# Patient Record
Sex: FEMALE | Race: WHITE | NOT HISPANIC OR LATINO | Employment: OTHER | URBAN - METROPOLITAN AREA
[De-identification: names, ages, dates, MRNs, and addresses within clinical notes are randomized per-mention and may not be internally consistent; named-entity substitution may affect disease eponyms.]

---

## 2017-03-23 ENCOUNTER — OFFICE VISIT (OUTPATIENT)
Dept: MEDICAL GROUP | Facility: PHYSICIAN GROUP | Age: 82
End: 2017-03-23
Payer: MEDICARE

## 2017-03-23 VITALS
RESPIRATION RATE: 16 BRPM | HEIGHT: 64 IN | BODY MASS INDEX: 23.9 KG/M2 | SYSTOLIC BLOOD PRESSURE: 130 MMHG | OXYGEN SATURATION: 95 % | HEART RATE: 77 BPM | DIASTOLIC BLOOD PRESSURE: 70 MMHG | WEIGHT: 140 LBS | TEMPERATURE: 97.4 F

## 2017-03-23 DIAGNOSIS — Z13.29 SCREENING FOR THYROID DISORDER: ICD-10-CM

## 2017-03-23 DIAGNOSIS — Z13.21 ENCOUNTER FOR VITAMIN DEFICIENCY SCREENING: ICD-10-CM

## 2017-03-23 DIAGNOSIS — R01.1 MURMUR, CARDIAC: ICD-10-CM

## 2017-03-23 DIAGNOSIS — N39.41 URGE INCONTINENCE: ICD-10-CM

## 2017-03-23 DIAGNOSIS — I10 ESSENTIAL HYPERTENSION: ICD-10-CM

## 2017-03-23 DIAGNOSIS — E78.5 DYSLIPIDEMIA: ICD-10-CM

## 2017-03-23 DIAGNOSIS — M81.0 OSTEOPOROSIS, POST-MENOPAUSAL: ICD-10-CM

## 2017-03-23 PROCEDURE — 1101F PT FALLS ASSESS-DOCD LE1/YR: CPT | Performed by: FAMILY MEDICINE

## 2017-03-23 PROCEDURE — 4040F PNEUMOC VAC/ADMIN/RCVD: CPT | Performed by: FAMILY MEDICINE

## 2017-03-23 PROCEDURE — 1036F TOBACCO NON-USER: CPT | Performed by: FAMILY MEDICINE

## 2017-03-23 PROCEDURE — 99214 OFFICE O/P EST MOD 30 MIN: CPT | Performed by: FAMILY MEDICINE

## 2017-03-23 PROCEDURE — G8432 DEP SCR NOT DOC, RNG: HCPCS | Performed by: FAMILY MEDICINE

## 2017-03-23 PROCEDURE — G8484 FLU IMMUNIZE NO ADMIN: HCPCS | Performed by: FAMILY MEDICINE

## 2017-03-23 PROCEDURE — G8420 CALC BMI NORM PARAMETERS: HCPCS | Performed by: FAMILY MEDICINE

## 2017-03-23 RX ORDER — ATORVASTATIN CALCIUM 20 MG/1
20 TABLET, FILM COATED ORAL DAILY
Qty: 30 TAB | Refills: 11 | Status: SHIPPED | OUTPATIENT
Start: 2017-03-23 | End: 2018-07-10 | Stop reason: SDUPTHER

## 2017-03-23 RX ORDER — LISINOPRIL 40 MG/1
40 TABLET ORAL DAILY
Qty: 90 TAB | Refills: 1 | Status: SHIPPED | OUTPATIENT
Start: 2017-03-23 | End: 2017-03-23 | Stop reason: SDUPTHER

## 2017-03-23 RX ORDER — OXYBUTYNIN CHLORIDE 5 MG/1
5 TABLET, EXTENDED RELEASE ORAL DAILY
Qty: 30 TAB | Refills: 11 | Status: SHIPPED | OUTPATIENT
Start: 2017-03-23 | End: 2018-07-10 | Stop reason: SDUPTHER

## 2017-03-23 RX ORDER — LISINOPRIL 40 MG/1
40 TABLET ORAL DAILY
Qty: 30 TAB | Refills: 11 | Status: SHIPPED | OUTPATIENT
Start: 2017-03-23 | End: 2018-07-10 | Stop reason: SDUPTHER

## 2017-03-23 RX ORDER — AMLODIPINE BESYLATE 5 MG/1
5 TABLET ORAL
Qty: 30 TAB | Refills: 11 | Status: SHIPPED | OUTPATIENT
Start: 2017-03-23 | End: 2018-07-10 | Stop reason: SDUPTHER

## 2017-03-23 ASSESSMENT — PATIENT HEALTH QUESTIONNAIRE - PHQ9: CLINICAL INTERPRETATION OF PHQ2 SCORE: 0

## 2017-03-23 NOTE — PROGRESS NOTES
Subjective:   Milena Ang is a 81 y.o. female here today for establishing care, discuss chronic medical conditions including dyslipidemia, hypertension, urinary incontinence.    Her previous PCP was at [facility]. In 2015 she has seen Dr. Garnett in our clinic. She is here for reestablishing care.    Dyslipidemia  Patient has had dyslipidemia for several years and has been taking Lipitor 20 mg daily. She does not have any complaints today. She was also taking niacin at some point but due to side effects of intermittent flushing, she stopped taking it over 2 years back. Her last labs were done with Dr. Shay Baker at [facility] about a year back.    Incontinence  Patient has long-standing history of urinary urge incontinence and bladder prolapse. In 2012 she had a bladder sling surgery. Since then her symptoms have improved a lot, however she still has symptoms for about incontinence. She needs to get up at least once and has sleep to go to the bathroom. Whenever she goes out for any social activities, she makes sure that she wears pads. Denies any urine leakage with coughing, sneezing. She was taking Detrol LA 4 mg daily for her urge incontinence, however that did not seem to make much difference, therefore she stopped taking it almost over a year back.    HTN (hypertension)  She has long-standing history of hypertension. Recently she has been increasing exercise mostly with equipment at home. Her blood pressure has mostly been well controlled in recent times. She is currently taking amlodipine 5 mg daily and lisinopril 40 mg daily. She needs refills on those medications.    Hx of Osteoporosis, post-menopausal  She had a history of osteoporosis that was diagnosed several years back. Apparently she took some medication for it, possibly a bisphosphonate (she cannot remember name) for about a year. I reviewed her last DEXA scan that was done in 2011 and that shows osteopenia with increased fracture risk. She she  "does do regular exercise. She also takes calcium and vitamin D daily. She has not had any recent falls or fractures.    Murmur, cardiac  She does not have any symptoms today. Denies any chest pain, shortness of breath, palpitations. On cardiac exam I do appreciate a grade 3 systolic murmur in the aortic area. She has never had any echocardiogram.         Current medicines (including changes today)  Current Outpatient Prescriptions   Medication Sig Dispense Refill   • oxybutynin SR (DITROPAN-XL) 5 MG TABLET SR 24 HR Take 1 Tab by mouth every day. 30 Tab 11   • amlodipine (NORVASC) 5 MG Tab Take 1 Tab by mouth every bedtime. 30 Tab 11   • atorvastatin (LIPITOR) 20 MG Tab Take 1 Tab by mouth every day. 30 Tab 11   • lisinopril (PRINIVIL, ZESTRIL) 40 MG tablet Take 1 Tab by mouth every day. 30 Tab 11   • vitamin D (CHOLECALCIFEROL) 1000 UNIT Tab Take 3,000 Units by mouth every day.     • ascorbic acid (ASCORBIC ACID) 500 MG Tab Take 500 mg by mouth every day.     • coenzyme Q-10 30 MG capsule Take 60 mg by mouth every day.       No current facility-administered medications for this visit.     She  has a past medical history of Dyslipidemia; Menopausal and postmenopausal disorder; Colon polyp; OSTEOPOROSIS; Cervical high risk human papillomavirus (HPV) DNA test positive; Incontinence; Diverticulosis of colon; Hypertension; Cataract; and Kidney stone. She also has no past medical history of Breast cancer (CMS-[facility]).    ROS   No chest pain, no shortness of breath, no abdominal pain  No nausea, vomiting  No fatigue, heat or cold intolerance.  No rash       Objective:     Blood pressure 130/70, pulse 77, temperature 36.3 °C (97.4 °F), resp. rate 16, height 1.626 m (5' 4\"), weight 63.504 kg (140 lb), last menstrual period 07/01/1986, SpO2 95 %. Body mass index is 24.02 kg/(m^2).     PHYSICAL EXAM     GEN: Alert and oriented,well appearing, no acute distress  SKIN: warm, dry to touch, no rashes or lesions in visible " areas  PSYCH: mood and affect normal, judgement normal  EYES: Conjunctiva clear, lids normal,pupils equal round and reactive  ENMT: Normal external nose and ears,EACs normal appearing, TM pearly gray with normal light reflex bilaterally,nasal mucosa and turbinates normal appearing without erythema or edema, lips without lesions,good dentition,oropharynx clear  Neck : Trachea midline, no masses or swelling, no thyromegaly  LYMPHATIC : No cervical or supraclavicular lymphadenopathy  RESPIRATORY : Unlabored respiratory effort, no distress noted, clear to auscultation bilaterally, no wheeze, rhonchi, crackles  CARDIOVASCULAR: RRR, S1 S2 normal, grade 3 systolic murmur in aortic area, no gallop , no carotid bruit, no edema of the extremities      Assessment and Plan:   The following treatment plan was discussed    1. Dyslipidemia  This is a chronic medical condition.Controlled.Continue current medications.  Refill for Lipitor given  - LIPID PROFILE; Future    2. Essential hypertension  This is a chronic medical condition.Controlled.Continue current medications.  Continue current medications.  - CBC WITH DIFFERENTIAL; Future  - COMP METABOLIC PANEL; Future  - amlodipine (NORVASC) 5 MG Tab; Take 1 Tab by mouth every bedtime.  Dispense: 30 Tab; Refill: 11  - lisinopril (PRINIVIL, ZESTRIL) 40 MG tablet; Take 1 Tab by mouth every day.  Dispense: 30 Tab; Refill: 11    3. Urge incontinence  This is a chronic medical condition.Uncontrolled.  Given prescription for oxybutynin extended release 5 mg daily.    4. Osteoporosis, post-menopausal  Patient has not been on any medications.  Her last DEXA scan in 2011 showed osteopenia with increased fracture risk.  We will reevaluate with a DEXA scan.  - DS-BONE DENSITY STUDY (DEXA); Future    5. Murmur, cardiac  This is a new problem. Patient is asymptomatic.  - ECHOCARDIOGRAM COMP W/O CONT; Future    6. Encounter for vitamin deficiency screening  - VITAMIN D,25 HYDROXY; Future    7.  Screening for thyroid disorder  - TSH WITH REFLEX TO FT4; Future      Followup: Return in about 6 months (around 9/23/2017) for follow up on chronic med conditions.         Please note that this dictation was created using voice recognition software. I have made every reasonable attempt to correct obvious errors, but I expect that there are errors of grammar and possibly content that I did not discover before finalizing the note.

## 2017-03-23 NOTE — ASSESSMENT & PLAN NOTE
She had a history of osteoporosis that was diagnosed several years back. Apparently she took some medication for it, possibly a bisphosphonate (she cannot remember name) for about a year. I reviewed her last DEXA scan that was done in 2011 and that shows osteopenia with increased fracture risk. She she does do regular exercise. She also takes calcium and vitamin D daily. She has not had any recent falls or fractures.

## 2017-03-23 NOTE — ASSESSMENT & PLAN NOTE
Patient has long-standing history of urinary urge incontinence and bladder prolapse. In 2012 she had a bladder sling surgery. Since then her symptoms have improved a lot, however she still has symptoms for about incontinence. She needs to get up at least once and has sleep to go to the bathroom. Whenever she goes out for any social activities, she makes sure that she wears pads. Denies any urine leakage with coughing, sneezing. She was taking Detrol LA 4 mg daily for her urge incontinence, however that did not seem to make much difference, therefore she stopped taking it almost over a year back.

## 2017-03-23 NOTE — ASSESSMENT & PLAN NOTE
She has long-standing history of hypertension. Recently she has been increasing exercise mostly with equipment at home. Her blood pressure has mostly been well controlled in recent times. She is currently taking amlodipine 5 mg daily and lisinopril 40 mg daily. She needs refills on those medications.

## 2017-03-23 NOTE — ASSESSMENT & PLAN NOTE
Patient has had dyslipidemia for several years and has been taking Lipitor 20 mg daily. She does not have any complaints today. She was also taking niacin at some point but due to side effects of intermittent flushing, she stopped taking it over 2 years back. Her last labs were done with Dr. Shay Baker at [facility] about a year back.

## 2017-03-23 NOTE — MR AVS SNAPSHOT
"Milena Ang   3/23/2017 2:00 PM   Office Visit   MRN: [id number]    Department:  [facility]   Dept Phone:  [phone]    Description:  Female : 1935   Provider:  Willem Montana M.D.           Reason for Visit     Establish Care           Allergies as of 3/23/2017     Allergen Noted Reactions    Nitrofurantoin 2014   Rash    rash    Pcn [Penicillins] 2010   Rash      You were diagnosed with     Dyslipidemia   [352899]       Essential hypertension   [1312966]       Urge incontinence   [788.31.ICD-9-CM]       Encounter for Medicare annual wellness exam   [384704]       Osteoporosis, post-menopausal   [779981]       Cervical high risk human papillomavirus (HPV) DNA test positive   [795.05.ICD-9-CM]       Incontinence   [598217]       Diverticulosis of large intestine without hemorrhage   [1262757]       IGT (impaired glucose tolerance)   [512272]       Encounter for vitamin deficiency screening   [048138]       Screening for thyroid disorder   [V77.0.ICD-9-CM]       Murmur, cardiac   [351555]       Osteopenia   [990631]       Osteoporosis   [5499608]         Vital Signs     Blood Pressure Pulse Temperature Respirations Height Weight    130/70 mmHg 77 36.3 °C (97.4 °F) 16 1.626 m (5' 4\") 63.504 kg (140 lb)    Body Mass Index Oxygen Saturation Last Menstrual Period Smoking Status          24.02 kg/m2 95% 1986 Never Smoker         Basic Information     Date Of Birth Sex Race Ethnicity Preferred Language    1935 Female White Non- English      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   Unknown - Present    Cervical high risk human papillomavirus (HPV) DNA test positive R87.810   Unknown - Present    Incontinence R32   Unknown - Present    Diverticulosis of colon K57.30   Unknown - Present    IGT (impaired glucose tolerance) R73.02   3/12/2014 - " Present    HTN (hypertension) I10   1/14/2015 - Present      Health Maintenance        Date Due Completion Dates    PAP SMEAR 9/26/2014 9/26/2011    MAMMOGRAM 4/21/2015 4/21/2014, 11/28/2012, 11/21/2011, 2/20/2009, 2/20/2009, 10/10/2006, 3/4/2004    IMM INFLUENZA (1) 9/1/2016 10/10/2012    COLONOSCOPY 10/15/2018 10/15/2008 (Done)    Override on 10/15/2008: Done    BONE DENSITY 4/21/2019 4/21/2014 (Done), 11/21/2011, 10/10/2006, 3/4/2004    Override on 4/21/2014: Done    IMM DTaP/Tdap/Td Vaccine (2 - Td) 9/20/2026 9/20/2016            Current Immunizations     13-VALENT PCV PREVNAR 9/20/2016    Influenza TIV (IM) 10/10/2012    OPV - Historical Data 7/20/1993, 10/23/1990    Pneumococcal Vaccine (UF)Historical Data 10/25/2012, 8/17/2006    Pneumococcal polysaccharide vaccine (PPSV-23) 10/29/2012    SHINGLES VACCINE 10/30/2012, 10/25/2012    Tdap Vaccine 9/20/2016    Tetanus Vaccine 11/3/2004      Below and/or attached are the medications your provider expects you to take. Review all of your home medications and newly ordered medications with your provider and/or pharmacist. Follow medication instructions as directed by your provider and/or pharmacist. Please keep your medication list with you and share with your provider. Update the information when medications are discontinued, doses are changed, or new medications (including over-the-counter products) are added; and carry medication information at all times in the event of emergency situations     Allergies:  NITROFURANTOIN - Rash     PCN - Rash               Medications  Valid as of: March 23, 2017 -  2:52 PM    Generic Name Brand Name Tablet Size Instructions for use    AmLODIPine Besylate (Tab) NORVASC 5 MG Take 1 Tab by mouth every bedtime.        Ascorbic Acid (Tab) ascorbic acid 500 MG Take 500 mg by mouth every day.        Atorvastatin Calcium (Tab) LIPITOR 20 MG Take 1 Tab by mouth every day.        Cholecalciferol (Tab) cholecalciferol 1000 UNIT Take 3,000  Units by mouth every day.        Coenzyme Q10 (Cap) coenzyme Q-10 30 MG Take 60 mg by mouth every day.        Lisinopril (Tab) PRINIVIL, ZESTRIL 40 MG Take 1 Tab by mouth every day.        Oxybutynin Chloride (TABLET SR 24 HR) DITROPAN-XL 5 MG Take 1 Tab by mouth every day.        .                 Medicines prescribed today were sent to:     [facility] [id number] [place] [address], [address] AT [address] [place] [address]    Phone: [phone] Fax: [phone]    Open 24 Hours?: No      Medication refill instructions:       If your prescription bottle indicates you have medication refills left, it is not necessary to call your provider’s office. Please contact your pharmacy and they will refill your medication.    If your prescription bottle indicates you do not have any refills left, you may request refills at any time through one of the following ways: The online [employer] system (except Urgent Care), by calling your provider’s office, or by asking your pharmacy to contact your provider’s office with a refill request. Medication refills are processed only during regular business hours and may not be available until the next business day. Your provider may request additional information or to have a follow-up visit with you prior to refilling your medication.   *Please Note: Medication refills are assigned a new Rx number when refilled electronically. Your pharmacy may indicate that no refills were authorized even though a new prescription for the same medication is available at the pharmacy. Please request the medicine by name with the pharmacy before contacting your provider for a refill.        Your To Do List     Future Labs/Procedures Complete By Expires    CBC WITH DIFFERENTIAL  As directed 3/24/2018    COMP METABOLIC PANEL  As directed 3/24/2018    DS-BONE DENSITY STUDY (DEXA)  As directed 9/23/2017    ECHOCARDIOGRAM COMP W/O CONT  As directed 3/24/2018       Scheduling Instructions:    Systolic murmur in aortic area    LIPID PROFILE  As directed 3/24/2018    TSH WITH REFLEX TO FT4  As directed 3/23/2018    VITAMIN D,25 HYDROXY  As directed 3/24/2018         Wejo Access Code: [email or web address]  Expires: 4/22/2017  2:52 PM    Wejo  A secure, online tool to manage your health information     [employer]’s Wejo® is a secure, online tool that connects you to your personalized health information from the privacy of your home -- day or night - making it very easy for you to manage your healthcare. Once the activation process is completed, you can even access your medical information using the Wejo alberto, which is available for free in the Apple Alberto store or Google Play store.     Wejo provides the following levels of access (as shown below):   My Chart Features   [facility] Doctor [facility]  Urgent  Care Non-[facility]  Primary Care  Doctor   Email your healthcare team securely and privately 24/7 X X X    Manage appointments: schedule your next appointment; view details of past/upcoming appointments X      Request prescription refills. X      View recent personal medical records, including lab and immunizations X X X X   View health record, including health history, allergies, medications X X X X   Read reports about your outpatient visits, procedures, consult and ER notes X X X X   See your discharge summary, which is a recap of your hospital and/or ER visit that includes your diagnosis, lab results, and care plan. X X       How to register for Wejo:  1. Go to  [email or web address].  2. Click on the Sign Up Now box, which takes you to the New Member Sign Up page. You will need to provide the following information:  a. Enter your Wejo Access Code exactly as it appears at the top of this page. (You will not need to use this code after you’ve completed the sign-up process. If you do not sign up before the expiration date, you  must request a new code.)   b. Enter your date of birth.   c. Enter your home email address.   d. Click Submit, and follow the next screen’s instructions.  3. Create a tsumobit ID. This will be your [employer] login ID and cannot be changed, so think of one that is secure and easy to remember.  4. Create a tsumobit password. You can change your password at any time.  5. Enter your Password Reset Question and Answer. This can be used at a later time if you forget your password.   6. Enter your e-mail address. This allows you to receive e-mail notifications when new information is available in [employer].  7. Click Sign Up. You can now view your health information.    For assistance activating your [employer] account, call [phone]

## 2017-03-23 NOTE — ASSESSMENT & PLAN NOTE
She does not have any symptoms today. Denies any chest pain, shortness of breath, palpitations. On cardiac exam I do appreciate a grade 3 systolic murmur in the aortic area. She has never had any echocardiogram.

## 2017-04-20 ENCOUNTER — HOSPITAL ENCOUNTER (OUTPATIENT)
Dept: CARDIOLOGY | Facility: MEDICAL CENTER | Age: 82
End: 2017-04-20
Attending: FAMILY MEDICINE
Payer: MEDICARE

## 2017-04-20 ENCOUNTER — HOSPITAL ENCOUNTER (OUTPATIENT)
Dept: RADIOLOGY | Facility: MEDICAL CENTER | Age: 82
End: 2017-04-20
Attending: FAMILY MEDICINE
Payer: MEDICARE

## 2017-04-20 DIAGNOSIS — M81.0 OSTEOPOROSIS, POST-MENOPAUSAL: ICD-10-CM

## 2017-04-20 DIAGNOSIS — R01.1 MURMUR, CARDIAC: ICD-10-CM

## 2017-04-20 LAB
LV EJECT FRACT  99904: 65
LV EJECT FRACT MOD 2C 99903: 65.98
LV EJECT FRACT MOD 4C 99902: 62.92
LV EJECT FRACT MOD BP 99901: 64.48

## 2017-04-20 PROCEDURE — 93306 TTE W/DOPPLER COMPLETE: CPT | Mod: 26 | Performed by: INTERNAL MEDICINE

## 2017-04-20 PROCEDURE — 77080 DXA BONE DENSITY AXIAL: CPT

## 2017-04-21 ENCOUNTER — TELEPHONE (OUTPATIENT)
Dept: MEDICAL GROUP | Facility: PHYSICIAN GROUP | Age: 82
End: 2017-04-21

## 2017-04-21 NOTE — TELEPHONE ENCOUNTER
----- Message from Willem Montana M.D. sent at 4/20/2017  7:39 PM PDT -----  Please ask patient to make an appointment to discuss DEXA scan results.It is abnormal and we will need to discuss medication.  Her echocardiogram is generally normal.There are a few not so significant findings which we can discuss at the above appointment.    Willem Montana M.D.

## 2017-04-26 ENCOUNTER — TELEPHONE (OUTPATIENT)
Dept: MEDICAL GROUP | Facility: PHYSICIAN GROUP | Age: 82
End: 2017-04-26

## 2017-04-27 NOTE — TELEPHONE ENCOUNTER
Please inform patient that I have her echocardiogram results.There is some mild to moderate valve abnormality.Please ask her to make an appointment (short visit) to discuss in more detail.    Thank You  Willem Montana M.D.

## 2017-05-02 ENCOUNTER — OFFICE VISIT (OUTPATIENT)
Dept: MEDICAL GROUP | Facility: PHYSICIAN GROUP | Age: 82
End: 2017-05-02
Payer: MEDICARE

## 2017-05-02 VITALS
RESPIRATION RATE: 14 BRPM | WEIGHT: 139 LBS | HEIGHT: 64 IN | TEMPERATURE: 98.2 F | SYSTOLIC BLOOD PRESSURE: 130 MMHG | HEART RATE: 61 BPM | OXYGEN SATURATION: 96 % | DIASTOLIC BLOOD PRESSURE: 56 MMHG | BODY MASS INDEX: 23.73 KG/M2

## 2017-05-02 DIAGNOSIS — M85.88 OSTEOPENIA OF SPINE: ICD-10-CM

## 2017-05-02 DIAGNOSIS — I35.1 NONRHEUMATIC AORTIC VALVE INSUFFICIENCY: ICD-10-CM

## 2017-05-02 PROCEDURE — 4040F PNEUMOC VAC/ADMIN/RCVD: CPT | Performed by: FAMILY MEDICINE

## 2017-05-02 PROCEDURE — G8420 CALC BMI NORM PARAMETERS: HCPCS | Performed by: FAMILY MEDICINE

## 2017-05-02 PROCEDURE — 1101F PT FALLS ASSESS-DOCD LE1/YR: CPT | Performed by: FAMILY MEDICINE

## 2017-05-02 PROCEDURE — 99213 OFFICE O/P EST LOW 20 MIN: CPT | Performed by: FAMILY MEDICINE

## 2017-05-02 PROCEDURE — 1036F TOBACCO NON-USER: CPT | Performed by: FAMILY MEDICINE

## 2017-05-02 PROCEDURE — G8432 DEP SCR NOT DOC, RNG: HCPCS | Performed by: FAMILY MEDICINE

## 2017-05-02 NOTE — ASSESSMENT & PLAN NOTE
Patient is here for review of DEXA scan results.She reports that she was found to have osteopenia in the past in around 2010 when she was treated with a Bisphosphonate once a week for a year.A repeat DEXA had shown some improvement and she was advised to stop the medication.She is currently not taking Vitamin D but she is taking a multivitamin that has calcium.She eats a balanced diet and tries to take milk, yogurt and cheese regularly.

## 2017-05-02 NOTE — PROGRESS NOTES
Subjective:   Milena Ang is a 81 y.o. female here today for to discuss recent DEXA and echocardiogram results.    Osteopenia of spine  Patient is here for review of DEXA scan results.She reports that she was found to have osteopenia in the past in around 2010 when she was treated with a Bisphosphonate once a week for a year.A repeat DEXA had shown some improvement and she was advised to stop the medication.She is currently not taking Vitamin D but she is taking a multivitamin that has calcium.She eats a balanced diet and tries to take milk, yogurt and cheese regularly.    Nonrheumatic aortic valve insufficiency  Patient recently had an echocardiogram done since I found a grade 3 murmur in the aortic area on my last exam.She is here to discuss the results.She denies chest pain, shortness of breath.         Current medicines (including changes today)  Current Outpatient Prescriptions   Medication Sig Dispense Refill   • oxybutynin SR (DITROPAN-XL) 5 MG TABLET SR 24 HR Take 1 Tab by mouth every day. 30 Tab 11   • amlodipine (NORVASC) 5 MG Tab Take 1 Tab by mouth every bedtime. 30 Tab 11   • atorvastatin (LIPITOR) 20 MG Tab Take 1 Tab by mouth every day. 30 Tab 11   • lisinopril (PRINIVIL, ZESTRIL) 40 MG tablet Take 1 Tab by mouth every day. 30 Tab 11   • vitamin D (CHOLECALCIFEROL) 1000 UNIT Tab Take 3,000 Units by mouth every day.     • ascorbic acid (ASCORBIC ACID) 500 MG Tab Take 500 mg by mouth every day.     • coenzyme Q-10 30 MG capsule Take 60 mg by mouth every day.       No current facility-administered medications for this visit.     She  has a past medical history of Dyslipidemia; Menopausal and postmenopausal disorder; Colon polyp; OSTEOPOROSIS; Cervical high risk human papillomavirus (HPV) DNA test positive; Incontinence; Diverticulosis of colon; Hypertension; Cataract; and Kidney stone. She also has no past medical history of Breast cancer (CMS-[facility]).    ROS   No chest pain, no shortness of breath,  "no abdominal pain       Objective:     Blood pressure 130/56, pulse 61, temperature 36.8 °C (98.2 °F), resp. rate 14, height 1.626 m (5' 4\"), weight 63.05 kg (139 lb), last menstrual period 07/01/1986, SpO2 96 %. Body mass index is 23.85 kg/(m^2).     PHYSICAL EXAM     GEN: Alert and oriented,well appearing, no acute distress  SKIN: warm, dry to touch, no rashes or lesions in visible areas  PSYCH: mood and affect normal, judgement normal  RESPIRATORY : Unlabored respiratory effort, no distress noted, clear to auscultation bilaterally, no wheeze, rhonchi, crackles  CARDIOVASCULAR: RRR, S1 S2 normal, grade 3 murmur in aortic area , no edema of the extremities, pedal pulses B/L 2+      Assessment and Plan:   The following treatment plan was discussed    1. Nonrheumatic aortic valve insufficiency  New diagnosis.  Reviewed Echocardiogram results with patient.Moderate Aortic insufficiency, Grade I diastolic dysfunction, Normal EF of 65 %, no wall motion abnormalities.  Monitor for ay symptoms like CP, SOB, dizziness etc.Repeat Echocardiogram in 2years.    2. Osteopenia of spine  Reviewed DEXA scan results with patient.  The mean bone mineral density for the lumbar spine is 1.005 g/cm2, which corresponds to a T score of -1.5 and a Z score of 0.4.  The proximal left femur has a mean bone mineral density of 0.909 g/cm2, with a T score of -0.8 and a Z score of 1.3.  When compared to the previous study dated 11/21/2011, there has been a 3.6% increase in the bone mineral density of the lumbar spine and a  3.4% increase in the bone mineral density of the left femur.  10-year Probability of Fracture:  Major Osteoporotic     13.0%  Hip     3.2%  With improvement in BMD since last scan, will continue conservative management and repeat DEXA in 2 years  Continue balanced diet, more fruits and veggies, daily exercise.Labs ordered.Will reassess Vitamin D level.Advised that she should be taking Calcium (800 - 1000 mg) daily along with " Vitamin D  Followup: Return in about 6 months (around 11/2/2017) for follow up on chronic med conditions.         Please note that this dictation was created using voice recognition software. I have made every reasonable attempt to correct obvious errors, but I expect that there are errors of grammar and possibly content that I did not discover before finalizing the note.

## 2017-05-02 NOTE — ASSESSMENT & PLAN NOTE
Patient recently had an echocardiogram done since I found a grade 3 murmur in the aortic area on my last exam.She is here to discuss the results.She denies chest pain, shortness of breath.

## 2017-05-02 NOTE — MR AVS SNAPSHOT
"        Milena Pang Sav   2017 11:00 AM   Office Visit   MRN: [id number]    Department:  [facility]   Dept Phone:  [phone]    Description:  Female : 1935   Provider:  Willem Montana M.D.           Reason for Visit     Results ECHO AND DEXA SCAN       Allergies as of 2017     Allergen Noted Reactions    Nitrofurantoin 2014   Rash    rash    Pcn [Penicillins] 2010   Rash      Vital Signs     Blood Pressure Pulse Temperature Respirations Height Weight    130/56 mmHg 61 36.8 °C (98.2 °F) 14 1.626 m (5' 4\") 63.05 kg (139 lb)    Body Mass Index Oxygen Saturation Last Menstrual Period Smoking Status          23.85 kg/m2 96% 1986 Never Smoker         Basic Information     Date Of Birth Sex Race Ethnicity Preferred Language    1935 Female White Non- English      Your appointments     Sep 05, 2017 10:00 AM   Established Patient with Willem Montana M.D.   [address] [address]   [phone]           You will be receiving a confirmation call a few days before your appointment from our automated call confirmation system.              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   Unknown - Present    Cervical high risk human papillomavirus (HPV) DNA test positive R87.810   Unknown - Present    Incontinence R32   Unknown - Present    Diverticulosis of colon K57.30   Unknown - Present    IGT (impaired glucose tolerance) R73.02   3/12/2014 - Present    HTN (hypertension) I10   2015 - Present    Murmur, cardiac R01.1   3/23/2017 - Present      Health Maintenance        Date Due Completion Dates    PAP SMEAR 2014    MAMMOGRAM 2015, 2012, 2011, 2009, 2009, 10/10/2006, 3/4/2004    COLONOSCOPY 10/15/2018 10/15/2008 (Done)    Override on 10/15/2008: Done  "    BONE DENSITY 4/20/2022 4/20/2017, 4/21/2014 (Done), 11/21/2011, 10/10/2006, 3/4/2004    Override on 4/21/2014: Done    IMM DTaP/Tdap/Td Vaccine (2 - Td) 9/20/2026 9/20/2016            Current Immunizations     13-VALENT PCV PREVNAR 9/20/2016    Influenza TIV (IM) 10/10/2012    OPV - Historical Data 7/20/1993, 10/23/1990    Pneumococcal Vaccine (UF)Historical Data 10/25/2012, 8/17/2006    Pneumococcal polysaccharide vaccine (PPSV-23) 10/29/2012    SHINGLES VACCINE 10/30/2012, 10/25/2012    Tdap Vaccine 9/20/2016    Tetanus Vaccine 11/3/2004      Below and/or attached are the medications your provider expects you to take. Review all of your home medications and newly ordered medications with your provider and/or pharmacist. Follow medication instructions as directed by your provider and/or pharmacist. Please keep your medication list with you and share with your provider. Update the information when medications are discontinued, doses are changed, or new medications (including over-the-counter products) are added; and carry medication information at all times in the event of emergency situations     Allergies:  NITROFURANTOIN - Rash     PCN - Rash               Medications  Valid as of: May 02, 2017 - 11:16 AM    Generic Name Brand Name Tablet Size Instructions for use    AmLODIPine Besylate (Tab) NORVASC 5 MG Take 1 Tab by mouth every bedtime.        Ascorbic Acid (Tab) ascorbic acid 500 MG Take 500 mg by mouth every day.        Atorvastatin Calcium (Tab) LIPITOR 20 MG Take 1 Tab by mouth every day.        Cholecalciferol (Tab) cholecalciferol 1000 UNIT Take 3,000 Units by mouth every day.        Coenzyme Q10 (Cap) coenzyme Q-10 30 MG Take 60 mg by mouth every day.        Lisinopril (Tab) PRINIVIL, ZESTRIL 40 MG Take 1 Tab by mouth every day.        Oxybutynin Chloride (TABLET SR 24 HR) DITROPAN-XL 5 MG Take 1 Tab by mouth every day.        .                 Medicines prescribed today were sent to:     [id number]  [place], [place] - [address] AT [facility] [address]    [address] [place] [address]    Phone: [phone] Fax: [phone]    Open 24 Hours?: No      Medication refill instructions:       If your prescription bottle indicates you have medication refills left, it is not necessary to call your provider’s office. Please contact your pharmacy and they will refill your medication.    If your prescription bottle indicates you do not have any refills left, you may request refills at any time through one of the following ways: The online Medisync Bioservices system (except Urgent Care), by calling your provider’s office, or by asking your pharmacy to contact your provider’s office with a refill request. Medication refills are processed only during regular business hours and may not be available until the next business day. Your provider may request additional information or to have a follow-up visit with you prior to refilling your medication.   *Please Note: Medication refills are assigned a new Rx number when refilled electronically. Your pharmacy may indicate that no refills were authorized even though a new prescription for the same medication is available at the pharmacy. Please request the medicine by name with the pharmacy before contacting your provider for a refill.           Medisync Bioservices Access Code: [email or web address]  Expires: 6/1/2017 11:16 AM    Medisync Bioservices  A secure, online tool to manage your health information     [employer]’s Medisync Bioservices® is a secure, online tool that connects you to your personalized health information from the privacy of your home -- day or night - making it very easy for you to manage your healthcare. Once the activation process is completed, you can even access your medical information using the Medisync Bioservices annemarie, which is available for free in the Apple Annemarie store or Google Play store.     Medisync Bioservices provides the following levels of access (as shown below):   My Chart  Features   [facility] Doctor [facility]  Specialists [facility]  Urgent  Care Non-[facility]  Primary Care  Doctor   Email your healthcare team securely and privately 24/7 X X X    Manage appointments: schedule your next appointment; view details of past/upcoming appointments X      Request prescription refills. X      View recent personal medical records, including lab and immunizations X X X X   View health record, including health history, allergies, medications X X X X   Read reports about your outpatient visits, procedures, consult and ER notes X X X X   See your discharge summary, which is a recap of your hospital and/or ER visit that includes your diagnosis, lab results, and care plan. X X       How to register for BeVocal:  1. Go to  [email or web address].  2. Click on the Sign Up Now box, which takes you to the New Member Sign Up page. You will need to provide the following information:  a. Enter your BeVocal Access Code exactly as it appears at the top of this page. (You will not need to use this code after you’ve completed the sign-up process. If you do not sign up before the expiration date, you must request a new code.)   b. Enter your date of birth.   c. Enter your home email address.   d. Click Submit, and follow the next screen’s instructions.  3. Create a BeVocal ID. This will be your BeVocal login ID and cannot be changed, so think of one that is secure and easy to remember.  4. Create a BeVocal password. You can change your password at any time.  5. Enter your Password Reset Question and Answer. This can be used at a later time if you forget your password.   6. Enter your e-mail address. This allows you to receive e-mail notifications when new information is available in BeVocal.  7. Click Sign Up. You can now view your health information.    For assistance activating your BeVocal account, call [phone]

## 2017-05-25 ENCOUNTER — OFFICE VISIT (OUTPATIENT)
Dept: URGENT CARE | Facility: PHYSICIAN GROUP | Age: 82
End: 2017-05-25
Payer: MEDICARE

## 2017-05-25 VITALS
OXYGEN SATURATION: 96 % | WEIGHT: 140 LBS | HEIGHT: 63 IN | TEMPERATURE: 98.8 F | DIASTOLIC BLOOD PRESSURE: 70 MMHG | BODY MASS INDEX: 24.8 KG/M2 | SYSTOLIC BLOOD PRESSURE: 132 MMHG | HEART RATE: 68 BPM | RESPIRATION RATE: 16 BRPM

## 2017-05-25 DIAGNOSIS — M79.89 LEFT LEG SWELLING: ICD-10-CM

## 2017-05-25 DIAGNOSIS — M79.605 LEFT LEG PAIN: ICD-10-CM

## 2017-05-25 PROCEDURE — 1036F TOBACCO NON-USER: CPT | Performed by: FAMILY MEDICINE

## 2017-05-25 PROCEDURE — 1101F PT FALLS ASSESS-DOCD LE1/YR: CPT | Performed by: FAMILY MEDICINE

## 2017-05-25 PROCEDURE — 4040F PNEUMOC VAC/ADMIN/RCVD: CPT | Performed by: FAMILY MEDICINE

## 2017-05-25 PROCEDURE — G8432 DEP SCR NOT DOC, RNG: HCPCS | Performed by: FAMILY MEDICINE

## 2017-05-25 PROCEDURE — G8420 CALC BMI NORM PARAMETERS: HCPCS | Performed by: FAMILY MEDICINE

## 2017-05-25 PROCEDURE — 99214 OFFICE O/P EST MOD 30 MIN: CPT | Performed by: FAMILY MEDICINE

## 2017-05-25 NOTE — MR AVS SNAPSHOT
"        Milena Pang Sav   2017 4:00 PM   Office Visit   MRN: [id number]    Department:  [facility]   Dept Phone:  [phone]    Description:  Female : 1935   Provider:  Jordin Fontenot M.D.           Reason for Visit     Leg Pain left lower leg pain, described as \"tightness,\" x3 days      Allergies as of 2017     Allergen Noted Reactions    Nitrofurantoin 2014   Rash    rash    Pcn [Penicillins] 2010   Rash      You were diagnosed with     Left leg pain   [003240]       Left leg swelling   [[id number]]         Vital Signs     Blood Pressure Pulse Temperature Respirations Height Weight    132/70 mmHg 68 37.1 °C (98.8 °F) 16 1.6 m (5' 2.99\") 63.504 kg (140 lb)    Body Mass Index Oxygen Saturation Last Menstrual Period Smoking Status          24.81 kg/m2 96% 1986 Never Smoker         Basic Information     Date Of Birth Sex Race Ethnicity Preferred Language    1935 Female White Non- English      Problem List              ICD-10-CM Priority Class Noted - Resolved    Dyslipidemia E78.5   Unknown - Present    Cervical high risk human papillomavirus (HPV) DNA test positive R87.810   Unknown - Present    Incontinence R32   Unknown - Present    Diverticulosis of colon K57.30   Unknown - Present    IGT (impaired glucose tolerance) R73.02   3/12/2014 - Present    HTN (hypertension) I10   2015 - Present    Murmur, cardiac R01.1   3/23/2017 - Present    Nonrheumatic aortic valve insufficiency I35.1   2017 - Present    Osteopenia of spine M85.88   2017 - Present      Health Maintenance        Date Due Completion Dates    PAP SMEAR 2014    MAMMOGRAM 2015, 2012, 2011, 2009, 2009, 10/10/2006, 3/4/2004    COLONOSCOPY 10/15/2018 10/15/2008 (Done)    Override on 10/15/2008: Done    BONE DENSITY 2022, 2014 (Done), 2011, 10/10/2006, 3/4/2004    Override on 2014: Done    IMM DTaP/Tdap/Td " Vaccine (2 - Td) 9/20/2026 9/20/2016            Current Immunizations     13-VALENT PCV PREVNAR 9/20/2016    Influenza TIV (IM) 10/10/2012    OPV - Historical Data 7/20/1993, 10/23/1990    Pneumococcal Vaccine (UF)Historical Data 10/25/2012, 8/17/2006    Pneumococcal polysaccharide vaccine (PPSV-23) 10/29/2012    SHINGLES VACCINE 10/30/2012, 10/25/2012    Tdap Vaccine 9/20/2016    Tetanus Vaccine 11/3/2004      Below and/or attached are the medications your provider expects you to take. Review all of your home medications and newly ordered medications with your provider and/or pharmacist. Follow medication instructions as directed by your provider and/or pharmacist. Please keep your medication list with you and share with your provider. Update the information when medications are discontinued, doses are changed, or new medications (including over-the-counter products) are added; and carry medication information at all times in the event of emergency situations     Allergies:  NITROFURANTOIN - Rash     PCN - Rash               Medications  Valid as of: May 25, 2017 -  5:10 PM    Generic Name Brand Name Tablet Size Instructions for use    AmLODIPine Besylate (Tab) NORVASC 5 MG Take 1 Tab by mouth every bedtime.        Ascorbic Acid (Tab) ascorbic acid 500 MG Take 500 mg by mouth every day.        Atorvastatin Calcium (Tab) LIPITOR 20 MG Take 1 Tab by mouth every day.        Cholecalciferol (Tab) cholecalciferol 1000 UNIT Take 3,000 Units by mouth every day.        Coenzyme Q10 (Cap) coenzyme Q-10 30 MG Take 60 mg by mouth every day.        Lisinopril (Tab) PRINIVIL, ZESTRIL 40 MG Take 1 Tab by mouth every day.        Oxybutynin Chloride (TABLET SR 24 HR) DITROPAN-XL 5 MG Take 1 Tab by mouth every day.        .                 Medicines prescribed today were sent to:     [id number]  [place], [place] - [address] [place] [address] [place] AT [address] [place] [address]    Phone:  [phone] Fax: [phone]    Open 24 Hours?: No      Medication refill instructions:       If your prescription bottle indicates you have medication refills left, it is not necessary to call your provider’s office. Please contact your pharmacy and they will refill your medication.    If your prescription bottle indicates you do not have any refills left, you may request refills at any time through one of the following ways: The online CoaLogix system (except Urgent Care), by calling your provider’s office, or by asking your pharmacy to contact your provider’s office with a refill request. Medication refills are processed only during regular business hours and may not be available until the next business day. Your provider may request additional information or to have a follow-up visit with you prior to refilling your medication.   *Please Note: Medication refills are assigned a new Rx number when refilled electronically. Your pharmacy may indicate that no refills were authorized even though a new prescription for the same medication is available at the pharmacy. Please request the medicine by name with the pharmacy before contacting your provider for a refill.        Your To Do List     Future Labs/Procedures Complete By Expires    US-EXTREMITY VENOUS UNILATERAL-LOWER LEFT  As directed 6/1/2017    Comments:    [phone]         CoaLogix Access Code: [email or web address]  Expires: 6/1/2017 11:16 AM    CoaLogix  A secure, online tool to manage your health information     [employer]’s CoaLogix® is a secure, online tool that connects you to your personalized health information from the privacy of your home -- day or night - making it very easy for you to manage your healthcare. Once the activation process is completed, you can even access your medical information using the CoaLogix alberto, which is available for free in the Apple Alberto store or Google Play store.     CoaLogix provides the following levels of access (as  shown below):   My Chart Features   [facility] Doctor [facility]  Specialists [facility]  Urgent  Care Non-[facility]  Primary Care  Doctor   Email your healthcare team securely and privately 24/7 X X X    Manage appointments: schedule your next appointment; view details of past/upcoming appointments X      Request prescription refills. X      View recent personal medical records, including lab and immunizations X X X X   View health record, including health history, allergies, medications X X X X   Read reports about your outpatient visits, procedures, consult and ER notes X X X X   See your discharge summary, which is a recap of your hospital and/or ER visit that includes your diagnosis, lab results, and care plan. X X       How to register for Appboy:  1. Go to  [email or web address].  2. Click on the Sign Up Now box, which takes you to the New Member Sign Up page. You will need to provide the following information:  a. Enter your Appboy Access Code exactly as it appears at the top of this page. (You will not need to use this code after you’ve completed the sign-up process. If you do not sign up before the expiration date, you must request a new code.)   b. Enter your date of birth.   c. Enter your home email address.   d. Click Submit, and follow the next screen’s instructions.  3. Create a Appboy ID. This will be your Appboy login ID and cannot be changed, so think of one that is secure and easy to remember.  4. Create a Appboy password. You can change your password at any time.  5. Enter your Password Reset Question and Answer. This can be used at a later time if you forget your password.   6. Enter your e-mail address. This allows you to receive e-mail notifications when new information is available in Appboy.  7. Click Sign Up. You can now view your health information.    For assistance activating your Appboy account, call [phone]

## 2017-05-25 NOTE — PROGRESS NOTES
"Chief Complaint:    Chief Complaint   Patient presents with   • Leg Pain     left lower leg pain, described as \"tightness,\" x3 days       History of Present Illness:    This is a new problem. Since 5/22/17, has pain and swelling in left lower calf region. Can feel tight like pressure and fluctuating squeezing sensation. No injury or trauma. Ibuprofen helps temporarily. No h/o blood clots. No recent long plane or car trip.      Review of Systems:    Constitutional: Negative for fever, chills, and diaphoresis.   Eyes: Negative for change in vision, photophobia, pain, redness, and discharge.  ENT: Negative for ear pain, ear discharge, hearing loss, tinnitus, nasal congestion, nosebleeds, and sore throat.    Respiratory: Negative for cough, hemoptysis, sputum production, shortness of breath, wheezing, and stridor.    Cardiovascular: Negative for chest pain, palpitations, orthopnea, claudication, leg swelling, and PND.   Gastrointestinal: Negative for abdominal pain, nausea, vomiting, diarrhea, constipation, blood in stool, and melena.   Genitourinary: No new symptoms.  Musculoskeletal: See HPI.   Skin: Negative for rash and itching.   Neurological: Negative for dizziness, tingling, tremors, sensory change, speech change, focal weakness, seizures, loss of consciousness, and headaches.   Endo: Negative for polydipsia.   Heme: Does not bruise/bleed easily.   Psychiatric/Behavioral: Negative for depression, suicidal ideas, hallucinations, memory loss and substance abuse. The patient is not nervous/anxious and does not have insomnia.      Past Medical History:    Past Medical History   Diagnosis Date   • Dyslipidemia    • Menopausal and postmenopausal disorder    • Colon polyp      hyperplastic   • OSTEOPOROSIS    • Cervical high risk human papillomavirus (HPV) DNA test positive    • Incontinence      pessary   • Diverticulosis of colon    • Hypertension    • Cataract    • Kidney stone        Past Surgical History:    Past " "Surgical History   Procedure Laterality Date   • Cholecystectomy     • Bladder sling female  7/12     Dr. Quinn       Social History:    Social History     Social History   • Marital Status:      Spouse Name: Rivera   • Number of Children: 2   • Years of Education: college     Occupational History   • Retired Teacher Other     Social History Main Topics   • Smoking status: Never Smoker    • Smokeless tobacco: Never Used   • Alcohol Use: No   • Drug Use: No   • Sexual Activity: No     Other Topics Concern   • Not on file     Social History Narrative       Family History:    Family History   Problem Relation Age of Onset   • Cancer Father      pancreatic   • Heart Disease Father      MI at age 57   • Hypertension Father    • Cancer Brother      throat   • Cancer Brother      melanoma   • Heart Disease Brother    • Psychiatry Daughter    • Cancer Sister      ovaren cancer       Medications:    Current Outpatient Prescriptions on File Prior to Visit   Medication Sig Dispense Refill   • oxybutynin SR (DITROPAN-XL) 5 MG TABLET SR 24 HR Take 1 Tab by mouth every day. 30 Tab 11   • amlodipine (NORVASC) 5 MG Tab Take 1 Tab by mouth every bedtime. 30 Tab 11   • atorvastatin (LIPITOR) 20 MG Tab Take 1 Tab by mouth every day. 30 Tab 11   • lisinopril (PRINIVIL, ZESTRIL) 40 MG tablet Take 1 Tab by mouth every day. 30 Tab 11   • vitamin D (CHOLECALCIFEROL) 1000 UNIT Tab Take 3,000 Units by mouth every day.     • ascorbic acid (ASCORBIC ACID) 500 MG Tab Take 500 mg by mouth every day.     • coenzyme Q-10 30 MG capsule Take 60 mg by mouth every day.       No current facility-administered medications on file prior to visit.       Allergies:    Allergies   Allergen Reactions   • Nitrofurantoin Rash     rash   • Pcn [Penicillins] Rash         Vitals:    Filed Vitals:    05/25/17 1615   BP: 132/70   Pulse: 68   Temp: 37.1 °C (98.8 °F)   Resp: 16   Height: 1.6 m (5' 2.99\")   Weight: 63.504 kg (140 lb)   SpO2: 96% "       Physical Exam:    Constitutional: Vital signs reviewed. Appears well-developed and well-nourished. No acute distress.   Eyes: Sclera white, conjunctivae clear.  ENT: External ears normal. Hearing normal.  Cardiovascular: Peripheral pulses 2+. Moderate varicosities bilaterally.  Pulmonary/Chest: Respirations non-labored.  Musculoskeletal: Left lower calf region: area of soft tissue swelling compared to right without significant tenderness to palpation. Normal gait. Normal range of motion. No muscular atrophy or weakness.  Neurological: Alert and oriented to person, place, and time. Muscle tone normal. Coordination normal. Light touch and sensation normal.   Skin: No rashes or lesions. Warm, dry, normal turgor.  Psychiatric: Normal mood and affect. Behavior is normal. Judgment and thought content normal.       Assessment / Plan:    1. Left leg pain  - US-EXTREMITY VENOUS UNILATERAL-LOWER LEFT; Future    2. Left leg swelling  - US-EXTREMITY VENOUS UNILATERAL-LOWER LEFT; Future      Discussed with her DDX and management options.    Agreeable to Venous Doppler US left leg ordered to r/o DVT - could not be scheduled for today.     Due to protocol, tonight, staff cannot schedule test for tomorrow. Staff will need to schedule test tomorrow AM per protocol.    May continue with Ibuprofen prn as this is helping temporarily.    Says may call [phone] (H) with result and OK to leave message with result.

## 2017-05-26 ENCOUNTER — HOSPITAL ENCOUNTER (OUTPATIENT)
Dept: RADIOLOGY | Facility: MEDICAL CENTER | Age: 82
End: 2017-05-26
Attending: FAMILY MEDICINE
Payer: MEDICARE

## 2017-05-26 DIAGNOSIS — M79.605 LEFT LEG PAIN: ICD-10-CM

## 2017-05-26 DIAGNOSIS — M79.89 LEFT LEG SWELLING: ICD-10-CM

## 2017-05-26 PROCEDURE — 93971 EXTREMITY STUDY: CPT

## 2017-05-26 PROCEDURE — 93971 EXTREMITY STUDY: CPT | Mod: 26 | Performed by: SURGERY

## 2017-05-27 ENCOUNTER — TELEPHONE (OUTPATIENT)
Dept: URGENT CARE | Facility: PHYSICIAN GROUP | Age: 82
End: 2017-05-27

## 2017-05-27 DIAGNOSIS — M79.605 PAIN OF LEFT LOWER EXTREMITY: ICD-10-CM

## 2017-05-27 RX ORDER — PREDNISONE 20 MG/1
TABLET ORAL
Qty: 7 TAB | Refills: 0 | Status: SHIPPED | OUTPATIENT
Start: 2017-05-27 | End: 2018-07-10

## 2017-05-27 NOTE — TELEPHONE ENCOUNTER
Spoke to her. Discussed with her US result. She reports wearing compression stocking during day helps. Advised she may continue. She reports Ibuprofen and Aspirin help temporarily. Advised she may continue. Discussed with her short-term Rx for Prednisone for anti-inflammatory effect. She would like and this was rx'd. Advised to follow-up with PCP or return to urgent care if getting worse or not improving at all in 3-5 days with Prednisone.

## 2017-11-16 ENCOUNTER — HOSPITAL ENCOUNTER (OUTPATIENT)
Dept: LAB | Facility: MEDICAL CENTER | Age: 82
End: 2017-11-16
Attending: FAMILY MEDICINE
Payer: MEDICARE

## 2017-11-16 DIAGNOSIS — E78.5 DYSLIPIDEMIA: ICD-10-CM

## 2017-11-16 DIAGNOSIS — Z13.21 ENCOUNTER FOR VITAMIN DEFICIENCY SCREENING: ICD-10-CM

## 2017-11-16 DIAGNOSIS — I10 ESSENTIAL HYPERTENSION: ICD-10-CM

## 2017-11-16 DIAGNOSIS — Z13.29 SCREENING FOR THYROID DISORDER: ICD-10-CM

## 2017-11-16 LAB
25(OH)D3 SERPL-MCNC: 22 NG/ML (ref 30–100)
ALBUMIN SERPL BCP-MCNC: 4.3 G/DL (ref 3.2–4.9)
ALBUMIN/GLOB SERPL: 1.4 G/DL
ALP SERPL-CCNC: 72 U/L (ref 30–99)
ALT SERPL-CCNC: 22 U/L (ref 2–50)
ANION GAP SERPL CALC-SCNC: 9 MMOL/L (ref 0–11.9)
AST SERPL-CCNC: 21 U/L (ref 12–45)
BASOPHILS # BLD AUTO: 1.3 % (ref 0–1.8)
BASOPHILS # BLD: 0.06 K/UL (ref 0–0.12)
BILIRUB SERPL-MCNC: 1.3 MG/DL (ref 0.1–1.5)
BUN SERPL-MCNC: 22 MG/DL (ref 8–22)
CALCIUM SERPL-MCNC: 9.6 MG/DL (ref 8.5–10.5)
CHLORIDE SERPL-SCNC: 108 MMOL/L (ref 96–112)
CHOLEST SERPL-MCNC: 147 MG/DL (ref 100–199)
CO2 SERPL-SCNC: 24 MMOL/L (ref 20–33)
CREAT SERPL-MCNC: 0.8 MG/DL (ref 0.5–1.4)
EOSINOPHIL # BLD AUTO: 0.03 K/UL (ref 0–0.51)
EOSINOPHIL NFR BLD: 0.7 % (ref 0–6.9)
ERYTHROCYTE [DISTWIDTH] IN BLOOD BY AUTOMATED COUNT: 42.7 FL (ref 35.9–50)
GFR SERPL CREATININE-BSD FRML MDRD: >60 ML/MIN/1.73 M 2
GLOBULIN SER CALC-MCNC: 3.1 G/DL (ref 1.9–3.5)
GLUCOSE SERPL-MCNC: 101 MG/DL (ref 65–99)
HCT VFR BLD AUTO: 48.2 % (ref 37–47)
HDLC SERPL-MCNC: 39 MG/DL
HGB BLD-MCNC: 15.8 G/DL (ref 12–16)
IMM GRANULOCYTES # BLD AUTO: 0.02 K/UL (ref 0–0.11)
IMM GRANULOCYTES NFR BLD AUTO: 0.4 % (ref 0–0.9)
LDLC SERPL CALC-MCNC: 76 MG/DL
LYMPHOCYTES # BLD AUTO: 1.72 K/UL (ref 1–4.8)
LYMPHOCYTES NFR BLD: 37.4 % (ref 22–41)
MCH RBC QN AUTO: 31.2 PG (ref 27–33)
MCHC RBC AUTO-ENTMCNC: 32.8 G/DL (ref 33.6–35)
MCV RBC AUTO: 95.3 FL (ref 81.4–97.8)
MONOCYTES # BLD AUTO: 0.37 K/UL (ref 0–0.85)
MONOCYTES NFR BLD AUTO: 8 % (ref 0–13.4)
NEUTROPHILS # BLD AUTO: 2.4 K/UL (ref 2–7.15)
NEUTROPHILS NFR BLD: 52.2 % (ref 44–72)
NRBC # BLD AUTO: 0 K/UL
NRBC BLD AUTO-RTO: 0 /100 WBC
PLATELET # BLD AUTO: 175 K/UL (ref 164–446)
PMV BLD AUTO: 13.8 FL (ref 9–12.9)
POTASSIUM SERPL-SCNC: 3.5 MMOL/L (ref 3.6–5.5)
PROT SERPL-MCNC: 7.4 G/DL (ref 6–8.2)
RBC # BLD AUTO: 5.06 M/UL (ref 4.2–5.4)
SODIUM SERPL-SCNC: 141 MMOL/L (ref 135–145)
TRIGL SERPL-MCNC: 158 MG/DL (ref 0–149)
TSH SERPL DL<=0.005 MIU/L-ACNC: 0.3 UIU/ML (ref 0.3–3.7)
WBC # BLD AUTO: 4.6 K/UL (ref 4.8–10.8)

## 2017-11-16 PROCEDURE — 80053 COMPREHEN METABOLIC PANEL: CPT

## 2017-11-16 PROCEDURE — 82306 VITAMIN D 25 HYDROXY: CPT | Mod: GA

## 2017-11-16 PROCEDURE — 85025 COMPLETE CBC W/AUTO DIFF WBC: CPT

## 2017-11-16 PROCEDURE — 84443 ASSAY THYROID STIM HORMONE: CPT

## 2017-11-16 PROCEDURE — 80061 LIPID PANEL: CPT

## 2017-11-16 PROCEDURE — 36415 COLL VENOUS BLD VENIPUNCTURE: CPT

## 2017-11-18 DIAGNOSIS — E55.9 VITAMIN D DEFICIENCY: ICD-10-CM

## 2017-11-18 DIAGNOSIS — E78.5 DYSLIPIDEMIA: ICD-10-CM

## 2018-07-02 ENCOUNTER — HOSPITAL ENCOUNTER (OUTPATIENT)
Dept: LAB | Facility: MEDICAL CENTER | Age: 83
End: 2018-07-02
Attending: FAMILY MEDICINE
Payer: MEDICARE

## 2018-07-02 DIAGNOSIS — E55.9 VITAMIN D DEFICIENCY: ICD-10-CM

## 2018-07-02 DIAGNOSIS — E78.5 DYSLIPIDEMIA: ICD-10-CM

## 2018-07-02 LAB
25(OH)D3 SERPL-MCNC: 25 NG/ML (ref 30–100)
ALBUMIN SERPL BCP-MCNC: 4.4 G/DL (ref 3.2–4.9)
ALBUMIN/GLOB SERPL: 1.4 G/DL
ALP SERPL-CCNC: 71 U/L (ref 30–99)
ALT SERPL-CCNC: 23 U/L (ref 2–50)
ANION GAP SERPL CALC-SCNC: 6 MMOL/L (ref 0–11.9)
AST SERPL-CCNC: 20 U/L (ref 12–45)
BILIRUB SERPL-MCNC: 1.5 MG/DL (ref 0.1–1.5)
BUN SERPL-MCNC: 20 MG/DL (ref 8–22)
CALCIUM SERPL-MCNC: 9.6 MG/DL (ref 8.5–10.5)
CHLORIDE SERPL-SCNC: 109 MMOL/L (ref 96–112)
CHOLEST SERPL-MCNC: 143 MG/DL (ref 100–199)
CO2 SERPL-SCNC: 27 MMOL/L (ref 20–33)
CREAT SERPL-MCNC: 0.82 MG/DL (ref 0.5–1.4)
GLOBULIN SER CALC-MCNC: 3.2 G/DL (ref 1.9–3.5)
GLUCOSE SERPL-MCNC: 114 MG/DL (ref 65–99)
HDLC SERPL-MCNC: 36 MG/DL
LDLC SERPL CALC-MCNC: 70 MG/DL
POTASSIUM SERPL-SCNC: 3.8 MMOL/L (ref 3.6–5.5)
PROT SERPL-MCNC: 7.6 G/DL (ref 6–8.2)
SODIUM SERPL-SCNC: 142 MMOL/L (ref 135–145)
TRIGL SERPL-MCNC: 184 MG/DL (ref 0–149)

## 2018-07-02 PROCEDURE — 80061 LIPID PANEL: CPT

## 2018-07-02 PROCEDURE — 82306 VITAMIN D 25 HYDROXY: CPT

## 2018-07-02 PROCEDURE — 80053 COMPREHEN METABOLIC PANEL: CPT

## 2018-07-02 PROCEDURE — 36415 COLL VENOUS BLD VENIPUNCTURE: CPT

## 2018-07-10 ENCOUNTER — OFFICE VISIT (OUTPATIENT)
Dept: MEDICAL GROUP | Facility: PHYSICIAN GROUP | Age: 83
End: 2018-07-10
Payer: MEDICARE

## 2018-07-10 VITALS
BODY MASS INDEX: 24.8 KG/M2 | WEIGHT: 140 LBS | HEIGHT: 63 IN | SYSTOLIC BLOOD PRESSURE: 132 MMHG | TEMPERATURE: 97.8 F | RESPIRATION RATE: 16 BRPM | OXYGEN SATURATION: 98 % | DIASTOLIC BLOOD PRESSURE: 80 MMHG | HEART RATE: 60 BPM

## 2018-07-10 DIAGNOSIS — N32.81 OVERACTIVE BLADDER: ICD-10-CM

## 2018-07-10 DIAGNOSIS — E78.5 DYSLIPIDEMIA: ICD-10-CM

## 2018-07-10 DIAGNOSIS — I10 ESSENTIAL HYPERTENSION: ICD-10-CM

## 2018-07-10 PROCEDURE — 99214 OFFICE O/P EST MOD 30 MIN: CPT | Performed by: NURSE PRACTITIONER

## 2018-07-10 RX ORDER — OXYBUTYNIN CHLORIDE 5 MG/1
5 TABLET, EXTENDED RELEASE ORAL DAILY
Qty: 90 TAB | Refills: 3 | Status: SHIPPED | OUTPATIENT
Start: 2018-07-10 | End: 2019-07-24 | Stop reason: SDUPTHER

## 2018-07-10 RX ORDER — AMLODIPINE BESYLATE 5 MG/1
5 TABLET ORAL
Qty: 90 TAB | Refills: 3 | Status: SHIPPED | OUTPATIENT
Start: 2018-07-10 | End: 2019-08-06 | Stop reason: SDUPTHER

## 2018-07-10 RX ORDER — ATORVASTATIN CALCIUM 20 MG/1
20 TABLET, FILM COATED ORAL DAILY
Qty: 90 TAB | Refills: 3 | Status: SHIPPED | OUTPATIENT
Start: 2018-07-10 | End: 2019-08-06

## 2018-07-10 RX ORDER — LISINOPRIL 40 MG/1
40 TABLET ORAL DAILY
Qty: 90 TAB | Refills: 3 | Status: SHIPPED | OUTPATIENT
Start: 2018-07-10 | End: 2019-08-01 | Stop reason: SDUPTHER

## 2018-07-10 ASSESSMENT — PATIENT HEALTH QUESTIONNAIRE - PHQ9: CLINICAL INTERPRETATION OF PHQ2 SCORE: 0

## 2018-07-10 NOTE — PROGRESS NOTES
Chief Complaint   Patient presents with   • Results     labs       HISTORY OF PRESENT ILLNESS: Patient is a 83 y.o. female established patient of Dr. Montana who presents today to discuss the following issues:    Dyslipidemia  Reviewed lab results.  Will continue on Lipitor.  Would like refills.    Overactive bladder  Would like refills of oxybutinin.    HTN (hypertension)  Chronic in nature.  Stable on meds.  Due for refills.       Patient Active Problem List    Diagnosis Date Noted   • Overactive bladder 2018   • Vitamin D deficiency 2017   • Nonrheumatic aortic valve insufficiency 2017   • Osteopenia of spine 2017   • Murmur, cardiac 2017   • HTN (hypertension) 2015   • Dyslipidemia    • Incontinence    • Diverticulosis of colon        Allergies:Nitrofurantoin and Pcn [penicillins]    Current Outpatient Prescriptions   Medication Sig Dispense Refill   • amLODIPine (NORVASC) 5 MG Tab Take 1 Tab by mouth every bedtime. 90 Tab 3   • atorvastatin (LIPITOR) 20 MG Tab Take 1 Tab by mouth every day. 90 Tab 3   • lisinopril (PRINIVIL, ZESTRIL) 40 MG tablet Take 1 Tab by mouth every day. 90 Tab 3   • oxybutynin SR (DITROPAN-XL) 5 MG TABLET SR 24 HR Take 1 Tab by mouth every day. 90 Tab 3   • vitamin D (CHOLECALCIFEROL) 1000 UNIT Tab Take 3,000 Units by mouth every day.       No current facility-administered medications for this visit.        Social History   Substance Use Topics   • Smoking status: Never Smoker   • Smokeless tobacco: Never Used   • Alcohol use No       Family Status   Relation Status   • Father  at age 70    pancreatic cancer   • Brother    • Brother    • Mother Alive   • Sister Alive   • Brother    • Daughter      Family History   Problem Relation Age of Onset   • Cancer Father         pancreatic   • Heart Disease Father         MI at age 57   • Hypertension Father    • Cancer Brother         melanoma   • Heart Disease Brother    • Cancer  "Sister         ovaren cancer   • Cancer Brother         throat   • Psychiatry Daughter        Review of Systems:   Constitutional: Negative for fever, chills, weight loss and malaise/fatigue.   HENT: Negative for ear pain, nosebleeds, congestion, sore throat and neck pain.    Eyes: Negative for blurred vision.   Respiratory: Negative for cough, sputum production, shortness of breath and wheezing.    Cardiovascular: Negative for chest pain, palpitations, orthopnea and leg swelling.   Gastrointestinal: Negative for heartburn, nausea, vomiting and abdominal pain.   Genitourinary: Negative for dysuria, urgency and frequency.   Musculoskeletal: Negative for myalgias, joint pain, and back pain.  Skin: Negative for rash and itching.   Neurological: Negative for dizziness, tingling, tremors, sensory change, focal weakness and headaches.   Endo/Heme/Allergies: Does not bruise/bleed easily.   Psychiatric/Behavioral: Negative for depression, suicidal ideas and memory loss.  The patient is not nervous/anxious and does not have insomnia.    All other systems reviewed and are negative except as in HPI.    Exam:  Blood pressure 132/80, pulse 60, temperature 36.6 °C (97.8 °F), resp. rate 16, height 1.6 m (5' 3\"), weight 63.5 kg (140 lb), last menstrual period 07/01/1986, SpO2 98 %.  General:  Well nourished, well developed female in NAD  Head: Grossly normal.  Neck: Supple without JVD or bruit. Thyroid is not enlarged.  Pulmonary: Clear to ausculation. Normal effort. No rales, ronchi, or wheezing.  Cardiovascular: Regular rate and rhythm without murmur.   Abdomen:  Soft, nontender, nondistended.  Extremities: No clubbing, cyanosis, or edema.  Skin: Intact with no obvious rashes or lesions.  Neuro: Grossly intact.  Psych: Alert and oriented x 3.  Mood and affect appropriate.    Medical decision-making and discussion: Milena is here today for follow-up.  Her medications were refilled, and she will follow-up here as " needed.          Assessment/Plan:  1. Essential hypertension  amLODIPine (NORVASC) 5 MG Tab    lisinopril (PRINIVIL, ZESTRIL) 40 MG tablet   2. Dyslipidemia  atorvastatin (LIPITOR) 20 MG Tab   3. Overactive bladder  oxybutynin SR (DITROPAN-XL) 5 MG TABLET SR 24 HR       Return if symptoms worsen or fail to improve.    Please note that this dictation was created using voice recognition software. I have made every reasonable attempt to correct obvious errors, but I expect that there are errors of grammar and possibly content that I did not discover before finalizing the note.

## 2018-08-06 PROBLEM — N32.81 OVERACTIVE BLADDER: Status: ACTIVE | Noted: 2018-08-06

## 2018-08-29 ENCOUNTER — OFFICE VISIT (OUTPATIENT)
Dept: MEDICAL GROUP | Facility: PHYSICIAN GROUP | Age: 83
End: 2018-08-29
Payer: MEDICARE

## 2018-08-29 VITALS
HEART RATE: 56 BPM | TEMPERATURE: 98.5 F | OXYGEN SATURATION: 96 % | DIASTOLIC BLOOD PRESSURE: 84 MMHG | HEIGHT: 63 IN | RESPIRATION RATE: 16 BRPM | SYSTOLIC BLOOD PRESSURE: 122 MMHG | WEIGHT: 134 LBS | BODY MASS INDEX: 23.74 KG/M2

## 2018-08-29 DIAGNOSIS — Z76.89 ENCOUNTER TO ESTABLISH CARE WITH NEW DOCTOR: ICD-10-CM

## 2018-08-29 DIAGNOSIS — N32.81 OVERACTIVE BLADDER: ICD-10-CM

## 2018-08-29 DIAGNOSIS — I10 ESSENTIAL HYPERTENSION: ICD-10-CM

## 2018-08-29 PROCEDURE — 99213 OFFICE O/P EST LOW 20 MIN: CPT | Performed by: NURSE PRACTITIONER

## 2018-08-29 NOTE — PROGRESS NOTES
Chief Complaint   Patient presents with   • Hypertension       HISTORY OF PRESENT ILLNESS: Patient is a 83 y.o. female new patient who presents today to discuss the following issues:    Encounter to establish care with new doctor  Is here to establish with a new primary care provider.  Was previously seen by Dr. Montana.    Overactive bladder  Chronic in nature.  Stable on oxybutinin.    HTN (hypertension)  Chronic in nature.  Stable on meds.  Will continue to check blood pressure at home from time to time.      Patient Active Problem List    Diagnosis Date Noted   • Encounter to establish care with new doctor 2018   • Overactive bladder 2018   • Vitamin D deficiency 2017   • Nonrheumatic aortic valve insufficiency 2017   • Osteopenia of spine 2017   • Murmur, cardiac 2017   • HTN (hypertension) 2015   • Dyslipidemia    • Incontinence    • Diverticulosis of colon        Allergies:Nitrofurantoin and Pcn [penicillins]    Current Outpatient Prescriptions   Medication Sig Dispense Refill   • amLODIPine (NORVASC) 5 MG Tab Take 1 Tab by mouth every bedtime. 90 Tab 3   • atorvastatin (LIPITOR) 20 MG Tab Take 1 Tab by mouth every day. 90 Tab 3   • lisinopril (PRINIVIL, ZESTRIL) 40 MG tablet Take 1 Tab by mouth every day. 90 Tab 3   • oxybutynin SR (DITROPAN-XL) 5 MG TABLET SR 24 HR Take 1 Tab by mouth every day. 90 Tab 3   • vitamin D (CHOLECALCIFEROL) 1000 UNIT Tab Take 3,000 Units by mouth every day.       No current facility-administered medications for this visit.        Social History   Substance Use Topics   • Smoking status: Never Smoker   • Smokeless tobacco: Never Used   • Alcohol use No       Family Status   Relation Status   • Fa  at age 70        pancreatic cancer   • Bro    • Bro    • Mo Alive   • Sis Alive   • Bro (Not Specified)   • Sha (Not Specified)     Family History   Problem Relation Age of Onset   • Cancer Father         pancreatic  "  • Heart Disease Father         MI at age 57   • Hypertension Father    • Cancer Brother         melanoma   • Heart Disease Brother    • Cancer Sister         ovaren cancer   • Cancer Brother         throat   • Psychiatry Daughter        Review of Systems:   Constitutional: Negative for fever, chills, weight loss and malaise/fatigue.   HENT: Negative for ear pain, nosebleeds, congestion, sore throat and neck pain.    Eyes: Negative for blurred vision.   Respiratory: Negative for cough, sputum production, shortness of breath and wheezing.    Cardiovascular: Negative for chest pain, palpitations, orthopnea and leg swelling.   Gastrointestinal: Negative for heartburn, nausea, vomiting and abdominal pain.   Genitourinary: Negative for dysuria, urgency and frequency.   Musculoskeletal: Negative for myalgias, joint pain, and back pain.  Skin: Negative for rash and itching.   Neurological: Negative for dizziness, tingling, tremors, sensory change, focal weakness and headaches.   Endo/Heme/Allergies: Does not bruise/bleed easily.   Psychiatric/Behavioral: Negative for depression, suicidal ideas and memory loss.  The patient is not nervous/anxious and does not have insomnia.    All other systems reviewed and are negative except as in HPI.    Exam:  Blood pressure 122/84, pulse (!) 56, temperature 36.9 °C (98.5 °F), resp. rate 16, height 1.6 m (5' 3\"), weight 60.8 kg (134 lb), last menstrual period 07/01/1986, SpO2 96 %.  General:  Well nourished, well developed female in NAD  Head: Grossly normal.  Neck: Supple without JVD or bruit. Thyroid is not enlarged.  Pulmonary: Clear to ausculation. Normal effort. No rales, ronchi, or wheezing.  Cardiovascular: Regular rate and rhythm.   Abdomen:  Bowel sounds + x 4. Soft, non-tender, nondistended.  Extremities: No clubbing, cyanosis, or edema.  Skin: Intact with no obvious rashes or lesions.  Neuro: Grossly intact.  Psych: Alert and oriented x 3.  Mood and affect " appropriate.    Medical decision-making and discussion: Milena is here to establish with a new primary care provider.  We reviewed her past medical history and discussed her current medications. She will sign a records release for her previous provider, she will sign up with [facility], and she will plan to follow-up here as needed.         Assessment/Plan:  1. Encounter to establish care with new doctor     2. Overactive bladder     3. Essential hypertension         Return if symptoms worsen or fail to improve.    Please note that this dictation was created using voice recognition software. I have made every reasonable attempt to correct obvious errors, but I expect that there are errors of grammar and possibly content that I did not discover before finalizing the note.

## 2018-08-29 NOTE — ASSESSMENT & PLAN NOTE
Chronic in nature.  Stable on meds.  Will continue to check blood pressure at home from time to time.

## 2019-01-20 ENCOUNTER — OFFICE VISIT (OUTPATIENT)
Dept: URGENT CARE | Facility: PHYSICIAN GROUP | Age: 84
End: 2019-01-20
Payer: MEDICARE

## 2019-01-20 VITALS
RESPIRATION RATE: 16 BRPM | SYSTOLIC BLOOD PRESSURE: 126 MMHG | WEIGHT: 134 LBS | HEART RATE: 73 BPM | TEMPERATURE: 98 F | BODY MASS INDEX: 23.74 KG/M2 | HEIGHT: 63 IN | OXYGEN SATURATION: 97 % | DIASTOLIC BLOOD PRESSURE: 84 MMHG

## 2019-01-20 DIAGNOSIS — H10.33 ACUTE BACTERIAL CONJUNCTIVITIS OF BOTH EYES: ICD-10-CM

## 2019-01-20 PROCEDURE — 99214 OFFICE O/P EST MOD 30 MIN: CPT | Performed by: NURSE PRACTITIONER

## 2019-01-20 RX ORDER — TOBRAMYCIN 3 MG/ML
1 SOLUTION/ DROPS OPHTHALMIC 4 TIMES DAILY
Qty: 1 BOTTLE | Refills: 0 | Status: SHIPPED | OUTPATIENT
Start: 2019-01-20 | End: 2019-01-25

## 2019-01-20 ASSESSMENT — ENCOUNTER SYMPTOMS
NECK PAIN: 0
CHILLS: 0
WEAKNESS: 0
CONSTIPATION: 0
BLURRED VISION: 0
COUGH: 0
WHEEZING: 0
EYE DISCHARGE: 1
DIZZINESS: 0
HEADACHES: 0
MYALGIAS: 0
VOMITING: 0
ABDOMINAL PAIN: 0
NAUSEA: 0
DIARRHEA: 0
FEVER: 0
DOUBLE VISION: 0
EYE PAIN: 0
PHOTOPHOBIA: 0
SORE THROAT: 0
SHORTNESS OF BREATH: 0
EYE REDNESS: 1

## 2019-01-20 NOTE — PROGRESS NOTES
"Subjective:      Milena Ang is a 83 y.o. female who presents with Eye Problem (both eyes red and drainage)            HPI  Bilat eye redness, white d/c x 3 days. Possible exposure to pink eye. OTC \"pink eye relief\" eye drops. Denies vision change, HA, no eyeball. Using facial wipes to clean eyes. Denies any other URI symptoms.    PMH:  has a past medical history of Cataract; Cervical high risk human papillomavirus (HPV) DNA test positive; Colon polyp; Diverticulosis of colon; Dyslipidemia; Hypertension; Incontinence; Kidney stone; Menopausal and postmenopausal disorder; and OSTEOPOROSIS. She also has no past medical history of Breast cancer (HCC).  MEDS:   Current Outpatient Prescriptions:   •  amLODIPine (NORVASC) 5 MG Tab, Take 1 Tab by mouth every bedtime., Disp: 90 Tab, Rfl: 3  •  atorvastatin (LIPITOR) 20 MG Tab, Take 1 Tab by mouth every day., Disp: 90 Tab, Rfl: 3  •  lisinopril (PRINIVIL, ZESTRIL) 40 MG tablet, Take 1 Tab by mouth every day., Disp: 90 Tab, Rfl: 3  •  oxybutynin SR (DITROPAN-XL) 5 MG TABLET SR 24 HR, Take 1 Tab by mouth every day., Disp: 90 Tab, Rfl: 3  •  amLODIPine (NORVASC) 5 MG Tab, TAKE 1 TABLET BY MOUTH EVERY NIGHT AT BEDTIME, Disp: 30 Tab, Rfl: 0  •  vitamin D (CHOLECALCIFEROL) 1000 UNIT Tab, Take 3,000 Units by mouth every day., Disp: , Rfl:   ALLERGIES:   Allergies   Allergen Reactions   • Nitrofurantoin Rash     rash   • Pcn [Penicillins] Rash     SURGHX:   Past Surgical History:   Procedure Laterality Date   • BLADDER SLING FEMALE  7/12    Dr. Quinn   • CHOLECYSTECTOMY       SOCHX:  reports that she has never smoked. She has never used smokeless tobacco. She reports that she does not drink alcohol or use drugs.  FH: Family history was reviewed, no pertinent findings to report    Review of Systems   Constitutional: Negative for chills, fever and malaise/fatigue.   HENT: Negative for congestion, ear pain and sore throat.    Eyes: Positive for discharge and redness. Negative " "for blurred vision, double vision, photophobia and pain.   Respiratory: Negative for cough, shortness of breath and wheezing.    Gastrointestinal: Negative for abdominal pain, constipation, diarrhea, nausea and vomiting.   Musculoskeletal: Negative for myalgias and neck pain.   Skin: Negative for itching and rash.   Neurological: Negative for dizziness, weakness and headaches.   Endo/Heme/Allergies: Negative for environmental allergies.   All other systems reviewed and are negative.         Objective:     /84 (BP Location: Left arm, Patient Position: Sitting, BP Cuff Size: Adult)   Pulse 73   Temp 36.7 °C (98 °F) (Tympanic)   Resp 16   Ht 1.6 m (5' 3\")   Wt 60.8 kg (134 lb)   LMP 07/01/1986   SpO2 97%   BMI 23.74 kg/m²      Physical Exam   Constitutional: She is oriented to person, place, and time. Vital signs are normal. She appears well-developed and well-nourished. She is active and cooperative.  Non-toxic appearance. She does not have a sickly appearance. She does not appear ill. No distress.   HENT:   Head: Normocephalic.   Nose: Nose normal. No mucosal edema or rhinorrhea.   Eyes: Pupils are equal, round, and reactive to light. EOM and lids are normal. Right eye exhibits no chemosis, no discharge, no exudate and no hordeolum. No foreign body present in the right eye. Left eye exhibits no chemosis, no discharge, no exudate and no hordeolum. No foreign body present in the left eye. Right conjunctiva is injected. Right conjunctiva has no hemorrhage. Left conjunctiva is injected. Left conjunctiva has no hemorrhage.   Bilat conjunctiva redness without periorbital swelling.   Neck: Normal range of motion. Neck supple.   Cardiovascular: Normal rate.    Pulmonary/Chest: Effort normal.   Musculoskeletal: Normal range of motion.   Neurological: She is alert and oriented to person, place, and time.   Skin: Skin is warm and dry. She is not diaphoretic.   Vitals reviewed.              Assessment/Plan:     1. " Acute bacterial conjunctivitis of both eyes    - tobramycin (TOBREX) 0.3 % Solution ophthalmic solution; Place 1 Drop in both eyes 4 times a day for 5 days.  Dispense: 1 Bottle; Refill: 0    May use longer acting antihistamine prn for any itchy eyes  May use cool compresses for any eye swelling  Avoid touching eyes  May clean eyes with mild dilute soap/facial wipe along eyelash line with eyes closed, rinse with plenty of water  Avoid wearing eye makeup until cleared  Monitor for increase in redness or swelling, vision change, eye pain- need re-evaluation

## 2019-07-24 DIAGNOSIS — N32.81 OVERACTIVE BLADDER: ICD-10-CM

## 2019-07-24 RX ORDER — OXYBUTYNIN CHLORIDE 5 MG/1
TABLET, EXTENDED RELEASE ORAL
Qty: 90 TAB | Refills: 1 | Status: SHIPPED | OUTPATIENT
Start: 2019-07-24 | End: 2019-08-06 | Stop reason: SDUPTHER

## 2019-07-24 NOTE — TELEPHONE ENCOUNTER
Was the patient seen in the last year in this department? Yes    Does patient have an active prescription for medications requested? No     Received Request Via: Pharmacy    Pt met protocol?: Yes     Last OV 08/29/2018

## 2019-07-29 ENCOUNTER — TELEPHONE (OUTPATIENT)
Dept: MEDICAL GROUP | Facility: PHYSICIAN GROUP | Age: 84
End: 2019-07-29

## 2019-07-29 NOTE — TELEPHONE ENCOUNTER
ANNUAL WELLNESS VISIT PRE-VISIT PLANNING WITHOUT OUTREACH    1.  Reviewed note from last office visit with PCP: YES    2.  If any orders were placed at last visit, do we have Results/Consult Notes?        •  Labs - Labs were not ordered at last office visit.       •  Imaging - Imaging was not ordered at last office visit.       •  Referrals - No referrals were ordered at last office visit.    3.  Immunizations were updated in [facility] using WebIZ?: Yes       •  WebIZ Recommendations: FLU, TD, SHINGRIX (Shingles) and MMR        •  Is patient due for Tdap? NO       •  Is patient due for Shingrix? YES. Patient was not notified of copay/out of pocket cost.     4.  Patient is due for the following Health Maintenance Topics:   Health Maintenance Due   Topic Date Due   • IMM ZOSTER VACCINES (2 of 3) 12/25/2012   • Annual Wellness Visit  08/19/2016   0  5.  Reviewed/Updated the following with patient:       •   Preferred Pharmacy? YES       •   Preferred Lab? YES       •   Preferred Communication? YES       •   Allergies? YES       •   Medications? YES. Was Abstract Encounter opened and chart updated? YES       •   Social History? YES. Was Abstract Encounter opened and chart updated? YES       •   Family History (document living status of immediate family members and if + hx of  cancer, diabetes, hypertension, hyperlipidemia, heart attack, stroke) YES. Was Abstract Encounter opened and chart updated? YES    6.  Care Team Updated:       •   DME Company (gait device, O2, CPAP, etc.): YES       •   Other Specialists (eye doctor, derm, GYN, cardiology, endo, etc): YES    7. Orders for overdue Health Maintenance topics pended in Pre-Charting? NO    8.  Patient has the following Care Path diagnoses on Problem List:  COPD    9.  Patient was advised: “This is a free wellness visit. The provider will screen for medical conditions to help you stay healthy. If you have other concerns to address you may be asked to discuss these at a  separate visit or there may be an additional fee.”     10.  Patient was informed to arrive 15 min prior to their scheduled appointment and bring in their medication bottles.

## 2019-07-29 NOTE — TELEPHONE ENCOUNTER
Just wanted to make a note that pt does not have COPD. It was an accidentally hit on the last telephone encounter.

## 2019-08-01 DIAGNOSIS — I10 ESSENTIAL HYPERTENSION: ICD-10-CM

## 2019-08-02 RX ORDER — LISINOPRIL 40 MG/1
TABLET ORAL
Qty: 90 TAB | Refills: 0 | Status: SHIPPED | OUTPATIENT
Start: 2019-08-02 | End: 2019-08-06 | Stop reason: SDUPTHER

## 2019-08-02 NOTE — TELEPHONE ENCOUNTER
*PT NEEDS AN APPT*  Was the patient seen in the last year in this department? Yes    Does patient have an active prescription for medications requested? No     Received Request Via: Pharmacy      Pt met protocol?: NO    LAST OV 08/29/2018    BP Readings from Last 1 Encounters:   01/20/19 126/84     Lab Results   Component Value Date/Time    CHOLSTRLTOT 143 07/02/2018 09:15 AM    LDL 70 07/02/2018 09:15 AM    HDL 36 (A) 07/02/2018 09:15 AM    TRIGLYCERIDE 184 (H) 07/02/2018 09:15 AM       Lab Results   Component Value Date/Time    SODIUM 142 07/02/2018 09:15 AM    POTASSIUM 3.8 07/02/2018 09:15 AM    CHLORIDE 109 07/02/2018 09:15 AM    CO2 27 07/02/2018 09:15 AM    GLUCOSE 114 (H) 07/02/2018 09:15 AM    BUN 20 07/02/2018 09:15 AM    CREATININE 0.82 07/02/2018 09:15 AM    CREATININE 0.88 04/06/2010 12:00 AM    BUNCREATRAT 22 04/06/2010 12:00 AM    GLOMRATE >59 04/06/2010 12:00 AM     Lab Results   Component Value Date/Time    ALKPHOSPHAT 71 07/02/2018 09:15 AM    ASTSGOT 20 07/02/2018 09:15 AM    ALTSGPT 23 07/02/2018 09:15 AM    TBILIRUBIN 1.5 07/02/2018 09:15 AM

## 2019-08-06 ENCOUNTER — HOSPITAL ENCOUNTER (OUTPATIENT)
Dept: LAB | Facility: MEDICAL CENTER | Age: 84
End: 2019-08-06
Attending: NURSE PRACTITIONER
Payer: MEDICARE

## 2019-08-06 ENCOUNTER — OFFICE VISIT (OUTPATIENT)
Dept: MEDICAL GROUP | Facility: PHYSICIAN GROUP | Age: 84
End: 2019-08-06
Payer: MEDICARE

## 2019-08-06 VITALS
DIASTOLIC BLOOD PRESSURE: 52 MMHG | RESPIRATION RATE: 16 BRPM | SYSTOLIC BLOOD PRESSURE: 122 MMHG | HEIGHT: 63 IN | OXYGEN SATURATION: 96 % | WEIGHT: 127.8 LBS | TEMPERATURE: 97.2 F | HEART RATE: 58 BPM | BODY MASS INDEX: 22.64 KG/M2

## 2019-08-06 DIAGNOSIS — E55.9 AVITAMINOSIS D: ICD-10-CM

## 2019-08-06 DIAGNOSIS — I10 ESSENTIAL HYPERTENSION: ICD-10-CM

## 2019-08-06 DIAGNOSIS — N32.81 OVERACTIVE BLADDER: ICD-10-CM

## 2019-08-06 DIAGNOSIS — E78.5 DYSLIPIDEMIA: ICD-10-CM

## 2019-08-06 DIAGNOSIS — R73.09 ELEVATED GLUCOSE: ICD-10-CM

## 2019-08-06 DIAGNOSIS — Z76.89 ENCOUNTER TO ESTABLISH CARE WITH NEW DOCTOR: ICD-10-CM

## 2019-08-06 LAB
25(OH)D3 SERPL-MCNC: 20 NG/ML (ref 30–100)
ALBUMIN SERPL BCP-MCNC: 4.3 G/DL (ref 3.2–4.9)
ALBUMIN/GLOB SERPL: 1.5 G/DL
ALP SERPL-CCNC: 68 U/L (ref 30–99)
ALT SERPL-CCNC: 15 U/L (ref 2–50)
ANION GAP SERPL CALC-SCNC: 7 MMOL/L (ref 0–11.9)
AST SERPL-CCNC: 17 U/L (ref 12–45)
BASOPHILS # BLD AUTO: 0.9 % (ref 0–1.8)
BASOPHILS # BLD: 0.04 K/UL (ref 0–0.12)
BILIRUB SERPL-MCNC: 1.2 MG/DL (ref 0.1–1.5)
BUN SERPL-MCNC: 23 MG/DL (ref 8–22)
CALCIUM SERPL-MCNC: 10.1 MG/DL (ref 8.5–10.5)
CHLORIDE SERPL-SCNC: 109 MMOL/L (ref 96–112)
CHOLEST SERPL-MCNC: 205 MG/DL (ref 100–199)
CO2 SERPL-SCNC: 27 MMOL/L (ref 20–33)
CREAT SERPL-MCNC: 0.76 MG/DL (ref 0.5–1.4)
EOSINOPHIL # BLD AUTO: 0.02 K/UL (ref 0–0.51)
EOSINOPHIL NFR BLD: 0.4 % (ref 0–6.9)
ERYTHROCYTE [DISTWIDTH] IN BLOOD BY AUTOMATED COUNT: 45.5 FL (ref 35.9–50)
FASTING STATUS PATIENT QL REPORTED: NORMAL
GLOBULIN SER CALC-MCNC: 2.9 G/DL (ref 1.9–3.5)
GLUCOSE SERPL-MCNC: 104 MG/DL (ref 65–99)
HCT VFR BLD AUTO: 47.3 % (ref 37–47)
HDLC SERPL-MCNC: 36 MG/DL
HGB BLD-MCNC: 15 G/DL (ref 12–16)
IMM GRANULOCYTES # BLD AUTO: 0.02 K/UL (ref 0–0.11)
IMM GRANULOCYTES NFR BLD AUTO: 0.4 % (ref 0–0.9)
LDLC SERPL CALC-MCNC: 135 MG/DL
LYMPHOCYTES # BLD AUTO: 1.68 K/UL (ref 1–4.8)
LYMPHOCYTES NFR BLD: 37.7 % (ref 22–41)
MCH RBC QN AUTO: 30.5 PG (ref 27–33)
MCHC RBC AUTO-ENTMCNC: 31.7 G/DL (ref 33.6–35)
MCV RBC AUTO: 96.3 FL (ref 81.4–97.8)
MONOCYTES # BLD AUTO: 0.44 K/UL (ref 0–0.85)
MONOCYTES NFR BLD AUTO: 9.9 % (ref 0–13.4)
NEUTROPHILS # BLD AUTO: 2.26 K/UL (ref 2–7.15)
NEUTROPHILS NFR BLD: 50.7 % (ref 44–72)
NRBC # BLD AUTO: 0 K/UL
NRBC BLD-RTO: 0 /100 WBC
PLATELET # BLD AUTO: 141 K/UL (ref 164–446)
PMV BLD AUTO: 14.1 FL (ref 9–12.9)
POTASSIUM SERPL-SCNC: 4.3 MMOL/L (ref 3.6–5.5)
PROT SERPL-MCNC: 7.2 G/DL (ref 6–8.2)
RBC # BLD AUTO: 4.91 M/UL (ref 4.2–5.4)
SODIUM SERPL-SCNC: 143 MMOL/L (ref 135–145)
TRIGL SERPL-MCNC: 170 MG/DL (ref 0–149)
WBC # BLD AUTO: 4.5 K/UL (ref 4.8–10.8)

## 2019-08-06 PROCEDURE — 83036 HEMOGLOBIN GLYCOSYLATED A1C: CPT | Mod: GA

## 2019-08-06 PROCEDURE — 80053 COMPREHEN METABOLIC PANEL: CPT

## 2019-08-06 PROCEDURE — 80061 LIPID PANEL: CPT

## 2019-08-06 PROCEDURE — G0439 PPPS, SUBSEQ VISIT: HCPCS | Performed by: NURSE PRACTITIONER

## 2019-08-06 PROCEDURE — 85025 COMPLETE CBC W/AUTO DIFF WBC: CPT

## 2019-08-06 PROCEDURE — 82306 VITAMIN D 25 HYDROXY: CPT

## 2019-08-06 PROCEDURE — 36415 COLL VENOUS BLD VENIPUNCTURE: CPT

## 2019-08-06 RX ORDER — AMLODIPINE BESYLATE 5 MG/1
5 TABLET ORAL
Qty: 90 TAB | Refills: 3 | Status: SHIPPED | OUTPATIENT
Start: 2019-08-06 | End: 2020-08-06 | Stop reason: SDUPTHER

## 2019-08-06 RX ORDER — LISINOPRIL 40 MG/1
40 TABLET ORAL
Qty: 90 TAB | Refills: 3 | Status: SHIPPED | OUTPATIENT
Start: 2019-08-06 | End: 2020-08-06 | Stop reason: SDUPTHER

## 2019-08-06 RX ORDER — OXYBUTYNIN CHLORIDE 5 MG/1
5 TABLET, EXTENDED RELEASE ORAL
Qty: 90 TAB | Refills: 3 | Status: SHIPPED | OUTPATIENT
Start: 2019-08-06 | End: 2020-06-30

## 2019-08-06 ASSESSMENT — ACTIVITIES OF DAILY LIVING (ADL): BATHING_REQUIRES_ASSISTANCE: 0

## 2019-08-06 ASSESSMENT — PATIENT HEALTH QUESTIONNAIRE - PHQ9: CLINICAL INTERPRETATION OF PHQ2 SCORE: 0

## 2019-08-06 ASSESSMENT — ENCOUNTER SYMPTOMS: GENERAL WELL-BEING: GOOD

## 2019-08-06 NOTE — PROGRESS NOTES
Chief Complaint   Patient presents with   • Annual Wellness Visit         HPI:  Milena is a 84 y.o. here for Medicare Annual Wellness Visit        Patient Active Problem List    Diagnosis Date Noted   • Encounter to establish care with new doctor 08/29/2018   • Overactive bladder 08/06/2018   • Vitamin D deficiency 11/18/2017   • Nonrheumatic aortic valve insufficiency 05/02/2017   • Osteopenia of spine 05/02/2017   • Murmur, cardiac 03/23/2017   • HTN (hypertension) 01/14/2015   • Dyslipidemia    • Incontinence    • Diverticulosis of colon        Current Outpatient Medications   Medication Sig Dispense Refill   • oxybutynin SR (DITROPAN-XL) 5 MG TABLET SR 24 HR Take 1 Tab by mouth every day. 90 Tab 3   • lisinopril (PRINIVIL, ZESTRIL) 40 MG tablet Take 1 Tab by mouth every day. 90 Tab 3   • amLODIPine (NORVASC) 5 MG Tab Take 1 Tab by mouth every bedtime. 90 Tab 3   • vitamin D (CHOLECALCIFEROL) 1000 UNIT Tab Take 3,000 Units by mouth every day.       No current facility-administered medications for this visit.         Patient is taking medications as noted in medication list.  Current supplements as per medication list.     Allergies: Pcn [penicillins]    Current social contact/activities: Visiting with friends and family, Bahai, Teacher 3-4 years old, maufait     Is patient current with immunizations? Yes.    She  reports that she has never smoked. She has never used smokeless tobacco. She reports that she does not drink alcohol or use drugs.  Counseling given: Yes        DPA/Advanced directive: Patient has Advanced Directive, but it is not on file. Instructed to bring in a copy to scan into their chart.    ROS:    Gait: Uses no assistive device   Ostomy: No   Other tubes: No   Amputations: No   Chronic oxygen use No   Last eye exam 10/2018   Wears hearing aids: No   : Reports urinary leakage during the last 6 months that has not interfered at all with their daily activities or sleep.    Annual Health  Assessment Questions:    1.  Are you currently engaging in any exercise or physical activity? Yes    2.  How would you describe your mood or emotional well-being today? good    3.  Have you had any falls in the last year? No    4.  Have you noticed any problems with your balance or had difficulty walking? No    5.  In the last six months have you experienced any leakage of urine? Yes    6. DPA/Advanced Directive: Patient has Advanced Directive, but it is not on file. Instructed to bring in a copy to scan into their chart.      Screening:        Depression Screening    Little interest or pleasure in doing things?  0 - not at all  Feeling down, depressed, or hopeless? 0 - not at all  Patient Health Questionnaire Score: 0    If depressive symptoms identified deferred to follow up visit unless specifically addressed in assessment and plan.    Interpretation of PHQ-9 Total Score   Score Severity   1-4 No Depression   5-9 Mild Depression   10-14 Moderate Depression   15-19 Moderately Severe Depression   20-27 Severe Depression    Screening for Cognitive Impairment    Three Minute Recall (village, kitchen, baby)  3/3 Village Kitchen Baby  Ryne clock face with all 12 numbers and set the hands to show 10 past 10.  Yes 10:10  5/5  If cognitive concerns identified, deferred for follow up unless specifically addressed in assessment and plan.    Fall Risk Assessment    Has the patient had two or more falls in the last year or any fall with injury in the last year?  No  If fall risk identified, deferred for follow up unless specifically addressed in assessment and plan.    Safety Assessment    Throw rugs on floor.  Yes  Handrails on all stairs.  Yes  Good lighting in all hallways.  Yes  Difficulty hearing.  No  Patient counseled about all safety risks that were identified.    Functional Assessment ADLs    Are there any barriers preventing you from cooking for yourself or meeting nutritional needs?  No.    Are there any barriers  preventing you from driving safely or obtaining transportation?  No.    Are there any barriers preventing you from using a telephone or calling for help?  No.    Are there any barriers preventing you from shopping?  No.    Are there any barriers preventing you from taking care of your own finances?  No.    Are there any barriers preventing you from managing your medications?  No.    Are there any barriers preventing you from showering, bathing or dressing yourself?  No.    Are you currently engaging in any exercise or physical activity?  Yes.  Walking  What is your perception of your health?  Good.    Health Maintenance Summary                IMM ZOSTER VACCINES Overdue 12/25/2012      Done 10/30/2012 Imm Admin: Zoster Vaccine Live (ZVL) (Zostavax)     Patient has more history with this topic...    Annual Wellness Visit Overdue 3/13/2015      Done 3/12/2014      Patient has more history with this topic...    IMM INFLUENZA Next Due 9/1/2019      Done 10/10/2012 Imm Admin: Influenza TIV (IM)    BONE DENSITY Next Due 4/20/2022      Done 4/20/2017 DS-BONE DENSITY STUDY (DEXA)     Patient has more history with this topic...    IMM DTaP/Tdap/Td Vaccine Next Due 9/20/2026      Done 9/20/2016 Imm Admin: Tdap Vaccine     Patient has more history with this topic...          Patient Care Team:  BEN Scott as PCP - General (Family Medicine)  Dario Sy O.D. as Consulting Physician (Optometry)    Social History     Tobacco Use   • Smoking status: Never Smoker   • Smokeless tobacco: Never Used   Substance Use Topics   • Alcohol use: No   • Drug use: No     Family History   Problem Relation Age of Onset   • Cancer Father         pancreatic   • Heart Disease Father         MI at age 57   • Hypertension Father    • Cancer Brother         melanoma   • Heart Disease Brother    • No Known Problems Mother    • Cancer Sister         ovaren cancer   • Cancer Brother         throat   • Psychiatric Illness Daughter    •  "No Known Problems Daughter      She  has a past medical history of Cataract, Cervical high risk human papillomavirus (HPV) DNA test positive, Colon polyp, Diverticulosis of colon, Dyslipidemia, Hypertension, Incontinence, Kidney stone, Menopausal and postmenopausal disorder, and OSTEOPOROSIS. She also has no past medical history of Breast cancer (HCC).   Past Surgical History:   Procedure Laterality Date   • BLADDER SLING FEMALE  7/12    Dr. Quinn   • CHOLECYSTECTOMY             Exam:     /52 (BP Location: Left arm, Patient Position: Sitting, BP Cuff Size: Adult)   Pulse (!) 58   Temp 36.2 °C (97.2 °F) (Temporal)   Resp 16   Ht 1.6 m (5' 3\")   Wt 58 kg (127 lb 12.8 oz)   SpO2 96%  Body mass index is 22.64 kg/m².    Hearing good.    Dentition good  Alert, oriented in no acute distress.  Eye contact is good, speech goal directed, affect calm      Assessment and Plan. The following treatment and monitoring plan is recommended:    1. Dyslipidemia  Lipid Profile    Subsequent Annual Wellness Visit - Includes PPPS ()   2. Elevated glucose  Comp Metabolic Panel    HEMOGLOBIN A1C    Subsequent Annual Wellness Visit - Includes PPPS ()   3. Encounter to establish care with new doctor     4. Essential hypertension  CBC WITH DIFFERENTIAL    Comp Metabolic Panel    lisinopril (PRINIVIL, ZESTRIL) 40 MG tablet    amLODIPine (NORVASC) 5 MG Tab    Subsequent Annual Wellness Visit - Includes PPPS ()   5. Avitaminosis D  VITAMIN D,25 HYDROXY   6. Overactive bladder  oxybutynin SR (DITROPAN-XL) 5 MG TABLET SR 24 HR    Subsequent Annual Wellness Visit - Includes PPPS ()     Elevated glucose  Due for labs.    Dyslipidemia  Chronic in nature.  Stable.  Due for labs.      HTN (hypertension)  Chronic in nature.  Stable on meds.  Due for labs and refills.      Overactive bladder  Due for refills.        Services suggested: No services needed at this time  Health Care Screening recommendations as per orders " if indicated.  Referrals offered: PT/OT/Nutrition counseling/Behavioral Health/Smoking cessation as per orders if indicated.    Discussion today about general wellness and lifestyle habits:    · Prevent falls and reduce trip hazards; Cautioned about securing or removing rugs.  · Have a working fire alarm and carbon monoxide detector;   · Engage in regular physical activity and social activities       Follow-up: Return if symptoms worsen or fail to improve.

## 2019-08-07 PROBLEM — Z76.89 ENCOUNTER TO ESTABLISH CARE WITH NEW DOCTOR: Status: RESOLVED | Noted: 2018-08-29 | Resolved: 2019-08-07

## 2019-08-07 PROBLEM — R73.09 ELEVATED GLUCOSE: Status: ACTIVE | Noted: 2019-08-07

## 2019-08-07 LAB
EST. AVERAGE GLUCOSE BLD GHB EST-MCNC: 126 MG/DL
HBA1C MFR BLD: 6 % (ref 0–5.6)

## 2019-09-26 ENCOUNTER — APPOINTMENT (RX ONLY)
Dept: URBAN - METROPOLITAN AREA CLINIC 22 | Facility: CLINIC | Age: 84
Setting detail: DERMATOLOGY
End: 2019-09-26

## 2019-09-26 DIAGNOSIS — L81.4 OTHER MELANIN HYPERPIGMENTATION: ICD-10-CM

## 2019-09-26 DIAGNOSIS — L57.0 ACTINIC KERATOSIS: ICD-10-CM

## 2019-09-26 DIAGNOSIS — D22 MELANOCYTIC NEVI: ICD-10-CM

## 2019-09-26 DIAGNOSIS — D18.0 HEMANGIOMA: ICD-10-CM

## 2019-09-26 DIAGNOSIS — Z71.89 OTHER SPECIFIED COUNSELING: ICD-10-CM

## 2019-09-26 DIAGNOSIS — L82.1 OTHER SEBORRHEIC KERATOSIS: ICD-10-CM

## 2019-09-26 PROBLEM — D22.62 MELANOCYTIC NEVI OF LEFT UPPER LIMB, INCLUDING SHOULDER: Status: ACTIVE | Noted: 2019-09-26

## 2019-09-26 PROBLEM — D18.01 HEMANGIOMA OF SKIN AND SUBCUTANEOUS TISSUE: Status: ACTIVE | Noted: 2019-09-26

## 2019-09-26 PROBLEM — D22.5 MELANOCYTIC NEVI OF TRUNK: Status: ACTIVE | Noted: 2019-09-26

## 2019-09-26 PROBLEM — D22.61 MELANOCYTIC NEVI OF RIGHT UPPER LIMB, INCLUDING SHOULDER: Status: ACTIVE | Noted: 2019-09-26

## 2019-09-26 PROCEDURE — ? COUNSELING

## 2019-09-26 PROCEDURE — 17004 DESTROY PREMAL LESIONS 15/>: CPT

## 2019-09-26 PROCEDURE — 99203 OFFICE O/P NEW LOW 30 MIN: CPT | Mod: 25

## 2019-09-26 PROCEDURE — ? LIQUID NITROGEN

## 2019-09-26 ASSESSMENT — LOCATION DETAILED DESCRIPTION DERM
LOCATION DETAILED: LEFT INFERIOR FOREHEAD
LOCATION DETAILED: NASAL SUPRATIP
LOCATION DETAILED: LEFT FOREHEAD
LOCATION DETAILED: RIGHT SUPERIOR MEDIAL MIDBACK
LOCATION DETAILED: RIGHT INFERIOR LATERAL FOREHEAD
LOCATION DETAILED: RIGHT INFERIOR MEDIAL FOREHEAD
LOCATION DETAILED: LEFT NASAL SIDEWALL
LOCATION DETAILED: RIGHT SUPERIOR UPPER BACK
LOCATION DETAILED: LEFT SUPERIOR LATERAL MALAR CHEEK
LOCATION DETAILED: RIGHT PROXIMAL DORSAL FOREARM
LOCATION DETAILED: LEFT LATERAL EYEBROW
LOCATION DETAILED: LEFT DISTAL DORSAL FOREARM
LOCATION DETAILED: RIGHT MEDIAL UPPER BACK
LOCATION DETAILED: LEFT PROXIMAL DORSAL FOREARM
LOCATION DETAILED: NASAL DORSUM
LOCATION DETAILED: LEFT INFERIOR LATERAL FOREHEAD
LOCATION DETAILED: RIGHT CENTRAL TEMPLE
LOCATION DETAILED: LEFT MID TEMPLE
LOCATION DETAILED: LEFT INFERIOR CENTRAL MALAR CHEEK
LOCATION DETAILED: INFERIOR MID FOREHEAD
LOCATION DETAILED: EPIGASTRIC SKIN
LOCATION DETAILED: LEFT CENTRAL MALAR CHEEK
LOCATION DETAILED: RIGHT INFERIOR FOREHEAD

## 2019-09-26 ASSESSMENT — LOCATION SIMPLE DESCRIPTION DERM
LOCATION SIMPLE: INFERIOR FOREHEAD
LOCATION SIMPLE: RIGHT FOREHEAD
LOCATION SIMPLE: NOSE
LOCATION SIMPLE: LEFT FOREARM
LOCATION SIMPLE: RIGHT LOWER BACK
LOCATION SIMPLE: LEFT NOSE
LOCATION SIMPLE: LEFT CHEEK
LOCATION SIMPLE: RIGHT TEMPLE
LOCATION SIMPLE: LEFT FOREHEAD
LOCATION SIMPLE: LEFT TEMPLE
LOCATION SIMPLE: RIGHT UPPER BACK
LOCATION SIMPLE: LEFT EYEBROW
LOCATION SIMPLE: RIGHT FOREARM
LOCATION SIMPLE: ABDOMEN

## 2019-09-26 ASSESSMENT — LOCATION ZONE DERM
LOCATION ZONE: FACE
LOCATION ZONE: NOSE
LOCATION ZONE: ARM
LOCATION ZONE: TRUNK

## 2019-09-26 NOTE — PROCEDURE: LIQUID NITROGEN
Duration Of Freeze Thaw-Cycle (Seconds): 3
Post-Care Instructions: I reviewed with the patient in detail post-care instructions. Patient is to wear sunprotection, and avoid picking at any of the treated lesions. Pt may apply Vaseline to crusted or scabbing areas.
Render Note In Bullet Format When Appropriate: No
Detail Level: Detailed
Consent: The patient's consent was obtained including but not limited to risks of crusting, scabbing, blistering, scarring, darker or lighter pigmentary change, recurrence, incomplete removal and infection.
Number Of Freeze-Thaw Cycles: 2 freeze-thaw cycles

## 2020-03-26 ENCOUNTER — APPOINTMENT (RX ONLY)
Dept: URBAN - METROPOLITAN AREA CLINIC 22 | Facility: CLINIC | Age: 85
Setting detail: DERMATOLOGY
End: 2020-03-26

## 2020-03-26 DIAGNOSIS — L81.4 OTHER MELANIN HYPERPIGMENTATION: ICD-10-CM

## 2020-03-26 DIAGNOSIS — L82.1 OTHER SEBORRHEIC KERATOSIS: ICD-10-CM

## 2020-03-26 DIAGNOSIS — L57.0 ACTINIC KERATOSIS: ICD-10-CM

## 2020-03-26 DIAGNOSIS — Z71.89 OTHER SPECIFIED COUNSELING: ICD-10-CM

## 2020-03-26 PROCEDURE — 99213 OFFICE O/P EST LOW 20 MIN: CPT | Mod: 25

## 2020-03-26 PROCEDURE — ? LIQUID NITROGEN

## 2020-03-26 PROCEDURE — 17000 DESTRUCT PREMALG LESION: CPT

## 2020-03-26 PROCEDURE — ? COUNSELING

## 2020-03-26 PROCEDURE — 17003 DESTRUCT PREMALG LES 2-14: CPT

## 2020-03-26 ASSESSMENT — LOCATION DETAILED DESCRIPTION DERM
LOCATION DETAILED: RIGHT RADIAL DORSAL HAND
LOCATION DETAILED: RIGHT CENTRAL MALAR CHEEK
LOCATION DETAILED: RIGHT DORSAL MIDDLE METACARPOPHALANGEAL JOINT
LOCATION DETAILED: LEFT INFERIOR CENTRAL MALAR CHEEK
LOCATION DETAILED: RIGHT FOREHEAD
LOCATION DETAILED: LEFT RADIAL DORSAL HAND
LOCATION DETAILED: RIGHT DISTAL DORSAL FOREARM
LOCATION DETAILED: LEFT CENTRAL MALAR CHEEK
LOCATION DETAILED: LEFT PROXIMAL DORSAL FOREARM
LOCATION DETAILED: RIGHT PROXIMAL DORSAL FOREARM
LOCATION DETAILED: LEFT SUPERIOR FOREHEAD
LOCATION DETAILED: RIGHT INFERIOR CENTRAL MALAR CHEEK
LOCATION DETAILED: INFERIOR MID FOREHEAD
LOCATION DETAILED: LEFT SUPERIOR MEDIAL FOREHEAD
LOCATION DETAILED: LEFT DISTAL DORSAL FOREARM
LOCATION DETAILED: LEFT SUPERIOR NASAL CHEEK
LOCATION DETAILED: LEFT ULNAR DORSAL HAND
LOCATION DETAILED: LEFT INFERIOR FOREHEAD

## 2020-03-26 ASSESSMENT — LOCATION SIMPLE DESCRIPTION DERM
LOCATION SIMPLE: RIGHT HAND
LOCATION SIMPLE: RIGHT FOREHEAD
LOCATION SIMPLE: RIGHT CHEEK
LOCATION SIMPLE: INFERIOR FOREHEAD
LOCATION SIMPLE: LEFT HAND
LOCATION SIMPLE: RIGHT FOREARM
LOCATION SIMPLE: LEFT CHEEK
LOCATION SIMPLE: LEFT FOREHEAD
LOCATION SIMPLE: LEFT FOREARM

## 2020-03-26 ASSESSMENT — LOCATION ZONE DERM
LOCATION ZONE: ARM
LOCATION ZONE: FACE
LOCATION ZONE: HAND

## 2020-05-02 ENCOUNTER — OFFICE VISIT (OUTPATIENT)
Dept: URGENT CARE | Facility: PHYSICIAN GROUP | Age: 85
End: 2020-05-02
Payer: MEDICARE

## 2020-05-02 VITALS
BODY MASS INDEX: 21.9 KG/M2 | HEIGHT: 63 IN | OXYGEN SATURATION: 94 % | TEMPERATURE: 97.6 F | WEIGHT: 123.6 LBS | DIASTOLIC BLOOD PRESSURE: 70 MMHG | SYSTOLIC BLOOD PRESSURE: 132 MMHG | HEART RATE: 70 BPM | RESPIRATION RATE: 18 BRPM

## 2020-05-02 DIAGNOSIS — M25.472 LEFT ANKLE SWELLING: ICD-10-CM

## 2020-05-02 PROCEDURE — 99212 OFFICE O/P EST SF 10 MIN: CPT | Performed by: FAMILY MEDICINE

## 2020-05-02 ASSESSMENT — ENCOUNTER SYMPTOMS: LEG SWELLING: 1

## 2020-05-02 ASSESSMENT — FIBROSIS 4 INDEX: FIB4 SCORE: 2.58

## 2020-05-02 NOTE — PROGRESS NOTES
"Subjective:      Milena Ang is a 84 y.o. female who presents with Leg Swelling (x3 months, states swelling is worse at night )            This is a healthy 84-year-old female with past medical history of hypertension, and incontinence is here complaining of left ankle swelling for the past 3 to 4 months, patient stated the swelling only stays around the ankle area and only noticeable at nighttime, during the day patient does not have the swelling.  Patient is active, does all her ADLs and IADLs herself, moves around the house constantly and takes care of her disabled .  Denies any fever, sore throat, chest pain, no shortness of breath.  No recent traveling,    Leg Swelling         Review of Systems   Musculoskeletal:        Left ankle swelling   All other systems reviewed and are negative.         Objective:     /70 (BP Location: Left arm, Patient Position: Sitting, BP Cuff Size: Adult)   Pulse 70   Temp 36.4 °C (97.6 °F) (Temporal)   Resp 18   Ht 1.6 m (5' 3\")   Wt 56.1 kg (123 lb 9.6 oz)   LMP 07/01/1986   SpO2 94%   BMI 21.89 kg/m²      Physical Exam  Vitals signs reviewed.   Constitutional:       General: She is not in acute distress.     Appearance: Normal appearance. She is normal weight. She is not ill-appearing, toxic-appearing or diaphoretic.   HENT:      Head: Normocephalic and atraumatic.      Nose: Nose normal. No congestion or rhinorrhea.      Mouth/Throat:      Mouth: Mucous membranes are moist.      Pharynx: No oropharyngeal exudate or posterior oropharyngeal erythema.   Eyes:      Extraocular Movements: Extraocular movements intact.   Neck:      Musculoskeletal: Normal range of motion.   Cardiovascular:      Rate and Rhythm: Normal rate.      Pulses: Normal pulses.      Heart sounds: No murmur. No friction rub. No gallop.    Pulmonary:      Effort: Pulmonary effort is normal. No respiratory distress.      Breath sounds: Normal breath sounds. No stridor. No wheezing, " rhonchi or rales.   Chest:      Chest wall: No tenderness.   Musculoskeletal: Normal range of motion.         General: No swelling, tenderness, deformity or signs of injury.      Right lower leg: No edema.      Left lower leg: No edema.      Comments: No ankle swelling at this time, no edema, no calf swelling  No pitting edema   Skin:     General: Skin is warm.      Capillary Refill: Capillary refill takes less than 2 seconds.   Neurological:      General: No focal deficit present.      Mental Status: She is alert.   Psychiatric:         Mood and Affect: Mood normal.                 Assessment/Plan:       1. Left ankle swelling         -Patient currently does not have any swelling, patient is Wells criteria for PE and DVT is extremely low, patient is not complaining of any shortness of breath, no chest pain, patient physical exam within normal limits, vital signs within normal limits,  -Patient takes calcium channel blocker for blood pressure and it is possible that 1 of the reason that she is having ankle swelling is because of that especially because ankle swelling only noticeable at nighttime, patient does not have calf swelling  -I explained to the patient that it is best if she uses her compression socks during the morning and take the compression socks out at nighttime,  -Explained to the patient to make an appoint with the primary care physician in the next 7 to 10 days for possible blood work  -All the red flag signs were reviewed with the patient, please come back to classes or if your sinus symptoms worsen

## 2020-05-02 NOTE — PATIENT INSTRUCTIONS
Edema  Edema is an abnormal buildup of fluids in your body tissues. Edema is somewhat dependent on gravity to pull the fluid to the lowest place in your body. That makes the condition more common in the legs and thighs (lower extremities). Painless swelling of the feet and ankles is common and becomes more likely as you get older. It is also common in looser tissues, like around your eyes.  When the affected area is squeezed, the fluid may move out of that spot and leave a dent for a few moments. This dent is called pitting.  What are the causes?  There are many possible causes of edema. Eating too much salt and being on your feet or sitting for a long time can cause edema in your legs and ankles. Hot weather may make edema worse. Common medical causes of edema include:  · Heart failure.  · Liver disease.  · Kidney disease.  · Weak blood vessels in your legs.  · Cancer.  · An injury.  · Pregnancy.  · Some medications.  · Obesity.  What are the signs or symptoms?  Edema is usually painless. Your skin may look swollen or shiny.  How is this diagnosed?  Your health care provider may be able to diagnose edema by asking about your medical history and doing a physical exam. You may need to have tests such as X-rays, an electrocardiogram, or blood tests to check for medical conditions that may cause edema.  How is this treated?  Edema treatment depends on the cause. If you have heart, liver, or kidney disease, you need the treatment appropriate for these conditions. General treatment may include:  · Elevation of the affected body part above the level of your heart.  · Compression of the affected body part. Pressure from elastic bandages or support stockings squeezes the tissues and forces fluid back into the blood vessels. This keeps fluid from entering the tissues.  · Restriction of fluid and salt intake.  · Use of a water pill (diuretic). These medications are appropriate only for some types of edema. They pull fluid out  of your body and make you urinate more often. This gets rid of fluid and reduces swelling, but diuretics can have side effects. Only use diuretics as directed by your health care provider.  Follow these instructions at home:  · Keep the affected body part above the level of your heart when you are lying down.  · Do not sit still or stand for prolonged periods.  · Do not put anything directly under your knees when lying down.  · Do not wear constricting clothing or garters on your upper legs.  · Exercise your legs to work the fluid back into your blood vessels. This may help the swelling go down.  · Wear elastic bandages or support stockings to reduce ankle swelling as directed by your health care provider.  · Eat a low-salt diet to reduce fluid if your health care provider recommends it.  · Only take medicines as directed by your health care provider.  Contact a health care provider if:  · Your edema is not responding to treatment.  · You have heart, liver, or kidney disease and notice symptoms of edema.  · You have edema in your legs that does not improve after elevating them.  · You have sudden and unexplained weight gain.  Get help right away if:  · You develop shortness of breath or chest pain.  · You cannot breathe when you lie down.  · You develop pain, redness, or warmth in the swollen areas.  · You have heart, liver, or kidney disease and suddenly get edema.  · You have a fever and your symptoms suddenly get worse.  This information is not intended to replace advice given to you by your health care provider. Make sure you discuss any questions you have with your health care provider.  Document Released: 12/18/2006 Document Revised: 05/25/2017 Document Reviewed: 10/10/2014  [employer] Interactive Patient Education © 2017 [employer]

## 2020-06-18 ENCOUNTER — HOSPITAL ENCOUNTER (OUTPATIENT)
Dept: LAB | Facility: MEDICAL CENTER | Age: 85
End: 2020-06-18
Attending: PHYSICIAN ASSISTANT
Payer: MEDICARE

## 2020-06-18 ENCOUNTER — OFFICE VISIT (OUTPATIENT)
Dept: URGENT CARE | Facility: PHYSICIAN GROUP | Age: 85
End: 2020-06-18
Payer: MEDICARE

## 2020-06-18 ENCOUNTER — HOSPITAL ENCOUNTER (OUTPATIENT)
Facility: MEDICAL CENTER | Age: 85
End: 2020-06-18
Attending: SPECIALIST
Payer: MEDICARE

## 2020-06-18 VITALS
DIASTOLIC BLOOD PRESSURE: 76 MMHG | HEIGHT: 63 IN | TEMPERATURE: 98.1 F | BODY MASS INDEX: 21.97 KG/M2 | WEIGHT: 124 LBS | SYSTOLIC BLOOD PRESSURE: 134 MMHG | RESPIRATION RATE: 18 BRPM | HEART RATE: 66 BPM | OXYGEN SATURATION: 96 %

## 2020-06-18 DIAGNOSIS — R20.2 TINGLING OF BOTH FEET: ICD-10-CM

## 2020-06-18 DIAGNOSIS — R73.09 OTHER ABNORMAL GLUCOSE: ICD-10-CM

## 2020-06-18 DIAGNOSIS — N30.00 ACUTE CYSTITIS WITHOUT HEMATURIA: ICD-10-CM

## 2020-06-18 DIAGNOSIS — R73.03 PRE-DIABETES: ICD-10-CM

## 2020-06-18 LAB
ALBUMIN SERPL BCP-MCNC: 4.6 G/DL (ref 3.2–4.9)
ALBUMIN/GLOB SERPL: 1.5 G/DL
ALP SERPL-CCNC: 70 U/L (ref 30–99)
ALT SERPL-CCNC: 17 U/L (ref 2–50)
ANION GAP SERPL CALC-SCNC: 12 MMOL/L (ref 7–16)
APPEARANCE UR: CLEAR
AST SERPL-CCNC: 10 U/L (ref 12–45)
BASOPHILS # BLD AUTO: 1.1 % (ref 0–1.8)
BASOPHILS # BLD: 0.07 K/UL (ref 0–0.12)
BILIRUB SERPL-MCNC: 0.5 MG/DL (ref 0.1–1.5)
BILIRUB UR STRIP-MCNC: NEGATIVE MG/DL
BUN SERPL-MCNC: 18 MG/DL (ref 8–22)
CALCIUM SERPL-MCNC: 9.4 MG/DL (ref 8.5–10.5)
CHLORIDE SERPL-SCNC: 107 MMOL/L (ref 96–112)
CO2 SERPL-SCNC: 25 MMOL/L (ref 20–33)
COLOR UR AUTO: YELLOW
CREAT SERPL-MCNC: 0.79 MG/DL (ref 0.5–1.4)
EOSINOPHIL # BLD AUTO: 0.03 K/UL (ref 0–0.51)
EOSINOPHIL NFR BLD: 0.5 % (ref 0–6.9)
ERYTHROCYTE [DISTWIDTH] IN BLOOD BY AUTOMATED COUNT: 46 FL (ref 35.9–50)
GLOBULIN SER CALC-MCNC: 3.1 G/DL (ref 1.9–3.5)
GLUCOSE BLD-MCNC: 128 MG/DL (ref 70–100)
GLUCOSE SERPL-MCNC: 114 MG/DL (ref 65–99)
GLUCOSE UR STRIP.AUTO-MCNC: NEGATIVE MG/DL
HBA1C MFR BLD: 5.9 % (ref 0–5.6)
HCT VFR BLD AUTO: 49.4 % (ref 37–47)
HGB BLD-MCNC: 15.8 G/DL (ref 12–16)
IMM GRANULOCYTES # BLD AUTO: 0.02 K/UL (ref 0–0.11)
IMM GRANULOCYTES NFR BLD AUTO: 0.3 % (ref 0–0.9)
INT CON NEG: ABNORMAL
INT CON POS: ABNORMAL
KETONES UR STRIP.AUTO-MCNC: NEGATIVE MG/DL
LEUKOCYTE ESTERASE UR QL STRIP.AUTO: NEGATIVE
LYMPHOCYTES # BLD AUTO: 2.37 K/UL (ref 1–4.8)
LYMPHOCYTES NFR BLD: 36.7 % (ref 22–41)
MCH RBC QN AUTO: 31.5 PG (ref 27–33)
MCHC RBC AUTO-ENTMCNC: 32 G/DL (ref 33.6–35)
MCV RBC AUTO: 98.4 FL (ref 81.4–97.8)
MONOCYTES # BLD AUTO: 0.52 K/UL (ref 0–0.85)
MONOCYTES NFR BLD AUTO: 8.1 % (ref 0–13.4)
NEUTROPHILS # BLD AUTO: 3.44 K/UL (ref 2–7.15)
NEUTROPHILS NFR BLD: 53.3 % (ref 44–72)
NITRITE UR QL STRIP.AUTO: POSITIVE
NRBC # BLD AUTO: 0 K/UL
NRBC BLD-RTO: 0 /100 WBC
PH UR STRIP.AUTO: 5.5 [PH] (ref 5–8)
PLATELET # BLD AUTO: 185 K/UL (ref 164–446)
PMV BLD AUTO: 11.5 FL (ref 9–12.9)
POTASSIUM SERPL-SCNC: 3.8 MMOL/L (ref 3.6–5.5)
PROT SERPL-MCNC: 7.7 G/DL (ref 6–8.2)
PROT UR QL STRIP: NEGATIVE MG/DL
RBC # BLD AUTO: 5.02 M/UL (ref 4.2–5.4)
RBC UR QL AUTO: NORMAL
SODIUM SERPL-SCNC: 144 MMOL/L (ref 135–145)
SP GR UR STRIP.AUTO: 1.03
T4 FREE SERPL-MCNC: 1.06 NG/DL (ref 0.93–1.7)
TSH SERPL DL<=0.005 MIU/L-ACNC: 0.21 UIU/ML (ref 0.38–5.33)
UROBILINOGEN UR STRIP-MCNC: NEGATIVE MG/DL
WBC # BLD AUTO: 6.5 K/UL (ref 4.8–10.8)

## 2020-06-18 PROCEDURE — 84443 ASSAY THYROID STIM HORMONE: CPT

## 2020-06-18 PROCEDURE — 87186 SC STD MICRODIL/AGAR DIL: CPT

## 2020-06-18 PROCEDURE — 80053 COMPREHEN METABOLIC PANEL: CPT

## 2020-06-18 PROCEDURE — 83036 HEMOGLOBIN GLYCOSYLATED A1C: CPT | Performed by: PHYSICIAN ASSISTANT

## 2020-06-18 PROCEDURE — 81002 URINALYSIS NONAUTO W/O SCOPE: CPT | Performed by: PHYSICIAN ASSISTANT

## 2020-06-18 PROCEDURE — 87077 CULTURE AEROBIC IDENTIFY: CPT

## 2020-06-18 PROCEDURE — 82962 GLUCOSE BLOOD TEST: CPT | Performed by: PHYSICIAN ASSISTANT

## 2020-06-18 PROCEDURE — 36415 COLL VENOUS BLD VENIPUNCTURE: CPT

## 2020-06-18 PROCEDURE — 85025 COMPLETE CBC W/AUTO DIFF WBC: CPT

## 2020-06-18 PROCEDURE — 84439 ASSAY OF FREE THYROXINE: CPT

## 2020-06-18 PROCEDURE — 99214 OFFICE O/P EST MOD 30 MIN: CPT | Performed by: PHYSICIAN ASSISTANT

## 2020-06-18 PROCEDURE — 87086 URINE CULTURE/COLONY COUNT: CPT

## 2020-06-18 RX ORDER — SULFAMETHOXAZOLE AND TRIMETHOPRIM 800; 160 MG/1; MG/1
1 TABLET ORAL EVERY 12 HOURS
Qty: 6 TAB | Refills: 0 | Status: SHIPPED | OUTPATIENT
Start: 2020-06-18 | End: 2020-06-21

## 2020-06-18 ASSESSMENT — ENCOUNTER SYMPTOMS
CHILLS: 0
FEVER: 0
NUMBNESS: 0
VISUAL CHANGE: 0
VERTIGO: 0
FATIGUE: 1
ABDOMINAL PAIN: 0
NAUSEA: 0
WEAKNESS: 0
JOINT SWELLING: 0
LEG PAIN: 1
HEADACHES: 0
COUGH: 0
TINGLING: 1
VOMITING: 0

## 2020-06-18 ASSESSMENT — FIBROSIS 4 INDEX: FIB4 SCORE: 2.58

## 2020-06-18 NOTE — PROGRESS NOTES
Subjective:      Milena Ang is a 84 y.o. female who presents with Leg Pain (and tingling in (R) leg)            Approximately 6 weeks ago she had swelling of her left ankle which was treated conservatively.  Shortly after the episode began to have tingling in her right foot which she is shooting upward intermittently.  Upon further questioning patient states she has had symptoms like this for the last 2 years off and on.  She has been increasingly fatigued and did have smelly urine.  History of prediabetes.  No injuries.  No leg swelling or pain.  Denies any hip, back, knee pain.  History of DVT several years ago but states this feels nothing like that.  Denies any chest pain, shortness of breath.      Tingling   This is a new problem. The current episode started more than 1 month ago. The problem occurs intermittently. The problem has been waxing and waning. Associated symptoms include fatigue and urinary symptoms. Pertinent negatives include no abdominal pain, chest pain, chills, congestion, coughing, fever, headaches, joint swelling, nausea, numbness, rash, vertigo, visual change, vomiting or weakness. Nothing aggravates the symptoms. She has tried nothing for the symptoms. The treatment provided no relief.         PMH:  has a past medical history of Cataract, Cervical high risk human papillomavirus (HPV) DNA test positive, Colon polyp, Diverticulosis of colon, Dyslipidemia, Hypertension, Incontinence, Kidney stone, Menopausal and postmenopausal disorder, and OSTEOPOROSIS. She also has no past medical history of Breast cancer (HCC).  MEDS:   Current Outpatient Medications:   •  sulfamethoxazole-trimethoprim (BACTRIM DS) 800-160 MG tablet, Take 1 Tab by mouth every 12 hours for 3 days., Disp: 6 Tab, Rfl: 0  •  oxybutynin SR (DITROPAN-XL) 5 MG TABLET SR 24 HR, Take 1 Tab by mouth every day., Disp: 90 Tab, Rfl: 3  •  lisinopril (PRINIVIL, ZESTRIL) 40 MG tablet, Take 1 Tab by mouth every day., Disp: 90 Tab,  "Rfl: 3  •  amLODIPine (NORVASC) 5 MG Tab, Take 1 Tab by mouth every bedtime., Disp: 90 Tab, Rfl: 3  •  vitamin D (CHOLECALCIFEROL) 1000 UNIT Tab, Take 3,000 Units by mouth every day., Disp: , Rfl:   ALLERGIES:   Allergies   Allergen Reactions   • Pcn [Penicillins] Rash     \"tongue got thick\"     SURGHX:   Past Surgical History:   Procedure Laterality Date   • BLADDER SLING FEMALE  7/12    Dr. Quinn   • CHOLECYSTECTOMY       SOCHX:  reports that she has never smoked. She has never used smokeless tobacco. She reports that she does not drink alcohol or use drugs.  FH: family history includes Cancer in her brother, brother, father, and sister; Heart Disease in her brother and father; Hypertension in her father; No Known Problems in her daughter and mother; Psychiatric Illness in her daughter.    Review of Systems   Constitutional: Positive for fatigue. Negative for chills and fever.   HENT: Negative for congestion.    Respiratory: Negative for cough.    Cardiovascular: Negative for chest pain.   Gastrointestinal: Negative for abdominal pain, nausea and vomiting.   Musculoskeletal: Negative for joint swelling.   Skin: Negative for rash.   Neurological: Negative for vertigo, weakness, numbness and headaches.       Medications, Allergies, and current problem list reviewed today in Epic     Objective:     /76 (BP Location: Left arm, Patient Position: Sitting, BP Cuff Size: Adult)   Pulse 66   Temp 36.7 °C (98.1 °F) (Temporal)   Resp 18   Ht 1.6 m (5' 3\")   Wt 56.2 kg (124 lb)   LMP 07/01/1986   SpO2 96%   BMI 21.97 kg/m²      Physical Exam  Vitals signs and nursing note reviewed.   Constitutional:       General: She is not in acute distress.     Appearance: She is well-developed. She is not diaphoretic.   HENT:      Head: Normocephalic and atraumatic.      Right Ear: Tympanic membrane and external ear normal.      Left Ear: Tympanic membrane and external ear normal.      Nose: Nose normal.      " Mouth/Throat:      Pharynx: No oropharyngeal exudate.   Eyes:      General:         Right eye: No discharge.         Left eye: No discharge.      Conjunctiva/sclera: Conjunctivae normal.      Pupils: Pupils are equal, round, and reactive to light.   Neck:      Musculoskeletal: Normal range of motion and neck supple.   Cardiovascular:      Rate and Rhythm: Normal rate and regular rhythm.      Heart sounds: Normal heart sounds.   Pulmonary:      Effort: Pulmonary effort is normal. No respiratory distress.      Breath sounds: Normal breath sounds. No wheezing.   Abdominal:      Tenderness: There is no right CVA tenderness or left CVA tenderness.   Musculoskeletal: Normal range of motion.         General: No swelling or tenderness.      Right lower leg: No edema.      Left lower leg: No edema.      Comments: No obvious sensory deficits of her distal extremities.   Lymphadenopathy:      Cervical: No cervical adenopathy.   Skin:     General: Skin is warm and dry.   Neurological:      Mental Status: She is alert and oriented to person, place, and time.   Psychiatric:         Behavior: Behavior normal.         Thought Content: Thought content normal.         Judgment: Judgment normal.                 Assessment/Plan:     1. Tingling of both feet  POCT Glucose    POCT  A1C    POCT Urinalysis    URINE CULTURE(NEW)    CBC WITH DIFFERENTIAL    Comp Metabolic Panel    TSH WITH REFLEX TO FT4   2. Other abnormal glucose   POCT  A1C    CBC WITH DIFFERENTIAL    Comp Metabolic Panel   3. Acute cystitis without hematuria  URINE CULTURE(NEW)    sulfamethoxazole-trimethoprim (BACTRIM DS) 800-160 MG tablet   4. Pre-diabetes       Urinalysis: Positive nitrites  Glucose: 128  A1c: 5.9    Couple year history of tingling in her right foot off and on.  Patient is prediabetic as evidenced by in clinic testing.  Exam is completely unremarkable, her vital signs are normal.  She has no leg swelling or pain that would indicate vascular compromise.   I will complete CBC, CMP, TSH as she has not had any recent lab work completed.  Incidental finding of urinary tract infection.  She does admit to increased fatigue and smelly urine recently.  We will treat for UTI and see if this has any bearing on her other symptoms.  I did advise an immediate follow-up with PCP for further work-up if indicated.    Take full course of antibiotic  Urine culture, I will only call patient if I need to change antibiotic pending results  Significantly increase fluids  OTC Motrin or Azo as needed  Cranberry as tolerated    Return to clinic or go to ED if symptoms worsen or persist. Indications for ED discussed at length. Patient voices understanding. Follow-up with your primary care provider in 3-5 days. Red flags discussed. All side effects of medication discussed including allergic response, GI upset, tendon injury, etc.    Please note that this dictation was created using voice recognition software. I have made every reasonable attempt to correct obvious errors, but I expect that there are errors of grammar and possibly content that I did not discover before finalizing the note.

## 2020-06-21 LAB
BACTERIA UR CULT: ABNORMAL
BACTERIA UR CULT: ABNORMAL
SIGNIFICANT IND 70042: ABNORMAL
SITE SITE: ABNORMAL
SOURCE SOURCE: ABNORMAL

## 2020-06-23 ENCOUNTER — OFFICE VISIT (OUTPATIENT)
Dept: URGENT CARE | Facility: PHYSICIAN GROUP | Age: 85
End: 2020-06-23
Payer: MEDICARE

## 2020-06-23 ENCOUNTER — HOSPITAL ENCOUNTER (OUTPATIENT)
Dept: RADIOLOGY | Facility: MEDICAL CENTER | Age: 85
End: 2020-06-23
Attending: NURSE PRACTITIONER
Payer: MEDICARE

## 2020-06-23 VITALS
RESPIRATION RATE: 16 BRPM | SYSTOLIC BLOOD PRESSURE: 140 MMHG | TEMPERATURE: 97.6 F | OXYGEN SATURATION: 97 % | DIASTOLIC BLOOD PRESSURE: 78 MMHG | HEART RATE: 63 BPM

## 2020-06-23 DIAGNOSIS — Z86.79 HISTORY OF HYPERTENSION: ICD-10-CM

## 2020-06-23 DIAGNOSIS — R63.0 LACK OF APPETITE: ICD-10-CM

## 2020-06-23 DIAGNOSIS — R53.83 OTHER FATIGUE: ICD-10-CM

## 2020-06-23 LAB
APPEARANCE UR: CLEAR
BILIRUB UR STRIP-MCNC: NORMAL MG/DL
COLOR UR AUTO: YELLOW
EKG 4674: NORMAL
GLUCOSE UR STRIP.AUTO-MCNC: NORMAL MG/DL
KETONES UR STRIP.AUTO-MCNC: NORMAL MG/DL
LEUKOCYTE ESTERASE UR QL STRIP.AUTO: NORMAL
NITRITE UR QL STRIP.AUTO: NORMAL
PH UR STRIP.AUTO: 5 [PH] (ref 5–8)
PROT UR QL STRIP: NORMAL MG/DL
RBC UR QL AUTO: NORMAL
SP GR UR STRIP.AUTO: 1.03
UROBILINOGEN UR STRIP-MCNC: 0.2 MG/DL

## 2020-06-23 PROCEDURE — 81002 URINALYSIS NONAUTO W/O SCOPE: CPT | Performed by: NURSE PRACTITIONER

## 2020-06-23 PROCEDURE — 93000 ELECTROCARDIOGRAM COMPLETE: CPT | Performed by: NURSE PRACTITIONER

## 2020-06-23 PROCEDURE — 71046 X-RAY EXAM CHEST 2 VIEWS: CPT

## 2020-06-23 PROCEDURE — 99214 OFFICE O/P EST MOD 30 MIN: CPT | Performed by: NURSE PRACTITIONER

## 2020-06-23 ASSESSMENT — ENCOUNTER SYMPTOMS
TINGLING: 0
WHEEZING: 0
PALPITATIONS: 0
CHILLS: 0
ABDOMINAL PAIN: 0
EYE DISCHARGE: 0
FEVER: 0
SORE THROAT: 0
EYE REDNESS: 0
VOMITING: 0
FALLS: 0
COUGH: 1
DIARRHEA: 0
HEADACHES: 0
NAUSEA: 0
SHORTNESS OF BREATH: 0
STRIDOR: 0
CONSTIPATION: 0
BLOOD IN STOOL: 0
WEAKNESS: 0
ROS GI COMMENTS: LACK OF APPETITE
DIZZINESS: 0

## 2020-06-23 NOTE — PROGRESS NOTES
"Subjective:   Milena Ang is a 84 y.o. female who presents for Fatigue (ging on for a while)        HPI   Patient with recurrent fatigue that has been ongoing for the past several weeks. States symptoms are intermittent. Often associated with lack of appetite. She was seen in UC on 6/18/2020 and was treated for UTI, which showed sensitivity tot Bactrim. States her urinary symptoms have resolved, but she is still feeling fatigued. Patient states her blood \"returned normal.\" She has not followed up with PCP. States often times when she goes to sleep, she \"has a mild cough and feels like she has to clear her throat.\" Denies alleviating or aggravating factors.    Patient has not had any recent medicine changes and does not take any sedating medications. Denies numbness or tingling or weakness.    Review of Systems   Constitutional: Positive for malaise/fatigue. Negative for chills and fever.   HENT: Negative for congestion, ear discharge, ear pain and sore throat.    Eyes: Negative for discharge and redness.   Respiratory: Positive for cough. Negative for shortness of breath, wheezing and stridor.    Cardiovascular: Negative for chest pain, palpitations and leg swelling.   Gastrointestinal: Negative for abdominal pain, blood in stool, constipation, diarrhea, nausea and vomiting.        Lack of appetite   Musculoskeletal: Negative for falls and joint pain.   Skin: Negative for itching and rash.   Neurological: Negative for dizziness, tingling, weakness and headaches.   All other systems reviewed and are negative.    PMH:  has a past medical history of Cataract, Cervical high risk human papillomavirus (HPV) DNA test positive, Colon polyp, Diverticulosis of colon, Dyslipidemia, Hypertension, Incontinence, Kidney stone, Menopausal and postmenopausal disorder, and OSTEOPOROSIS. She also has no past medical history of Breast cancer (HCC).  ALLERGIES:   Allergies   Allergen Reactions   • Pcn [Penicillins] Rash     " "\"tongue got thick\"       Patient's PMH, SocHx, SurgHx, FamHx, Drug allergies and medications reviewed.     Objective:   /78 (BP Location: Left arm, Patient Position: Sitting, BP Cuff Size: Adult)   Pulse 63   Temp 36.4 °C (97.6 °F) (Temporal)   Resp 16   LMP 07/01/1986   SpO2 97%   Physical Exam  Vitals signs reviewed.   Constitutional:       Appearance: She is well-developed.   HENT:      Head: Normocephalic.      Right Ear: Tympanic membrane and ear canal normal. No middle ear effusion. Tympanic membrane is not perforated or erythematous.      Left Ear: Tympanic membrane and ear canal normal.  No middle ear effusion. Tympanic membrane is not perforated or erythematous.      Nose: Nose normal. No rhinorrhea.      Right Sinus: No maxillary sinus tenderness or frontal sinus tenderness.      Left Sinus: No maxillary sinus tenderness or frontal sinus tenderness.      Mouth/Throat:      Lips: Pink.      Mouth: Mucous membranes are moist.      Pharynx: Oropharynx is clear. Uvula midline. No oropharyngeal exudate, posterior oropharyngeal erythema or uvula swelling.      Tonsils: No tonsillar exudate.   Eyes:      General: Lids are normal.      Extraocular Movements: Extraocular movements intact.      Conjunctiva/sclera: Conjunctivae normal.      Pupils: Pupils are equal, round, and reactive to light.   Neck:      Musculoskeletal: Normal range of motion.      Thyroid: No thyromegaly.   Cardiovascular:      Rate and Rhythm: Normal rate and regular rhythm.      Heart sounds: Murmur present.   Pulmonary:      Effort: Pulmonary effort is normal. No respiratory distress.      Breath sounds: Examination of the right-lower field reveals decreased breath sounds. Decreased breath sounds present. No wheezing.   Lymphadenopathy:      Head:      Right side of head: No submandibular or tonsillar adenopathy.      Left side of head: No submandibular or tonsillar adenopathy.   Skin:     General: Skin is warm and dry.      " "Findings: No rash.   Neurological:      Mental Status: She is alert and oriented to person, place, and time.   Psychiatric:         Mood and Affect: Mood normal.         Speech: Speech normal.         Behavior: Behavior normal. Behavior is cooperative.         Thought Content: Thought content normal.         Judgment: Judgment normal.           Assessment/Plan:   Assessment    1. Other fatigue  EKG    DX-CHEST-2 VIEWS   2. Lack of appetite  EKG    DX-CHEST-2 VIEWS    POCT Urinalysis   3. History of hypertension  EKG    POCT Urinalysis     EKG normal sinus rhythm without any acute changes at ST segment. No prior EKG for comparison.  UA negative - just completed course of Bactrim  Xray:\"FINDINGS:   The cardiac silhouette  and mediastinal contours are normal.   No discrete opacity, pleural fluid or pneumothorax.   Surgical clips project over the upper abdomen.      IMPRESSION:   No acute cardiopulmonary findings.\"    I reviewed prior labs from last visit and TSH was slightly below normal range. However, the T4 was WNL. Due to patient's ongoing symptoms, I would like her to follow up with PCP regarding lab work and further evaluation since she has continued fatigue.  Patient states she will schedule appointment with PCP and agrees to treatment plan.  Strict ER precautions discussed    Differential diagnosis, natural history, supportive care, and indications for immediate follow-up discussed.     **Please note that all invasive procedures during this visit were performed by myself and/or the Medical Assistant under the supervision of the PA or MD in office**      "

## 2020-06-30 ENCOUNTER — OFFICE VISIT (OUTPATIENT)
Dept: MEDICAL GROUP | Facility: PHYSICIAN GROUP | Age: 85
End: 2020-06-30
Payer: MEDICARE

## 2020-06-30 VITALS
DIASTOLIC BLOOD PRESSURE: 70 MMHG | HEART RATE: 63 BPM | SYSTOLIC BLOOD PRESSURE: 102 MMHG | BODY MASS INDEX: 22.15 KG/M2 | HEIGHT: 63 IN | TEMPERATURE: 98.4 F | RESPIRATION RATE: 18 BRPM | OXYGEN SATURATION: 97 % | WEIGHT: 125 LBS

## 2020-06-30 DIAGNOSIS — N32.81 OVERACTIVE BLADDER: ICD-10-CM

## 2020-06-30 DIAGNOSIS — R53.83 FATIGUE, UNSPECIFIED TYPE: ICD-10-CM

## 2020-06-30 PROCEDURE — 99213 OFFICE O/P EST LOW 20 MIN: CPT | Performed by: NURSE PRACTITIONER

## 2020-06-30 RX ORDER — LISINOPRIL 40 MG/1
TABLET ORAL
COMMUNITY
Start: 2020-05-08 | End: 2020-06-29

## 2020-06-30 RX ORDER — SULFAMETHOXAZOLE AND TRIMETHOPRIM 800; 160 MG/1; MG/1
TABLET ORAL
COMMUNITY
Start: 2020-06-18 | End: 2020-06-29

## 2020-06-30 RX ORDER — OXYBUTYNIN CHLORIDE 5 MG/1
TABLET, EXTENDED RELEASE ORAL
COMMUNITY
Start: 2020-05-07 | End: 2020-06-29

## 2020-06-30 RX ORDER — OXYBUTYNIN CHLORIDE 10 MG/1
10 TABLET, EXTENDED RELEASE ORAL
Qty: 90 TAB | Refills: 3 | Status: SHIPPED | OUTPATIENT
Start: 2020-06-30 | End: 2022-06-28

## 2020-06-30 RX ORDER — AMLODIPINE BESYLATE 5 MG/1
TABLET ORAL
COMMUNITY
Start: 2020-05-07 | End: 2020-06-29

## 2020-06-30 ASSESSMENT — FIBROSIS 4 INDEX: FIB4 SCORE: 1.1

## 2020-07-03 PROBLEM — R53.83 FATIGUE: Status: ACTIVE | Noted: 2020-07-03

## 2020-07-03 NOTE — ASSESSMENT & PLAN NOTE
Reports that she has been feeling very run down and tired lately.  She has been seen in urgent care 3 times recently, and has had an extensive workup.  All test results were normal, with the exception of being treated for a UTI.  She states that she is starting to feel a little more like herself, and her UTI symptoms have resolved.

## 2020-07-03 NOTE — PROGRESS NOTES
Chief Complaint   Patient presents with   • Follow-Up     urgent care   • Medication Refill     Vit D       HISTORY OF PRESENT ILLNESS: Patient is a 85 y.o. female established patient who presents today to discuss the following issues:    Fatigue  Reports that she has been feeling very run down and tired lately.  She has been seen in urgent care 3 times recently, and has had an extensive workup.  All test results were normal, with the exception of being treated for a UTI.  She states that she is starting to feel a little more like herself, and her UTI symptoms have resolved.    Overactive bladder  She would like refills of her Ditropan.      Patient Active Problem List    Diagnosis Date Noted   • Fatigue 2020   • Elevated glucose 2019   • Overactive bladder 2018   • Vitamin D deficiency 2017   • Nonrheumatic aortic valve insufficiency 2017   • Osteopenia of spine 2017   • Murmur, cardiac 2017   • HTN (hypertension) 2015   • Dyslipidemia    • Incontinence    • Diverticulosis of colon        Allergies:Pcn [penicillins]    Current Outpatient Medications   Medication Sig Dispense Refill   • oxybutynin SR (DITROPAN-XL) 10 MG CR tablet Take 1 Tab by mouth every day. 90 Tab 3   • lisinopril (PRINIVIL, ZESTRIL) 40 MG tablet Take 1 Tab by mouth every day. 90 Tab 3   • amLODIPine (NORVASC) 5 MG Tab Take 1 Tab by mouth every bedtime. 90 Tab 3   • vitamin D (CHOLECALCIFEROL) 1000 UNIT Tab Take 3,000 Units by mouth every day.       No current facility-administered medications for this visit.        Social History     Tobacco Use   • Smoking status: Never Smoker   • Smokeless tobacco: Never Used   Substance Use Topics   • Alcohol use: No   • Drug use: No       Family Status   Relation Name Status   • Fa 70  at age 70        pancreatic cancer   • Bro  Alive   • Bro     • Mo  Alive   • Sis 78    • Bro     • Sha  Alive   • Bro  Alive   • Sha  Alive  "    Family History   Problem Relation Age of Onset   • Cancer Father         pancreatic   • Heart Disease Father         MI at age 57   • Hypertension Father    • Cancer Brother         melanoma   • Heart Disease Brother    • No Known Problems Mother    • Cancer Sister         ovaren cancer   • Cancer Brother         throat   • Psychiatric Illness Daughter    • No Known Problems Daughter        Review of Systems:   Constitutional: Negative for fever, chills, weight loss and malaise.  Positive for fatigue.   HENT: Negative for ear pain, nosebleeds, congestion, sore throat and neck pain.    Eyes: Negative for blurred vision.   Respiratory: Negative for cough, sputum production, shortness of breath and wheezing.    Cardiovascular: Negative for chest pain, palpitations, orthopnea.  Leg swelling has resolved.   Gastrointestinal: Negative for heartburn, nausea, vomiting and abdominal pain.   Genitourinary: Negative for dysuria, urgency and frequency.   Musculoskeletal: Negative for myalgias, joint pain, and back pain.  Skin: Negative for rash and itching.   Neurological: Negative for dizziness, tingling, tremors, sensory change, focal weakness and headaches.   Endo/Heme/Allergies: Does not bruise/bleed easily.   Psychiatric/Behavioral: Negative for depression, suicidal ideas and memory loss.  The patient is not nervous/anxious and does not have insomnia.    All other systems reviewed and are negative except as in HPI.    Exam:  Blood Pressure 102/70 (BP Location: Left arm, Patient Position: Sitting, BP Cuff Size: Adult)   Pulse 63   Temperature 36.9 °C (98.4 °F) (Temporal)   Respiration 18   Height 1.6 m (5' 3\")   Weight 56.7 kg (125 lb)   Oxygen Saturation 97%   General:  Well nourished, well developed female in NAD  Head: Grossly normal.  Neck: Supple without JVD or bruit. Thyroid is not enlarged.  Pulmonary: Clear to ausculation. Normal effort. No rales, ronchi, or wheezing.  Cardiovascular: Regular rate and " rhythm without murmur.   Abdomen:  Soft, nontender, nondistended.  Extremities: No clubbing, cyanosis, or edema.  Skin: Intact with no obvious rashes or lesions.  Neuro: Grossly intact.  Psych: Alert and oriented x 3.  Mood and affect appropriate.    Medical decision-making and discussion: Milena is here today for follow-up.  Her chart was reviewed and her urgent care visits were discussed, her medication was refilled, and she will follow-up here as needed.          Assessment/Plan:  1. Overactive bladder  oxybutynin SR (DITROPAN-XL) 10 MG CR tablet   2. Fatigue, unspecified type         Return if symptoms worsen or fail to improve.    Please note that this dictation was created using voice recognition software. I have made every reasonable attempt to correct obvious errors, but I expect that there are errors of grammar and possibly content that I did not discover before finalizing the note.

## 2020-07-27 ENCOUNTER — APPOINTMENT (OUTPATIENT)
Dept: RADIOLOGY | Facility: MEDICAL CENTER | Age: 85
End: 2020-07-27
Attending: EMERGENCY MEDICINE
Payer: MEDICARE

## 2020-07-27 ENCOUNTER — HOSPITAL ENCOUNTER (EMERGENCY)
Facility: MEDICAL CENTER | Age: 85
End: 2020-07-27
Attending: EMERGENCY MEDICINE
Payer: MEDICARE

## 2020-07-27 VITALS
DIASTOLIC BLOOD PRESSURE: 79 MMHG | RESPIRATION RATE: 18 BRPM | BODY MASS INDEX: 22.27 KG/M2 | HEART RATE: 58 BPM | OXYGEN SATURATION: 93 % | SYSTOLIC BLOOD PRESSURE: 178 MMHG | TEMPERATURE: 97 F | HEIGHT: 63 IN | WEIGHT: 125.66 LBS

## 2020-07-27 DIAGNOSIS — R53.83 OTHER FATIGUE: ICD-10-CM

## 2020-07-27 DIAGNOSIS — N39.0 ACUTE UTI: ICD-10-CM

## 2020-07-27 LAB
ALBUMIN SERPL BCP-MCNC: 4.6 G/DL (ref 3.2–4.9)
ALBUMIN/GLOB SERPL: 1.8 G/DL
ALP SERPL-CCNC: 67 U/L (ref 30–99)
ALT SERPL-CCNC: 23 U/L (ref 2–50)
ANION GAP SERPL CALC-SCNC: 15 MMOL/L (ref 7–16)
APPEARANCE UR: CLEAR
AST SERPL-CCNC: 19 U/L (ref 12–45)
BACTERIA #/AREA URNS HPF: ABNORMAL /HPF
BASOPHILS # BLD AUTO: 0.7 % (ref 0–1.8)
BASOPHILS # BLD: 0.04 K/UL (ref 0–0.12)
BILIRUB SERPL-MCNC: 0.9 MG/DL (ref 0.1–1.5)
BILIRUB UR QL STRIP.AUTO: NEGATIVE
BUN SERPL-MCNC: 17 MG/DL (ref 8–22)
CALCIUM SERPL-MCNC: 9.2 MG/DL (ref 8.5–10.5)
CHLORIDE SERPL-SCNC: 106 MMOL/L (ref 96–112)
CO2 SERPL-SCNC: 22 MMOL/L (ref 20–33)
COLOR UR: YELLOW
COVID ORDER STATUS COVID19: NORMAL
CREAT SERPL-MCNC: 0.82 MG/DL (ref 0.5–1.4)
EKG IMPRESSION: NORMAL
EOSINOPHIL # BLD AUTO: 0.01 K/UL (ref 0–0.51)
EOSINOPHIL NFR BLD: 0.2 % (ref 0–6.9)
EPI CELLS #/AREA URNS HPF: ABNORMAL /HPF
ERYTHROCYTE [DISTWIDTH] IN BLOOD BY AUTOMATED COUNT: 41 FL (ref 35.9–50)
GLOBULIN SER CALC-MCNC: 2.6 G/DL (ref 1.9–3.5)
GLUCOSE SERPL-MCNC: 106 MG/DL (ref 65–99)
GLUCOSE UR STRIP.AUTO-MCNC: NEGATIVE MG/DL
HCT VFR BLD AUTO: 47.3 % (ref 37–47)
HGB BLD-MCNC: 16.2 G/DL (ref 12–16)
HYALINE CASTS #/AREA URNS LPF: ABNORMAL /LPF
IMM GRANULOCYTES # BLD AUTO: 0.02 K/UL (ref 0–0.11)
IMM GRANULOCYTES NFR BLD AUTO: 0.3 % (ref 0–0.9)
KETONES UR STRIP.AUTO-MCNC: ABNORMAL MG/DL
LEUKOCYTE ESTERASE UR QL STRIP.AUTO: NEGATIVE
LIPASE SERPL-CCNC: 35 U/L (ref 11–82)
LYMPHOCYTES # BLD AUTO: 1.6 K/UL (ref 1–4.8)
LYMPHOCYTES NFR BLD: 26.5 % (ref 22–41)
MCH RBC QN AUTO: 31.3 PG (ref 27–33)
MCHC RBC AUTO-ENTMCNC: 34.2 G/DL (ref 33.6–35)
MCV RBC AUTO: 91.3 FL (ref 81.4–97.8)
MICRO URNS: ABNORMAL
MONOCYTES # BLD AUTO: 0.45 K/UL (ref 0–0.85)
MONOCYTES NFR BLD AUTO: 7.5 % (ref 0–13.4)
NEUTROPHILS # BLD AUTO: 3.91 K/UL (ref 2–7.15)
NEUTROPHILS NFR BLD: 64.8 % (ref 44–72)
NITRITE UR QL STRIP.AUTO: POSITIVE
NRBC # BLD AUTO: 0 K/UL
NRBC BLD-RTO: 0 /100 WBC
PH UR STRIP.AUTO: 5 [PH] (ref 5–8)
PLATELET # BLD AUTO: 172 K/UL (ref 164–446)
PMV BLD AUTO: 11.6 FL (ref 9–12.9)
POTASSIUM SERPL-SCNC: 3.9 MMOL/L (ref 3.6–5.5)
PROT SERPL-MCNC: 7.2 G/DL (ref 6–8.2)
PROT UR QL STRIP: NEGATIVE MG/DL
RBC # BLD AUTO: 5.18 M/UL (ref 4.2–5.4)
RBC # URNS HPF: ABNORMAL /HPF
RBC UR QL AUTO: NEGATIVE
SARS-COV-2 RNA RESP QL NAA+PROBE: NOTDETECTED
SODIUM SERPL-SCNC: 143 MMOL/L (ref 135–145)
SP GR UR STRIP.AUTO: 1.02
SPECIMEN SOURCE: NORMAL
TROPONIN T SERPL-MCNC: 17 NG/L (ref 6–19)
UROBILINOGEN UR STRIP.AUTO-MCNC: 0.2 MG/DL
WBC # BLD AUTO: 6 K/UL (ref 4.8–10.8)
WBC #/AREA URNS HPF: ABNORMAL /HPF

## 2020-07-27 PROCEDURE — 700111 HCHG RX REV CODE 636 W/ 250 OVERRIDE (IP): Performed by: EMERGENCY MEDICINE

## 2020-07-27 PROCEDURE — 93005 ELECTROCARDIOGRAM TRACING: CPT | Performed by: EMERGENCY MEDICINE

## 2020-07-27 PROCEDURE — 96365 THER/PROPH/DIAG IV INF INIT: CPT

## 2020-07-27 PROCEDURE — U0003 INFECTIOUS AGENT DETECTION BY NUCLEIC ACID (DNA OR RNA); SEVERE ACUTE RESPIRATORY SYNDROME CORONAVIRUS 2 (SARS-COV-2) (CORONAVIRUS DISEASE [COVID-19]), AMPLIFIED PROBE TECHNIQUE, MAKING USE OF HIGH THROUGHPUT TECHNOLOGIES AS DESCRIBED BY CMS-2020-01-R: HCPCS

## 2020-07-27 PROCEDURE — 83690 ASSAY OF LIPASE: CPT

## 2020-07-27 PROCEDURE — 85025 COMPLETE CBC W/AUTO DIFF WBC: CPT

## 2020-07-27 PROCEDURE — C9803 HOPD COVID-19 SPEC COLLECT: HCPCS | Performed by: EMERGENCY MEDICINE

## 2020-07-27 PROCEDURE — 700105 HCHG RX REV CODE 258: Performed by: EMERGENCY MEDICINE

## 2020-07-27 PROCEDURE — 71046 X-RAY EXAM CHEST 2 VIEWS: CPT

## 2020-07-27 PROCEDURE — 99284 EMERGENCY DEPT VISIT MOD MDM: CPT

## 2020-07-27 PROCEDURE — 80053 COMPREHEN METABOLIC PANEL: CPT

## 2020-07-27 PROCEDURE — 81001 URINALYSIS AUTO W/SCOPE: CPT

## 2020-07-27 PROCEDURE — 84484 ASSAY OF TROPONIN QUANT: CPT

## 2020-07-27 RX ORDER — CEFDINIR 300 MG/1
300 CAPSULE ORAL 2 TIMES DAILY
Qty: 14 CAP | Refills: 0 | Status: SHIPPED | OUTPATIENT
Start: 2020-07-27 | End: 2020-08-03

## 2020-07-27 RX ADMIN — CEFTRIAXONE SODIUM 1 G: 1 INJECTION, POWDER, FOR SOLUTION INTRAMUSCULAR; INTRAVENOUS at 16:53

## 2020-07-27 ASSESSMENT — FIBROSIS 4 INDEX: FIB4 SCORE: 1.1

## 2020-07-27 NOTE — ED PROVIDER NOTES
ED Provider Note    CHIEF COMPLAINT  Chief Complaint   Patient presents with   • Fatigue     Generalized. Intermittent bouts of exhaustion w/o exertion. x3 mo.        HPI  Milena Ang is a 85 y.o. female who presents to the emergency department for fatigue and lack of energy.  The patient states the symptoms have been present for about 3 months and they come and go.  She has been to the urgent care a couple of times.  She has been worked up with labs urinalysis and x-ray and no diagnosis has been made.  She did have a UTI about a month ago.  She reports taking antibiotics so she does not know what they were.  She states she came in today because she just does not have any energy.  She has a bit of a cough.  She states her cough is associated with some mucus production.  No sore throat or runny nose.  She denies any shortness of breath.  She denies any pain in her chest.  No abdominal pain nausea or vomiting.  She has had some soft stools for a while.  And she does have chronic incontinence of stool and occasionally has stool around her vagina when she goes to the bathroom.  No belly pain.  No focal numbness tingling weakness.  She denies any other aggravating alleviating factors or associated complaints.    REVIEW OF SYSTEMS  See HPI for further details. All other systems are negative.    PAST MEDICAL HISTORY  Past Medical History:   Diagnosis Date   • Cataract    • Cervical high risk human papillomavirus (HPV) DNA test positive    • Colon polyp     hyperplastic   • Diverticulosis of colon    • Dyslipidemia    • Hypertension    • Incontinence     pessary   • Kidney stone    • Menopausal and postmenopausal disorder    • OSTEOPOROSIS        FAMILY HISTORY  Family History   Problem Relation Age of Onset   • Cancer Father         pancreatic   • Heart Disease Father         MI at age 57   • Hypertension Father    • Cancer Brother         melanoma   • Heart Disease Brother    • No Known Problems Mother    • Cancer  Sister         ovaren cancer   • Cancer Brother         throat   • Psychiatric Illness Daughter    • No Known Problems Daughter        SOCIAL HISTORY  Social History     Socioeconomic History   • Marital status:      Spouse name: Rivera   • Number of children: 2   • Years of education: college   • Highest education level: Not on file   Occupational History   • Occupation: Retired Teacher     Employer: OTHER   Social Needs   • Financial resource strain: Not on file   • Food insecurity     Worry: Not on file     Inability: Not on file   • Transportation needs     Medical: Not on file     Non-medical: Not on file   Tobacco Use   • Smoking status: Never Smoker   • Smokeless tobacco: Never Used   Substance and Sexual Activity   • Alcohol use: No   • Drug use: No   • Sexual activity: Never   Lifestyle   • Physical activity     Days per week: Not on file     Minutes per session: Not on file   • Stress: Not on file   Relationships   • Social connections     Talks on phone: Not on file     Gets together: Not on file     Attends Bahai service: Not on file     Active member of club or organization: Not on file     Attends meetings of clubs or organizations: Not on file     Relationship status: Not on file   • Intimate partner violence     Fear of current or ex partner: Not on file     Emotionally abused: Not on file     Physically abused: Not on file     Forced sexual activity: Not on file   Other Topics Concern   • Not on file   Social History Narrative   • Not on file       SURGICAL HISTORY  Past Surgical History:   Procedure Laterality Date   • BLADDER SLING FEMALE  7/12    Dr. Quinn   • CHOLECYSTECTOMY         CURRENT MEDICATIONS  Home Medications     Reviewed by Schuyler B Collett, R.N. (Registered Nurse) on 07/27/20 at 1336  Med List Status: <None>   Medication Last Dose Status   amLODIPine (NORVASC) 5 MG Tab  Active   lisinopril (PRINIVIL, ZESTRIL) 40 MG tablet  Active   oxybutynin SR (DITROPAN-XL) 10 MG  "CR tablet  Active   vitamin D (CHOLECALCIFEROL) 1000 UNIT Tab  Active                ALLERGIES  Allergies   Allergen Reactions   • Pcn [Penicillins] Rash     \"tongue got thick\"       PHYSICAL EXAM  VITAL SIGNS: /72   Pulse (!) 54   Temp 36.1 °C (97 °F) (Temporal)   Resp 16   Ht 1.6 m (5' 3\")   Wt 57 kg (125 lb 10.6 oz)   LMP 07/01/1986   SpO2 95%   BMI 22.26 kg/m²    Constitutional: Well developed, Well nourished, No acute distress, Non-toxic appearance.   HENT: Normocephalic, Atraumatic, Bilateral external ears normal, Oropharynx moist, No oral exudates, Nose normal.   Eyes: PERRL, EOMI, Conjunctiva normal, No discharge.   Neck: Normal range of motion, No tenderness, Supple, No stridor.   Cardiovascular: Normal heart rate, Normal rhythm, No murmurs,   Thorax & Lungs: Normal breath sounds, No respiratory distress, No wheezing, No chest tenderness.   Abdomen: Bowel sounds normal, Soft, No tenderness  Skin: Warm, Dry, No erythema, No rash.   Back: No tenderness, No CVA tenderness.   Musculoskeletal: Good range of motion in all major joints.  Neurologic: Alert & oriented x 3, No focal deficits noted.   Psychiatric: Affect normal    Results for orders placed or performed during the hospital encounter of 07/27/20   CBC WITH DIFFERENTIAL   Result Value Ref Range    WBC 6.0 4.8 - 10.8 K/uL    RBC 5.18 4.20 - 5.40 M/uL    Hemoglobin 16.2 (H) 12.0 - 16.0 g/dL    Hematocrit 47.3 (H) 37.0 - 47.0 %    MCV 91.3 81.4 - 97.8 fL    MCH 31.3 27.0 - 33.0 pg    MCHC 34.2 33.6 - 35.0 g/dL    RDW 41.0 35.9 - 50.0 fL    Platelet Count 172 164 - 446 K/uL    MPV 11.6 9.0 - 12.9 fL    Neutrophils-Polys 64.80 44.00 - 72.00 %    Lymphocytes 26.50 22.00 - 41.00 %    Monocytes 7.50 0.00 - 13.40 %    Eosinophils 0.20 0.00 - 6.90 %    Basophils 0.70 0.00 - 1.80 %    Immature Granulocytes 0.30 0.00 - 0.90 %    Nucleated RBC 0.00 /100 WBC    Neutrophils (Absolute) 3.91 2.00 - 7.15 K/uL    Lymphs (Absolute) 1.60 1.00 - 4.80 K/uL    " Monos (Absolute) 0.45 0.00 - 0.85 K/uL    Eos (Absolute) 0.01 0.00 - 0.51 K/uL    Baso (Absolute) 0.04 0.00 - 0.12 K/uL    Immature Granulocytes (abs) 0.02 0.00 - 0.11 K/uL    NRBC (Absolute) 0.00 K/uL   COMP METABOLIC PANEL   Result Value Ref Range    Sodium 143 135 - 145 mmol/L    Potassium 3.9 3.6 - 5.5 mmol/L    Chloride 106 96 - 112 mmol/L    Co2 22 20 - 33 mmol/L    Anion Gap 15.0 7.0 - 16.0    Glucose 106 (H) 65 - 99 mg/dL    Bun 17 8 - 22 mg/dL    Creatinine 0.82 0.50 - 1.40 mg/dL    Calcium 9.2 8.5 - 10.5 mg/dL    AST(SGOT) 19 12 - 45 U/L    ALT(SGPT) 23 2 - 50 U/L    Alkaline Phosphatase 67 30 - 99 U/L    Total Bilirubin 0.9 0.1 - 1.5 mg/dL    Albumin 4.6 3.2 - 4.9 g/dL    Total Protein 7.2 6.0 - 8.2 g/dL    Globulin 2.6 1.9 - 3.5 g/dL    A-G Ratio 1.8 g/dL   LIPASE   Result Value Ref Range    Lipase 35 11 - 82 U/L   URINALYSIS,CULTURE IF INDICATED    Specimen: Blood   Result Value Ref Range    Color Yellow     Character Clear     Specific Gravity 1.021 <1.035    Ph 5.0 5.0 - 8.0    Glucose Negative Negative mg/dL    Ketones Trace (A) Negative mg/dL    Protein Negative Negative mg/dL    Bilirubin Negative Negative    Urobilinogen, Urine 0.2 Negative    Nitrite Positive (A) Negative    Leukocyte Esterase Negative Negative    Occult Blood Negative Negative    Micro Urine Req Microscopic    ESTIMATED GFR   Result Value Ref Range    GFR If African American >60 >60 mL/min/1.73 m 2    GFR If Non African American >60 >60 mL/min/1.73 m 2   URINE MICROSCOPIC (W/UA)   Result Value Ref Range    WBC 5-10 (A) /hpf    RBC 0-2 /hpf    Bacteria Many (A) None /hpf    Epithelial Cells Few /hpf    Hyaline Cast 0-2 /lpf   TROPONIN   Result Value Ref Range    Troponin T 17 6 - 19 ng/L   COVID/SARS CoV-2 PCR    Specimen: Nasopharyngeal; Respirate   Result Value Ref Range    COVID Order Status Received    SARS-CoV-2, PCR (In-House)   Result Value Ref Range    SARS-CoV-2 Source NP Swab     SARS-CoV-2 by PCR NotDetected    EKG  (NOW)   Result Value Ref Range    Report       [facility] Emergency Dept.    Test Date:  2020  Pt Name:    JOHNATHAN GUY                  Department: ER  MRN:        [id number]                      Room:        56  Gender:     Female                       Technician: [id number]  :        [id number]                   Requested By:NOBLE MCLAUGHLIN  Order #:    [id number]                    Reading MD: NOBLE MCLAUGHLIN. AMD    Measurements  Intervals                                Axis  Rate:       49                           P:          -76  IN:         196                          QRS:        -14  QRSD:       80                           T:          65  QT:         432  QTc:        390    Interpretive Statements  SINUS OR ECTOPIC ATRIAL BRADYCARDIA  LVH BY VOLTAGE  No previous ECG available for comparison  Electronically Signed On 2020 18:31:52 PDT by NOBLE MCLAUGHLIN. AMD          RADIOLOGY/PROCEDURES  DX-CHEST-2 VIEWS   Final Result      Mild left basilar atelectasis/scarring. No focal consolidation to suggest pneumonia.            COURSE & MEDICAL DECISION MAKING  Pertinent Labs & Imaging studies reviewed. (See chart for details)    The patient presents emergency department complaining of fatigue.  She mostly complains of just unusual fatigue and not having any energy.    Patient has been to the urgent care and has had a fairly extensive work-up as an outpatient.  A broad differential diagnosis was considered including but not limited to COVID-19 infection acute UTI pneumonia, dehydration, electrolyte abnormality, ACS and hypothyroidism.    The patient's chart is reviewed.  She had fairly recent thyroid studies which are reassuring.  X-ray was unremarkable.     The patient CBC and CMP are unremarkable.  I considered ACS but she has vague fatigue and no chest pain or shortness of breath and unremarkable EKG and negative troponin.  This is ACS.  She is having chest pain or dyspnea on exertion  of this is a PE.  She is not in renal failure.  COVID swab is negative has no signs of sepsis.    Henry exam is benign.  At this point she does have a UTI.  She had a culture proven UTI recently took Bactrim.  I suspect this is not completely treated.  She is given a dose of IV Rocephin will be sent home on Omnicef.    The patient is given return precautions for UTI discharge instructions for fatigue.  I suggested she goes back to her primary care doctor for continued work-up and treatment as an outpatient.    This point her questions are answered, she is agreeable with plan she is discharged in good condition.    The patient was noted to have elevated blood pressure while in the ER and was counseled to see their doctor within one wee to have this rechecked.    Chacorta Welch, A.P.N.  [address] [place] [id number]  [phone]    Schedule an appointment as soon as possible for a visit in 2 days          FINAL IMPRESSION  1. Other fatigue     2. Acute UTI         3.         Electronically signed by: Ferny Hernandez M.D., 7/27/2020 4:04 PM

## 2020-07-27 NOTE — ED NOTES
Pt walked back to room, steady gait. Placed in gown and on cardiac monitor. Respirations even and unlabored. Pt states feeling light headed with ambulation.

## 2020-07-27 NOTE — ED TRIAGE NOTES
"Milena Ang presents to triage, wearing a mask, reporting:  Chief Complaint   Patient presents with   • Fatigue     Generalized. Intermittent bouts of exhaustion w/o exertion. x3 mo.      Pt also reports intermittent \"heavyness in my chest every once in a while\". Pt denies dizziness. Pt denies SOB/CP. Denies dark stools.     Pt denies any respiratory or flu like symptoms at this time.    Pt speaking in full sentences, NAD noted. Pt educated of triage process, placed back in waiting area pending room assignment.    "

## 2020-07-28 NOTE — DISCHARGE INSTRUCTIONS
Take antibiotics as prescribed.  Follow-up with your doctor.  Return for any new medical problems or complaints.

## 2020-07-31 ENCOUNTER — HOSPITAL ENCOUNTER (EMERGENCY)
Facility: MEDICAL CENTER | Age: 85
End: 2020-07-31
Attending: EMERGENCY MEDICINE
Payer: MEDICARE

## 2020-07-31 VITALS
RESPIRATION RATE: 15 BRPM | HEART RATE: 54 BPM | WEIGHT: 123.68 LBS | HEIGHT: 63 IN | BODY MASS INDEX: 21.91 KG/M2 | SYSTOLIC BLOOD PRESSURE: 176 MMHG | TEMPERATURE: 97.6 F | OXYGEN SATURATION: 94 % | DIASTOLIC BLOOD PRESSURE: 77 MMHG

## 2020-07-31 DIAGNOSIS — G60.9 IDIOPATHIC PERIPHERAL NEUROPATHY: ICD-10-CM

## 2020-07-31 LAB
ALBUMIN SERPL BCP-MCNC: 4.3 G/DL (ref 3.2–4.9)
ALBUMIN/GLOB SERPL: 1.7 G/DL
ALP SERPL-CCNC: 61 U/L (ref 30–99)
ALT SERPL-CCNC: 20 U/L (ref 2–50)
ANION GAP SERPL CALC-SCNC: 13 MMOL/L (ref 7–16)
APPEARANCE UR: CLEAR
AST SERPL-CCNC: 19 U/L (ref 12–45)
BACTERIA #/AREA URNS HPF: NEGATIVE /HPF
BILIRUB SERPL-MCNC: 0.9 MG/DL (ref 0.1–1.5)
BILIRUB UR QL STRIP.AUTO: NEGATIVE
BUN SERPL-MCNC: 17 MG/DL (ref 8–22)
CALCIUM SERPL-MCNC: 9.3 MG/DL (ref 8.5–10.5)
CHLORIDE SERPL-SCNC: 104 MMOL/L (ref 96–112)
CO2 SERPL-SCNC: 24 MMOL/L (ref 20–33)
COLOR UR: YELLOW
CREAT SERPL-MCNC: 0.81 MG/DL (ref 0.5–1.4)
EPI CELLS #/AREA URNS HPF: ABNORMAL /HPF
GLOBULIN SER CALC-MCNC: 2.6 G/DL (ref 1.9–3.5)
GLUCOSE SERPL-MCNC: 98 MG/DL (ref 65–99)
GLUCOSE UR STRIP.AUTO-MCNC: NEGATIVE MG/DL
HYALINE CASTS #/AREA URNS LPF: ABNORMAL /LPF
KETONES UR STRIP.AUTO-MCNC: NEGATIVE MG/DL
LEUKOCYTE ESTERASE UR QL STRIP.AUTO: ABNORMAL
MICRO URNS: ABNORMAL
NITRITE UR QL STRIP.AUTO: NEGATIVE
PH UR STRIP.AUTO: 6.5 [PH] (ref 5–8)
POTASSIUM SERPL-SCNC: 4.4 MMOL/L (ref 3.6–5.5)
PROT SERPL-MCNC: 6.9 G/DL (ref 6–8.2)
PROT UR QL STRIP: NEGATIVE MG/DL
RBC # URNS HPF: ABNORMAL /HPF
RBC UR QL AUTO: NEGATIVE
SODIUM SERPL-SCNC: 141 MMOL/L (ref 135–145)
SP GR UR STRIP.AUTO: 1.01
TSH SERPL DL<=0.005 MIU/L-ACNC: 0.23 UIU/ML (ref 0.38–5.33)
UROBILINOGEN UR STRIP.AUTO-MCNC: 0.2 MG/DL
WBC #/AREA URNS HPF: ABNORMAL /HPF

## 2020-07-31 PROCEDURE — 80053 COMPREHEN METABOLIC PANEL: CPT

## 2020-07-31 PROCEDURE — 81001 URINALYSIS AUTO W/SCOPE: CPT

## 2020-07-31 PROCEDURE — 84443 ASSAY THYROID STIM HORMONE: CPT

## 2020-07-31 PROCEDURE — 99283 EMERGENCY DEPT VISIT LOW MDM: CPT

## 2020-07-31 RX ORDER — GABAPENTIN 100 MG/1
100 CAPSULE ORAL
Qty: 60 CAP | Refills: 0 | Status: SHIPPED | OUTPATIENT
Start: 2020-07-31 | End: 2020-08-30

## 2020-07-31 ASSESSMENT — FIBROSIS 4 INDEX: FIB4 SCORE: 1.93

## 2020-07-31 ASSESSMENT — ENCOUNTER SYMPTOMS
BACK PAIN: 0
CHILLS: 0
COUGH: 0
TINGLING: 1
SHORTNESS OF BREATH: 0
ABDOMINAL PAIN: 0
FEVER: 0

## 2020-07-31 NOTE — ED TRIAGE NOTES
Pt to triage, c/o being diagnosed with a bladder infection on Monday, pt c/o lower ext bilateral weakness, tingling for weeks. Pt states she really hasn't felt well since February. Pt was tested for Covid last Monday and it was negative. Pt provided urine cup in triage

## 2020-08-01 NOTE — ED NOTES
D/C home with written and verbal instructions re: Rx, activity, f/u.  Verbalizes understanding.  Ambulated out with steady gait

## 2020-08-01 NOTE — ED NOTES
D/C home with written and verbal instructions re: Rx, activity, f/u.  Verbalizes understanding.  Ambulated out with steady gait. Patient will call  for a ride home.

## 2020-08-01 NOTE — ED PROVIDER NOTES
ED Provider Note    Scribed for Helio Elam M.D. by Sb Otero. 7/31/2020, 5:05 PM.    Primary care provider: BEN Scott  Means of arrival: Walk in  History obtained from: Patient  History limited by: None    CHIEF COMPLAINT  Chief Complaint   Patient presents with   • Extremity Weakness   • Tingling       HPI  Milena Ang is a 85 y.o. female who presents to the Emergency Department for evaluation of constant, squeezing, moderate bilateral shin pain onset this morning. She notes history of chronic bilateral ankle pain, but never as high up her leg as it is currently. She notes she has been evaluated by her Primary care provider four times since February, 2020 for her ankle pain, who believed her symptoms may be secondary to Vitamin D deficiency and anemia, which she was put on vitamin D and iron supplement for. She says her ankle pain has not changed since starting these supplements. She admits to additional symptoms of bilateral lower extremity tingling, but denies abdominal pain, back pain, chest pain, shortness of breath, acute cough (had cough for several months), fever, or chills. She denies alleviating factors. She admits to history of intermittent lower extremity weakness. She notes she was tested for COVID 5 days ago, which was negative. She says she was diagnosed with a bladder infection 5 days ago. She denies history of diabetes. She denies alcohol or drug abuse.     PPE Note: I personally donned full PPE for all patient encounters during this visit, including being clean-shaven with an N95 respirator mask, gloves, and goggles.     Scribe remained outside the patient's room and did not have any contact with the patient for the duration of patient encounter.      REVIEW OF SYSTEMS  Review of Systems   Constitutional: Negative for chills and fever.   Respiratory: Negative for cough and shortness of breath.    Cardiovascular: Negative for chest pain.   Gastrointestinal: Negative  "for abdominal pain.   Musculoskeletal: Negative for back pain.        Positive for bilateral shin pain.    Neurological: Positive for tingling.   All other systems reviewed and are negative.    PAST MEDICAL HISTORY   has a past medical history of Cataract, Cervical high risk human papillomavirus (HPV) DNA test positive, Colon polyp, Diverticulosis of colon, Dyslipidemia, Hypertension, Incontinence, Kidney stone, Menopausal and postmenopausal disorder, and OSTEOPOROSIS.    SURGICAL HISTORY   has a past surgical history that includes cholecystectomy and bladder sling female (7/12).    SOCIAL HISTORY  Social History     Tobacco Use   • Smoking status: Never Smoker   • Smokeless tobacco: Never Used   Substance Use Topics   • Alcohol use: No   • Drug use: No      Social History     Substance and Sexual Activity   Drug Use No       FAMILY HISTORY  Family History   Problem Relation Age of Onset   • Cancer Father         pancreatic   • Heart Disease Father         MI at age 57   • Hypertension Father    • Cancer Brother         melanoma   • Heart Disease Brother    • No Known Problems Mother    • Cancer Sister         ovaren cancer   • Cancer Brother         throat   • Psychiatric Illness Daughter    • No Known Problems Daughter      CURRENT MEDICATIONS  Current Outpatient Medications   Medication Instructions   • amLODIPine (NORVASC) 5 mg, Oral, EVERY BEDTIME   • cefdinir (OMNICEF) 300 mg, Oral, 2 TIMES DAILY   • lisinopril (PRINIVIL) 40 mg, Oral, EVERY DAY   • oxybutynin SR (DITROPAN-XL) 10 mg, Oral, EVERY DAY   • vitamin D (CHOLECALCIFEROL) 3,000 Units, Oral, DAILY     ALLERGIES  Allergies   Allergen Reactions   • Pcn [Penicillins] Rash     \"tongue got thick\"       PHYSICAL EXAM  VITAL SIGNS: BP (!) 170/71   Pulse 67   Temp 36.7 °C (98 °F) (Oral)   Resp 18   Ht 1.6 m (5' 3\")   Wt 56.1 kg (123 lb 10.9 oz)   LMP 07/01/1986   SpO2 95%   BMI 21.91 kg/m²     Constitutional:  Mild distress  HENT:  Moist mucous " membranes  Eyes:  No conjunctivitis or icterus  Neck:  trachea is midline, no palpable thyroid  Lymphatic:  No cervical lymphadenopathy  Cardiovascular: Regular rate and rhythm, no murmurs  Thorax & Lungs: Normal breath sounds, no rhonchi  Skin:. no rash  Extremities:  no edema  Vascular: Symmetric radial pulse  Neurologic: Alert & oriented x 3, Answers questions appropriately, No asymmetry to motor or sensation of face, EOMI, No pronator drift, Normal visual fields confrontation, Normal finger to nose, Normal leg raise bilaterally, Normal sensation in all 4 extremities.       LABS  Labs Reviewed   URINALYSIS - Abnormal; Notable for the following components:       Result Value    Leukocyte Esterase Trace (*)     All other components within normal limits   URINE MICROSCOPIC (W/UA) - Abnormal; Notable for the following components:    RBC 2-5 (*)     All other components within normal limits   TSH - Abnormal; Notable for the following components:    TSH 0.233 (*)     All other components within normal limits   COMP METABOLIC PANEL   ESTIMATED GFR     All labs reviewed by me.    COURSE & MEDICAL DECISION MAKING  Pertinent Labs & Imaging studies reviewed. (See chart for details)    5:05 PM - Patient seen and examined at bedside. Discussed her symptoms may be relayed to peripheral neuropathy. Ordered Urine Microscopic, UA to evaluate her symptoms. The differential diagnoses include but are not limited to: Peripheral Neuropathy.     5:57 PM - Ordered TSH, CMP.     7:24 PM - I reevaluated the patient at bedside. I discussed the patient's diagnostic study results which show a low TSH of .233. I discussed plan for discharge and follow up as outlined below, including follow up with her primary care provider for further thyroid evaluation. She will be started on low dose Neurontin today. The patient verbalizes they feel comfortable going home. The patient is stable for discharge at this time and will return for any new or  worsening symptoms. Patient verbalizes understanding and support with my plan for discharge.      Medical Decision Making:   The patient has pain that she calls a squeezing type in the lower legs symmetrically below the knees.  She has had it at the ankle level but now it seems to have increased.  She also has been having fatigue.  She comes in for evaluation.  I feel it is likely to be peripheral neuropathy.  I did do evaluation of her electrolytes.  She has a normal complete metabolic panel and UA.  Her TSH is low 0.233.  I think she needs further evaluation of her thyroid function and I am going to have her follow-up with her physician for that.  I am going to try her on low-dose Neurontin for pain she is given return precautions and follow-up    The patient will return for new or worsening symptoms and is stable at the time of discharge.    DISPOSITION:  Patient will be discharged home in stable condition.    FOLLOW UP:  BEN Scott  [address] [id number] [phone]            OUTPATIENT MEDICATIONS:  Discharge Medication List as of 7/31/2020  7:34 PM      START taking these medications    Details   gabapentin (NEURONTIN) 100 MG Cap Take 1 Cap by mouth 2 Times a Day for 30 days., Disp-60 Cap,R-0, Print Rx Paper              FINAL IMPRESSION  1. Idiopathic peripheral neuropathy          Sb GAMA (Adam), am scribing for, and in the presence of, Helio Elam M.D..    Electronically signed by: Sb Foster), 7/31/2020    Helio GAMA M.D. personally performed the services described in this documentation, as scribed by Sb Otero in my presence, and it is both accurate and complete.    The note accurately reflects work and decisions made by me.  Helio Elam M.D.  7/31/2020  9:29 PM

## 2020-08-06 DIAGNOSIS — I10 ESSENTIAL HYPERTENSION: ICD-10-CM

## 2020-08-06 RX ORDER — LISINOPRIL 40 MG/1
40 TABLET ORAL
Qty: 90 TAB | Refills: 0 | Status: SHIPPED | OUTPATIENT
Start: 2020-08-06 | End: 2020-11-09 | Stop reason: SDUPTHER

## 2020-08-06 RX ORDER — AMLODIPINE BESYLATE 5 MG/1
5 TABLET ORAL
Qty: 90 TAB | Refills: 0 | Status: SHIPPED | OUTPATIENT
Start: 2020-08-06 | End: 2020-11-09 | Stop reason: SDUPTHER

## 2020-09-17 ENCOUNTER — APPOINTMENT (RX ONLY)
Dept: URBAN - METROPOLITAN AREA CLINIC 22 | Facility: CLINIC | Age: 85
Setting detail: DERMATOLOGY
End: 2020-09-17

## 2020-09-17 DIAGNOSIS — Z71.89 OTHER SPECIFIED COUNSELING: ICD-10-CM

## 2020-09-17 DIAGNOSIS — D18.0 HEMANGIOMA: ICD-10-CM

## 2020-09-17 DIAGNOSIS — D22 MELANOCYTIC NEVI: ICD-10-CM

## 2020-09-17 DIAGNOSIS — L81.4 OTHER MELANIN HYPERPIGMENTATION: ICD-10-CM

## 2020-09-17 DIAGNOSIS — L82.1 OTHER SEBORRHEIC KERATOSIS: ICD-10-CM

## 2020-09-17 DIAGNOSIS — L57.0 ACTINIC KERATOSIS: ICD-10-CM

## 2020-09-17 DIAGNOSIS — Z85.828 PERSONAL HISTORY OF OTHER MALIGNANT NEOPLASM OF SKIN: ICD-10-CM

## 2020-09-17 PROBLEM — D22.5 MELANOCYTIC NEVI OF TRUNK: Status: ACTIVE | Noted: 2020-09-17

## 2020-09-17 PROBLEM — D18.01 HEMANGIOMA OF SKIN AND SUBCUTANEOUS TISSUE: Status: ACTIVE | Noted: 2020-09-17

## 2020-09-17 PROCEDURE — 99214 OFFICE O/P EST MOD 30 MIN: CPT | Mod: 25

## 2020-09-17 PROCEDURE — ? LIQUID NITROGEN

## 2020-09-17 PROCEDURE — 17004 DESTROY PREMAL LESIONS 15/>: CPT

## 2020-09-17 PROCEDURE — ? COUNSELING

## 2020-09-17 ASSESSMENT — LOCATION SIMPLE DESCRIPTION DERM
LOCATION SIMPLE: RIGHT HAND
LOCATION SIMPLE: LEFT TEMPLE
LOCATION SIMPLE: NOSE
LOCATION SIMPLE: LEFT UPPER BACK
LOCATION SIMPLE: LEFT FOREHEAD
LOCATION SIMPLE: RIGHT FOREHEAD
LOCATION SIMPLE: CHEST
LOCATION SIMPLE: ABDOMEN
LOCATION SIMPLE: RIGHT LOWER BACK
LOCATION SIMPLE: RIGHT FOREARM

## 2020-09-17 ASSESSMENT — LOCATION DETAILED DESCRIPTION DERM
LOCATION DETAILED: RIGHT MEDIAL SUPERIOR CHEST
LOCATION DETAILED: LEFT SUPERIOR FOREHEAD
LOCATION DETAILED: RIGHT PROXIMAL DORSAL FOREARM
LOCATION DETAILED: NASAL DORSUM
LOCATION DETAILED: RIGHT DORSAL MIDDLE METACARPOPHALANGEAL JOINT
LOCATION DETAILED: LEFT SUPERIOR MEDIAL UPPER BACK
LOCATION DETAILED: RIGHT SUPERIOR MEDIAL MIDBACK
LOCATION DETAILED: EPIGASTRIC SKIN
LOCATION DETAILED: LEFT INFERIOR FOREHEAD
LOCATION DETAILED: MIDDLE STERNUM
LOCATION DETAILED: RIGHT INFERIOR MEDIAL FOREHEAD
LOCATION DETAILED: LEFT MEDIAL SUPERIOR CHEST
LOCATION DETAILED: LEFT MEDIAL TEMPLE

## 2020-09-17 ASSESSMENT — LOCATION ZONE DERM
LOCATION ZONE: ARM
LOCATION ZONE: FACE
LOCATION ZONE: NOSE
LOCATION ZONE: HAND
LOCATION ZONE: TRUNK

## 2020-10-11 ENCOUNTER — HOSPITAL ENCOUNTER (OUTPATIENT)
Facility: MEDICAL CENTER | Age: 85
End: 2020-10-12
Attending: EMERGENCY MEDICINE | Admitting: INTERNAL MEDICINE
Payer: MEDICARE

## 2020-10-11 ENCOUNTER — APPOINTMENT (OUTPATIENT)
Dept: RADIOLOGY | Facility: MEDICAL CENTER | Age: 85
End: 2020-10-11
Attending: EMERGENCY MEDICINE
Payer: MEDICARE

## 2020-10-11 DIAGNOSIS — R10.11 RIGHT UPPER QUADRANT ABDOMINAL PAIN: ICD-10-CM

## 2020-10-11 PROBLEM — N13.9 OBSTRUCTIVE UROPATHY: Status: ACTIVE | Noted: 2020-10-11

## 2020-10-11 PROBLEM — N30.00 ACUTE CYSTITIS WITHOUT HEMATURIA: Status: ACTIVE | Noted: 2020-10-11

## 2020-10-11 LAB
ALBUMIN SERPL BCP-MCNC: 4.3 G/DL (ref 3.2–4.9)
ALBUMIN/GLOB SERPL: 1.6 G/DL
ALP SERPL-CCNC: 67 U/L (ref 30–99)
ALT SERPL-CCNC: 18 U/L (ref 2–50)
ANION GAP SERPL CALC-SCNC: 13 MMOL/L (ref 7–16)
APPEARANCE UR: CLEAR
AST SERPL-CCNC: 19 U/L (ref 12–45)
BACTERIA #/AREA URNS HPF: ABNORMAL /HPF
BASOPHILS # BLD AUTO: 0.6 % (ref 0–1.8)
BASOPHILS # BLD: 0.06 K/UL (ref 0–0.12)
BILIRUB SERPL-MCNC: 0.9 MG/DL (ref 0.1–1.5)
BILIRUB UR QL STRIP.AUTO: NEGATIVE
BUN SERPL-MCNC: 25 MG/DL (ref 8–22)
CALCIUM SERPL-MCNC: 9 MG/DL (ref 8.5–10.5)
CHLORIDE SERPL-SCNC: 108 MMOL/L (ref 96–112)
CO2 SERPL-SCNC: 23 MMOL/L (ref 20–33)
COLOR UR: YELLOW
COVID ORDER STATUS COVID19: NORMAL
CREAT SERPL-MCNC: 0.66 MG/DL (ref 0.5–1.4)
EOSINOPHIL # BLD AUTO: 0.02 K/UL (ref 0–0.51)
EOSINOPHIL NFR BLD: 0.2 % (ref 0–6.9)
EPI CELLS #/AREA URNS HPF: NEGATIVE /HPF
ERYTHROCYTE [DISTWIDTH] IN BLOOD BY AUTOMATED COUNT: 43.6 FL (ref 35.9–50)
GLOBULIN SER CALC-MCNC: 2.7 G/DL (ref 1.9–3.5)
GLUCOSE SERPL-MCNC: 128 MG/DL (ref 65–99)
GLUCOSE UR STRIP.AUTO-MCNC: NEGATIVE MG/DL
HCT VFR BLD AUTO: 46.2 % (ref 37–47)
HGB BLD-MCNC: 15.5 G/DL (ref 12–16)
HYALINE CASTS #/AREA URNS LPF: ABNORMAL /LPF
IMM GRANULOCYTES # BLD AUTO: 0.03 K/UL (ref 0–0.11)
IMM GRANULOCYTES NFR BLD AUTO: 0.3 % (ref 0–0.9)
INR PPP: 1 (ref 0.87–1.13)
KETONES UR STRIP.AUTO-MCNC: NEGATIVE MG/DL
LACTATE BLD-SCNC: 2.8 MMOL/L (ref 0.5–2)
LEUKOCYTE ESTERASE UR QL STRIP.AUTO: ABNORMAL
LYMPHOCYTES # BLD AUTO: 0.85 K/UL (ref 1–4.8)
LYMPHOCYTES NFR BLD: 7.8 % (ref 22–41)
MCH RBC QN AUTO: 32.2 PG (ref 27–33)
MCHC RBC AUTO-ENTMCNC: 33.5 G/DL (ref 33.6–35)
MCV RBC AUTO: 96 FL (ref 81.4–97.8)
MICRO URNS: ABNORMAL
MONOCYTES # BLD AUTO: 1.22 K/UL (ref 0–0.85)
MONOCYTES NFR BLD AUTO: 11.2 % (ref 0–13.4)
NEUTROPHILS # BLD AUTO: 8.68 K/UL (ref 2–7.15)
NEUTROPHILS NFR BLD: 79.9 % (ref 44–72)
NITRITE UR QL STRIP.AUTO: NEGATIVE
NRBC # BLD AUTO: 0 K/UL
NRBC BLD-RTO: 0 /100 WBC
PH UR STRIP.AUTO: 5 [PH] (ref 5–8)
PLATELET # BLD AUTO: 160 K/UL (ref 164–446)
PMV BLD AUTO: 12.2 FL (ref 9–12.9)
POTASSIUM SERPL-SCNC: 3.7 MMOL/L (ref 3.6–5.5)
PROT SERPL-MCNC: 7 G/DL (ref 6–8.2)
PROT UR QL STRIP: NEGATIVE MG/DL
PROTHROMBIN TIME: 13.5 SEC (ref 12–14.6)
RBC # BLD AUTO: 4.81 M/UL (ref 4.2–5.4)
RBC # URNS HPF: ABNORMAL /HPF
RBC UR QL AUTO: NEGATIVE
SODIUM SERPL-SCNC: 144 MMOL/L (ref 135–145)
SP GR UR STRIP.AUTO: 1.01
TROPONIN T SERPL-MCNC: 15 NG/L (ref 6–19)
UROBILINOGEN UR STRIP.AUTO-MCNC: 0.2 MG/DL
WBC # BLD AUTO: 10.9 K/UL (ref 4.8–10.8)
WBC #/AREA URNS HPF: ABNORMAL /HPF

## 2020-10-11 PROCEDURE — 36415 COLL VENOUS BLD VENIPUNCTURE: CPT

## 2020-10-11 PROCEDURE — 700117 HCHG RX CONTRAST REV CODE 255: Performed by: EMERGENCY MEDICINE

## 2020-10-11 PROCEDURE — 87077 CULTURE AEROBIC IDENTIFY: CPT

## 2020-10-11 PROCEDURE — 84484 ASSAY OF TROPONIN QUANT: CPT

## 2020-10-11 PROCEDURE — 85025 COMPLETE CBC W/AUTO DIFF WBC: CPT

## 2020-10-11 PROCEDURE — 87086 URINE CULTURE/COLONY COUNT: CPT

## 2020-10-11 PROCEDURE — G0378 HOSPITAL OBSERVATION PER HR: HCPCS

## 2020-10-11 PROCEDURE — C9803 HOPD COVID-19 SPEC COLLECT: HCPCS | Performed by: INTERNAL MEDICINE

## 2020-10-11 PROCEDURE — U0003 INFECTIOUS AGENT DETECTION BY NUCLEIC ACID (DNA OR RNA); SEVERE ACUTE RESPIRATORY SYNDROME CORONAVIRUS 2 (SARS-COV-2) (CORONAVIRUS DISEASE [COVID-19]), AMPLIFIED PROBE TECHNIQUE, MAKING USE OF HIGH THROUGHPUT TECHNOLOGIES AS DESCRIBED BY CMS-2020-01-R: HCPCS

## 2020-10-11 PROCEDURE — 80053 COMPREHEN METABOLIC PANEL: CPT

## 2020-10-11 PROCEDURE — 81001 URINALYSIS AUTO W/SCOPE: CPT

## 2020-10-11 PROCEDURE — 74177 CT ABD & PELVIS W/CONTRAST: CPT

## 2020-10-11 PROCEDURE — 85610 PROTHROMBIN TIME: CPT

## 2020-10-11 PROCEDURE — 700111 HCHG RX REV CODE 636 W/ 250 OVERRIDE (IP): Performed by: INTERNAL MEDICINE

## 2020-10-11 PROCEDURE — 99220 PR INITIAL OBSERVATION CARE,LEVL III: CPT | Performed by: INTERNAL MEDICINE

## 2020-10-11 PROCEDURE — 700102 HCHG RX REV CODE 250 W/ 637 OVERRIDE(OP): Performed by: INTERNAL MEDICINE

## 2020-10-11 PROCEDURE — 87040 BLOOD CULTURE FOR BACTERIA: CPT

## 2020-10-11 PROCEDURE — 83605 ASSAY OF LACTIC ACID: CPT

## 2020-10-11 PROCEDURE — 96365 THER/PROPH/DIAG IV INF INIT: CPT

## 2020-10-11 PROCEDURE — 99285 EMERGENCY DEPT VISIT HI MDM: CPT

## 2020-10-11 PROCEDURE — 700105 HCHG RX REV CODE 258: Performed by: INTERNAL MEDICINE

## 2020-10-11 PROCEDURE — 87186 SC STD MICRODIL/AGAR DIL: CPT

## 2020-10-11 PROCEDURE — A9270 NON-COVERED ITEM OR SERVICE: HCPCS | Performed by: INTERNAL MEDICINE

## 2020-10-11 RX ORDER — BISACODYL 10 MG
10 SUPPOSITORY, RECTAL RECTAL
Status: DISCONTINUED | OUTPATIENT
Start: 2020-10-11 | End: 2020-10-12 | Stop reason: HOSPADM

## 2020-10-11 RX ORDER — SODIUM CHLORIDE, SODIUM LACTATE, POTASSIUM CHLORIDE, AND CALCIUM CHLORIDE .6; .31; .03; .02 G/100ML; G/100ML; G/100ML; G/100ML
500 INJECTION, SOLUTION INTRAVENOUS
Status: DISCONTINUED | OUTPATIENT
Start: 2020-10-11 | End: 2020-10-12 | Stop reason: HOSPADM

## 2020-10-11 RX ORDER — ONDANSETRON 4 MG/1
4 TABLET, ORALLY DISINTEGRATING ORAL EVERY 4 HOURS PRN
Status: DISCONTINUED | OUTPATIENT
Start: 2020-10-11 | End: 2020-10-12 | Stop reason: HOSPADM

## 2020-10-11 RX ORDER — HYDROMORPHONE HYDROCHLORIDE 1 MG/ML
0.25 INJECTION, SOLUTION INTRAMUSCULAR; INTRAVENOUS; SUBCUTANEOUS
Status: DISCONTINUED | OUTPATIENT
Start: 2020-10-11 | End: 2020-10-12 | Stop reason: HOSPADM

## 2020-10-11 RX ORDER — AMLODIPINE BESYLATE 5 MG/1
5 TABLET ORAL
Status: DISCONTINUED | OUTPATIENT
Start: 2020-10-11 | End: 2020-10-12 | Stop reason: HOSPADM

## 2020-10-11 RX ORDER — SODIUM CHLORIDE 9 MG/ML
INJECTION, SOLUTION INTRAVENOUS CONTINUOUS
Status: DISCONTINUED | OUTPATIENT
Start: 2020-10-11 | End: 2020-10-12 | Stop reason: HOSPADM

## 2020-10-11 RX ORDER — OXYBUTYNIN CHLORIDE 10 MG/1
10 TABLET, EXTENDED RELEASE ORAL DAILY
Status: DISCONTINUED | OUTPATIENT
Start: 2020-10-11 | End: 2020-10-12 | Stop reason: HOSPADM

## 2020-10-11 RX ORDER — POLYETHYLENE GLYCOL 3350 17 G/17G
1 POWDER, FOR SOLUTION ORAL
Status: DISCONTINUED | OUTPATIENT
Start: 2020-10-11 | End: 2020-10-12 | Stop reason: HOSPADM

## 2020-10-11 RX ORDER — OXYCODONE HYDROCHLORIDE 5 MG/1
5 TABLET ORAL
Status: DISCONTINUED | OUTPATIENT
Start: 2020-10-11 | End: 2020-10-12 | Stop reason: HOSPADM

## 2020-10-11 RX ORDER — ACETAMINOPHEN 500 MG
500 TABLET ORAL ONCE
COMMUNITY
End: 2021-03-08

## 2020-10-11 RX ORDER — LISINOPRIL 20 MG/1
40 TABLET ORAL DAILY
Status: DISCONTINUED | OUTPATIENT
Start: 2020-10-11 | End: 2020-10-12 | Stop reason: HOSPADM

## 2020-10-11 RX ORDER — OXYCODONE HYDROCHLORIDE 5 MG/1
2.5 TABLET ORAL
Status: DISCONTINUED | OUTPATIENT
Start: 2020-10-11 | End: 2020-10-12 | Stop reason: HOSPADM

## 2020-10-11 RX ORDER — AMOXICILLIN 250 MG
2 CAPSULE ORAL 2 TIMES DAILY
Status: DISCONTINUED | OUTPATIENT
Start: 2020-10-12 | End: 2020-10-12 | Stop reason: HOSPADM

## 2020-10-11 RX ORDER — ACETAMINOPHEN 325 MG/1
650 TABLET ORAL EVERY 6 HOURS PRN
Status: DISCONTINUED | OUTPATIENT
Start: 2020-10-11 | End: 2020-10-12 | Stop reason: HOSPADM

## 2020-10-11 RX ORDER — ONDANSETRON 2 MG/ML
4 INJECTION INTRAMUSCULAR; INTRAVENOUS EVERY 4 HOURS PRN
Status: DISCONTINUED | OUTPATIENT
Start: 2020-10-11 | End: 2020-10-12 | Stop reason: HOSPADM

## 2020-10-11 RX ORDER — VITAMIN B COMPLEX
1500 TABLET ORAL DAILY
Status: DISCONTINUED | OUTPATIENT
Start: 2020-10-11 | End: 2020-10-12 | Stop reason: HOSPADM

## 2020-10-11 RX ORDER — LACTOBACILLUS RHAMNOSUS GG 10B CELL
1 CAPSULE ORAL
Status: DISCONTINUED | OUTPATIENT
Start: 2020-10-11 | End: 2020-10-12 | Stop reason: HOSPADM

## 2020-10-11 RX ADMIN — CEFTRIAXONE SODIUM 2 G: 2 INJECTION, POWDER, FOR SOLUTION INTRAMUSCULAR; INTRAVENOUS at 09:34

## 2020-10-11 RX ADMIN — Medication 1500 UNITS: at 11:28

## 2020-10-11 RX ADMIN — SODIUM CHLORIDE: 9 INJECTION, SOLUTION INTRAVENOUS at 12:31

## 2020-10-11 RX ADMIN — OXYCODONE 5 MG: 5 TABLET ORAL at 15:49

## 2020-10-11 RX ADMIN — AMLODIPINE BESYLATE 5 MG: 5 TABLET ORAL at 20:49

## 2020-10-11 RX ADMIN — Medication 1 CAPSULE: at 11:32

## 2020-10-11 RX ADMIN — LISINOPRIL 40 MG: 20 TABLET ORAL at 11:27

## 2020-10-11 RX ADMIN — ACETAMINOPHEN 650 MG: 325 TABLET, FILM COATED ORAL at 11:29

## 2020-10-11 RX ADMIN — IOHEXOL 80 ML: 350 INJECTION, SOLUTION INTRAVENOUS at 07:59

## 2020-10-11 ASSESSMENT — COGNITIVE AND FUNCTIONAL STATUS - GENERAL
SUGGESTED CMS G CODE MODIFIER DAILY ACTIVITY: CH
SUGGESTED CMS G CODE MODIFIER MOBILITY: CH
DAILY ACTIVITIY SCORE: 24
MOBILITY SCORE: 24

## 2020-10-11 ASSESSMENT — ENCOUNTER SYMPTOMS
SEIZURES: 0
FALLS: 0
FLANK PAIN: 1
PALPITATIONS: 0
INSOMNIA: 0
EYE REDNESS: 0
DIZZINESS: 0
BLOOD IN STOOL: 0
EYE PAIN: 0
ABDOMINAL PAIN: 0
LOSS OF CONSCIOUSNESS: 0
WHEEZING: 0
ABDOMINAL PAIN: 1
TREMORS: 0
CONSTIPATION: 0
HEMOPTYSIS: 0
FOCAL WEAKNESS: 0
MYALGIAS: 0
VOMITING: 0
SHORTNESS OF BREATH: 0
NAUSEA: 0
FLANK PAIN: 0
COUGH: 0
NERVOUS/ANXIOUS: 0
WEAKNESS: 0
DIARRHEA: 0
FEVER: 0
HEADACHES: 0
CHILLS: 0

## 2020-10-11 ASSESSMENT — LIFESTYLE VARIABLES
HAVE PEOPLE ANNOYED YOU BY CRITICIZING YOUR DRINKING: NO
TOTAL SCORE: 0
TOTAL SCORE: 0
DOES PATIENT WANT TO STOP DRINKING: NO
DO YOU DRINK ALCOHOL: NO
HAVE YOU EVER FELT YOU SHOULD CUT DOWN ON YOUR DRINKING: NO
CONSUMPTION TOTAL: NEGATIVE
ALCOHOL_USE: NO
HOW MANY TIMES IN THE PAST YEAR HAVE YOU HAD 5 OR MORE DRINKS IN A DAY: 0
TOTAL SCORE: 0
EVER HAD A DRINK FIRST THING IN THE MORNING TO STEADY YOUR NERVES TO GET RID OF A HANGOVER: NO
EVER FELT BAD OR GUILTY ABOUT YOUR DRINKING: NO
AVERAGE NUMBER OF DAYS PER WEEK YOU HAVE A DRINK CONTAINING ALCOHOL: 0
ON A TYPICAL DAY WHEN YOU DRINK ALCOHOL HOW MANY DRINKS DO YOU HAVE: 0

## 2020-10-11 ASSESSMENT — PAIN DESCRIPTION - PAIN TYPE
TYPE: ACUTE PAIN

## 2020-10-11 ASSESSMENT — PATIENT HEALTH QUESTIONNAIRE - PHQ9
1. LITTLE INTEREST OR PLEASURE IN DOING THINGS: NOT AT ALL
1. LITTLE INTEREST OR PLEASURE IN DOING THINGS: NOT AT ALL
2. FEELING DOWN, DEPRESSED, IRRITABLE, OR HOPELESS: NOT AT ALL
2. FEELING DOWN, DEPRESSED, IRRITABLE, OR HOPELESS: NOT AT ALL
SUM OF ALL RESPONSES TO PHQ9 QUESTIONS 1 AND 2: 0
SUM OF ALL RESPONSES TO PHQ9 QUESTIONS 1 AND 2: 0

## 2020-10-11 ASSESSMENT — FIBROSIS 4 INDEX: FIB4 SCORE: 2.07

## 2020-10-11 NOTE — ED TRIAGE NOTES
Patient reports that she started having right sided abdominal pain and flank pain.  Describes it as a pinching pain.  Patient reports some blood in her urine earlier.  Voiding about every hour.  Hx of kidney stones.

## 2020-10-11 NOTE — H&P
Hospital Medicine History & Physical Note    Date of Service  10/11/2020    Primary Care Physician  Chacorta Welch, KANWAL.    Consultants  Dr. Gamboa, Urology    Code Status  Full Code    Chief Complaint  Chief Complaint   Patient presents with   • Abdominal Pain       History of Presenting Illness  85 y.o. female who presented 10/11/2020 with Abdominal Pain  History of UTIs treated antibiotics last episode few months ago.  Family history of ovarian cancer, colon cancer and renal mass. No personal history of cancer.  Presents with Abdominal Pain  Mainly RLQ thern radiating to the pelvis. Occurred last night. She also has a bit of urinary frequency and stres sincontinence. She is on oxybutin. Had an episode of gross hematuria early this morning. No dysuria.   No sudden weight loss. NO exposure to chemicals. Worked as a  before. Denies ever smoking. No personal history of cancer.  No fevers, chills, cough, myalgias, no exposure to CoVID positive people or travel to hotspots.  At the ED, she is afebrile and initially hypertensive, improved with pain control.  CT:  1.  Right posterior bladder wall asymmetric thickening in the vicinity of the UVJ with mild hydroureteronephrosis. 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 Cystogram is recommended. No evidence of metastasis  2.  Punctate urolith at the right UVJ, contributing to the hydronephrosis  3.  Cholecystectomy with mild intrahepatic biliary dilatation  4.  Above average stool volume  5.  Renal hypodensities most likely represent cysts but one on the right is mildly dense. Consider ultrasound or MRI to further evaluate. Alternatively could follow at time of follow-up for the bladder mass. This most likely is a Bosniak 2 cyst  6.  Above average stool volume  Mild leukocytosis.  Urinalysis showed WBCs and bacteruria but no blood.  Dr. Gamboa, Urology consulted.    Review of Systems  Review of Systems   Constitutional: Negative  "for chills and fever.   HENT: Negative for congestion, hearing loss and nosebleeds.    Eyes: Negative for pain and redness.   Respiratory: Negative for cough, hemoptysis, shortness of breath and wheezing.    Cardiovascular: Negative for chest pain and palpitations.   Gastrointestinal: Negative for abdominal pain, blood in stool, constipation, diarrhea, nausea and vomiting.   Genitourinary: Positive for flank pain, frequency and urgency. Negative for dysuria and hematuria.   Musculoskeletal: Negative for falls, joint pain and myalgias.   Skin: Negative for rash.   Neurological: Negative for dizziness, tremors, focal weakness, seizures, loss of consciousness, weakness and headaches.   Psychiatric/Behavioral: The patient is not nervous/anxious and does not have insomnia.    All other systems reviewed and are negative.      Past Medical History   has a past medical history of Cataract, Cervical high risk human papillomavirus (HPV) DNA test positive, Colon polyp, Diverticulosis of colon, Dyslipidemia, Hypertension, Incontinence, Kidney stone, Menopausal and postmenopausal disorder, and OSTEOPOROSIS.    Surgical History   has a past surgical history that includes cholecystectomy and bladder sling female (7/12).     Family History  family history includes Cancer in her brother, brother, father, and sister; Heart Disease in her brother and father; Hypertension in her father; No Known Problems in her daughter and mother; Psychiatric Illness in her daughter.   Father had stomach cancer  Mother had a mass on kidney and had resection  Sister had ovarian cancer  Brother had colon cancer    Social History   reports that she has never smoked. She has never used smokeless tobacco. She reports that she does not drink alcohol or use drugs.    Allergies  Allergies   Allergen Reactions   • Pcn [Penicillins] Rash     \"tongue got thick\"       Medications  Prior to Admission Medications   Prescriptions Last Dose Informant Patient " Reported? Taking?   Ferrous Sulfate (IRON PO) 10/10/2020 at am  Yes Yes   Sig: Take 1 Tab by mouth every day.   acetaminophen (TYLENOL) 500 MG Tab 10/10/2020 at 1300  Yes Yes   Sig: Take 500 mg by mouth Once.   amLODIPine (NORVASC) 5 MG Tab 10/10/2020 at hs  No No   Sig: Take 1 Tab by mouth every bedtime.   lisinopril (PRINIVIL) 40 MG tablet 10/10/2020 at am  No No   Sig: Take 1 Tab by mouth every day.   oxybutynin SR (DITROPAN-XL) 10 MG CR tablet 10/10/2020 at am  No No   Sig: Take 1 Tab by mouth every day.   vitamin D (CHOLECALCIFEROL) 1000 UNIT Tab 10/10/2020 at am  Yes No   Sig: Take 5,000 Units by mouth every day.      Facility-Administered Medications: None       Physical Exam  Temp:  [37.1 °C (98.8 °F)] 37.1 °C (98.8 °F)  Pulse:  [66-80] 74  Resp:  [16] 16  BP: (132-172)/(63-84) 172/83  SpO2:  [92 %-96 %] 96 %    Physical Exam  Vitals signs and nursing note reviewed.   Constitutional:       General: She is not in acute distress.  HENT:      Head: Normocephalic and atraumatic.      Right Ear: External ear normal.      Left Ear: External ear normal.      Nose: Nose normal.      Mouth/Throat:      Mouth: Mucous membranes are moist.   Eyes:      General: No scleral icterus.     Conjunctiva/sclera: Conjunctivae normal.   Neck:      Musculoskeletal: Normal range of motion and neck supple. No neck rigidity.   Cardiovascular:      Rate and Rhythm: Normal rate and regular rhythm.      Pulses: Normal pulses.      Heart sounds: No murmur. No friction rub. No gallop.    Pulmonary:      Effort: Pulmonary effort is normal. No respiratory distress.      Breath sounds: Normal breath sounds. No stridor. No wheezing, rhonchi or rales.   Chest:      Chest wall: No tenderness.   Abdominal:      General: Abdomen is flat. Bowel sounds are normal. There is no distension.      Palpations: Abdomen is soft.      Tenderness: There is no abdominal tenderness. There is no guarding or rebound.   Musculoskeletal: Normal range of motion.          General: No swelling, tenderness or deformity.   Skin:     General: Skin is warm.      Coloration: Skin is not jaundiced.   Neurological:      General: No focal deficit present.      Mental Status: She is alert and oriented to person, place, and time. Mental status is at baseline.   Psychiatric:         Mood and Affect: Mood normal.         Behavior: Behavior normal.         Thought Content: Thought content normal.         Judgment: Judgment normal.         Laboratory:  Recent Labs     10/11/20  0629   WBC 10.9*   RBC 4.81   HEMOGLOBIN 15.5   HEMATOCRIT 46.2   MCV 96.0   MCH 32.2   MCHC 33.5*   RDW 43.6   PLATELETCT 160*   MPV 12.2     Recent Labs     10/11/20  0629   SODIUM 144   POTASSIUM 3.7   CHLORIDE 108   CO2 23   GLUCOSE 128*   BUN 25*   CREATININE 0.66   CALCIUM 9.0     Recent Labs     10/11/20  0629   ALTSGPT 18   ASTSGOT 19   ALKPHOSPHAT 67   TBILIRUBIN 0.9   GLUCOSE 128*     Recent Labs     10/11/20  0629   INR 1.00     No results for input(s): NTPROBNP in the last 72 hours.      Recent Labs     10/11/20  0629   TROPONINT 15       Imaging:  CT-ABDOMEN-PELVIS WITH   Final Result      1.  Right posterior bladder wall asymmetric thickening in the vicinity of the UVJ with mild hydroureteronephrosis. 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 Cystogram is recommended. No evidence of metastasis      2.  Punctate urolith at the right UVJ, contributing to the hydronephrosis      3.  Cholecystectomy with mild intrahepatic biliary dilatation      4.  Above average stool volume      5.  Renal hypodensities most likely represent cysts but one on the right is mildly dense. Consider ultrasound or MRI to further evaluate. Alternatively could follow at time of follow-up for the bladder mass. 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  Above average stool volume            Assessment/Plan:  I anticipate this patient is appropriate for observation status at this time. PENDING  Urology    * 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  Assessment & Plan  Mild hydronephrosis on CT  Pain control, bowel regimen  Urinalysis show leuk est, WBCs and some bacteria so will cover with antibiotics.  Urology consulted.    Acute cystitis without hematuria  Assessment & Plan  With hydronephrosis and obstructing mass  Treat with antibiotics    Vitamin D deficiency- (present on admission)  Assessment & Plan  Continue vitamin D    HTN (hypertension)- (present on admission)  Assessment & Plan  Stable. Continue home blood pressure medications.    SCDs, will hold pharmacologic DVT prophylaxis for now unless no urologic procedures

## 2020-10-11 NOTE — CONSULTS
[facility] Consult/H&P Note    Primary Service: Hospitalist  Attending: No att. providers found  Patient's Name/MRN: Milena Ang, [id number]    Admit Date:10/11/2020  Today's Date: 10/11/2020   Length of stay:  LOS: 0 days   Room #: S635/00      Reason for consult/chief complaint: abnormal CT finding  ID/HPI: Milena Ang is a 85 y.o. female patient being worked up for abdominal pain was found to have abnormal thickening around UVJ with hydro on CT scan. Hx of one UTI prior and one episode of kidney stones. No rf for bladder or kidney cancer. Denies any hematuria or other  symptoms. Cr 0.66. Urine with leuk and 10-20 WBC. No fevers or chills.        Past Medical History:   Past Medical History:   Diagnosis Date   • Cataract    • Cervical high risk human papillomavirus (HPV) DNA test positive    • Colon polyp     hyperplastic   • Diverticulosis of colon    • Dyslipidemia    • Hypertension    • Incontinence     pessary   • Kidney stone    • Menopausal and postmenopausal disorder    • OSTEOPOROSIS         Past Surgical History:   Past Surgical History:   Procedure Laterality Date   • BLADDER SLING FEMALE  7/12    Dr. Quinn   • CHOLECYSTECTOMY          Family History:   Family History   Problem Relation Age of Onset   • Cancer Father         pancreatic   • Heart Disease Father         MI at age 57   • Hypertension Father    • Cancer Brother         melanoma   • Heart Disease Brother    • No Known Problems Mother    • Cancer Sister         ovaren cancer   • Cancer Brother         throat   • Psychiatric Illness Daughter    • No Known Problems Daughter          Social History:   Social History     Tobacco Use   • Smoking status: Never Smoker   • Smokeless tobacco: Never Used   Substance Use Topics   • Alcohol use: No   • Drug use: No      Social History     Social History Narrative   • Not on file        Allergies: she Pcn [penicillins]    Medications:   Medications Prior to Admission   Medication Sig  "Dispense Refill Last Dose   • Ferrous Sulfate (IRON PO) Take 1 Tab by mouth every day.   10/10/2020 at am   • acetaminophen (TYLENOL) 500 MG Tab Take 500 mg by mouth Once.   10/10/2020 at 1300   • lisinopril (PRINIVIL) 40 MG tablet Take 1 Tab by mouth every day. 90 Tab 0 10/10/2020 at am   • amLODIPine (NORVASC) 5 MG Tab Take 1 Tab by mouth every bedtime. 90 Tab 0 10/10/2020 at hs   • oxybutynin SR (DITROPAN-XL) 10 MG CR tablet Take 1 Tab by mouth every day. 90 Tab 3 10/10/2020 at am   • vitamin D (CHOLECALCIFEROL) 1000 UNIT Tab Take 5,000 Units by mouth every day.   10/10/2020 at am         Review of Systems  Review of Systems   Constitutional: Negative for chills and fever.   Gastrointestinal: Positive for abdominal pain. Negative for diarrhea, nausea and vomiting.   Genitourinary: Negative for dysuria, flank pain, frequency and urgency.   All other systems reviewed and are negative.       Physical Exam  VITAL SIGNS: /73   Pulse 86   Temp 36.7 °C (98 °F) (Temporal)   Resp 18   Ht 1.6 m (5' 3\")   Wt 56.2 kg (124 lb)   LMP 07/01/1986   SpO2 93%   BMI 21.97 kg/m²   Physical Exam   Constitutional: She is oriented to person, place, and time and well-developed, well-nourished, and in no distress.   HENT:   Head: Normocephalic and atraumatic.   Eyes: Pupils are equal, round, and reactive to light.   Cardiovascular: Normal rate.   Pulmonary/Chest: Effort normal and breath sounds normal.   Abdominal: Soft.   Neurological: She is alert and oriented to person, place, and time.   Skin: Skin is warm.   Psychiatric: Affect and judgment normal.   Nursing note and vitals reviewed.        Labs:  Recent Labs     10/11/20  0629   WBC 10.9*   RBC 4.81   HEMOGLOBIN 15.5   HEMATOCRIT 46.2   MCV 96.0   MCH 32.2   MCHC 33.5*   RDW 43.6   PLATELETCT 160*   MPV 12.2     Recent Labs     10/11/20  0629   SODIUM 144   POTASSIUM 3.7   CHLORIDE 108   CO2 23   GLUCOSE 128*   BUN 25*   CREATININE 0.66   CALCIUM 9.0     Recent Labs "     10/11/20  0629   INR 1.00     Glucose:  Recent Labs     10/11/20  0629   GLUCOSE 128*     Coags:  Recent Labs     10/11/20  0629   INR 1.00         Urinalysis:   Recent Labs     10/11/20  0558   COLORURINE Yellow   CLARITY Clear   SPECGRAVITY 1.015   PHURINE 5.0   GLUCOSEUR Negative   KETONES Negative   NITRITE Negative   OCCULTBLOOD Negative   RBCURINE 0-2   BACTERIA Few*   EPITHELCELL Negative       Imaging:  CT-ABDOMEN-PELVIS WITH   Final Result      1.  Right posterior bladder wall asymmetric thickening in the vicinity of the UVJ with mild hydroureteronephrosis. 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 Cystogram is recommended. No evidence of metastasis      2.  Punctate urolith at the right UVJ, contributing to the hydronephrosis      3.  Cholecystectomy with mild intrahepatic biliary dilatation      4.  Above average stool volume      5.  Renal hypodensities most likely represent cysts but one on the right is mildly dense. Consider ultrasound or MRI to further evaluate. Alternatively could follow at time of follow-up for the bladder mass. 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  Above average stool volume          @lastct@     Assessment/Recommendation   85 y.o.female with asymmetric thickening around UVJ with mild hydro.     · PLAN:  · Outpatient cystoscopy in our office. I will arrange.  · No acute surgical interventions indicated while inpatient.   · Dr. Gamboa is aware of this consult and dictated the plan of care.   · Urology signing off.        MAXINE LaraCDave   [address] [place] [address]   [phone]

## 2020-10-11 NOTE — ASSESSMENT & PLAN NOTE
Mild hydronephrosis on CT  Pain control, bowel regimen  Urinalysis show leuk est, WBCs and some bacteria so will cover with antibiotics.  Urology consulted.

## 2020-10-11 NOTE — ED PROVIDER NOTES
"ED Provider Note    CHIEF COMPLAINT  Chief Complaint   Patient presents with   • Abdominal Pain       HPI  Milena Ang is a 85 y.o. female here for evaluation of abdominal pain.  Patient states that she woke up early this morning with some right-sided abdominal pain that radiated around and through to her lower back.  Patient states that she has a history of the same regarding previous kidney stones that she has had in the past.  She states that she did take some Tylenol when the incident occurred but this did not help her symptoms.  She denies any chest pain or upper back pain, and has no shortness of breath headache or vomiting.  She denies any fever chills.  Patient describes it as an aching and intermittently sharp pain, and nothing seems alleviate or exacerbate her symptoms.      ROS  See HPI for further details, o/w negative.     PAST MEDICAL HISTORY   has a past medical history of Cataract, Cervical high risk human papillomavirus (HPV) DNA test positive, Colon polyp, Diverticulosis of colon, Dyslipidemia, Hypertension, Incontinence, Kidney stone, Menopausal and postmenopausal disorder, and OSTEOPOROSIS.    SOCIAL HISTORY  Social History     Tobacco Use   • Smoking status: Never Smoker   • Smokeless tobacco: Never Used   Substance and Sexual Activity   • Alcohol use: No   • Drug use: No   • Sexual activity: Never       Family History  No bleeding disorders    SURGICAL HISTORY   has a past surgical history that includes cholecystectomy and bladder sling female (7/12).    CURRENT MEDICATIONS  Home Medications    **Home medications have not yet been reviewed for this encounter**         ALLERGIES  Allergies   Allergen Reactions   • Pcn [Penicillins] Rash     \"tongue got thick\"       REVIEW OF SYSTEMS  See HPI for further details. Review of systems as above, otherwise all other systems are negative.     PHYSICAL EXAM  Constitutional: Well developed, well nourished.  Mild acute distress.  HEENT: " Normocephalic, atraumatic. Posterior pharynx clear and moist.  Eyes:  EOMI. Normal sclera.  Neck: Supple, Full range of motion, nontender.  Chest/Pulmonary: clear to ausculation. Symmetrical expansion.   Cardio: Regular rate and rhythm with no murmur.   Abdomen: Soft, right upper quadrant tenderness palpation, no peritoneal signs. No guarding. No palpable masses.  Musculoskeletal: No deformity, no edema, neurovascular intact.   Neuro: Clear speech, appropriate, cooperative, cranial nerves II-XII grossly intact.  Psych: Normal mood and affect      Results for orders placed or performed during the hospital encounter of 10/11/20   URINALYSIS CULTURE, IF INDICATED    Specimen: Urine   Result Value Ref Range    Color Yellow     Character Clear     Specific Gravity 1.015 <1.035    Ph 5.0 5.0 - 8.0    Glucose Negative Negative mg/dL    Ketones Negative Negative mg/dL    Protein Negative Negative mg/dL    Bilirubin Negative Negative    Urobilinogen, Urine 0.2 Negative    Nitrite Negative Negative    Leukocyte Esterase Small (A) Negative    Occult Blood Negative Negative    Micro Urine Req Microscopic    URINE MICROSCOPIC (W/UA)   Result Value Ref Range    WBC 10-20 (A) /hpf    RBC 0-2 /hpf    Bacteria Few (A) None /hpf    Epithelial Cells Negative /hpf    Hyaline Cast 0-2 /lpf   CBC WITH DIFFERENTIAL   Result Value Ref Range    WBC 10.9 (H) 4.8 - 10.8 K/uL    RBC 4.81 4.20 - 5.40 M/uL    Hemoglobin 15.5 12.0 - 16.0 g/dL    Hematocrit 46.2 37.0 - 47.0 %    MCV 96.0 81.4 - 97.8 fL    MCH 32.2 27.0 - 33.0 pg    MCHC 33.5 (L) 33.6 - 35.0 g/dL    RDW 43.6 35.9 - 50.0 fL    Platelet Count 160 (L) 164 - 446 K/uL    MPV 12.2 9.0 - 12.9 fL    Neutrophils-Polys 79.90 (H) 44.00 - 72.00 %    Lymphocytes 7.80 (L) 22.00 - 41.00 %    Monocytes 11.20 0.00 - 13.40 %    Eosinophils 0.20 0.00 - 6.90 %    Basophils 0.60 0.00 - 1.80 %    Immature Granulocytes 0.30 0.00 - 0.90 %    Nucleated RBC 0.00 /100 WBC    Neutrophils (Absolute) 8.68 (H)  2.00 - 7.15 K/uL    Lymphs (Absolute) 0.85 (L) 1.00 - 4.80 K/uL    Monos (Absolute) 1.22 (H) 0.00 - 0.85 K/uL    Eos (Absolute) 0.02 0.00 - 0.51 K/uL    Baso (Absolute) 0.06 0.00 - 0.12 K/uL    Immature Granulocytes (abs) 0.03 0.00 - 0.11 K/uL    NRBC (Absolute) 0.00 K/uL   COMP METABOLIC PANEL   Result Value Ref Range    Sodium 144 135 - 145 mmol/L    Potassium 3.7 3.6 - 5.5 mmol/L    Chloride 108 96 - 112 mmol/L    Co2 23 20 - 33 mmol/L    Anion Gap 13.0 7.0 - 16.0    Glucose 128 (H) 65 - 99 mg/dL    Bun 25 (H) 8 - 22 mg/dL    Creatinine 0.66 0.50 - 1.40 mg/dL    Calcium 9.0 8.5 - 10.5 mg/dL    AST(SGOT) 19 12 - 45 U/L    ALT(SGPT) 18 2 - 50 U/L    Alkaline Phosphatase 67 30 - 99 U/L    Total Bilirubin 0.9 0.1 - 1.5 mg/dL    Albumin 4.3 3.2 - 4.9 g/dL    Total Protein 7.0 6.0 - 8.2 g/dL    Globulin 2.7 1.9 - 3.5 g/dL    A-G Ratio 1.6 g/dL   TROPONIN   Result Value Ref Range    Troponin T 15 6 - 19 ng/L   PROTHROMBIN TIME (INR)   Result Value Ref Range    PT 13.5 12.0 - 14.6 sec    INR 1.00 0.87 - 1.13   ESTIMATED GFR   Result Value Ref Range    GFR If African American >60 >60 mL/min/1.73 m 2    GFR If Non African American >60 >60 mL/min/1.73 m 2     CT-ABDOMEN-PELVIS WITH   Final Result      1.  Right posterior bladder wall asymmetric thickening in the vicinity of the UVJ with mild hydroureteronephrosis. 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 Cystogram is recommended. No evidence of metastasis      2.  Punctate urolith at the right UVJ, contributing to the hydronephrosis      3.  Cholecystectomy with mild intrahepatic biliary dilatation      4.  Above average stool volume      5.  Renal hypodensities most likely represent cysts but one on the right is mildly dense. Consider ultrasound or MRI to further evaluate. Alternatively could follow at time of follow-up for the bladder mass. 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  Above average stool volume            PROCEDURES      MEDICAL RECORD  I have reviewed patient's medical record and pertinent results are listed.    COURSE & MEDICAL DECISION MAKING  I have reviewed any medical record information, laboratory studies and radiographic results as noted above.    8:30 AM  Dr. SHANKAR will admit the pt.  He requests urology consult.     10:00 AM  Dr. Gamboa is aware, via tiger text.     FINAL IMPRESSION  Abdominal pain      Electronically signed by: Drew Baez D.O., 10/11/2020 7:20 AM

## 2020-10-11 NOTE — ED NOTES
Break RN:  Blood drawn for new lab orders, two set of cultures and sent to lab.    Admitting MD at bedside.

## 2020-10-12 ENCOUNTER — PATIENT OUTREACH (OUTPATIENT)
Dept: HEALTH INFORMATION MANAGEMENT | Facility: OTHER | Age: 85
End: 2020-10-12

## 2020-10-12 VITALS
RESPIRATION RATE: 17 BRPM | SYSTOLIC BLOOD PRESSURE: 117 MMHG | HEIGHT: 63 IN | WEIGHT: 124 LBS | BODY MASS INDEX: 21.97 KG/M2 | DIASTOLIC BLOOD PRESSURE: 62 MMHG | OXYGEN SATURATION: 94 % | HEART RATE: 62 BPM | TEMPERATURE: 99.1 F

## 2020-10-12 PROBLEM — N30.00 ACUTE CYSTITIS WITHOUT HEMATURIA: Status: RESOLVED | Noted: 2020-10-11 | Resolved: 2020-10-12

## 2020-10-12 LAB
ANION GAP SERPL CALC-SCNC: 12 MMOL/L (ref 7–16)
BUN SERPL-MCNC: 19 MG/DL (ref 8–22)
CALCIUM SERPL-MCNC: 8.2 MG/DL (ref 8.5–10.5)
CHLORIDE SERPL-SCNC: 107 MMOL/L (ref 96–112)
CO2 SERPL-SCNC: 25 MMOL/L (ref 20–33)
CREAT SERPL-MCNC: 0.7 MG/DL (ref 0.5–1.4)
ERYTHROCYTE [DISTWIDTH] IN BLOOD BY AUTOMATED COUNT: 44.3 FL (ref 35.9–50)
GLUCOSE SERPL-MCNC: 137 MG/DL (ref 65–99)
HCT VFR BLD AUTO: 41.5 % (ref 37–47)
HGB BLD-MCNC: 13.6 G/DL (ref 12–16)
MCH RBC QN AUTO: 31.7 PG (ref 27–33)
MCHC RBC AUTO-ENTMCNC: 32.8 G/DL (ref 33.6–35)
MCV RBC AUTO: 96.7 FL (ref 81.4–97.8)
PLATELET # BLD AUTO: 149 K/UL (ref 164–446)
PMV BLD AUTO: 12.5 FL (ref 9–12.9)
POTASSIUM SERPL-SCNC: 3.5 MMOL/L (ref 3.6–5.5)
RBC # BLD AUTO: 4.29 M/UL (ref 4.2–5.4)
SARS-COV-2 RNA RESP QL NAA+PROBE: NOTDETECTED
SODIUM SERPL-SCNC: 144 MMOL/L (ref 135–145)
SPECIMEN SOURCE: NORMAL
WBC # BLD AUTO: 5.7 K/UL (ref 4.8–10.8)

## 2020-10-12 PROCEDURE — 99217 PR OBSERVATION CARE DISCHARGE: CPT | Performed by: INTERNAL MEDICINE

## 2020-10-12 PROCEDURE — 700105 HCHG RX REV CODE 258: Performed by: INTERNAL MEDICINE

## 2020-10-12 PROCEDURE — 97161 PT EVAL LOW COMPLEX 20 MIN: CPT

## 2020-10-12 PROCEDURE — G0378 HOSPITAL OBSERVATION PER HR: HCPCS

## 2020-10-12 PROCEDURE — 97165 OT EVAL LOW COMPLEX 30 MIN: CPT

## 2020-10-12 PROCEDURE — 700111 HCHG RX REV CODE 636 W/ 250 OVERRIDE (IP): Performed by: INTERNAL MEDICINE

## 2020-10-12 PROCEDURE — 85027 COMPLETE CBC AUTOMATED: CPT

## 2020-10-12 PROCEDURE — 80048 BASIC METABOLIC PNL TOTAL CA: CPT

## 2020-10-12 PROCEDURE — 700102 HCHG RX REV CODE 250 W/ 637 OVERRIDE(OP): Performed by: INTERNAL MEDICINE

## 2020-10-12 PROCEDURE — A9270 NON-COVERED ITEM OR SERVICE: HCPCS | Performed by: INTERNAL MEDICINE

## 2020-10-12 RX ORDER — CEFDINIR 300 MG/1
300 CAPSULE ORAL 2 TIMES DAILY
Qty: 10 CAP | Refills: 0 | Status: SHIPPED | OUTPATIENT
Start: 2020-10-12 | End: 2020-10-17

## 2020-10-12 RX ADMIN — DOCUSATE SODIUM 50 MG AND SENNOSIDES 8.6 MG 2 TABLET: 8.6; 5 TABLET, FILM COATED ORAL at 04:51

## 2020-10-12 RX ADMIN — SODIUM CHLORIDE: 9 INJECTION, SOLUTION INTRAVENOUS at 01:26

## 2020-10-12 RX ADMIN — Medication 1 CAPSULE: at 08:35

## 2020-10-12 RX ADMIN — Medication 1500 UNITS: at 04:51

## 2020-10-12 RX ADMIN — LISINOPRIL 40 MG: 20 TABLET ORAL at 04:52

## 2020-10-12 RX ADMIN — CEFTRIAXONE SODIUM 2 G: 2 INJECTION, POWDER, FOR SOLUTION INTRAMUSCULAR; INTRAVENOUS at 04:55

## 2020-10-12 RX ADMIN — OXYBUTYNIN CHLORIDE 10 MG: 5 TABLET, EXTENDED RELEASE ORAL at 04:51

## 2020-10-12 ASSESSMENT — PAIN DESCRIPTION - PAIN TYPE: TYPE: ACUTE PAIN

## 2020-10-12 ASSESSMENT — COGNITIVE AND FUNCTIONAL STATUS - GENERAL
MOBILITY SCORE: 23
CLIMB 3 TO 5 STEPS WITH RAILING: A LITTLE
DAILY ACTIVITIY SCORE: 24
SUGGESTED CMS G CODE MODIFIER MOBILITY: CI
SUGGESTED CMS G CODE MODIFIER DAILY ACTIVITY: CH

## 2020-10-12 ASSESSMENT — GAIT ASSESSMENTS
GAIT LEVEL OF ASSIST: SUPERVISED
DISTANCE (FEET): 75
DEVIATION: NO DEVIATION

## 2020-10-12 ASSESSMENT — ACTIVITIES OF DAILY LIVING (ADL): TOILETING: INDEPENDENT

## 2020-10-12 NOTE — DISCHARGE SUMMARY
Discharge Summary    CHIEF COMPLAINT ON ADMISSION  Chief Complaint   Patient presents with   • Abdominal Pain       Reason for Admission  Right Flank Pain     Admission Date  10/11/2020    CODE STATUS  Full Code    HPI & HOSPITAL COURSE  This is a 85 y.o. female here with above medical issues. She was found to have a possible bladder mass at the UVJ on the right side with resulting hydronephrosis which is mild. Urology was consulted and patient was placed on empiric IV CTX for possible mild UTI. Urology recommended outpatient cystoscopy. Patient was also noted to have possible renal cysts likely Bosniak but this can be followed up with urology. Urology will schedule this appointment but I also called hospital  to expedite this process.     Patient's pain resolved and she remained afebrile with normal labs. She will be discharged on empiric oral omnicef to complete a 7 day course and I will follow up on her urine culture. Prior urine cultures have grown pan sensitive E coli.         Therefore, she is discharged in fair and stable condition to home with close outpatient follow-up.      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    MEDICATIONS ON DISCHARGE     Medication List      START taking these medications      Instructions   cefdinir 300 MG Caps  Commonly known as: OMNICEF   Take 1 Cap by mouth 2 times a day for 5 days.  Dose: 300 mg        CONTINUE taking these  "medications      Instructions   acetaminophen 500 MG Tabs  Commonly known as: TYLENOL   Take 500 mg by mouth Once.  Dose: 500 mg     amLODIPine 5 MG Tabs  Commonly known as: NORVASC   Take 1 Tab by mouth every bedtime.  Dose: 5 mg     IRON PO   Take 1 Tab by mouth every day.  Dose: 1 Tab     lisinopril 40 MG tablet  Commonly known as: PRINIVIL   Take 1 Tab by mouth every day.  Dose: 40 mg     oxybutynin SR 10 MG CR tablet  Commonly known as: DITROPAN-XL   Take 1 Tab by mouth every day.  Dose: 10 mg     vitamin D 1000 Unit (25 mcg) Tabs  Commonly known as: cholecalciferol   Take 5,000 Units by mouth every day.  Dose: 5,000 Units            Allergies  Allergies   Allergen Reactions   • Pcn [Penicillins] Rash     \"tongue got thick\"       DIET  Orders Placed This Encounter   Procedures   • Diet Order Regular     Standing Status:   Standing     Number of Occurrences:   1     Order Specific Question:   Diet:     Answer:   Regular [1]       ACTIVITY  As tolerated.  Weight bearing as tolerated    CONSULTATIONS  Urology    PROCEDURES  CT-ABDOMEN-PELVIS WITH   Final Result      1.  Right posterior bladder wall asymmetric thickening in the vicinity of the UVJ with mild hydroureteronephrosis. 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 Cystogram is recommended. No evidence of metastasis      2.  Punctate urolith at the right UVJ, contributing to the hydronephrosis      3.  Cholecystectomy with mild intrahepatic biliary dilatation      4.  Above average stool volume      5.  Renal hypodensities most likely represent cysts but one on the right is mildly dense. Consider ultrasound or MRI to further evaluate. Alternatively could follow at time of follow-up for the bladder mass. 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  Above average stool volume            LABORATORY  Lab Results   Component Value Date    SODIUM 144 10/12/2020    POTASSIUM 3.5 (L) 10/12/2020    CHLORIDE 107 10/12/2020    CO2 25 " 10/12/2020    GLUCOSE 137 (H) 10/12/2020    BUN 19 10/12/2020    CREATININE 0.70 10/12/2020    CREATININE 0.88 04/06/2010    GLOMRATE >59 04/06/2010        Lab Results   Component Value Date    WBC 5.7 10/12/2020    HEMOGLOBIN 13.6 10/12/2020    HEMATOCRIT 41.5 10/12/2020    PLATELETCT 149 (L) 10/12/2020        Total time of the discharge process exceeds 32 minutes.

## 2020-10-12 NOTE — THERAPY
Physical Therapy   Initial Evaluation     Patient Name: Milena Ang  Age:  85 y.o., Sex:  female  Medical Record #: [id number]  Today's Date: 10/12/2020          Assessment  Patient is 85 y.o. female with a diagnosis of UTI and hydronephrosis. Pt will be followed in outpatient by urology. Pt lives with her  and was independent prior. Pt appears to be at her functional baseline ambulating with no AD and no LOB. Pt completed all transfers with modified independent. No further acute PT needs.     Plan    Recommend Physical Therapy for Evaluation only for the following treatments:  none    DC Equipment Recommendations: None  Discharge Recommendations: Anticipate that the patient will have no further physical therapy needs after discharge from the hospital          10/12/20 0856   Prior Living Situation   Prior Services None   Housing / Facility 1 Story House   Steps Into Home 1   Steps In Home 0   Equipment Owned None   Lives with - Patient's Self Care Capacity Spouse   Comments pt lives with her  and is independent prior cooking, cleaning, driving   Prior Level of Functional Mobility   Bed Mobility Independent   Transfer Status Independent   Ambulation Independent   Distance Ambulation (Feet)   (community)   Assistive Devices Used None   Stairs Independent   Comments independent prior   Gait Analysis   Gait Level Of Assist Supervised   Assistive Device None   Distance (Feet) 75   # of Times Distance was Traveled 1   Deviation No deviation   # of Stairs Climbed 0   Weight Bearing Status fwb   Comments no LOB   Bed Mobility    Supine to Sit   (NT EOB)   Sit to Supine   (NT EOB)   Scooting Modified Independent   Functional Mobility   Sit to Stand Modified Independent   Bed, Chair, Wheelchair Transfer Modified Independent   Toilet Transfers Modified Independent   Mobility household distances   Anticipated Discharge Equipment and Recommendations   DC Equipment Recommendations None   Discharge  Recommendations Anticipate that the patient will have no further physical therapy needs after discharge from the hospital

## 2020-10-12 NOTE — DISCHARGE INSTRUCTIONS
Discharge Instructions    Discharged to home by car with relative. Discharged via wheelchair, hospital escort: Yes.  Special equipment needed: Not Applicable    Be sure to schedule a follow-up appointment with your primary care doctor or any specialists as instructed.     Discharge Plan:   Diet Plan: Discussed  Activity Level: Discussed  Confirmed Follow up Appointment: Patient to Call and Schedule Appointment  Confirmed Symptoms Management: Discussed  Medication Reconciliation Updated: Yes  Influenza Vaccine Indication: Patient Refuses    I understand that a diet low in cholesterol, fat, and sodium is recommended for good health. Unless I have been given specific instructions below for another diet, I accept this instruction as my diet prescription.   Other diet: Regular diet    Special Instructions: None    · Is patient discharged on Warfarin / Coumadin?   No     Depression / Suicide Risk    As you are discharged from this [facility] facility, it is important to learn how to keep safe from harming yourself.    Recognize the warning signs:  · Abrupt changes in personality, positive or negative- including increase in energy   · Giving away possessions  · Change in eating patterns- significant weight changes-  positive or negative  · Change in sleeping patterns- unable to sleep or sleeping all the time   · Unwillingness or inability to communicate  · Depression  · Unusual sadness, discouragement and loneliness  · Talk of wanting to die  · Neglect of personal appearance   · Rebelliousness- reckless behavior  · Withdrawal from people/activities they love  · Confusion- inability to concentrate     If you or a loved one observes any of these behaviors or has concerns about self-harm, here's what you can do:  · Talk about it- your feelings and reasons for harming yourself  · Remove any means that you might use to hurt yourself (examples: pills, rope, extension cords, firearm)  · Get professional help from the community  (Mental Health, Substance Abuse, psychological counseling)  · Do not be alone:Call your Safe Contact- someone whom you trust who will be there for you.  · Call your local CRISIS HOTLINE 398-4385 or 487-038-4346  · Call your local Children's Mobile Crisis Response Team [place] (335) 781-7978 or www.WakingApp  · Call the toll free National Suicide Prevention Hotlines   · National Suicide Prevention Lifeline 267-250-ATJV (7548)  · National Hope Line Network 800-SUICIDE (036-0862)

## 2020-10-12 NOTE — THERAPY
"Occupational Therapy   Initial Evaluation     Patient Name: Milena Ang  Age:  85 y.o., Sex:  female  Medical Record #: [id number]  Today's Date: 10/12/2020          Assessment  Patient is 85 y.o. female with a diagnosis of hydronephrosis and UTI, small mass found near kidney to be address with outpatient urology.  Pt presents at her baseline of functional independence, Mod I for ADLs, ADL transfers and IADLs. Pt has no concerns for DC, reports grab bars installed in her home mostly for her , otherwise no daily DME use. No additional acute OT needs.     Plan    Recommend Occupational Therapy for Evaluation only  DC Equipment Recommendations: None  Discharge Recommendations: Anticipate that the patient will have no further occupational therapy needs after discharge from the hospital     Subjective    \"It's been a rotten year.\"        10/12/20 0858   Prior Living Situation   Housing / Facility 1 Story House   Steps Into Home 1   Equipment Owned Grab Bar(s) In Tub / Shower;Grab Bar(s) By Toilet;Tub / Shower Seat   Lives with - Patient's Self Care Capacity Spouse   Comments pt lives with  who is unable to provide physical assist, pt doesnt need any   Prior Level of ADL Function   Comments independent   Prior Level of IADL Function   Shopping Independent   Prior Level Of Mobility Independent With Device in Community;Independent With Device in Home   Driving / Transportation Driving Independent   Occupation (Pre-Hospital Vocational) Retired Due To Age   Cognition    Cognition / Consciousness WDL   Comments verbose, very pleasant   Balance Assessment   Weight Shift Sitting Good   Weight Shift Standing Fair   Comments no LOB, no AD   Bed Mobility    Scooting Modified Independent   Comments not observed, no concerns for independence    ADL Assessment   Eating Modified Independent   Grooming Modified Independent;Standing   Upper Body Dressing Modified Independent   Lower Body Dressing Modified Independent "   Toileting Modified Independent   Functional Mobility   Sit to Stand Modified Independent   Bed, Chair, Wheelchair Transfer Modified Independent   Toilet Transfers Modified Independent   Mobility no AD   Activity Tolerance   Sitting in Chair functional   Sitting Edge of Bed functional   Standing functional   Anticipated Discharge Equipment and Recommendations   DC Equipment Recommendations None   Discharge Recommendations Anticipate that the patient will have no further occupational therapy needs after discharge from the hospital

## 2020-10-12 NOTE — CARE PLAN
Problem: Infection  Goal: Will remain free from infection  Outcome: PROGRESSING AS EXPECTED   IV abx in use    Problem: Pain Management  Goal: Pain level will decrease to patient's comfort goal  Outcome: PROGRESSING AS EXPECTED   Prn pain meds in use

## 2020-10-16 LAB
BACTERIA BLD CULT: NORMAL
BACTERIA BLD CULT: NORMAL
SIGNIFICANT IND 70042: NORMAL
SIGNIFICANT IND 70042: NORMAL
SITE SITE: NORMAL
SITE SITE: NORMAL
SOURCE SOURCE: NORMAL
SOURCE SOURCE: NORMAL

## 2020-10-21 ENCOUNTER — TELEPHONE (OUTPATIENT)
Dept: MEDICAL GROUP | Facility: PHYSICIAN GROUP | Age: 85
End: 2020-10-21

## 2020-10-21 NOTE — TELEPHONE ENCOUNTER
----- Message from Jayde Whitman sent at 10/21/2020  3:34 PM PDT -----  Regarding: FW: shingles vaccine  Contact: [phone]    ----- Message -----  From: Milena Ang  Sent: 10/12/2020   9:53 PM PDT  To: Roger Jim  Subject: shingles vaccine                                 Topic: Bill/Statement    I would like the first  shingle shot.  I am a patient at the [place] office.  I was at [facility] on Sunday and they suggested I contact your office.   [phone]

## 2020-11-04 ENCOUNTER — PRE-ADMISSION TESTING (OUTPATIENT)
Dept: ADMISSIONS | Facility: MEDICAL CENTER | Age: 85
End: 2020-11-04
Attending: UROLOGY
Payer: MEDICARE

## 2020-11-04 DIAGNOSIS — Z01.812 PRE-PROCEDURAL LABORATORY EXAMINATION: ICD-10-CM

## 2020-11-04 LAB
COVID ORDER STATUS COVID19: NORMAL
SARS-COV-2 RNA RESP QL NAA+PROBE: NOTDETECTED
SPECIMEN SOURCE: NORMAL

## 2020-11-04 PROCEDURE — U0003 INFECTIOUS AGENT DETECTION BY NUCLEIC ACID (DNA OR RNA); SEVERE ACUTE RESPIRATORY SYNDROME CORONAVIRUS 2 (SARS-COV-2) (CORONAVIRUS DISEASE [COVID-19]), AMPLIFIED PROBE TECHNIQUE, MAKING USE OF HIGH THROUGHPUT TECHNOLOGIES AS DESCRIBED BY CMS-2020-01-R: HCPCS

## 2020-11-04 ASSESSMENT — FIBROSIS 4 INDEX: FIB4 SCORE: 2.52

## 2020-11-06 ENCOUNTER — HOSPITAL ENCOUNTER (OUTPATIENT)
Facility: MEDICAL CENTER | Age: 85
End: 2020-11-06
Attending: UROLOGY | Admitting: UROLOGY
Payer: MEDICARE

## 2020-11-06 ENCOUNTER — ANESTHESIA EVENT (OUTPATIENT)
Dept: SURGERY | Facility: MEDICAL CENTER | Age: 85
End: 2020-11-06
Payer: MEDICARE

## 2020-11-06 ENCOUNTER — ANESTHESIA (OUTPATIENT)
Dept: SURGERY | Facility: MEDICAL CENTER | Age: 85
End: 2020-11-06
Payer: MEDICARE

## 2020-11-06 ENCOUNTER — APPOINTMENT (OUTPATIENT)
Dept: RADIOLOGY | Facility: MEDICAL CENTER | Age: 85
End: 2020-11-06
Attending: UROLOGY
Payer: MEDICARE

## 2020-11-06 VITALS
WEIGHT: 124.34 LBS | OXYGEN SATURATION: 91 % | SYSTOLIC BLOOD PRESSURE: 145 MMHG | HEART RATE: 63 BPM | DIASTOLIC BLOOD PRESSURE: 52 MMHG | TEMPERATURE: 97.4 F | RESPIRATION RATE: 16 BRPM | BODY MASS INDEX: 22.03 KG/M2 | HEIGHT: 63 IN

## 2020-11-06 PROCEDURE — 501329 HCHG SET, CYSTO IRRIG Y TUR: Performed by: UROLOGY

## 2020-11-06 PROCEDURE — 700111 HCHG RX REV CODE 636 W/ 250 OVERRIDE (IP): Performed by: ANESTHESIOLOGY

## 2020-11-06 PROCEDURE — 700101 HCHG RX REV CODE 250: Performed by: ANESTHESIOLOGY

## 2020-11-06 PROCEDURE — 700101 HCHG RX REV CODE 250: Performed by: UROLOGY

## 2020-11-06 PROCEDURE — 700105 HCHG RX REV CODE 258: Performed by: UROLOGY

## 2020-11-06 PROCEDURE — 160048 HCHG OR STATISTICAL LEVEL 1-5: Performed by: UROLOGY

## 2020-11-06 PROCEDURE — 500879 HCHG PACK, CYSTO: Performed by: UROLOGY

## 2020-11-06 PROCEDURE — 700102 HCHG RX REV CODE 250 W/ 637 OVERRIDE(OP): Performed by: ANESTHESIOLOGY

## 2020-11-06 PROCEDURE — 160039 HCHG SURGERY MINUTES - EA ADDL 1 MIN LEVEL 3: Performed by: UROLOGY

## 2020-11-06 PROCEDURE — 160002 HCHG RECOVERY MINUTES (STAT): Performed by: UROLOGY

## 2020-11-06 PROCEDURE — C2617 STENT, NON-COR, TEM W/O DEL: HCPCS | Performed by: UROLOGY

## 2020-11-06 PROCEDURE — A9270 NON-COVERED ITEM OR SERVICE: HCPCS | Performed by: ANESTHESIOLOGY

## 2020-11-06 PROCEDURE — C1769 GUIDE WIRE: HCPCS | Performed by: UROLOGY

## 2020-11-06 PROCEDURE — 160025 RECOVERY II MINUTES (STATS): Performed by: UROLOGY

## 2020-11-06 PROCEDURE — 160046 HCHG PACU - 1ST 60 MINS PHASE II: Performed by: UROLOGY

## 2020-11-06 PROCEDURE — 160035 HCHG PACU - 1ST 60 MINS PHASE I: Performed by: UROLOGY

## 2020-11-06 PROCEDURE — A9270 NON-COVERED ITEM OR SERVICE: HCPCS | Performed by: UROLOGY

## 2020-11-06 PROCEDURE — 160009 HCHG ANES TIME/MIN: Performed by: UROLOGY

## 2020-11-06 PROCEDURE — 700102 HCHG RX REV CODE 250 W/ 637 OVERRIDE(OP): Performed by: UROLOGY

## 2020-11-06 PROCEDURE — 160028 HCHG SURGERY MINUTES - 1ST 30 MINS LEVEL 3: Performed by: UROLOGY

## 2020-11-06 DEVICE — STENT UROLOGICAL POLARIS 6X22  ULTRA: Type: IMPLANTABLE DEVICE | Site: URETER | Status: FUNCTIONAL

## 2020-11-06 RX ORDER — HYDROMORPHONE HYDROCHLORIDE 1 MG/ML
0.5 INJECTION, SOLUTION INTRAMUSCULAR; INTRAVENOUS; SUBCUTANEOUS
Status: DISCONTINUED | OUTPATIENT
Start: 2020-11-06 | End: 2020-11-06 | Stop reason: HOSPADM

## 2020-11-06 RX ORDER — ONDANSETRON 2 MG/ML
4 INJECTION INTRAMUSCULAR; INTRAVENOUS
Status: DISCONTINUED | OUTPATIENT
Start: 2020-11-06 | End: 2020-11-06 | Stop reason: HOSPADM

## 2020-11-06 RX ORDER — CELECOXIB 200 MG/1
200 CAPSULE ORAL ONCE
Status: COMPLETED | OUTPATIENT
Start: 2020-11-06 | End: 2020-11-06

## 2020-11-06 RX ORDER — ACETAMINOPHEN 500 MG
1000 TABLET ORAL ONCE
Status: COMPLETED | OUTPATIENT
Start: 2020-11-06 | End: 2020-11-06

## 2020-11-06 RX ORDER — HYDRALAZINE HYDROCHLORIDE 20 MG/ML
5 INJECTION INTRAMUSCULAR; INTRAVENOUS
Status: DISCONTINUED | OUTPATIENT
Start: 2020-11-06 | End: 2020-11-06 | Stop reason: HOSPADM

## 2020-11-06 RX ORDER — SODIUM CHLORIDE, SODIUM LACTATE, POTASSIUM CHLORIDE, CALCIUM CHLORIDE 600; 310; 30; 20 MG/100ML; MG/100ML; MG/100ML; MG/100ML
INJECTION, SOLUTION INTRAVENOUS CONTINUOUS
Status: DISCONTINUED | OUTPATIENT
Start: 2020-11-06 | End: 2020-11-06 | Stop reason: HOSPADM

## 2020-11-06 RX ORDER — HYDROMORPHONE HYDROCHLORIDE 1 MG/ML
0.2 INJECTION, SOLUTION INTRAMUSCULAR; INTRAVENOUS; SUBCUTANEOUS
Status: DISCONTINUED | OUTPATIENT
Start: 2020-11-06 | End: 2020-11-06 | Stop reason: HOSPADM

## 2020-11-06 RX ORDER — DEXAMETHASONE SODIUM PHOSPHATE 4 MG/ML
INJECTION, SOLUTION INTRA-ARTICULAR; INTRALESIONAL; INTRAMUSCULAR; INTRAVENOUS; SOFT TISSUE PRN
Status: DISCONTINUED | OUTPATIENT
Start: 2020-11-06 | End: 2020-11-06 | Stop reason: SURG

## 2020-11-06 RX ORDER — LIDOCAINE HYDROCHLORIDE 20 MG/ML
INJECTION, SOLUTION EPIDURAL; INFILTRATION; INTRACAUDAL; PERINEURAL PRN
Status: DISCONTINUED | OUTPATIENT
Start: 2020-11-06 | End: 2020-11-06 | Stop reason: SURG

## 2020-11-06 RX ORDER — ONDANSETRON 2 MG/ML
INJECTION INTRAMUSCULAR; INTRAVENOUS PRN
Status: DISCONTINUED | OUTPATIENT
Start: 2020-11-06 | End: 2020-11-06 | Stop reason: SURG

## 2020-11-06 RX ORDER — OXYCODONE HCL 5 MG/5 ML
10 SOLUTION, ORAL ORAL
Status: DISCONTINUED | OUTPATIENT
Start: 2020-11-06 | End: 2020-11-06 | Stop reason: HOSPADM

## 2020-11-06 RX ORDER — OXYCODONE HCL 5 MG/5 ML
5 SOLUTION, ORAL ORAL
Status: DISCONTINUED | OUTPATIENT
Start: 2020-11-06 | End: 2020-11-06 | Stop reason: HOSPADM

## 2020-11-06 RX ORDER — HYDROMORPHONE HYDROCHLORIDE 1 MG/ML
0.4 INJECTION, SOLUTION INTRAMUSCULAR; INTRAVENOUS; SUBCUTANEOUS
Status: DISCONTINUED | OUTPATIENT
Start: 2020-11-06 | End: 2020-11-06 | Stop reason: HOSPADM

## 2020-11-06 RX ORDER — HALOPERIDOL 5 MG/ML
1 INJECTION INTRAMUSCULAR
Status: DISCONTINUED | OUTPATIENT
Start: 2020-11-06 | End: 2020-11-06 | Stop reason: HOSPADM

## 2020-11-06 RX ORDER — CEFAZOLIN SODIUM 1 G/3ML
INJECTION, POWDER, FOR SOLUTION INTRAMUSCULAR; INTRAVENOUS PRN
Status: DISCONTINUED | OUTPATIENT
Start: 2020-11-06 | End: 2020-11-06 | Stop reason: SURG

## 2020-11-06 RX ORDER — ROCURONIUM BROMIDE 10 MG/ML
INJECTION, SOLUTION INTRAVENOUS PRN
Status: DISCONTINUED | OUTPATIENT
Start: 2020-11-06 | End: 2020-11-06 | Stop reason: SURG

## 2020-11-06 RX ADMIN — CEFAZOLIN 2 G: 330 INJECTION, POWDER, FOR SOLUTION INTRAMUSCULAR; INTRAVENOUS at 15:48

## 2020-11-06 RX ADMIN — SODIUM CHLORIDE, POTASSIUM CHLORIDE, SODIUM LACTATE AND CALCIUM CHLORIDE: 600; 310; 30; 20 INJECTION, SOLUTION INTRAVENOUS at 14:10

## 2020-11-06 RX ADMIN — LIDOCAINE HYDROCHLORIDE 60 MG: 20 INJECTION, SOLUTION EPIDURAL; INFILTRATION; INTRACAUDAL at 15:51

## 2020-11-06 RX ADMIN — EPHEDRINE SULFATE 10 MG: 50 INJECTION, SOLUTION INTRAVENOUS at 16:04

## 2020-11-06 RX ADMIN — CELECOXIB 200 MG: 200 CAPSULE ORAL at 14:09

## 2020-11-06 RX ADMIN — ACETAMINOPHEN 1000 MG: 500 TABLET ORAL at 14:09

## 2020-11-06 RX ADMIN — SODIUM CHLORIDE, POTASSIUM CHLORIDE, SODIUM LACTATE AND CALCIUM CHLORIDE: 600; 310; 30; 20 INJECTION, SOLUTION INTRAVENOUS at 16:24

## 2020-11-06 RX ADMIN — ROCURONIUM BROMIDE 50 MG: 10 INJECTION, SOLUTION INTRAVENOUS at 15:51

## 2020-11-06 RX ADMIN — SUGAMMADEX 200 MG: 100 INJECTION, SOLUTION INTRAVENOUS at 16:36

## 2020-11-06 RX ADMIN — FENTANYL CITRATE 50 MCG: 50 INJECTION, SOLUTION INTRAMUSCULAR; INTRAVENOUS at 16:24

## 2020-11-06 RX ADMIN — POVIDONE IODINE 15 ML: 100 SOLUTION TOPICAL at 14:10

## 2020-11-06 RX ADMIN — EPHEDRINE SULFATE 10 MG: 50 INJECTION, SOLUTION INTRAVENOUS at 16:13

## 2020-11-06 RX ADMIN — DEXAMETHASONE SODIUM PHOSPHATE 4 MG: 4 INJECTION, SOLUTION INTRA-ARTICULAR; INTRALESIONAL; INTRAMUSCULAR; INTRAVENOUS; SOFT TISSUE at 15:57

## 2020-11-06 RX ADMIN — ONDANSETRON 4 MG: 2 INJECTION INTRAMUSCULAR; INTRAVENOUS at 16:36

## 2020-11-06 RX ADMIN — FENTANYL CITRATE 75 MCG: 50 INJECTION, SOLUTION INTRAMUSCULAR; INTRAVENOUS at 15:51

## 2020-11-06 RX ADMIN — PROPOFOL 100 MG: 10 INJECTION, EMULSION INTRAVENOUS at 15:51

## 2020-11-06 RX ADMIN — LIDOCAINE HYDROCHLORIDE 0.5 ML: 10 INJECTION, SOLUTION EPIDURAL; INFILTRATION; INTRACAUDAL; PERINEURAL at 14:10

## 2020-11-06 ASSESSMENT — FIBROSIS 4 INDEX: FIB4 SCORE: 2.52

## 2020-11-06 ASSESSMENT — PAIN DESCRIPTION - PAIN TYPE
TYPE: SURGICAL PAIN

## 2020-11-06 ASSESSMENT — PAIN SCALES - GENERAL: PAIN_LEVEL: 0

## 2020-11-06 NOTE — ANESTHESIA PREPROCEDURE EVALUATION
HTN. Denies: MI/CHF/smoking/CVA/DM/CKD      Relevant Problems   CARDIAC   (+) HTN (hypertension)   (+) Murmur, cardiac   (+) Nonrheumatic aortic valve insufficiency       Physical Exam    Airway   Mallampati: II  TM distance: >3 FB  Neck ROM: full       Cardiovascular - normal exam  Rhythm: regular  Rate: normal  (-) murmur     Dental - normal exam           Pulmonary - normal exam  Breath sounds clear to auscultation     Abdominal    Neurological - normal exam                 Anesthesia Plan    ASA 2       Plan - general       Airway plan will be ETT        Induction: intravenous    Postoperative Plan: Postoperative administration of opioids is intended.    Pertinent diagnostic labs and testing reviewed    Informed Consent:    Anesthetic plan and risks discussed with patient.    Use of blood products discussed with: patient whom consented to blood products.

## 2020-11-07 NOTE — OP REPORT
DATE OF SERVICE:  11/06/2020    PREOPERATIVE DIAGNOSES:  1.  Right hydroureteronephrosis.  2.  1 mm distal ureteral stone.  3.  Bladder wall thickening suggestive of possible malignancy versus   inflammation.    PROCEDURES PERFORMED:  1.  Rigid cystourethroscopy.  2.  Right semi-rigid ureteroscopy with dilation of distal ureteral stricture.  3.  Right 6-Nigerian x 22 cm double-J stent placement.    SURGEON:  Ernesto Gamboa MD    ANESTHESIA:  Dr. Ryan Agustin, general endotracheal.    POSTOPERATIVE DIAGNOSES:  1.  Passed right distal ureteral stone.  2.  Right distal ureteral stricture.  3.  Resolution of the bladder wall inflammation.    SPECIMENS:  None.    INDICATIONS:  The patient is a pleasant woman who presented with hematuria and   flank pain.  She was evaluated and found to have right hydroureteronephrosis   with a small distal stone, but a significantly inflamed bladder wall   suggestive of potential malignancy.  She was seen in consultation and I   performed office cystoscopy, which showed inflammation involving the right   hemitrigone.  At the time of cystoscopy, I could not identify the ureter,   ureteral orifice and the rest of the bladder was normal.  I explained to the   patient and her  and I felt this was inflammatory, but we needed to   further evaluate her under anesthesia with cystoscopy, possible retrograde   ureteral pyelogram, possible transurethral resection of the right ureteral   orifice with a stent.  I also discussed that the stone would be removed at the   time of treatment probably with the basket as it was very small.  In   addition, we discussed the risk of the procedure including, but not limited to   risk of perioperative urinary tract infection, urosepsis, risk of bladder   perforation requiring prolonged catheterization.  With the stent, there is a   risk of migration, associated stent pain, urgency, frequency, and hematuria.    I have also discussed that if there is a  malignancy, this is for diagnosis and   additional treatment may be indicated.  In addition, we discussed the fact   that if there is tumor involving the ureteral orifice and the stent was left   in place, there is a 3-7% incidence of stricture requiring ureteral   reimplantation.  I have also discussed the perioperative risk of deep vein   thrombosis, pulmonary embolism, aspiration pneumonia, heart attack, stroke and   death.  Informed consent was given to me by the patient to proceed and she is   marked on her right thigh with my initials and letter Y for safe site   surgery.    DESCRIPTION IN DETAIL:  After informed consent was obtained, the patient was   brought to the operating room and placed supine.  Bilateral sequential   compression devices are in place and operational.  A general endotracheal   anesthetic administered in balanced fashion by Matt Agustin.  The patient   subsequently was positioned in modified lithotomy, received weight-based   antibiotics and the operative area was Betadine prepped and draped in usual   sterile fashion.  A surgical time-out was called and all members of the   operative team agree as the patient's name, procedure to be performed without   objections, attention was directed towards the procedure.    The procedure was begun by passing a generously lubricated 25-Taiwanese rigid   cystoscope with a 30-degree lens per urethra.  The urethra was normal.  Upon   entering the bladder, serial evaluation shows efflux of clear urine from the   left ureteral orifice.  I did not see strong efflux in the right ureteral   orifice, but there was some inflammation previously noted in the office   cystoscopy had completely resolved.  The hemitrigone as much now normal in   appearance.  The bladder itself is normal and subsequently I cannulated the   right ureteral orifice with a 0.38 Sensor wire.  I pushed this up into the   kidney as documented on fluoroscopy and over the wire, passed the  semi-rigid   ureteroscope.  There is a distal ureteral stricture and associated   hydronephrosis, but no stone is identified.  I believe the patient has passed   the stone.  I was able to pass the scope all the way into the kidney   evaluating the entire ureter and the upper pole of the kidney.  Upper pole of   the kidney had no evidence of stones or tumors.  The proximal ureter, mid   ureter and distal ureter was normal with the exception of the narrowing.  So   at this point in time, the assumption is that there was a distal ureteral   stone with significant bladder wall thickening from inflammation and mucosal   inflammation previously noted and that she has passed the stone, but because   of the stricture noted I dilated this with a semi-rigid ureteroscope and at   this point in time, I elected to position a stent.    I withdrew my semi-rigid ureteroscope and upon doing so, the guidewire came   out of the right ureteral orifice, so I advanced a 0.38 angle tipped ZIPwire   up into the kidney as documented on fluoroscopy and with the cystoscope   backloaded this wire, I pushed up a 6-Amharic x 22 cm stent into the right   system.  When I withdrew the guidewire there was a good coil in the kidney,   good coil in the bladder and it was seen draining under high pressure.  The   bladder was drained.  She tolerated the procedure well without complication,   was awakened in the operating room and transferred to the recovery room where   she arrived in stable condition.       ____________________________________     MD JULIUS Solis / SHARIF    DD:  11/07/2020 07:01:01  DT:  11/07/2020 08:42:24    D#:  [id number]  Job#:  [id number]

## 2020-11-07 NOTE — ANESTHESIA PROCEDURE NOTES
Airway    Date/Time: 11/6/2020 3:53 PM  Performed by: Matt Agustin M.D.  Authorized by: Matt Agustin M.D.     Location:  OR  Urgency:  Elective  Difficult Airway: No    Indications for Airway Management:  Anesthesia      Spontaneous Ventilation: absent    Sedation Level:  Deep  Preoxygenated: Yes    Patient Position:  Sniffing  Mask Difficulty Assessment:  1 - vent by mask  Final Airway Type:  Endotracheal airway  Final Endotracheal Airway:  ETT  Cuffed: Yes    Technique Used for Successful ETT Placement:  Direct laryngoscopy    Insertion Site:  Oral  Blade Type:  Natarajan  Laryngoscope Blade/Videolaryngoscope Blade Size:  2  ETT Size (mm):  7.0  Measured from:  Lips  ETT to Lips (cm):  20  Placement Verified by: auscultation and capnometry    Cormack-Lehane Classification:  Grade IIa - partial view of glottis  Number of Attempts at Approach:  1

## 2020-11-07 NOTE — ANESTHESIA POSTPROCEDURE EVALUATION
Patient: Milena Ang    Procedure Summary     Date: 11/06/20 Room / Location: [place] [id number] / SURGERY [facility]    Anesthesia Start: 1548 Anesthesia Stop: 1645    Procedures:       CYSTOSCOPY, WITH BILATERAL RETROGRADE PYELOGRAM- URETERO (Right Bladder)      URETEROSCOPY (Bilateral Ureter)      CYSTOSCOPY, WITH URETERAL STENT INSERTION (Right Ureter) Diagnosis: (RIGHT DISTAL URETERAL STRICTURE)    Surgeons: Ernesto Gamboa M.D. Responsible Provider: Matt Agustin M.D.    Anesthesia Type: general ASA Status: 2          Final Anesthesia Type: general  Last vitals  BP   Blood Pressure : 123/49    Temp   36.3 °C (97.3 °F)    Pulse   Pulse: 71   Resp   16    SpO2   92 %      Anesthesia Post Evaluation    Patient location during evaluation: PACU  Patient participation: complete - patient participated  Level of consciousness: awake and alert  Pain score: 0    Airway patency: patent  Anesthetic complications: no  Cardiovascular status: hemodynamically stable  Respiratory status: acceptable  Hydration status: euvolemic    PONV: none           Nurse Pain Score: 0 (NPRS)

## 2020-11-07 NOTE — OR NURSING
Patient to PACU in stable condition. No complaints of pain or nausea. VSS. Will continue to monitor and medicate appropriately.

## 2020-11-07 NOTE — OR NURSING
1735 - Arrived to Phase II, ambulated to restroom for void x1, VSS, denies pain and nausea.     1743 - Pt dressing,  contacted for pickup    1750 - Continues to deny pain, ready for home, IV removed, discharge instructions reviewed with pt.     1758 - Discharged to  via wheelchair

## 2020-11-07 NOTE — DISCHARGE INSTRUCTIONS
ACTIVITY: Rest and take it easy for the first 24 hours.  A responsible adult is recommended to remain with you during that time.  It is normal to feel sleepy.  We encourage you to not do anything that requires balance, judgment or coordination.    MILD FLU-LIKE SYMPTOMS ARE NORMAL. YOU MAY EXPERIENCE GENERALIZED MUSCLE ACHES, THROAT IRRITATION, HEADACHE AND/OR SOME NAUSEA.    FOR 24 HOURS DO NOT:  Drive, operate machinery or run household appliances.  Drink beer or alcoholic beverages.   Make important decisions or sign legal documents.    SPECIAL INSTRUCTIONS:    · Diet: Clears, advance as tolerated to regular   · Activity: No restrictions   · Follow-up with Dr. Gamboa for right stent removal in 2 weeks.      Cystoscopy    What can I expect after the procedure?  After the procedure, it is common to have:  · Some soreness or pain in your abdomen and urethra.  · Urinary symptoms. These include:  ? Mild pain or burning when you urinate. Pain should stop within a few minutes after you urinate. This may last for up to 1 week.  ? A small amount of blood in your urine for several days.  ? Feeling like you need to urinate but producing only a small amount of urine.  Follow these instructions at home:  Medicines  · Take over-the-counter and prescription medicines only as told by your health care provider.  · If you were prescribed an antibiotic medicine, take it as told by your health care provider. Do not stop taking the antibiotic even if you start to feel better.  General instructions  · Return to your normal activities as told by your health care provider. Ask your health care provider what activities are safe for you.  · Do not drive for 24 hours if you were given a sedative during your procedure.  · Watch for any blood in your urine. If the amount of blood in your urine increases, call your health care provider.  · Follow instructions from your health care provider about eating or drinking restrictions.  · If a tissue  sample was removed for testing (biopsy) during your procedure, it is up to you to get your test results. Ask your health care provider, or the department that is doing the test, when your results will be ready.  · Drink enough fluid to keep your urine pale yellow.  · Keep all follow-up visits as told by your health care provider. This is important.  Contact a health care provider if you:  · Have pain that gets worse or does not get better with medicine, especially pain when you urinate.  · Have trouble urinating.  · Have more blood in your urine.  Get help right away if you:  · Have blood clots in your urine.  · Have abdominal pain.  · Have a fever or chills.  · Are unable to urinate.  Summary  · Cystoscopy is a procedure that is used to help diagnose and sometimes treat conditions that affect the lower urinary tract.  · Cystoscopy is done using a thin, tube-shaped instrument with a light and camera at the end.  · After the procedure, it is common to have some soreness or pain in your abdomen and urethra.  · Watch for any blood in your urine. If the amount of blood in your urine increases, call your health care provider.  · If you were prescribed an antibiotic medicine, take it as told by your health care provider. Do not stop taking the antibiotic even if you start to feel better.  This information is not intended to replace advice given to you by your health care provider. Make sure you discuss any questions you have with your health care provider.  Document Released: 12/15/2001 Document Revised: 12/10/2019 Document Reviewed: 12/10/2019  [employer] Patient Education © 2020 [employer]      DIET: To avoid nausea, slowly advance diet as tolerated, avoiding spicy or greasy foods for the first day.  Add more substantial food to your diet according to your physician's instructions.  Babies can be fed formula or breast milk as soon as they are hungry.  INCREASE FLUIDS AND FIBER TO AVOID CONSTIPATION.    SURGICAL  DRESSING/BATHING: N/A    FOLLOW-UP APPOINTMENT:  A follow-up appointment should be arranged with your doctor in 2 weeks; call to schedule.    You should CALL YOUR PHYSICIAN if you develop:  Fever greater than 101 degrees F.  Pain not relieved by medication, or persistent nausea or vomiting.  Excessive bleeding (blood soaking through dressing) or unexpected drainage from the wound.  Extreme redness or swelling around the incision site, drainage of pus or foul smelling drainage.  Inability to urinate or empty your bladder within 8 hours.  Problems with breathing or chest pain.    You should call 911 if you develop problems with breathing or chest pain.  If you are unable to contact your doctor or surgical center, you should go to the nearest emergency room or urgent care center.  Physician's telephone #: Dr. Gamboa [phone]    If any questions arise, call your doctor.  If your doctor is not available, please feel free to call the Surgical Center at [phone]. The Contact Center is open Monday through Friday 7AM to 5PM and may speak to a nurse at [phone], or toll free at [phone].     A registered nurse may call you a few days after your surgery to see how you are doing after your procedure.    MEDICATIONS: Resume taking daily medication.  Take prescribed pain medication with food.  If no medication is prescribed, you may take non-aspirin pain medication if needed.  PAIN MEDICATION CAN BE VERY CONSTIPATING.  Take a stool softener or laxative such as senokot, pericolace, or milk of magnesia if needed.    .    If your physician has prescribed pain medication that includes Acetaminophen (Tylenol), do not take additional Acetaminophen (Tylenol) while taking the prescribed medication.    Depression / Suicide Risk    As you are discharged from this [facility] facility, it is important to learn how to keep safe from harming yourself.    Recognize the warning signs:  · Abrupt changes in personality,  positive or negative- including increase in energy   · Giving away possessions  · Change in eating patterns- significant weight changes-  positive or negative  · Change in sleeping patterns- unable to sleep or sleeping all the time   · Unwillingness or inability to communicate  · Depression  · Unusual sadness, discouragement and loneliness  · Talk of wanting to die  · Neglect of personal appearance   · Rebelliousness- reckless behavior  · Withdrawal from people/activities they love  · Confusion- inability to concentrate     If you or a loved one observes any of these behaviors or has concerns about self-harm, here's what you can do:  · Talk about it- your feelings and reasons for harming yourself  · Remove any means that you might use to hurt yourself (examples: pills, rope, extension cords, firearm)  · Get professional help from the community (Mental Health, Substance Abuse, psychological counseling)  · Do not be alone:Call your Safe Contact- someone whom you trust who will be there for you.  · Call your local CRISIS HOTLINE 453-4927 or 591-113-6244  · Call your local Children's Mobile Crisis Response Team [place] (045) 377-1018 or www.DearLocal  · Call the toll free National Suicide Prevention Hotlines   · National Suicide Prevention Lifeline 564-786-XBIF (1722)  · National Hope Line Network 800-SUICIDE (531-8298)

## 2020-11-07 NOTE — ANESTHESIA TIME REPORT
Anesthesia Start and Stop Event Times     Date Time Event    11/6/2020 1531 Ready for Procedure     1548 Anesthesia Start     1645 Anesthesia Stop        Responsible Staff  11/06/20    Name Role Begin End    Matt Agustin M.D. Anesth 1548 1645        Preop Diagnosis (Free Text):  Pre-op Diagnosis     LESION OF BLADDER        Preop Diagnosis (Codes):    Post op Diagnosis  Ureteral stricture, right  right distal ureteral stricture    Premium Reason  A. 3PM - 7AM    Comments:

## 2020-11-07 NOTE — OR SURGEON
Immediate Post OP Note    PreOp Diagnosis: Right ureteral obstruction                                Right bladder inflammation                                Distal ureteral stone    PostOp Diagnosis: Right distal ureteral obstruction                                 Passed right distal ureteral stone                                 Resolved bladder inflammation    Procedure(s):  CYSTOSCOPY  RIGHT SEMIRIGID URETEROSCOPY - Wound Class: Clean Contaminated  RIGHT URETERAL STENT INSERTION - Wound Class: Clean Contaminated    Surgeon(s):  Ernesto Gamboa M.D.    Anesthesiologist/Type of Anesthesia:  Anesthesiologist: Matt Agustin M.D./General ETT    Surgical Staff:  Circulator: Megan Gallegos R.N.  Scrub Person: Nii Hampton R.N.    Specimens removed if any:  None    Estimated Blood Loss:N/A    Findings:No bladder inflammation                 Right ureteral obstruction                  Passed ureteral stone    Complications: None  Drains: 6 Libyan x 22 cm stent in the right ureter        11/6/2020 4:37 PM Ernesto Gamboa M.D.

## 2020-11-09 DIAGNOSIS — I10 ESSENTIAL HYPERTENSION: ICD-10-CM

## 2020-11-09 RX ORDER — AMLODIPINE BESYLATE 5 MG/1
5 TABLET ORAL
Qty: 90 TAB | Refills: 0 | Status: SHIPPED | OUTPATIENT
Start: 2020-11-09 | End: 2021-02-02

## 2020-11-09 RX ORDER — LISINOPRIL 40 MG/1
40 TABLET ORAL
Qty: 90 TAB | Refills: 0 | Status: SHIPPED | OUTPATIENT
Start: 2020-11-09 | End: 2021-02-02

## 2020-12-28 ENCOUNTER — OFFICE VISIT (OUTPATIENT)
Dept: MEDICAL GROUP | Facility: PHYSICIAN GROUP | Age: 85
End: 2020-12-28
Payer: MEDICARE

## 2020-12-28 VITALS
HEART RATE: 60 BPM | OXYGEN SATURATION: 96 % | DIASTOLIC BLOOD PRESSURE: 60 MMHG | SYSTOLIC BLOOD PRESSURE: 102 MMHG | RESPIRATION RATE: 12 BRPM | TEMPERATURE: 98.4 F | HEIGHT: 63 IN | BODY MASS INDEX: 21.76 KG/M2 | WEIGHT: 122.8 LBS

## 2020-12-28 DIAGNOSIS — R79.89 ABNORMAL CBC: ICD-10-CM

## 2020-12-28 DIAGNOSIS — R01.1 MURMUR: ICD-10-CM

## 2020-12-28 DIAGNOSIS — Z79.899 HIGH RISK MEDICATION USE: ICD-10-CM

## 2020-12-28 DIAGNOSIS — R32 URINARY INCONTINENCE, UNSPECIFIED TYPE: ICD-10-CM

## 2020-12-28 DIAGNOSIS — R73.03 PREDIABETES: ICD-10-CM

## 2020-12-28 DIAGNOSIS — R53.83 OTHER FATIGUE: ICD-10-CM

## 2020-12-28 DIAGNOSIS — N13.9 OBSTRUCTIVE UROPATHY: ICD-10-CM

## 2020-12-28 DIAGNOSIS — I10 ESSENTIAL HYPERTENSION: ICD-10-CM

## 2020-12-28 DIAGNOSIS — Z23 NEED FOR VACCINATION: ICD-10-CM

## 2020-12-28 DIAGNOSIS — E55.9 VITAMIN D DEFICIENCY: ICD-10-CM

## 2020-12-28 LAB
HBA1C MFR BLD: 5.7 % (ref 0–5.6)
INT CON NEG: ABNORMAL
INT CON POS: ABNORMAL

## 2020-12-28 PROCEDURE — 99214 OFFICE O/P EST MOD 30 MIN: CPT | Performed by: PHYSICIAN ASSISTANT

## 2020-12-28 PROCEDURE — 83036 HEMOGLOBIN GLYCOSYLATED A1C: CPT | Performed by: PHYSICIAN ASSISTANT

## 2020-12-28 ASSESSMENT — PATIENT HEALTH QUESTIONNAIRE - PHQ9: CLINICAL INTERPRETATION OF PHQ2 SCORE: 0

## 2020-12-28 ASSESSMENT — FIBROSIS 4 INDEX: FIB4 SCORE: 2.52

## 2020-12-28 NOTE — ASSESSMENT & PLAN NOTE
October 11, 2020 patient was seen in the ED for possible kidney stone or bladder infection.  CT abdomen pelvis showed an asymmetric bladder wall thickening concerning for bladder carcinoma.  Cystoscopy was recommended.  Urology was consulted to follow-up with the patient.  Renal hypodensities likely represent cysts one is mildly dense.  They discussed an MRI of the kidneys for further evaluation.    I see that the patient had a cystoscopy performed on November 6, 2020.

## 2020-12-28 NOTE — ASSESSMENT & PLAN NOTE
Complains of continued fatigue- started back in February. Has been to ED a few times for this. Had work up. She had pretty big work up, found to have bladder mass- had this removed- Dr. Gamboa. Benign per patient.  Patient states that she will get up to do an activity but then afterwards has to lie down and take a nap.  She has good days and bad days.  Denies any fever, chills, unintentional weight loss.  Denies any night sweats or bone pain.  Denies any nausea.  No belly pain, no chest pain.

## 2020-12-28 NOTE — ASSESSMENT & PLAN NOTE
This is a  chronic condition.   Supplementation: supplementing 1000 IU daily  Last Vitamin D level:   VIT D:   Lab Results   Component Value Date/Time    25HYDROXY 20 (L) 08/06/2019 1118     Patient denies any muscle aches or fatigue.

## 2020-12-28 NOTE — PROGRESS NOTES
CC:   Chief Complaint   Patient presents with   • Establish Care   • Fatigue          HISTORY OF PRESENT ILLNESS: Patient is a 85 y.o. female established patient who presents today to establish care with me and discuss the following issues:      Health Maintenance: Completed  Print shingrix.  Doesn't usually get flu shot.     HTN (hypertension)  This is a chronic condition.  Onset: years ago  Current Meds: Amlodipine 5 mg daily, lisinopril 40 mg daily  Side effects? none    Associated symptoms:  denies any chest pain, sob, headaches, dizziness/lightheadedness      Incontinence  Chronic condition.  Stable.  Patient is on oxybutynin 10 mg daily.  Tolerates medication without side effect.    Vitamin D deficiency  This is a  chronic condition.   Supplementation: supplementing 1000 IU daily  Last Vitamin D level:   VIT D:   Lab Results   Component Value Date/Time    25HYDROXY 20 (L) 08/06/2019 1118     Patient denies any muscle aches or fatigue.       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  CT abdomen pelvis showed an asymmetric bladder wall thickening concerning for bladder carcinoma.  Cystoscopy was recommended.  Urology was consulted to follow-up with the patient.  Renal hypodensities likely represent cysts one is mildly dense.  They discussed an MRI of the kidneys for further evaluation.    I see that the patient had a cystoscopy performed on November 6, 2020.    Fatigue  Complains of continued fatigue- started back in February. Has been to ED a few times for this. Had work up. She had pretty big work up, found to have bladder mass- had this removed- Dr. Gamboa. Benign per patient.  Patient states that she will get up to do an activity but then afterwards has to lie down and take a nap.  She has good days and bad days.  Denies any fever, chills, unintentional weight loss.  Denies any night sweats or bone  pain.  Denies any nausea.  No belly pain, no chest pain.    Murmur  Echo 2017 reviewed-   CONCLUSIONS  1. Moderate aortic insufficiency.     2.  Left ventricular ejection fraction is visually estimated to be 65%.   Normal regional wall motion. Grade I diastolic dysfunction.     3. Estimated right ventricular systolic pressure  is 30 mmHg.    Having chronic fatigue, sometimes feels that she has to take a deep breath from her belly, does not describe it as shortness of breath necessarily.  No chest pain., has had leg swelling in past, none currently. Wears compression stocking as needed.       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 10/11/2020     Priority: High   • Fatigue 07/03/2020   • Elevated glucose 08/07/2019   • Overactive bladder 08/06/2018   • Vitamin D deficiency 11/18/2017   • Nonrheumatic aortic valve insufficiency 05/02/2017   • Osteopenia of spine 05/02/2017   • Murmur 03/23/2017   • HTN (hypertension) 01/14/2015   • Dyslipidemia    • Incontinence    • Diverticulosis of colon       Allergies:Pcn [penicillins]    Current Outpatient Medications   Medication Sig Dispense Refill   • Zoster Vac Recomb Adjuvanted (SHINGRIX) 50 MCG/0.5ML Recon Susp Inject 0.5 mL into the shoulder, thigh, or buttocks one time for 1 dose. 0.5 mL 0   • lisinopril (PRINIVIL) 40 MG tablet Take 1 Tab by mouth every day. 90 Tab 0   • amLODIPine (NORVASC) 5 MG Tab Take 1 Tab by mouth every bedtime. 90 Tab 0   • Ferrous Sulfate (IRON PO) Take 1 Tab by mouth every day.     • acetaminophen (TYLENOL) 500 MG Tab Take 500 mg by mouth Once.     • oxybutynin SR (DITROPAN-XL) 10 MG CR tablet Take 1 Tab by mouth every day. 90 Tab 3   • vitamin D (CHOLECALCIFEROL) 1000 UNIT Tab Take 1,000 Units by mouth every day.       No current facility-administered medications for this visit.        Social History     Tobacco Use   • Smoking status: Never Smoker   • Smokeless tobacco:  "Never Used   Substance Use Topics   • Alcohol use: No   • Drug use: No     Social History     Social History Narrative   • Not on file       Family History   Problem Relation Age of Onset   • Cancer Father         pancreatic   • Heart Disease Father         MI at age 57   • Hypertension Father    • Cancer Brother         melanoma   • Heart Disease Brother    • No Known Problems Mother    • Cancer Sister         ovaren cancer   • Cancer Brother         throat   • Psychiatric Illness Daughter    • No Known Problems Daughter        Review of Systems:    Constitutional: No Fevers, Chills  Eyes: No vision changes  ENT: No hearing changes  Resp: No Shortness of breath  CV: No Chest pain  GI: No Nausea/Vomiting  MSK: No weakness  Skin: No rashes  Neuro: No Headaches  Psych: No Suicidal ideations    All remaining systems reviewed and found to be negative, except as stated above.    Exam:    /60   Pulse 60   Temp 36.9 °C (98.4 °F)   Resp 12   Ht 1.6 m (5' 3\")   Wt 55.7 kg (122 lb 12.8 oz)   SpO2 96%  Body mass index is 21.75 kg/m².    General:  Well nourished, well developed female in NAD  HENT: Atraumatic, normocephalic  EYES: Extraocular movements intact  NECK: Supple, FROM  CHEST: No deformities, Equal chest expansion  RESP: Unlabored, no stridor or audible wheeze  HEART: Regular Rate and rhythm. Murmur  ABD: Soft, Nontender, Non-Distended  Extremities: No Clubbing, Cyanosis, or Edema  Skin: Warm/dry, without rashes  Neuro: A/O x 4, due to COVID-19- did not have patient remove face mask to test cranial nerves.  Motor/sensory grossly intact  Psych: Normal behavior, normal affect      Lab review:  Labs are reviewed and discussed with a patient  Lab Results   Component Value Date/Time    CHOLSTRLTOT 205 (H) 08/06/2019 11:18 AM     (H) 08/06/2019 11:18 AM    HDL 36 (A) 08/06/2019 11:18 AM    TRIGLYCERIDE 170 (H) 08/06/2019 11:18 AM       Lab Results   Component Value Date/Time    SODIUM 144 10/12/2020 06:03 " AM    POTASSIUM 3.5 (L) 10/12/2020 06:03 AM    CHLORIDE 107 10/12/2020 06:03 AM    CO2 25 10/12/2020 06:03 AM    GLUCOSE 137 (H) 10/12/2020 06:03 AM    BUN 19 10/12/2020 06:03 AM    CREATININE 0.70 10/12/2020 06:03 AM    CREATININE 0.88 04/06/2010 12:00 AM    BUNCREATRAT 22 04/06/2010 12:00 AM    GLOMRATE >59 04/06/2010 12:00 AM     Lab Results   Component Value Date/Time    ALKPHOSPHAT 67 10/11/2020 06:29 AM    ASTSGOT 19 10/11/2020 06:29 AM    ALTSGPT 18 10/11/2020 06:29 AM    TBILIRUBIN 0.9 10/11/2020 06:29 AM      Lab Results   Component Value Date/Time    HBA1C 5.7 (A) 12/28/2020 02:25 PM    HBA1C 5.9 (A) 06/18/2020 10:55 AM    HBA1C 6.0 (H) 08/06/2019 11:18 AM     No results found for: TSH  Lab Results   Component Value Date/Time    FREET4 1.06 06/18/2020 11:24 AM    FREET4 0.92 01/19/2016 11:13 AM       Lab Results   Component Value Date/Time    WBC 5.7 10/12/2020 06:03 AM    RBC 4.29 10/12/2020 06:03 AM    HEMOGLOBIN 13.6 10/12/2020 06:03 AM    HEMATOCRIT 41.5 10/12/2020 06:03 AM    MCV 96.7 10/12/2020 06:03 AM    MCH 31.7 10/12/2020 06:03 AM    MCHC 32.8 (L) 10/12/2020 06:03 AM    MPV 12.5 10/12/2020 06:03 AM    NEUTSPOLYS 79.90 (H) 10/11/2020 06:29 AM    LYMPHOCYTES 7.80 (L) 10/11/2020 06:29 AM    MONOCYTES 11.20 10/11/2020 06:29 AM    EOSINOPHILS 0.20 10/11/2020 06:29 AM    BASOPHILS 0.60 10/11/2020 06:29 AM          Assessment/Plan:  1. Essential hypertension  Chronic condition.  Stable.  Blood pressure excellent in office today.  Continue amlodipine 5 mg nightly and lisinopril 40 mg daily.  2. Urinary incontinence, unspecified type  Chronic condition.  Stable.  Seen urology.  Continue oxybutynin 10 mg daily.  3. Vitamin D deficiency  - VITAMIN D,25 HYDROXY; Future  Continue vitamin D supplementation at 1000 IUs a day.  4. 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 Gamboa, recently had a bladder mass removed, benign per patient.  5. Other fatigue  - TSH;  Future  - T3 FREE; Future  - FREE THYROXINE; Future  - THYROID PEROXIDASE  (TPO) AB; Future  Unsure of etiology of her chronic fatigue.  Has been going on for almost a year now.  Has been to the ED a few times and reviewed their notes as well.  I do see back in July her TSH was actually slightly low indicative of hyperthyroidism.  She is on no medications for this.  We will repeat with a full thyroid panel.  We will update her vitamin D level, this could contribute to fatigue.  In addition I do see that she has a history of a moderate aortic insufficiency, last echo was 3 years ago would like to update this as well to make sure that there is no cardiac involvement.  Denies any chest pain.  No dyspnea on exertion.  No chronic edema.  No edema on exam today.  I also see that on her previous labs her glucose level has been elevated, patient states she has been fasting on a few of those labs.      Hemoglobin A1c in office showed: 5.7. mild elevation, but no diagnosis of Diabetes.   6. Need for vaccination  - Zoster Vac Recomb Adjuvanted (SHINGRIX) 50 MCG/0.5ML Recon Susp; Inject 0.5 mL into the shoulder, thigh, or buttocks one time for 1 dose.  Dispense: 0.5 mL; Refill: 0    7. Abnormal CBC  - CBC WITH DIFFERENTIAL; Future  Due for updated labs.  8. High risk medication use  - Comp Metabolic Panel; Future  - TSH; Future    9. Prediabetes  - POCT Hemoglobin A1C       Follow-up: Return in about 4 weeks (around 1/25/2021) for Follow up on labs.    Please note that this dictation was created using voice recognition software. I have made every reasonable attempt to correct obvious errors, but I expect that there are errors of grammar and possibly content that I did not discover before finalizing the note.

## 2020-12-28 NOTE — ASSESSMENT & PLAN NOTE
Chronic condition.  Stable.  Patient is on oxybutynin 10 mg daily.  Tolerates medication without side effect.

## 2020-12-28 NOTE — ASSESSMENT & PLAN NOTE
This is a chronic condition.  Onset: years ago  Current Meds: Amlodipine 5 mg daily, lisinopril 40 mg daily  Side effects? none    Associated symptoms:  denies any chest pain, sob, headaches, dizziness/lightheadedness

## 2020-12-28 NOTE — LETTER
[facility]  Shwetha Meyers P.A.-C.  [address]  [place] [address]  Fax: [phone]   Authorization for Release/Disclosure of   Protected Health Information   Name: JOHNATHAN ANG : 1935 SSN: [id number]   Address: [facility] [place] [address] Phone:    [phone] (home)    I authorize the entity listed below to release/disclose the PHI below to:   [facility]/Shwetha Meyers P.A.-C. and Shwetha Meyers P.A.-C.   Provider or Entity Name:  Dr. Gamboa - Urology   Address   City, [facility]   Phone:      Fax:     Reason for request: continuity of care   Information to be released:    [  ] LAST COLONOSCOPY,  including any PATH REPORT and follow-up  [  ] LAST FIT/COLOGUARD RESULT [  ] LAST DEXA  [  ] LAST MAMMOGRAM  [  ] LAST PAP  [  ] LAST LABS [  ] RETINA EXAM REPORT  [  ] IMMUNIZATION RECORDS  [ X ] Release all info      [  ] Check here and initial the line next to each item to release ALL health information INCLUDING  _____ Care and treatment for drug and / or alcohol abuse  _____ HIV testing, infection status, or AIDS  _____ Genetic Testing    DATES OF SERVICE OR TIME PERIOD TO BE DISCLOSED: _____________  I understand and acknowledge that:  * This Authorization may be revoked at any time by you in writing, except if your health information has already been used or disclosed.  * Your health information that will be used or disclosed as a result of you signing this authorization could be re-disclosed by the recipient. If this occurs, your re-disclosed health information may no longer be protected by State or Federal laws.  * You may refuse to sign this Authorization. Your refusal will not affect your ability to obtain treatment.  * This Authorization becomes effective upon signing and will  on (date) __________.      If no date is indicated, this Authorization will  one (1) year from the signature date.    Name: Johnathan Ang    Signature:   Date:          12/28/2020       PLEASE FAX REQUESTED RECORDS BACK TO: [phone]

## 2020-12-28 NOTE — ASSESSMENT & PLAN NOTE
Echo 2017 reviewed-   CONCLUSIONS  1. Moderate aortic insufficiency.     2.  Left ventricular ejection fraction is visually estimated to be 65%.   Normal regional wall motion. Grade I diastolic dysfunction.     3. Estimated right ventricular systolic pressure  is 30 mmHg.    Having chronic fatigue, sometimes feels that she has to take a deep breath from her belly, does not describe it as shortness of breath necessarily.  No chest pain., has had leg swelling in past, none currently. Wears compression stocking as needed.

## 2021-01-05 ENCOUNTER — HOSPITAL ENCOUNTER (OUTPATIENT)
Dept: LAB | Facility: MEDICAL CENTER | Age: 86
End: 2021-01-05
Attending: PHYSICIAN ASSISTANT
Payer: MEDICARE

## 2021-01-05 DIAGNOSIS — Z79.899 HIGH RISK MEDICATION USE: ICD-10-CM

## 2021-01-05 DIAGNOSIS — R79.89 ABNORMAL CBC: ICD-10-CM

## 2021-01-05 DIAGNOSIS — R53.83 OTHER FATIGUE: ICD-10-CM

## 2021-01-05 DIAGNOSIS — E55.9 VITAMIN D DEFICIENCY: ICD-10-CM

## 2021-01-05 LAB
25(OH)D3 SERPL-MCNC: 58 NG/ML (ref 30–100)
ALBUMIN SERPL BCP-MCNC: 4.2 G/DL (ref 3.2–4.9)
ALBUMIN/GLOB SERPL: 1.4 G/DL
ALP SERPL-CCNC: 68 U/L (ref 30–99)
ALT SERPL-CCNC: 17 U/L (ref 2–50)
ANION GAP SERPL CALC-SCNC: 9 MMOL/L (ref 7–16)
AST SERPL-CCNC: 21 U/L (ref 12–45)
BASOPHILS # BLD AUTO: 0.5 % (ref 0–1.8)
BASOPHILS # BLD: 0.03 K/UL (ref 0–0.12)
BILIRUB SERPL-MCNC: 0.5 MG/DL (ref 0.1–1.5)
BUN SERPL-MCNC: 21 MG/DL (ref 8–22)
CALCIUM SERPL-MCNC: 9.1 MG/DL (ref 8.5–10.5)
CHLORIDE SERPL-SCNC: 107 MMOL/L (ref 96–112)
CO2 SERPL-SCNC: 26 MMOL/L (ref 20–33)
CREAT SERPL-MCNC: 0.68 MG/DL (ref 0.5–1.4)
EOSINOPHIL # BLD AUTO: 0.08 K/UL (ref 0–0.51)
EOSINOPHIL NFR BLD: 1.4 % (ref 0–6.9)
ERYTHROCYTE [DISTWIDTH] IN BLOOD BY AUTOMATED COUNT: 43.7 FL (ref 35.9–50)
FERRITIN SERPL-MCNC: 99.9 NG/ML (ref 10–291)
FOLATE SERPL-MCNC: 13.4 NG/ML
GLOBULIN SER CALC-MCNC: 2.9 G/DL (ref 1.9–3.5)
GLUCOSE SERPL-MCNC: 127 MG/DL (ref 65–99)
HCT VFR BLD AUTO: 48.1 % (ref 37–47)
HGB BLD-MCNC: 15.6 G/DL (ref 12–16)
IMM GRANULOCYTES # BLD AUTO: 0.02 K/UL (ref 0–0.11)
IMM GRANULOCYTES NFR BLD AUTO: 0.3 % (ref 0–0.9)
IRON SATN MFR SERPL: 16 % (ref 15–55)
IRON SERPL-MCNC: 51 UG/DL (ref 40–170)
LYMPHOCYTES # BLD AUTO: 1.33 K/UL (ref 1–4.8)
LYMPHOCYTES NFR BLD: 22.6 % (ref 22–41)
MCH RBC QN AUTO: 31.6 PG (ref 27–33)
MCHC RBC AUTO-ENTMCNC: 32.4 G/DL (ref 33.6–35)
MCV RBC AUTO: 97.6 FL (ref 81.4–97.8)
MONOCYTES # BLD AUTO: 0.43 K/UL (ref 0–0.85)
MONOCYTES NFR BLD AUTO: 7.3 % (ref 0–13.4)
NEUTROPHILS # BLD AUTO: 4 K/UL (ref 2–7.15)
NEUTROPHILS NFR BLD: 67.9 % (ref 44–72)
NRBC # BLD AUTO: 0 K/UL
NRBC BLD-RTO: 0 /100 WBC
PLATELET # BLD AUTO: 155 K/UL (ref 164–446)
PMV BLD AUTO: 13.9 FL (ref 9–12.9)
POTASSIUM SERPL-SCNC: 3.9 MMOL/L (ref 3.6–5.5)
PROT SERPL-MCNC: 7.1 G/DL (ref 6–8.2)
RBC # BLD AUTO: 4.93 M/UL (ref 4.2–5.4)
SODIUM SERPL-SCNC: 142 MMOL/L (ref 135–145)
T3FREE SERPL-MCNC: 2.93 PG/ML (ref 2–4.4)
T4 FREE SERPL-MCNC: 1 NG/DL (ref 0.93–1.7)
THYROPEROXIDASE AB SERPL-ACNC: <9 IU/ML (ref 0–9)
TIBC SERPL-MCNC: 311 UG/DL (ref 250–450)
TSH SERPL DL<=0.005 MIU/L-ACNC: 0.15 UIU/ML (ref 0.38–5.33)
UIBC SERPL-MCNC: 260 UG/DL (ref 110–370)
VIT B12 SERPL-MCNC: 390 PG/ML (ref 211–911)
WBC # BLD AUTO: 5.9 K/UL (ref 4.8–10.8)

## 2021-01-05 PROCEDURE — 82746 ASSAY OF FOLIC ACID SERUM: CPT

## 2021-01-05 PROCEDURE — 84443 ASSAY THYROID STIM HORMONE: CPT

## 2021-01-05 PROCEDURE — 36415 COLL VENOUS BLD VENIPUNCTURE: CPT

## 2021-01-05 PROCEDURE — 83550 IRON BINDING TEST: CPT

## 2021-01-05 PROCEDURE — 84481 FREE ASSAY (FT-3): CPT

## 2021-01-05 PROCEDURE — 86376 MICROSOMAL ANTIBODY EACH: CPT

## 2021-01-05 PROCEDURE — 83540 ASSAY OF IRON: CPT

## 2021-01-05 PROCEDURE — 85025 COMPLETE CBC W/AUTO DIFF WBC: CPT

## 2021-01-05 PROCEDURE — 82306 VITAMIN D 25 HYDROXY: CPT

## 2021-01-05 PROCEDURE — 82607 VITAMIN B-12: CPT

## 2021-01-05 PROCEDURE — 84439 ASSAY OF FREE THYROXINE: CPT

## 2021-01-05 PROCEDURE — 82728 ASSAY OF FERRITIN: CPT

## 2021-01-05 PROCEDURE — 80053 COMPREHEN METABOLIC PANEL: CPT

## 2021-01-06 ENCOUNTER — TELEPHONE (OUTPATIENT)
Dept: MEDICAL GROUP | Facility: PHYSICIAN GROUP | Age: 86
End: 2021-01-06

## 2021-01-06 DIAGNOSIS — E05.90 HYPERTHYROIDISM: ICD-10-CM

## 2021-01-07 NOTE — PROGRESS NOTES
Lab work-up on the patient from January 5, 2021 showed a low TSH at 0.146.  Would like to do a further work-up on the patient on this matter.  Her free T3 and free T4 within normal limits.  TPO normal.    I like to proceed with a thyroid ultrasound.  Thyrotropin antibody test.  Would be consideration to get a radioiodine uptake scan, would like to get an ultrasound first being that her free T3 and free T4 within normal limits.

## 2021-01-07 NOTE — TELEPHONE ENCOUNTER
----- Message from Shwetha Meyers P.A.-C. sent at 1/6/2021  4:07 PM PST -----  Please call patient about their results.     Results showed: I like to keep her next visit on January 28 discussed your labs in detail, but your TSH came back low again indicating a possible hyperthyroid state.  I would like to proceed with a further work-up on this now before our next visit if you are agreeable.  This could be a contributing factor to some of your symptoms.  I would like to proceed with some further lab work and thyroid ultrasound.  I will order these for you, and hopefully we have the results before next visit.  We will give us further information on why her TSH might be low.    If you have any questions or concerns, do not hesitate to contact me or my Medical Assistant. Thank you for your time today.     Respectfully,     Shwetha Meyers PA-C

## 2021-01-11 DIAGNOSIS — Z23 NEED FOR VACCINATION: ICD-10-CM

## 2021-01-12 ENCOUNTER — HOSPITAL ENCOUNTER (OUTPATIENT)
Dept: RADIOLOGY | Facility: MEDICAL CENTER | Age: 86
End: 2021-01-12
Attending: PHYSICIAN ASSISTANT
Payer: MEDICARE

## 2021-01-12 DIAGNOSIS — E05.90 HYPERTHYROIDISM: ICD-10-CM

## 2021-01-12 PROCEDURE — 76536 US EXAM OF HEAD AND NECK: CPT

## 2021-01-14 ENCOUNTER — TELEPHONE (OUTPATIENT)
Dept: MEDICAL GROUP | Facility: PHYSICIAN GROUP | Age: 86
End: 2021-01-14

## 2021-01-14 DIAGNOSIS — E04.2 MULTIPLE THYROID NODULES: ICD-10-CM

## 2021-01-14 NOTE — TELEPHONE ENCOUNTER
----- Message from Shwetha Meyers P.A.-C. sent at 1/14/2021  9:26 AM PST -----  Please call patient about their results.     Results showed: Ultrasound of the thyroid gland did show there are 3 nodules on the right side and one small nodule on the left side.  2 were quite large on the right side and is recommended that we biopsy these at this time.  I will schedule a fine-needle aspiration for you.  We will follow-up on the results at your appointment on January 28, 2021.    If you have any questions or concerns, do not hesitate to contact me or my Medical Assistant. Thank you for your time today.     Respectfully,     Shwetha Meyers PA-C

## 2021-01-14 NOTE — PROGRESS NOTES
IMPRESSION:     1.  There are 3 solid nodules in the right lobe and one nodule measuring less than 1 cm and the left lobe.     2.   Ultrasound-guided biopsy of the mass located superiorly in the right lobe as well as the mass located inferiorly in the right lobe is recommended.     3.  Follow-up ultrasound is recommended in one year for the mass in the mid right lobe as well as the small left lobe nodule.    Will order FNA for patient.

## 2021-02-01 DIAGNOSIS — I10 ESSENTIAL HYPERTENSION: ICD-10-CM

## 2021-02-02 RX ORDER — AMLODIPINE BESYLATE 5 MG/1
TABLET ORAL
Qty: 90 TAB | Refills: 0 | Status: ON HOLD | OUTPATIENT
Start: 2021-02-02 | End: 2021-03-10 | Stop reason: SDUPTHER

## 2021-02-02 RX ORDER — LISINOPRIL 40 MG/1
TABLET ORAL
Qty: 90 TAB | Refills: 0 | Status: ON HOLD | OUTPATIENT
Start: 2021-02-02 | End: 2021-03-10 | Stop reason: SDUPTHER

## 2021-02-04 ENCOUNTER — HOSPITAL ENCOUNTER (OUTPATIENT)
Dept: RADIOLOGY | Facility: MEDICAL CENTER | Age: 86
End: 2021-02-04
Attending: PHYSICIAN ASSISTANT
Payer: MEDICARE

## 2021-02-04 DIAGNOSIS — E04.2 MULTIPLE THYROID NODULES: ICD-10-CM

## 2021-02-04 LAB — CYTOLOGY REG CYTOL: NORMAL

## 2021-02-04 PROCEDURE — 10006 FNA BX W/US GDN EA ADDL: CPT

## 2021-02-04 PROCEDURE — 88112 CYTOPATH CELL ENHANCE TECH: CPT | Mod: 91

## 2021-02-04 NOTE — PROGRESS NOTES
OPIR       Right Thyroid Fine Needle Aspiration x2 done by Dr. Foss, Right anterior aspect of neck access site; x2 jars of cytolyt obtained and sent to pathology.  Patient tolerated the procedure well.    All questions and concerns answered prior to being dc'd; pt provided with appropriate education for procedure, pt discharge dot home.

## 2021-02-08 ENCOUNTER — OFFICE VISIT (OUTPATIENT)
Dept: MEDICAL GROUP | Facility: PHYSICIAN GROUP | Age: 86
End: 2021-02-08
Payer: MEDICARE

## 2021-02-08 ENCOUNTER — E-CONSULT (OUTPATIENT)
Dept: ENDOCRINOLOGY | Facility: MEDICAL CENTER | Age: 86
End: 2021-02-08

## 2021-02-08 VITALS
RESPIRATION RATE: 12 BRPM | OXYGEN SATURATION: 95 % | DIASTOLIC BLOOD PRESSURE: 70 MMHG | WEIGHT: 121 LBS | TEMPERATURE: 97.5 F | BODY MASS INDEX: 21.44 KG/M2 | HEART RATE: 67 BPM | SYSTOLIC BLOOD PRESSURE: 130 MMHG | HEIGHT: 63 IN

## 2021-02-08 DIAGNOSIS — Z71.9 ENCOUNTER FOR CONSULTATION: ICD-10-CM

## 2021-02-08 DIAGNOSIS — R53.83 OTHER FATIGUE: ICD-10-CM

## 2021-02-08 DIAGNOSIS — T78.40XA ALLERGY, INITIAL ENCOUNTER: ICD-10-CM

## 2021-02-08 DIAGNOSIS — R79.89 LOW TSH LEVEL: ICD-10-CM

## 2021-02-08 PROCEDURE — 99213 OFFICE O/P EST LOW 20 MIN: CPT | Performed by: PHYSICIAN ASSISTANT

## 2021-02-08 ASSESSMENT — PATIENT HEALTH QUESTIONNAIRE - PHQ9: CLINICAL INTERPRETATION OF PHQ2 SCORE: 0

## 2021-02-08 ASSESSMENT — FIBROSIS 4 INDEX: FIB4 SCORE: 2.76

## 2021-02-08 NOTE — PATIENT INSTRUCTIONS
Continue taking iron daily. Iron saturation at lower end of normal.   Start taking B12 1000 mcg daily. B12 level at lower end of normal.

## 2021-02-08 NOTE — PROGRESS NOTES
CC:   Chief Complaint   Patient presents with   • Lab Results   • Dyslipidemia          HISTORY OF PRESENT ILLNESS: Patient is a 85 y.o. female established patient who presents today to follow up on the following conditions:       Health Maintenance: Completed    Low TSH level  To recap from her last visit, patient was complaining of extreme fatigue for well over a year.  She was seen in the ER for multiple times on this matter.  No emergent issues were discovered.  Reviewing labs previously, showed a low TSH level.  Repeat labs showed TSH is 0.146 with a free T3 within normal limits at 2.93 and a free T4 at 1.00.  TPO was normal.  Because of the fatigue we also did a folic acid, B12 iron panel, vitamin D all within normal limits.  CMP relatively unremarkable except an elevated glucose at 127.  CMP showed a slightly reduced platelet count 155, hematocrit slightly elevated at 48.1, again otherwise unremarkable.  Thyroid ultrasound was performed on the patient because of the low TSH and did find multiple thyroid nodules greater than a centimeter.  FNA was ordered on the thyroid nodules.  Final result on February 4, 2021 showed benign findings in the [place] 2 category.      Allergies  Having some phlegm symptoms. Discussed possible allergies. No sinus pressure, no fever or chills. Occasional coughing up phlegm. She took Benedryl and left eyelid swelled. Didn't take it again.     Fatigue  Continues to be fatigued, but taking less naps, feeling overall dramatically improved since her gallstone surgery.  Reviewed all of her results and imaging results in detail today.      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 10/11/2020     Priority: High   • Low TSH level 02/08/2021   • Allergies 02/08/2021   • Fatigue 07/03/2020   • Elevated glucose 08/07/2019   • Overactive bladder 08/06/2018   • Vitamin D deficiency 11/18/2017   • Nonrheumatic aortic valve  "insufficiency 05/02/2017   • Osteopenia of spine 05/02/2017   • Murmur 03/23/2017   • HTN (hypertension) 01/14/2015   • Dyslipidemia    • Incontinence    • Diverticulosis of colon       Allergies:Pcn [penicillins]    Current Outpatient Medications   Medication Sig Dispense Refill   • lisinopril (PRINIVIL) 40 MG tablet TAKE 1 TABLET BY MOUTH EVERY DAY 90 Tab 0   • amLODIPine (NORVASC) 5 MG Tab TAKE 1 TABLET BY MOUTH EVERY NIGHT AT BEDTIME 90 Tab 0   • Ferrous Sulfate (IRON PO) Take 1 Tab by mouth every day.     • acetaminophen (TYLENOL) 500 MG Tab Take 500 mg by mouth Once.     • oxybutynin SR (DITROPAN-XL) 10 MG CR tablet Take 1 Tab by mouth every day. 90 Tab 3   • vitamin D (CHOLECALCIFEROL) 1000 UNIT Tab Take 1,000 Units by mouth every day.       No current facility-administered medications for this visit.        Social History     Tobacco Use   • Smoking status: Never Smoker   • Smokeless tobacco: Never Used   Substance Use Topics   • Alcohol use: No   • Drug use: No     Social History     Social History Narrative   • Not on file       Family History   Problem Relation Age of Onset   • Cancer Father         pancreatic   • Heart Disease Father         MI at age 57   • Hypertension Father    • Cancer Brother         melanoma   • Heart Disease Brother    • No Known Problems Mother    • Cancer Sister         ovaren cancer   • Cancer Brother         throat   • Psychiatric Illness Daughter    • No Known Problems Daughter        Review of Systems:    Constitutional: No Fevers, Chills  Eyes: No vision changes  ENT: No hearing changes  Resp: No Shortness of breath  CV: No Chest pain  GI: No Nausea/Vomiting  MSK: No weakness  Skin: No rashes  Neuro: No Headaches  Psych: No Suicidal ideations    All remaining systems reviewed and found to be negative, except as stated above.    Exam:    /70   Pulse 67   Temp 36.4 °C (97.5 °F) (Temporal)   Resp 12   Ht 1.6 m (5' 3\")   Wt 54.9 kg (121 lb)   SpO2 95%  Body mass " index is 21.43 kg/m².    General:  Well nourished, well developed female in NAD  HENT: Atraumatic, normocephalic  EYES: Extraocular movements intact  NECK: Supple, FROM  CHEST: No deformities, Equal chest expansion  RESP: Unlabored, no stridor or audible wheeze  HEART: Regular Rate and rhythm.   Extremities: No Clubbing, Cyanosis, or Edema  Skin: Warm/dry, without rashes  Neuro: A/O x 4, due to COVID-19- did not have patient remove face mask to test cranial nerves.  Motor/sensory grossly intact  Psych: Normal behavior, normal affect      Lab review:  Labs are reviewed and discussed with a patient  Lab Results   Component Value Date/Time    CHOLSTRLTOT 205 (H) 08/06/2019 11:18 AM     (H) 08/06/2019 11:18 AM    HDL 36 (A) 08/06/2019 11:18 AM    TRIGLYCERIDE 170 (H) 08/06/2019 11:18 AM       Lab Results   Component Value Date/Time    SODIUM 142 01/05/2021 10:54 AM    POTASSIUM 3.9 01/05/2021 10:54 AM    CHLORIDE 107 01/05/2021 10:54 AM    CO2 26 01/05/2021 10:54 AM    GLUCOSE 127 (H) 01/05/2021 10:54 AM    BUN 21 01/05/2021 10:54 AM    CREATININE 0.68 01/05/2021 10:54 AM    CREATININE 0.88 04/06/2010 12:00 AM    BUNCREATRAT 22 04/06/2010 12:00 AM    GLOMRATE >59 04/06/2010 12:00 AM     Lab Results   Component Value Date/Time    ALKPHOSPHAT 68 01/05/2021 10:54 AM    ASTSGOT 21 01/05/2021 10:54 AM    ALTSGPT 17 01/05/2021 10:54 AM    TBILIRUBIN 0.5 01/05/2021 10:54 AM      Lab Results   Component Value Date/Time    HBA1C 5.7 (A) 12/28/2020 02:25 PM    HBA1C 5.9 (A) 06/18/2020 10:55 AM    HBA1C 6.0 (H) 08/06/2019 11:18 AM     No results found for: TSH  Lab Results   Component Value Date/Time    FREET4 1.00 01/05/2021 10:54 AM    FREET4 1.06 06/18/2020 11:24 AM       Lab Results   Component Value Date/Time    WBC 5.9 01/05/2021 10:54 AM    RBC 4.93 01/05/2021 10:54 AM    HEMOGLOBIN 15.6 01/05/2021 10:54 AM    HEMATOCRIT 48.1 (H) 01/05/2021 10:54 AM    MCV 97.6 01/05/2021 10:54 AM    MCH 31.6 01/05/2021 10:54 AM     MCHC 32.4 (L) 01/05/2021 10:54 AM    MPV 13.9 (H) 01/05/2021 10:54 AM    NEUTSPOLYS 67.90 01/05/2021 10:54 AM    LYMPHOCYTES 22.60 01/05/2021 10:54 AM    MONOCYTES 7.30 01/05/2021 10:54 AM    EOSINOPHILS 1.40 01/05/2021 10:54 AM    BASOPHILS 0.50 01/05/2021 10:54 AM          Assessment/Plan:  1. Low TSH level  Reviewed her work-up in full detail with the patient today.  Went over all her lab results.  Repeat labs showed TSH is 0.146 with a free T3 within normal limits at 2.93 and a free T4 at 1.00.  TPO was normal.  Thyroid ultrasound showed multiple thyroid nodules greater than 1 cm.  FNA was benign follicular nodule.  [place] II category.  Going to consult with endocrinology on this matter.  Did not start methimazole or any medication management being that she is subacute hyperthyroidism.  Consider radioiodine uptake scan?      2. Fatigue   For her continued fatigue, discussed her low iron levels and relatively low B12 levels.  Did have a long recovery following her surgery for gallstones.  Recommend she continue iron supplementation and start B12 1000 mcg daily see if this improves her symptoms.  Would recheck these levels in 3 months.    3. Allergy, initial encounter  Trial over-the-counter allergy medication, suggest Claritin or Zyrtec for postnasal drip and phlegm.    Follow-up: Return in about 3 months (around 5/8/2021).    Please note that this dictation was created using voice recognition software. I have made every reasonable attempt to correct obvious errors, but I expect that there are errors of grammar and possibly content that I did not discover before finalizing the note.

## 2021-02-08 NOTE — ASSESSMENT & PLAN NOTE
Continues to be fatigued, but taking less naps, feeling overall dramatically improved since her gallstone surgery.  Reviewed all of her results and imaging results in detail today.

## 2021-02-08 NOTE — ASSESSMENT & PLAN NOTE
Having some phlegm symptoms. Discussed possible allergies. No sinus pressure, no fever or chills. Occasional coughing up phlegm. She took Benedryl and left eyelid swelled. Didn't take it again.

## 2021-02-08 NOTE — ASSESSMENT & PLAN NOTE
To recap from her last visit, patient was complaining of extreme fatigue for well over a year.  She was seen in the ER for multiple times on this matter.  No emergent issues were discovered.  Reviewing labs previously, showed a low TSH level.  Repeat labs showed TSH is 0.146 with a free T3 within normal limits at 2.93 and a free T4 at 1.00.  TPO was normal.  Because of the fatigue we also did a folic acid, B12 iron panel, vitamin D all within normal limits.  CMP relatively unremarkable except an elevated glucose at 127.  CMP showed a slightly reduced platelet count 155, hematocrit slightly elevated at 48.1, again otherwise unremarkable.  Thyroid ultrasound was performed on the patient because of the low TSH and did find multiple thyroid nodules greater than a centimeter.  FNA was ordered on the thyroid nodules.  Final result on February 4, 2021 showed benign findings in the [place] 2 category.

## 2021-02-09 ENCOUNTER — APPOINTMENT (OUTPATIENT)
Dept: RADIOLOGY | Facility: MEDICAL CENTER | Age: 86
End: 2021-02-09
Attending: EMERGENCY MEDICINE
Payer: MEDICARE

## 2021-02-09 ENCOUNTER — HOSPITAL ENCOUNTER (EMERGENCY)
Facility: MEDICAL CENTER | Age: 86
End: 2021-02-09
Attending: EMERGENCY MEDICINE
Payer: MEDICARE

## 2021-02-09 ENCOUNTER — TELEPHONE (OUTPATIENT)
Dept: MEDICAL GROUP | Facility: PHYSICIAN GROUP | Age: 86
End: 2021-02-09

## 2021-02-09 VITALS
BODY MASS INDEX: 21.41 KG/M2 | RESPIRATION RATE: 16 BRPM | SYSTOLIC BLOOD PRESSURE: 165 MMHG | TEMPERATURE: 98.4 F | WEIGHT: 120.81 LBS | DIASTOLIC BLOOD PRESSURE: 78 MMHG | HEART RATE: 58 BPM | HEIGHT: 63 IN | OXYGEN SATURATION: 92 %

## 2021-02-09 DIAGNOSIS — E05.90 SUBCLINICAL HYPERTHYROIDISM: ICD-10-CM

## 2021-02-09 DIAGNOSIS — R19.7 DIARRHEA, UNSPECIFIED TYPE: ICD-10-CM

## 2021-02-09 DIAGNOSIS — N39.0 URINARY TRACT INFECTION WITHOUT HEMATURIA, SITE UNSPECIFIED: ICD-10-CM

## 2021-02-09 LAB
ALBUMIN SERPL BCP-MCNC: 4 G/DL (ref 3.2–4.9)
ALBUMIN/GLOB SERPL: 1.2 G/DL
ALP SERPL-CCNC: 68 U/L (ref 30–99)
ALT SERPL-CCNC: 16 U/L (ref 2–50)
ANION GAP SERPL CALC-SCNC: 13 MMOL/L (ref 7–16)
APPEARANCE UR: CLEAR
AST SERPL-CCNC: 20 U/L (ref 12–45)
BACTERIA #/AREA URNS HPF: ABNORMAL /HPF
BASOPHILS # BLD AUTO: 0.8 % (ref 0–1.8)
BASOPHILS # BLD: 0.04 K/UL (ref 0–0.12)
BILIRUB SERPL-MCNC: 0.8 MG/DL (ref 0.1–1.5)
BILIRUB UR QL STRIP.AUTO: NEGATIVE
BUN SERPL-MCNC: 17 MG/DL (ref 8–22)
CALCIUM SERPL-MCNC: 9.5 MG/DL (ref 8.5–10.5)
CHLORIDE SERPL-SCNC: 104 MMOL/L (ref 96–112)
CO2 SERPL-SCNC: 25 MMOL/L (ref 20–33)
COLOR UR: YELLOW
CREAT SERPL-MCNC: 0.8 MG/DL (ref 0.5–1.4)
EKG IMPRESSION: NORMAL
EOSINOPHIL # BLD AUTO: 0.01 K/UL (ref 0–0.51)
EOSINOPHIL NFR BLD: 0.2 % (ref 0–6.9)
EPI CELLS #/AREA URNS HPF: ABNORMAL /HPF
ERYTHROCYTE [DISTWIDTH] IN BLOOD BY AUTOMATED COUNT: 41.9 FL (ref 35.9–50)
GLOBULIN SER CALC-MCNC: 3.3 G/DL (ref 1.9–3.5)
GLUCOSE SERPL-MCNC: 110 MG/DL (ref 65–99)
GLUCOSE UR STRIP.AUTO-MCNC: NEGATIVE MG/DL
HCT VFR BLD AUTO: 49 % (ref 37–47)
HGB BLD-MCNC: 16.5 G/DL (ref 12–16)
HYALINE CASTS #/AREA URNS LPF: ABNORMAL /LPF
IMM GRANULOCYTES # BLD AUTO: 0.01 K/UL (ref 0–0.11)
IMM GRANULOCYTES NFR BLD AUTO: 0.2 % (ref 0–0.9)
KETONES UR STRIP.AUTO-MCNC: NEGATIVE MG/DL
LEUKOCYTE ESTERASE UR QL STRIP.AUTO: NEGATIVE
LIPASE SERPL-CCNC: 41 U/L (ref 11–82)
LYMPHOCYTES # BLD AUTO: 1.36 K/UL (ref 1–4.8)
LYMPHOCYTES NFR BLD: 26.2 % (ref 22–41)
MCH RBC QN AUTO: 31.6 PG (ref 27–33)
MCHC RBC AUTO-ENTMCNC: 33.7 G/DL (ref 33.6–35)
MCV RBC AUTO: 93.9 FL (ref 81.4–97.8)
MICRO URNS: ABNORMAL
MONOCYTES # BLD AUTO: 0.38 K/UL (ref 0–0.85)
MONOCYTES NFR BLD AUTO: 7.3 % (ref 0–13.4)
NEUTROPHILS # BLD AUTO: 3.4 K/UL (ref 2–7.15)
NEUTROPHILS NFR BLD: 65.3 % (ref 44–72)
NITRITE UR QL STRIP.AUTO: POSITIVE
NRBC # BLD AUTO: 0 K/UL
NRBC BLD-RTO: 0 /100 WBC
PH UR STRIP.AUTO: 8 [PH] (ref 5–8)
PLATELET # BLD AUTO: 183 K/UL (ref 164–446)
PMV BLD AUTO: 12.3 FL (ref 9–12.9)
POTASSIUM SERPL-SCNC: 4 MMOL/L (ref 3.6–5.5)
PROT SERPL-MCNC: 7.3 G/DL (ref 6–8.2)
PROT UR QL STRIP: NEGATIVE MG/DL
RBC # BLD AUTO: 5.22 M/UL (ref 4.2–5.4)
RBC # URNS HPF: ABNORMAL /HPF
RBC UR QL AUTO: NEGATIVE
SODIUM SERPL-SCNC: 142 MMOL/L (ref 135–145)
SP GR UR STRIP.AUTO: 1.01
UROBILINOGEN UR STRIP.AUTO-MCNC: 0.2 MG/DL
WBC # BLD AUTO: 5.2 K/UL (ref 4.8–10.8)
WBC #/AREA URNS HPF: ABNORMAL /HPF

## 2021-02-09 PROCEDURE — 700117 HCHG RX CONTRAST REV CODE 255: Performed by: EMERGENCY MEDICINE

## 2021-02-09 PROCEDURE — 81001 URINALYSIS AUTO W/SCOPE: CPT

## 2021-02-09 PROCEDURE — 74177 CT ABD & PELVIS W/CONTRAST: CPT

## 2021-02-09 PROCEDURE — 700105 HCHG RX REV CODE 258: Performed by: EMERGENCY MEDICINE

## 2021-02-09 PROCEDURE — 80053 COMPREHEN METABOLIC PANEL: CPT

## 2021-02-09 PROCEDURE — 36415 COLL VENOUS BLD VENIPUNCTURE: CPT

## 2021-02-09 PROCEDURE — 83690 ASSAY OF LIPASE: CPT

## 2021-02-09 PROCEDURE — A9270 NON-COVERED ITEM OR SERVICE: HCPCS | Performed by: EMERGENCY MEDICINE

## 2021-02-09 PROCEDURE — 96365 THER/PROPH/DIAG IV INF INIT: CPT

## 2021-02-09 PROCEDURE — 93005 ELECTROCARDIOGRAM TRACING: CPT | Performed by: EMERGENCY MEDICINE

## 2021-02-09 PROCEDURE — 700111 HCHG RX REV CODE 636 W/ 250 OVERRIDE (IP): Performed by: EMERGENCY MEDICINE

## 2021-02-09 PROCEDURE — 85025 COMPLETE CBC W/AUTO DIFF WBC: CPT

## 2021-02-09 PROCEDURE — 93005 ELECTROCARDIOGRAM TRACING: CPT

## 2021-02-09 PROCEDURE — 700102 HCHG RX REV CODE 250 W/ 637 OVERRIDE(OP): Performed by: EMERGENCY MEDICINE

## 2021-02-09 PROCEDURE — 99285 EMERGENCY DEPT VISIT HI MDM: CPT

## 2021-02-09 RX ORDER — LISINOPRIL 20 MG/1
40 TABLET ORAL ONCE
Status: COMPLETED | OUTPATIENT
Start: 2021-02-09 | End: 2021-02-09

## 2021-02-09 RX ORDER — SULFAMETHOXAZOLE AND TRIMETHOPRIM 800; 160 MG/1; MG/1
1 TABLET ORAL 2 TIMES DAILY
Qty: 20 TAB | Refills: 0 | Status: SHIPPED | OUTPATIENT
Start: 2021-02-09 | End: 2021-03-03

## 2021-02-09 RX ADMIN — LISINOPRIL 40 MG: 20 TABLET ORAL at 18:25

## 2021-02-09 RX ADMIN — CEFTRIAXONE SODIUM 1 G: 1 INJECTION, POWDER, FOR SOLUTION INTRAMUSCULAR; INTRAVENOUS at 18:15

## 2021-02-09 RX ADMIN — IOHEXOL 80 ML: 350 INJECTION, SOLUTION INTRAVENOUS at 16:38

## 2021-02-09 ASSESSMENT — FIBROSIS 4 INDEX: FIB4 SCORE: 2.76

## 2021-02-09 NOTE — ED PROVIDER NOTES
"ED Provider  Scribed for Omar Desouza D.O. by João Jerez. 2/9/2021  2:40 PM    Means of arrival:Walk In  History obtained from: Patient  History limited by: None    CHIEF COMPLAINT  Chief Complaint   Patient presents with   • Diarrhea     intermittent incontinence of stool, last this am- issue started last year,    • Epigastric Pain     \"discomfort\", hx kidney stones and bladder mass in November   • Fatigue   • Anxiety     HPI  Milena Ang is a 85 y.o. female who presents to the ED for evaluation of intermittent diarrhea episodes onset one year. Patient states that she has one episode of uncontrolled diarrhea every week with the last episode being this morning. Patient denies medicating with any laxatives or stool softeners. She says she does have normal bowel movement in between diarrhea episodes. Patient reports having associated occasional nausea and vomiting, stool incontinence (with diarrhea episodes) and fatigue, but denies any abdominal pain, dysuria, polyuria, fever, or chills. She states she was evaluated with CT in Fall 2020, which was only positive for a kidney mass. Patient reports surgical removal of the kidney mass in November 2020. She adds that she has seen several primary care physicians but has not followed with GI.     REVIEW OF SYSTEMS  See HPI for further details. All other systems are negative.     PAST MEDICAL HISTORY   has a past medical history of Cataract, Cervical high risk human papillomavirus (HPV) DNA test positive, Colon polyp, Diverticulosis of colon, Dyslipidemia, Female bladder prolapse (11/04/2020), High cholesterol, Hypertension, Incontinence, Incontinence (11/04/2020), Kidney stone, Menopausal and postmenopausal disorder, OSTEOPOROSIS, and Urinary bladder disorder (11/04/2020).    SOCIAL HISTORY  Social History     Tobacco Use   • Smoking status: Never Smoker   • Smokeless tobacco: Never Used   Substance and Sexual Activity   • Alcohol use: No   • Drug use: No   • " "Sexual activity: Never       SURGICAL HISTORY   has a past surgical history that includes bladder sling female (7/12); cataract extraction with iol (Bilateral); cholecystectomy; cystourethroscopy,ureter catheter (Right, 11/6/2020); cysto/uretero/pyeloscopy, dx (Bilateral, 11/6/2020); and cystoscopy,insert ureteral stent (Right, 11/6/2020).    CURRENT MEDICATIONS  Home Medications     Reviewed by Maya Mccloud R.N. (Registered Nurse) on 02/09/21 at 1303  Med List Status: Partial   Medication Last Dose Status   acetaminophen (TYLENOL) 500 MG Tab  Active   amLODIPine (NORVASC) 5 MG Tab  Active   Ferrous Sulfate (IRON PO)  Active   lisinopril (PRINIVIL) 40 MG tablet  Active   oxybutynin SR (DITROPAN-XL) 10 MG CR tablet  Active   vitamin D (CHOLECALCIFEROL) 1000 UNIT Tab  Active                ALLERGIES  Allergies   Allergen Reactions   • Pcn [Penicillins] Rash     \"tongue got thick\"       PHYSICAL EXAM  VITAL SIGNS: BP (!) 173/85   Pulse 66   Temp 36.2 °C (97.2 °F) (Oral)   Resp 18   Ht 1.6 m (5' 3\")   Wt 54.8 kg (120 lb 13 oz)   LMP 07/01/1986   SpO2 98%   BMI 21.40 kg/m²   Constitutional: Alert in no apparent distress.  HENT: No signs of trauma, mucous membranes are moist  Eyes: Conjunctiva normal, Non-icteric.   Neck: Normal range of motion, No tenderness, Supple.  Lymphatic: No lymphadenopathy noted.   Cardiovascular: Regular rate and rhythm, no murmurs.   Thorax & Lungs: Normal breath sounds, No respiratory distress, No wheezing, No chest tenderness.   Abdomen: Bowel sounds normal, Soft, No tenderness, No masses, No pulsatile masses. No peritoneal signs.  Skin: Warm, Dry, normal color.   Back: No bony tenderness, No CVA tenderness.   Extremities: No edema, No tenderness, No cyanosis  Musculoskeletal: Good range of motion in all major joints. No tenderness to palpation or major deformities noted.   Neurologic: Alert and oriented x4, Normal motor function, Normal sensory function, No focal deficits " noted.   Psychiatric: Affect normal, Judgment normal, Mood normal.     DIAGNOSTIC STUDIES / PROCEDURES    EKG  12 Lead EKG interpreted by me shown below.      LABS  Results for orders placed or performed during the hospital encounter of 02/09/21   CBC WITH DIFFERENTIAL   Result Value Ref Range    WBC 5.2 4.8 - 10.8 K/uL    RBC 5.22 4.20 - 5.40 M/uL    Hemoglobin 16.5 (H) 12.0 - 16.0 g/dL    Hematocrit 49.0 (H) 37.0 - 47.0 %    MCV 93.9 81.4 - 97.8 fL    MCH 31.6 27.0 - 33.0 pg    MCHC 33.7 33.6 - 35.0 g/dL    RDW 41.9 35.9 - 50.0 fL    Platelet Count 183 164 - 446 K/uL    MPV 12.3 9.0 - 12.9 fL    Neutrophils-Polys 65.30 44.00 - 72.00 %    Lymphocytes 26.20 22.00 - 41.00 %    Monocytes 7.30 0.00 - 13.40 %    Eosinophils 0.20 0.00 - 6.90 %    Basophils 0.80 0.00 - 1.80 %    Immature Granulocytes 0.20 0.00 - 0.90 %    Nucleated RBC 0.00 /100 WBC    Neutrophils (Absolute) 3.40 2.00 - 7.15 K/uL    Lymphs (Absolute) 1.36 1.00 - 4.80 K/uL    Monos (Absolute) 0.38 0.00 - 0.85 K/uL    Eos (Absolute) 0.01 0.00 - 0.51 K/uL    Baso (Absolute) 0.04 0.00 - 0.12 K/uL    Immature Granulocytes (abs) 0.01 0.00 - 0.11 K/uL    NRBC (Absolute) 0.00 K/uL   COMP METABOLIC PANEL   Result Value Ref Range    Sodium 142 135 - 145 mmol/L    Potassium 4.0 3.6 - 5.5 mmol/L    Chloride 104 96 - 112 mmol/L    Co2 25 20 - 33 mmol/L    Anion Gap 13.0 7.0 - 16.0    Glucose 110 (H) 65 - 99 mg/dL    Bun 17 8 - 22 mg/dL    Creatinine 0.80 0.50 - 1.40 mg/dL    Calcium 9.5 8.5 - 10.5 mg/dL    AST(SGOT) 20 12 - 45 U/L    ALT(SGPT) 16 2 - 50 U/L    Alkaline Phosphatase 68 30 - 99 U/L    Total Bilirubin 0.8 0.1 - 1.5 mg/dL    Albumin 4.0 3.2 - 4.9 g/dL    Total Protein 7.3 6.0 - 8.2 g/dL    Globulin 3.3 1.9 - 3.5 g/dL    A-G Ratio 1.2 g/dL   LIPASE   Result Value Ref Range    Lipase 41 11 - 82 U/L   URINALYSIS    Specimen: Urine   Result Value Ref Range    Color Yellow     Character Clear     Specific Gravity 1.010 <1.035    Ph 8.0 5.0 - 8.0    Glucose  Negative Negative mg/dL    Ketones Negative Negative mg/dL    Protein Negative Negative mg/dL    Bilirubin Negative Negative    Urobilinogen, Urine 0.2 Negative    Nitrite Positive (A) Negative    Leukocyte Esterase Negative Negative    Occult Blood Negative Negative    Micro Urine Req Microscopic    ESTIMATED GFR   Result Value Ref Range    GFR If African American >60 >60 mL/min/1.73 m 2    GFR If Non African American >60 >60 mL/min/1.73 m 2   URINE MICROSCOPIC (W/UA)   Result Value Ref Range    WBC 0-2 /hpf    RBC 0-2 /hpf    Bacteria Many (A) None /hpf    Epithelial Cells Few /hpf    Hyaline Cast 0-2 /lpf   EKG   Result Value Ref Range    Report       [facility] Emergency Dept.    Test Date:  2021  Pt Name:    JOHNATHAN GUY                  Department: ER  MRN:        [id number]                      Room:  Gender:     Female                       Technician: [id number]  :        [id number]                   Requested By:ER TRIAGE PROTOCOL  Order #:    [id number]                    Reading MD: SREE MCFADDEN D.O.    Measurements  Intervals                                Axis  Rate:       55                           P:          19  OR:         188                          QRS:        7  QRSD:       90                           T:          75  QT:         424  QTc:        406    Interpretive Statements  SINUS BRADYCARDIA  Compared to ECG 2020 16:28:23  Bradycardia, nonsinus no longer present  Left ventricular hypertrophy no longer present  Electronically Signed On 2021 17:38:30 PST by SREE MCFADDEN D.O.       All labs reviewed by me.    RADIOLOGY  CT-ABDOMEN-PELVIS WITH   Final Result      1.  Negative contrast-enhanced CT of the abdomen and pelvis.      2.  No evidence of bowel obstruction or inflammation. Colonic diverticulosis without evidence of diverticulitis.      3.  Minor intrahepatic and extra hepatic biliary dilatation status post cholecystectomy.      4.  No free  fluid      5.  Small hiatal hernia.        The radiologist's interpretations of all radiological studies have been reviewed by me.    Films have been independently by me      COURSE  Pertinent Labs & Imaging studies reviewed. (See chart for details)    2:40 PM - Patient seen and examined at bedside. Discussed plan of care which includes imaging and lab work. Patient verbalizes understanding and agreement to this plan of care. Ordered for CT-Abdomen-Pelvis, EKG, CBC w/ diff, CMP, Lipase, and UA to evaluate her symptoms.     5:41 PM - Patient was treated with Rocephin 1 g in  mL IVPB.    5:39 PM - I reevaluated the patient at bedside. I discussed the patient's diagnostic study results as noted above. I discussed plan for discharge and follow up as outlined below. The patient verbalizes they feel comfortable going home. The patient is stable for discharge at this time and will return for any new or worsening symptoms. Patient verbalizes understanding and support with my plan for discharge.      MEDICAL DECISION MAKING  This is a 85 y.o. female who presents with generalized weakness, and she does have intermittent episodes of diarrhea she did have that today and she considering episodes of diarrhea where she is has no control over this.  She is not been seen by GI.  She has had other medical problems had been taking her time and she is not been able to quit went to see the GI specialist.  On evaluation there is no abdominal pain.  No distention.  Her white blood cell count is normal no signs of dehydration.  She does have a urinary tract infection which may be a contributing factor to the diarrhea also.  She is given IV antibiotics and discharged home with antibiotics.    There is no signs of sepsis.     The patient will return for new or worsening symptoms and is stable at the time of discharge.    The patient is referred to a primary physician for blood pressure management, diabetic screening, and for all other  preventative health concerns.      DISPOSITION:  Patient will be discharged home in stable condition.    FOLLOW UP:  [facility]  [address] [place] [id number] [phone]          OUTPATIENT MEDICATIONS:  Discharge Medication List as of 2/9/2021  6:28 PM      START taking these medications    Details   sulfamethoxazole-trimethoprim (BACTRIM DS) 800-160 MG tablet Take 1 Tab by mouth 2 times a day., Disp-20 Tab, R-0, Normal               FINAL IMPRESSION  1. Urinary tract infection without hematuria, site unspecified    2. Diarrhea, unspecified type         I, João Jerez (Scribe), am scribing for, and in the presence of, Omar Desouza D.O..    Electronically signed by: João Jerez (Adam), 2/9/2021    IOmar D.O. personally performed the services described in this documentation, as scribed by João Jerez in my presence, and it is both accurate and complete.    The note accurately reflects work and decisions made by me.  Omar Desouza D.O.  2/9/2021  7:41 PM

## 2021-02-09 NOTE — ED NOTES
Pt unable to provide urine sample at this time. Pt advised of the need for a urine sample and provided a specimen cup. Pt acknowledged

## 2021-02-09 NOTE — TELEPHONE ENCOUNTER
Milena called into clinic.    Milena stated that she woke up this morning very tired and she has been having diarrhea that is very dark and smelly.    Milena advised to go to ED for further evaluation.

## 2021-02-09 NOTE — PROGRESS NOTES
E-Consult Response     After careful review of the patient's information available in the medical record, the following are my findings and recommendations:    Reason for consult: Management guidance for subclinical hyperthyroidism    Summary of data reviewed: In summary this is an 85-year-old female born on June 26, 1935 who has a history of endogenous subclinical hypothyroidism present since 2016 with TSH levels as low as 0.270    She was seen in the emergency room according to her primary care physician and was found to have a low TSH level of 0.146 with normal free T4 levels of 1.0 and free T3 levels of 2.93 on January 5, 2021 compatible with Subclinical hyperthyroidism    TPO antibodies were drawn which were negative  Thyroid-stimulating immunoglobulin and thyrotropin receptor antibodies are not available    She has not had a formal thyroid uptake and scan    Thyroid ultrasound on January 12, 2021 showed 3 dominant greater than 1 cm hypoechoic solid nodules on the right upper lobe, right mid lobe and right lower lobe.  The right upper lobe nodules largest measuring 2.3 cm in the largest dimension.    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 it will help confirm the diagnosis and confirm the underlying etiology for subclinical hyperthyroidism     and     2. will help also guide in determining which nodules need to be biopsied or which nodules are high  risk for cancer when correlated with her US      More than likely the right upper lobe nodule which is the largest and most dominant hypervascular nodule is going to be the hot or hyperfunctioning nodule on the thyroid uptake and scan    If it is hot or hyperfunctioning on the thyroid uptake and scan it does not need to be biopsied.  The risk of malignancy is very low for hot nodules      Assuming the thyroid uptake and scan is done any cold nodules should be evaluated as those nodules have a higher risk of malignancy.    I strongly suspect the right mid and right lower lobe nodules will be cold or hypofunctioning on her thyroid uptake and scan (if done)  and will need biopsy      Further management of this patient subclinical hyperthyroidism depends whether or not she has symptoms of thyrotoxicosis and whether or not she has A. fib or osteoporosis.    If her TSH levels remain stable or NOT less than 0.10 - THEN she can be monitored and may not need antithyroid therapy or radioactive iodine therapy        E-Consult Time: 31 minutes were spent with >50% of the total time spent discussing plan of care with requesting physician (Use code [id number])    Lucien Trejo M.D.

## 2021-02-09 NOTE — PROGRESS NOTES
Shwetha Meyers P.A.-C.  You 29 minutes ago (12:37 PM)     Can we call patient and let her know I consulted with endo, and they do recommend further evaluation with a thyroid uptake scan.

## 2021-02-09 NOTE — ED TRIAGE NOTES
"Milena Ang  Chief Complaint   Patient presents with   • Diarrhea     intermittent incontinence of stool, last this am- issue started last year,    • Epigastric Pain     \"discomfort\", hx kidney stones and bladder mass in November   • Fatigue   • Anxiety     Pt to triage in w/c, with above complaint.  VSS, no acute distress.  EKG done.   Pt states after incontinent of stool this morning had feeling that she was short of breath and now is felling anxious.  Pt has had multiple medical issues over last few months, and PCP unsure why pt having \"explosive diarrhea\", and work up was \"put on back burning\" d/t other issues.  Pt/staff masked and in appropriate PPE during encounter.   Pt returned to lobby, educated on triage process, and to inform staff of any changes or concerns.       "

## 2021-02-10 NOTE — ED NOTES
DC home with written and verbal instructions regarding f/u, activity and RX.  Verbalized understanding, ambulated out.

## 2021-02-10 NOTE — ED NOTES
Assumed care, report received from RN, POC discussed.   Introduced self as RN, call light within reach, no s/s of distress noted.

## 2021-02-10 NOTE — DISCHARGE INSTRUCTIONS
You have a urine infection.  Please take the antibiotics as prescribed.  Return if your symptoms worsen or if anything changes.

## 2021-02-10 NOTE — ED NOTES
Pt continues to be Hypertensive, unsure if she took her morning BP pill as louie was so uncomfortable and hasn't slept all night.

## 2021-02-17 ENCOUNTER — IMMUNIZATION (OUTPATIENT)
Dept: FAMILY PLANNING/WOMEN'S HEALTH CLINIC | Facility: IMMUNIZATION CENTER | Age: 86
End: 2021-02-17
Payer: MEDICARE

## 2021-02-17 DIAGNOSIS — Z23 ENCOUNTER FOR VACCINATION: Primary | ICD-10-CM

## 2021-02-17 PROCEDURE — 91300 PFIZER SARS-COV-2 VACCINE: CPT

## 2021-02-17 PROCEDURE — 0001A PFIZER SARS-COV-2 VACCINE: CPT

## 2021-03-03 ENCOUNTER — TELEMEDICINE (OUTPATIENT)
Dept: MEDICAL GROUP | Facility: PHYSICIAN GROUP | Age: 86
End: 2021-03-03
Payer: MEDICARE

## 2021-03-03 VITALS — BODY MASS INDEX: 21.44 KG/M2 | HEIGHT: 63 IN | WEIGHT: 121 LBS | RESPIRATION RATE: 12 BRPM

## 2021-03-03 DIAGNOSIS — R79.89 LOW TSH LEVEL: ICD-10-CM

## 2021-03-03 DIAGNOSIS — K59.1 FUNCTIONAL DIARRHEA: ICD-10-CM

## 2021-03-03 DIAGNOSIS — Z09 HOSPITAL DISCHARGE FOLLOW-UP: ICD-10-CM

## 2021-03-03 DIAGNOSIS — E03.8 SUBCLINICAL HYPOTHYROIDISM: ICD-10-CM

## 2021-03-03 DIAGNOSIS — E04.2 MULTIPLE THYROID NODULES: ICD-10-CM

## 2021-03-03 PROCEDURE — 99214 OFFICE O/P EST MOD 30 MIN: CPT | Mod: 95,CR | Performed by: PHYSICIAN ASSISTANT

## 2021-03-03 ASSESSMENT — FIBROSIS 4 INDEX: FIB4 SCORE: 2.3

## 2021-03-03 NOTE — ASSESSMENT & PLAN NOTE
To recap from her last visit the patient was complaining of chronic fatigue.  We found that her TSH level was too low, thyroid ultrasound was performed on the patient because of the fatigue and low TSH and found multiple thyroid nodules greater than a centimeter.  FNA was ordered and had benign findings.  Because of the hyperthyroidism though consult with endocrinology and they did recommend radioiodine uptake scan.  Ordered this for the patient at her last visit.  Pending results.  Endocrinology suspected subclinical hyperthyroidism most likely secondary to autonomous thyroid nodules or toxic multinodular goiter or toxic adenoma.  Further comments by endocrinology stated that the right upper lobe is the largest and most likely the dominant hypervascular nodule that may be hot or hyperfunctioning.  If it is it does not need to be biopsied.  Any cold nodules will need to be evaluated and biopsied.  Endocrinology suspects that the right mid and right lower nodules could be cold nodules.

## 2021-03-03 NOTE — ASSESSMENT & PLAN NOTE
Since her last visit patient was seen in the ED on February 9, 2021 for diarrhea and abdominal pain.  CT the abdomen essentially negative for acute findings small hiatal hernia.  She was found to have a urinary tract infection which they treated.  She was given IV antibiotics and discharged home with antibiotics.  Patient was put on Bactrim DS twice daily for 10 days.

## 2021-03-03 NOTE — PROGRESS NOTES
Virtual Visit: Established Patient   This visit was conducted via Zoom using secure and encrypted videoconferencing technology. The patient was in a private location in the state of [place].    The patient's identity was confirmed and verbal consent was obtained for this virtual visit.    Subjective:   CC:   Chief Complaint   Patient presents with   • Lab Results   • Thyroid Problem       Milena Ang is a 85 y.o. female presenting for evaluation and management of:     Hospital discharge follow-up  Since her last visit patient was seen in the ED on February 9, 2021 for diarrhea and abdominal pain.  CT the abdomen essentially negative for acute findings small hiatal hernia.  She was found to have a urinary tract infection which they treated.  She was given IV antibiotics and discharged home with antibiotics.  Patient was put on Bactrim DS twice daily for 10 days.    Low TSH level  To recap from her last visit the patient was complaining of chronic fatigue.  We found that her TSH level was too low, thyroid ultrasound was performed on the patient because of the fatigue and low TSH and found multiple thyroid nodules greater than a centimeter.  FNA was ordered and had benign findings.  Because of the hyperthyroidism though consult with endocrinology and they did recommend radioiodine uptake scan.  Ordered this for the patient at her last visit.  Pending results.  Endocrinology suspected subclinical hyperthyroidism most likely secondary to autonomous thyroid nodules or toxic multinodular goiter or toxic adenoma.  Further comments by endocrinology stated that the right upper lobe is the largest and most likely the dominant hypervascular nodule that may be hot or hyperfunctioning.  If it is it does not need to be biopsied.  Any cold nodules will need to be evaluated and biopsied.  Endocrinology suspects that the right mid and right lower nodules could be cold nodules.    Functional diarrhea  Chronic ongoing diarrhea.  "Intermittent. No blood in her stools. Will see Gastro tomorrow for this. Watery diarrhea most days. No medications taken for it. Imodium causes more cramping. This has been going on for years. She try eliminating certain foods and helped some but not resolved. Stopping caffeine and chocolate and that helped. Will see [facility] tomorrow.       ROS   Denies any recent fevers or chills. No nausea or vomiting. No chest pains or shortness of breath.     Allergies   Allergen Reactions   • Pcn [Penicillins] Rash     \"tongue got thick\"       Current medicines (including changes today)  Current Outpatient Medications   Medication Sig Dispense Refill   • lisinopril (PRINIVIL) 40 MG tablet TAKE 1 TABLET BY MOUTH EVERY DAY 90 Tab 0   • amLODIPine (NORVASC) 5 MG Tab TAKE 1 TABLET BY MOUTH EVERY NIGHT AT BEDTIME 90 Tab 0   • Ferrous Sulfate (IRON PO) Take 1 Tab by mouth every day.     • acetaminophen (TYLENOL) 500 MG Tab Take 500 mg by mouth Once.     • oxybutynin SR (DITROPAN-XL) 10 MG CR tablet Take 1 Tab by mouth every day. 90 Tab 3   • vitamin D (CHOLECALCIFEROL) 1000 UNIT Tab Take 1,000 Units by mouth every day.       No current facility-administered medications for this visit.       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 10/11/2020     Priority: High   • Hospital discharge follow-up 03/03/2021   • Functional diarrhea 03/03/2021   • Low TSH level 02/08/2021   • Allergies 02/08/2021   • Fatigue 07/03/2020   • Elevated glucose 08/07/2019   • Overactive bladder 08/06/2018   • Vitamin D deficiency 11/18/2017   • Nonrheumatic aortic valve insufficiency 05/02/2017   • Osteopenia of spine 05/02/2017   • Murmur 03/23/2017   • HTN (hypertension) 01/14/2015   • Dyslipidemia    • Incontinence    • Diverticulosis of colon        Family History   Problem Relation Age of Onset   • Cancer Father         pancreatic   • Heart Disease Father         MI at age 57   • " "Hypertension Father    • Cancer Brother         melanoma   • Heart Disease Brother    • No Known Problems Mother    • Cancer Sister         ovaren cancer   • Cancer Brother         throat   • Psychiatric Illness Daughter    • No Known Problems Daughter        She  has a past medical history of Cataract, Cervical high risk human papillomavirus (HPV) DNA test positive, Colon polyp, Diverticulosis of colon, Dyslipidemia, Female bladder prolapse (11/04/2020), High cholesterol, Hypertension, Incontinence, Incontinence (11/04/2020), Kidney stone, Menopausal and postmenopausal disorder, OSTEOPOROSIS, and Urinary bladder disorder (11/04/2020).  She  has a past surgical history that includes bladder sling female (7/12); cataract extraction with iol (Bilateral); cholecystectomy; pr cystourethroscopy,ureter catheter (Right, 11/6/2020); pr cysto/uretero/pyeloscopy, dx (Bilateral, 11/6/2020); and pr cystoscopy,insert ureteral stent (Right, 11/6/2020).       Objective:   Resp 12   Ht 1.6 m (5' 3\")   Wt 54.9 kg (121 lb)   LMP 07/01/1986   BMI 21.43 kg/m²     Physical Exam:  Constitutional: Alert, no distress, well-groomed.  Skin: No rashes in visible areas.  Eye: Round. Conjunctiva clear, lids normal. No icterus.   ENMT: Lips pink without lesions, good dentition, moist mucous membranes. Phonation normal.  Neck: No masses, no thyromegaly. Moves freely without pain.  Respiratory: Unlabored respiratory effort, no cough or audible wheeze  Psych: Alert and oriented x3, normal affect and mood.       Assessment and Plan:   The following treatment plan was discussed:     1. Hospital discharge follow-up  Patient was seen and evaluated for abdominal pain and diarrhea.  Found to have an asymptomatic UTI that she was treated for IV antibiotics and Bactrim for 10 days.   2. Low TSH level  Reviewing patient's records I see that her TSH levels have been low for the last few years going back to 2016.    To recap from her last visit the " patient was complaining of chronic fatigue.  We found that her TSH level was too low, thyroid ultrasound was performed on the patient because of the fatigue and low TSH and found multiple thyroid nodules greater than a centimeter.  FNA was ordered and had benign findings.  Because of the hyperthyroidism though consult with endocrinology and they did recommend radioiodine uptake scan.  Ordered this for the patient at her last visit.  Pending results.  Endocrinology suspected subclinical hyperthyroidism most likely secondary to autonomous thyroid nodules or toxic multinodular goiter or toxic adenoma.  Further comments by endocrinology stated that the right upper lobe is the largest and most likely the dominant hypervascular nodule that may be hot or hyperfunctioning.  If it is it does not need to be biopsied.  Any cold nodules will need to be evaluated and biopsied.  Endocrinology suspects that the right mid and right lower nodules could be cold nodules.    I would like to update her thyroid labs to ensure that it still subclinical hyperthyroid and her free T3 and free T4 are within normal limits.  Patient discussed with me today that she has had chronic diarrhea in which she is going to see gastro tomorrow.  And she is also had weight loss issues for the last few years.  She denies any palpitations, excessive sweating, heat or cold intolerance.    I did order a thyrotropin receptor antibody at her last visit but it was not performed.  Will order again.    I have ordered a radioiodine uptake scan, patient has not received a call on this, will find out why that she has not been scheduled for this.  Would like to get this as soon as we can.    Sending referral to endocrinology for consult as well.  3. Functional diarrhea  Follow-up with gastroenterology tomorrow.  4. Subclinical hypothyroidism  - URINALYSIS,CULTURE IF INDICATED; Future  - REFERRAL TO ENDOCRINOLOGY  - TSH; Future  - T3 FREE; Future  - FREE THYROXINE;  Future  - THYROTROPIN RECEP AB; Future    5. Multiple thyroid nodules  - URINALYSIS,CULTURE IF INDICATED; Future  - REFERRAL TO ENDOCRINOLOGY  - TSH; Future  - T3 FREE; Future  - FREE THYROXINE; Future  - THYROTROPIN RECEP AB; Future        Follow-up: Return in about 4 weeks (around 3/31/2021) for Follow up on labs.        The patient verbalized agreement and understanding of current plan. All questions and concerns were addressed at time of visit.    Please note that this dictation was created using voice recognition software. I have made every reasonable attempt to correct obvious errors, but I expect that there are errors of grammar and possibly content that I did not discover before finalizing the note.

## 2021-03-03 NOTE — ASSESSMENT & PLAN NOTE
Chronic ongoing diarrhea. Intermittent. No blood in her stools. Will see Gastro tomorrow for this. Watery diarrhea most days. No medications taken for it. Imodium causes more cramping. This has been going on for years. She try eliminating certain foods and helped some but not resolved. Stopping caffeine and chocolate and that helped. Will see [facility] tomorrow.

## 2021-03-06 ENCOUNTER — IMMUNIZATION (OUTPATIENT)
Dept: FAMILY PLANNING/WOMEN'S HEALTH CLINIC | Facility: IMMUNIZATION CENTER | Age: 86
End: 2021-03-06
Attending: INTERNAL MEDICINE
Payer: MEDICARE

## 2021-03-06 DIAGNOSIS — Z23 ENCOUNTER FOR VACCINATION: Primary | ICD-10-CM

## 2021-03-06 PROCEDURE — 0002A PFIZER SARS-COV-2 VACCINE: CPT

## 2021-03-06 PROCEDURE — 91300 PFIZER SARS-COV-2 VACCINE: CPT

## 2021-03-08 ENCOUNTER — APPOINTMENT (OUTPATIENT)
Dept: RADIOLOGY | Facility: MEDICAL CENTER | Age: 86
End: 2021-03-08
Attending: FAMILY MEDICINE
Payer: MEDICARE

## 2021-03-08 ENCOUNTER — HOSPITAL ENCOUNTER (OUTPATIENT)
Facility: MEDICAL CENTER | Age: 86
End: 2021-03-10
Attending: EMERGENCY MEDICINE | Admitting: FAMILY MEDICINE
Payer: MEDICARE

## 2021-03-08 ENCOUNTER — APPOINTMENT (OUTPATIENT)
Dept: RADIOLOGY | Facility: MEDICAL CENTER | Age: 86
End: 2021-03-08
Attending: EMERGENCY MEDICINE
Payer: MEDICARE

## 2021-03-08 DIAGNOSIS — R20.0 LEFT FACIAL NUMBNESS: ICD-10-CM

## 2021-03-08 DIAGNOSIS — I10 ESSENTIAL HYPERTENSION: ICD-10-CM

## 2021-03-08 PROBLEM — E05.90 SUBCLINICAL HYPERTHYROIDISM: Status: ACTIVE | Noted: 2021-03-08

## 2021-03-08 PROBLEM — R00.1 BRADYCARDIA: Status: ACTIVE | Noted: 2021-03-08

## 2021-03-08 LAB
ALBUMIN SERPL BCP-MCNC: 4 G/DL (ref 3.2–4.9)
ALBUMIN/GLOB SERPL: 1.5 G/DL
ALP SERPL-CCNC: 54 U/L (ref 30–99)
ALT SERPL-CCNC: 16 U/L (ref 2–50)
ANION GAP SERPL CALC-SCNC: 10 MMOL/L (ref 7–16)
AST SERPL-CCNC: 18 U/L (ref 12–45)
BASOPHILS # BLD AUTO: 0.6 % (ref 0–1.8)
BASOPHILS # BLD: 0.03 K/UL (ref 0–0.12)
BILIRUB SERPL-MCNC: 0.6 MG/DL (ref 0.1–1.5)
BUN SERPL-MCNC: 18 MG/DL (ref 8–22)
CALCIUM SERPL-MCNC: 9 MG/DL (ref 8.4–10.2)
CHLORIDE SERPL-SCNC: 107 MMOL/L (ref 96–112)
CO2 SERPL-SCNC: 26 MMOL/L (ref 20–33)
CREAT SERPL-MCNC: 0.77 MG/DL (ref 0.5–1.4)
EKG IMPRESSION: NORMAL
EOSINOPHIL # BLD AUTO: 0.03 K/UL (ref 0–0.51)
EOSINOPHIL NFR BLD: 0.6 % (ref 0–6.9)
ERYTHROCYTE [DISTWIDTH] IN BLOOD BY AUTOMATED COUNT: 42.9 FL (ref 35.9–50)
GLOBULIN SER CALC-MCNC: 2.6 G/DL (ref 1.9–3.5)
GLUCOSE SERPL-MCNC: 117 MG/DL (ref 65–99)
HCT VFR BLD AUTO: 44.8 % (ref 37–47)
HGB BLD-MCNC: 14.8 G/DL (ref 12–16)
IMM GRANULOCYTES # BLD AUTO: 0.03 K/UL (ref 0–0.11)
IMM GRANULOCYTES NFR BLD AUTO: 0.6 % (ref 0–0.9)
LYMPHOCYTES # BLD AUTO: 1.44 K/UL (ref 1–4.8)
LYMPHOCYTES NFR BLD: 30 % (ref 22–41)
MAGNESIUM SERPL-MCNC: 2.2 MG/DL (ref 1.5–2.5)
MCH RBC QN AUTO: 31.4 PG (ref 27–33)
MCHC RBC AUTO-ENTMCNC: 33 G/DL (ref 33.6–35)
MCV RBC AUTO: 94.9 FL (ref 81.4–97.8)
MONOCYTES # BLD AUTO: 0.52 K/UL (ref 0–0.85)
MONOCYTES NFR BLD AUTO: 10.8 % (ref 0–13.4)
NEUTROPHILS # BLD AUTO: 2.75 K/UL (ref 2–7.15)
NEUTROPHILS NFR BLD: 57.4 % (ref 44–72)
NRBC # BLD AUTO: 0 K/UL
NRBC BLD-RTO: 0 /100 WBC
PHOSPHATE SERPL-MCNC: 2.6 MG/DL (ref 2.5–4.5)
PLATELET # BLD AUTO: 177 K/UL (ref 164–446)
PMV BLD AUTO: 11.1 FL (ref 9–12.9)
POTASSIUM SERPL-SCNC: 4.4 MMOL/L (ref 3.6–5.5)
PROT SERPL-MCNC: 6.6 G/DL (ref 6–8.2)
RBC # BLD AUTO: 4.72 M/UL (ref 4.2–5.4)
SARS-COV-2 RNA RESP QL NAA+PROBE: NOTDETECTED
SODIUM SERPL-SCNC: 143 MMOL/L (ref 135–145)
SPECIMEN SOURCE: NORMAL
TSH SERPL DL<=0.005 MIU/L-ACNC: 0.32 UIU/ML (ref 0.38–5.33)
WBC # BLD AUTO: 4.8 K/UL (ref 4.8–10.8)

## 2021-03-08 PROCEDURE — 85025 COMPLETE CBC W/AUTO DIFF WBC: CPT

## 2021-03-08 PROCEDURE — A9270 NON-COVERED ITEM OR SERVICE: HCPCS | Performed by: FAMILY MEDICINE

## 2021-03-08 PROCEDURE — 99219 PR INITIAL OBSERVATION CARE,LEVL II: CPT | Performed by: FAMILY MEDICINE

## 2021-03-08 PROCEDURE — 80053 COMPREHEN METABOLIC PANEL: CPT

## 2021-03-08 PROCEDURE — 93005 ELECTROCARDIOGRAM TRACING: CPT

## 2021-03-08 PROCEDURE — 70450 CT HEAD/BRAIN W/O DYE: CPT

## 2021-03-08 PROCEDURE — G0378 HOSPITAL OBSERVATION PER HR: HCPCS

## 2021-03-08 PROCEDURE — 99285 EMERGENCY DEPT VISIT HI MDM: CPT

## 2021-03-08 PROCEDURE — 700102 HCHG RX REV CODE 250 W/ 637 OVERRIDE(OP): Performed by: EMERGENCY MEDICINE

## 2021-03-08 PROCEDURE — 93880 EXTRACRANIAL BILAT STUDY: CPT

## 2021-03-08 PROCEDURE — U0005 INFEC AGEN DETEC AMPLI PROBE: HCPCS

## 2021-03-08 PROCEDURE — 700102 HCHG RX REV CODE 250 W/ 637 OVERRIDE(OP): Performed by: FAMILY MEDICINE

## 2021-03-08 PROCEDURE — U0003 INFECTIOUS AGENT DETECTION BY NUCLEIC ACID (DNA OR RNA); SEVERE ACUTE RESPIRATORY SYNDROME CORONAVIRUS 2 (SARS-COV-2) (CORONAVIRUS DISEASE [COVID-19]), AMPLIFIED PROBE TECHNIQUE, MAKING USE OF HIGH THROUGHPUT TECHNOLOGIES AS DESCRIBED BY CMS-2020-01-R: HCPCS

## 2021-03-08 PROCEDURE — 83735 ASSAY OF MAGNESIUM: CPT

## 2021-03-08 PROCEDURE — A9270 NON-COVERED ITEM OR SERVICE: HCPCS | Performed by: EMERGENCY MEDICINE

## 2021-03-08 PROCEDURE — 84100 ASSAY OF PHOSPHORUS: CPT

## 2021-03-08 PROCEDURE — 36415 COLL VENOUS BLD VENIPUNCTURE: CPT

## 2021-03-08 PROCEDURE — 84443 ASSAY THYROID STIM HORMONE: CPT

## 2021-03-08 PROCEDURE — 82607 VITAMIN B-12: CPT

## 2021-03-08 RX ORDER — HYDRALAZINE HYDROCHLORIDE 20 MG/ML
10 INJECTION INTRAMUSCULAR; INTRAVENOUS
Status: DISCONTINUED | OUTPATIENT
Start: 2021-03-08 | End: 2021-03-08

## 2021-03-08 RX ORDER — LISINOPRIL 20 MG/1
40 TABLET ORAL ONCE
Status: COMPLETED | OUTPATIENT
Start: 2021-03-08 | End: 2021-03-08

## 2021-03-08 RX ORDER — BISACODYL 10 MG
10 SUPPOSITORY, RECTAL RECTAL
Status: DISCONTINUED | OUTPATIENT
Start: 2021-03-08 | End: 2021-03-10 | Stop reason: HOSPADM

## 2021-03-08 RX ORDER — AMOXICILLIN 250 MG
2 CAPSULE ORAL 2 TIMES DAILY
Status: DISCONTINUED | OUTPATIENT
Start: 2021-03-08 | End: 2021-03-10 | Stop reason: HOSPADM

## 2021-03-08 RX ORDER — ATORVASTATIN CALCIUM 40 MG/1
40 TABLET, FILM COATED ORAL EVERY EVENING
Status: DISCONTINUED | OUTPATIENT
Start: 2021-03-08 | End: 2021-03-10

## 2021-03-08 RX ORDER — ASPIRIN 325 MG
325 TABLET ORAL DAILY
Status: DISCONTINUED | OUTPATIENT
Start: 2021-03-08 | End: 2021-03-10 | Stop reason: HOSPADM

## 2021-03-08 RX ORDER — POLYETHYLENE GLYCOL 3350 17 G/17G
1 POWDER, FOR SOLUTION ORAL
Status: DISCONTINUED | OUTPATIENT
Start: 2021-03-08 | End: 2021-03-10 | Stop reason: HOSPADM

## 2021-03-08 RX ORDER — LABETALOL HYDROCHLORIDE 5 MG/ML
10 INJECTION, SOLUTION INTRAVENOUS EVERY 4 HOURS PRN
Status: DISCONTINUED | OUTPATIENT
Start: 2021-03-08 | End: 2021-03-08

## 2021-03-08 RX ORDER — ACETAMINOPHEN 325 MG/1
650 TABLET ORAL EVERY 6 HOURS PRN
Status: DISCONTINUED | OUTPATIENT
Start: 2021-03-08 | End: 2021-03-10 | Stop reason: HOSPADM

## 2021-03-08 RX ORDER — ASPIRIN 81 MG/1
324 TABLET, CHEWABLE ORAL DAILY
Status: DISCONTINUED | OUTPATIENT
Start: 2021-03-08 | End: 2021-03-10 | Stop reason: HOSPADM

## 2021-03-08 RX ORDER — FERROUS SULFATE 325(65) MG
325 TABLET ORAL
Status: DISCONTINUED | OUTPATIENT
Start: 2021-03-09 | End: 2021-03-10 | Stop reason: HOSPADM

## 2021-03-08 RX ORDER — ASPIRIN 600 MG/1
300 SUPPOSITORY RECTAL DAILY
Status: DISCONTINUED | OUTPATIENT
Start: 2021-03-08 | End: 2021-03-10 | Stop reason: HOSPADM

## 2021-03-08 RX ORDER — HYDRALAZINE HYDROCHLORIDE 20 MG/ML
10 INJECTION INTRAMUSCULAR; INTRAVENOUS
Status: DISCONTINUED | OUTPATIENT
Start: 2021-03-08 | End: 2021-03-10 | Stop reason: HOSPADM

## 2021-03-08 RX ADMIN — ASPIRIN 325 MG ORAL TABLET 325 MG: 325 PILL ORAL at 15:49

## 2021-03-08 RX ADMIN — LISINOPRIL 40 MG: 20 TABLET ORAL at 12:04

## 2021-03-08 RX ADMIN — ATORVASTATIN CALCIUM 40 MG: 40 TABLET, FILM COATED ORAL at 17:43

## 2021-03-08 ASSESSMENT — FIBROSIS 4 INDEX: FIB4 SCORE: 2.3

## 2021-03-08 ASSESSMENT — COGNITIVE AND FUNCTIONAL STATUS - GENERAL
MOBILITY SCORE: 24
SUGGESTED CMS G CODE MODIFIER DAILY ACTIVITY: CH
DAILY ACTIVITIY SCORE: 24
SUGGESTED CMS G CODE MODIFIER MOBILITY: CH

## 2021-03-08 ASSESSMENT — PATIENT HEALTH QUESTIONNAIRE - PHQ9
1. LITTLE INTEREST OR PLEASURE IN DOING THINGS: NOT AT ALL
2. FEELING DOWN, DEPRESSED, IRRITABLE, OR HOPELESS: NOT AT ALL
SUM OF ALL RESPONSES TO PHQ9 QUESTIONS 1 AND 2: 0

## 2021-03-08 ASSESSMENT — ENCOUNTER SYMPTOMS
WEAKNESS: 0
TINGLING: 1
FOCAL WEAKNESS: 0
LOSS OF CONSCIOUSNESS: 0
COUGH: 0
FEVER: 0
PALPITATIONS: 0
MYALGIAS: 0
HEADACHES: 0
EYE PAIN: 0
NAUSEA: 0
DIZZINESS: 0
SHORTNESS OF BREATH: 0
SPEECH CHANGE: 0
CHILLS: 0
SENSORY CHANGE: 1
ABDOMINAL PAIN: 0
VOMITING: 0

## 2021-03-08 ASSESSMENT — LIFESTYLE VARIABLES
AVERAGE NUMBER OF DAYS PER WEEK YOU HAVE A DRINK CONTAINING ALCOHOL: 0
TOTAL SCORE: 0
ALCOHOL_USE: NO
TOTAL SCORE: 0
HAVE PEOPLE ANNOYED YOU BY CRITICIZING YOUR DRINKING: NO
ON A TYPICAL DAY WHEN YOU DRINK ALCOHOL HOW MANY DRINKS DO YOU HAVE: 0
HOW MANY TIMES IN THE PAST YEAR HAVE YOU HAD 5 OR MORE DRINKS IN A DAY: 0
CONSUMPTION TOTAL: NEGATIVE
TOTAL SCORE: 0
EVER HAD A DRINK FIRST THING IN THE MORNING TO STEADY YOUR NERVES TO GET RID OF A HANGOVER: NO
EVER FELT BAD OR GUILTY ABOUT YOUR DRINKING: NO
HAVE YOU EVER FELT YOU SHOULD CUT DOWN ON YOUR DRINKING: NO

## 2021-03-08 NOTE — ED NOTES
ERP at bedside. Pt agrees with plan of care discussed by ERP. AIDET acknowledged with patient. IV established. Blood sent to lab.Peewee in low position, side rail up for pt safety. Call light within reach. Will continue to monitor.

## 2021-03-08 NOTE — ASSESSMENT & PLAN NOTE
- Largely asymptomatic, not on beta blockade  - TSH mildly suppressed  - Free T4 in the am   - No chest pain or palpitations

## 2021-03-08 NOTE — ED PROVIDER NOTES
"ED Provider Note    CHIEF COMPLAINT  Chief Complaint   Patient presents with   • Numbness     L side ear and head, pt states it started last night. Denies any stroke-like symptoms or fever       HPI  Milena Ang is a 85 y.o. female with a history of high cholesterol and hypertension who presents complaining of left external ear and side of head numbness since yesterday.  She reports \"a strange feeling.\"  She denies rash or pain.  She states her left eye \"feels heavy.\"    She denies headache, visual changes, trauma, jaw pain, extremity weakness or numbness, slurred speech, taste changes, hearing changes, tinnitus, history of similar symptoms.    Patient has had 1 dose of her shingles vaccine.  She has had both doses Covid vaccines.    Patient did not take her blood pressure or any of her medications yesterday evening or this morning.      ALLERGIES  Allergies   Allergen Reactions   • Pcn [Penicillins] Rash     \"tongue got thick\"       CURRENT MEDICATIONS  Home Medications     Reviewed by Meredith Bui (Pharmacy Tech) on 03/08/21 at 1111  Med List Status: Complete   Medication Last Dose Status   amLODIPine (NORVASC) 5 MG Tab 3/6/2021 Active   diphenhydrAMINE HCl (ALLERGY MEDICINE PO) 3/5/2021 Active   Ferrous Sulfate (IRON PO) 3/7/2021 Active   lisinopril (PRINIVIL) 40 MG tablet 3/7/2021 Active   oxybutynin SR (DITROPAN-XL) 10 MG CR tablet 3/7/2021 Active   VITAMIN D PO 3/7/2021 Active                PAST MEDICAL HISTORY   has a past medical history of Cataract, Cervical high risk human papillomavirus (HPV) DNA test positive, Colon polyp, Diverticulosis of colon, Dyslipidemia, Female bladder prolapse (11/04/2020), High cholesterol, Hypertension, Incontinence, Incontinence (11/04/2020), Kidney stone, Menopausal and postmenopausal disorder, OSTEOPOROSIS, and Urinary bladder disorder (11/04/2020).    SURGICAL HISTORY   has a past surgical history that includes bladder sling female (7/12); cataract " "extraction with iol (Bilateral); cholecystectomy; cystourethroscopy,ureter catheter (Right, 11/6/2020); cysto/uretero/pyeloscopy, dx (Bilateral, 11/6/2020); and cystoscopy,insert ureteral stent (Right, 11/6/2020).    SOCIAL HISTORY  Social History     Tobacco Use   • Smoking status: Never Smoker   • Smokeless tobacco: Never Used   Substance and Sexual Activity   • Alcohol use: No   • Drug use: No   • Sexual activity: Never       Family Hx:  No CVA      REVIEW OF SYSTEMS  See HPI for further details.  All other systems are negative except as above in HPI.      PHYSICAL EXAM  VITAL SIGNS: BP (!) 188/73   Pulse (!) 56   Temp 36.4 °C (97.6 °F) (Temporal)   Resp 20   Ht 1.6 m (5' 3\")   Wt 54.1 kg (119 lb 4.3 oz)   LMP 07/01/1986   SpO2 97%   BMI 21.13 kg/m²     General:  WDWN elderly female, nontoxic appearing in NAD; A+Ox3; V/S as above; elevated blood pressure  Skin: warm and dry; good color; no rash  HEENT: NCAT; EOMs intact; PERRL; no scleral icterus   Neck: FROM; soft  Cardiovascular: Regular heart rate and rhythm.  No murmurs, rubs, or gallops; pulses 2+ bilaterally radially and DP areas  Lungs: Clear to auscultation with good air movement bilaterally.  No wheezes, rhonchi, or rales.   Abdomen: BS present; soft; NTND; no rebound, guarding, or rigidity.  No organomegaly or pulsatile mass  Extremities: NUNEZ x 4; no e/o trauma; no pedal edema; neg [place]'s  Neurologic: CNs III-XII formally intact; no facial droop, speech clear; distal sensation intact; strength 5/5 UE/LEs; DTRs 2+ bilaterally in patellar/BR areas; subjectively decreased sensation in the left preauricular/scalp region  Psychiatric: Appropriate affect, normal mood    LABS  Results for orders placed or performed during the hospital encounter of 03/08/21   CBC WITH DIFFERENTIAL   Result Value Ref Range    WBC 4.8 4.8 - 10.8 K/uL    RBC 4.72 4.20 - 5.40 M/uL    Hemoglobin 14.8 12.0 - 16.0 g/dL    Hematocrit 44.8 37.0 - 47.0 %    MCV 94.9 81.4 - 97.8 " fL    MCH 31.4 27.0 - 33.0 pg    MCHC 33.0 (L) 33.6 - 35.0 g/dL    RDW 42.9 35.9 - 50.0 fL    Platelet Count 177 164 - 446 K/uL    MPV 11.1 9.0 - 12.9 fL    Neutrophils-Polys 57.40 44.00 - 72.00 %    Lymphocytes 30.00 22.00 - 41.00 %    Monocytes 10.80 0.00 - 13.40 %    Eosinophils 0.60 0.00 - 6.90 %    Basophils 0.60 0.00 - 1.80 %    Immature Granulocytes 0.60 0.00 - 0.90 %    Nucleated RBC 0.00 /100 WBC    Neutrophils (Absolute) 2.75 2.00 - 7.15 K/uL    Lymphs (Absolute) 1.44 1.00 - 4.80 K/uL    Monos (Absolute) 0.52 0.00 - 0.85 K/uL    Eos (Absolute) 0.03 0.00 - 0.51 K/uL    Baso (Absolute) 0.03 0.00 - 0.12 K/uL    Immature Granulocytes (abs) 0.03 0.00 - 0.11 K/uL    NRBC (Absolute) 0.00 K/uL   CMP   Result Value Ref Range    Sodium 143 135 - 145 mmol/L    Potassium 4.4 3.6 - 5.5 mmol/L    Chloride 107 96 - 112 mmol/L    Co2 26 20 - 33 mmol/L    Anion Gap 10.0 7.0 - 16.0    Glucose 117 (H) 65 - 99 mg/dL    Bun 18 8 - 22 mg/dL    Creatinine 0.77 0.50 - 1.40 mg/dL    Calcium 9.0 8.4 - 10.2 mg/dL    AST(SGOT) 18 12 - 45 U/L    ALT(SGPT) 16 2 - 50 U/L    Alkaline Phosphatase 54 30 - 99 U/L    Total Bilirubin 0.6 0.1 - 1.5 mg/dL    Albumin 4.0 3.2 - 4.9 g/dL    Total Protein 6.6 6.0 - 8.2 g/dL    Globulin 2.6 1.9 - 3.5 g/dL    A-G Ratio 1.5 g/dL   MAGNESIUM   Result Value Ref Range    Magnesium 2.2 1.5 - 2.5 mg/dL   PHOSPHORUS   Result Value Ref Range    Phosphorus 2.6 2.5 - 4.5 mg/dL   TSH   Result Value Ref Range    TSH 0.324 (L) 0.380 - 5.330 uIU/mL   ESTIMATED GFR   Result Value Ref Range    GFR If African American >60 >60 mL/min/1.73 m 2    GFR If Non African American >60 >60 mL/min/1.73 m 2   EKG   Result Value Ref Range    Report       [facility] [place] Emergency Dept.    Test Date:  2021  Pt Name:    JOHNATHANOLIVER GUY                  Department: EDS  MRN:        [id number]                      Room:       -ROOM 2  Gender:     Female                       Technician: [id number]  :         [id number]                   Requested By:ER TRIAGE PROTOCOL  Order #:    [id number]                    Reading MD: NNEKA TERRY MD    Measurements  Intervals                                Axis  Rate:       45                           P:          54  ME:         224                          QRS:        -2  QRSD:       80                           T:          82  QT:         420  QTc:        364    Interpretive Statements  SINUS BRADYCARDIA  BORDERLINE AV CONDUCTION DELAY  PROBABLE ANTEROSEPTAL INFARCT, AGE INDETERM  Compared to ECG 02/09/2021 12:48:13  no acute ST changes  Electronically Signed On 3-8-2021 12:24:25 PST by NNEKA TERRY MD         IMAGING  US-CAROTID DOPPLER BILAT   Final Result      CT-HEAD W/O   Final Result         1.  No evidence of acute intracranial hemorrhage or mass lesion.      2. Cerebral atrophy.         MR-BRAIN-W/O    (Results Pending)         MEDICAL RECORD  I have reviewed patient's medical record and pertinent results are listed below.      COURSE & MEDICAL DECISION MAKING  I have reviewed any medical record information, laboratory studies and radiographic results as noted.    Milena Ang is a 85 y.o. female who presents complaining of left external ear and scalp/face numbness.    Appropriate PPE was worn at all times while interacting with the patient, including goggles, N95 mask, and surgical mask.    Differential diagnosis includes zoster, electrolyte abnormality, cranial nerve compression.      CT head negative for acute pathology.    NIHSS: 0    1:10 PM  Hospitalist maria del carmen    Pt was re-evaluated at 1:38 PM  Blood pressure improved after administration of lisinopril 40 mg/home medication.  Blood pressure now 148 systolic.    Patient will be admitted for further evaluation of her numbness.  I doubt hypertensive urgency.  I feel she likely needs an MRI.  Patient is amenable to this.      FINAL IMPRESSION  1. Left facial numbness         Electronically signed by:  Tricia Martinez M.D., 3/8/2021 10:45 AM

## 2021-03-08 NOTE — ED TRIAGE NOTES
"Chief Complaint   Patient presents with   • Numbness     L side ear and head, pt states it started last night. Denies any stroke-like symptoms or fever     /72   Pulse (!) 52   Temp 36.4 °C (97.6 °F) (Temporal)   Resp 16   Ht 1.6 m (5' 3\")   Wt 54.1 kg (119 lb 4.3 oz)   LMP 07/01/1986   SpO2 96%   BMI 21.13 kg/m²     Pt arrived w/ above concern, pt denies any difficulty w/ hearing    COVID screen negative, mask in place  "

## 2021-03-08 NOTE — ED NOTES
"assist pt to BRP  Ambulated strong and steady  C/o slightly light headedness prior to standing  Voided w/out complaint   Pt did c/o more \"slight tingling and numbness to L side of head that lasted about 5 minutes   Felling better now per pt   To room 301-1 now   "

## 2021-03-08 NOTE — ASSESSMENT & PLAN NOTE
Patient reports a right sided headache.  Patient also reports shaking of both upper extremities with a headache  Headache is been apparently there for many years and because of that she actually got cataract surgery.  CT of the head negative  Carotid ultrasound negative  MRI of the head pending  Echocardiogram to be ordered

## 2021-03-08 NOTE — H&P
"Hospital Medicine History & Physical Note    Date of Service  3/8/2021    Primary Care Physician  Shwetha Meyers P.A.-C.    Consultants  None     Code Status  Full Code    Chief Complaint  Chief Complaint   Patient presents with   • Numbness     L side ear and head, pt states it started last night. Denies any stroke-like symptoms or fever       History of Presenting Illness  85 y.o. female who presented 3/8/2021 with numbness and tingling to the L lateral face and ear.  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  She notes that late last night, she felt \"odd\" when she was combing her hair, with some numbness; when she awoke this morning, her L face felt \"weird\" and she realized she had numbness to the L side of her face, lateral to her eye and including her L ear.  She denies focal weakness, changes in vision, otalgia, lid drooping or speech difficulties.  She was hypertensive in the ER and states that she hasn't taken her blood pressures medications today.  She is also bradycardic.     Review of Systems  Review of Systems   Constitutional: Negative for chills and fever.   HENT: Negative for hearing loss.    Eyes: Negative for pain.   Respiratory: Negative for cough and shortness of breath.    Cardiovascular: Negative for chest pain, palpitations and leg swelling.   Gastrointestinal: Negative for abdominal pain, nausea and vomiting.   Genitourinary: Negative for dysuria.   Musculoskeletal: Negative for myalgias.   Neurological: Positive for tingling and sensory change. Negative for dizziness, speech change, focal weakness, loss of consciousness, weakness and headaches.   All other systems reviewed and are negative.      Past Medical History   has a past medical history of Cataract, Cervical high risk human papillomavirus (HPV) DNA test positive, Colon polyp, Diverticulosis of colon, " "Dyslipidemia, Female bladder prolapse (11/04/2020), High cholesterol, Hypertension, Incontinence, Incontinence (11/04/2020), Kidney stone, Menopausal and postmenopausal disorder, OSTEOPOROSIS, and Urinary bladder disorder (11/04/2020).    Surgical History   has a past surgical history that includes bladder sling female (7/12); cataract extraction with iol (Bilateral); cholecystectomy; pr cystourethroscopy,ureter catheter (Right, 11/6/2020); pr cysto/uretero/pyeloscopy, dx (Bilateral, 11/6/2020); and pr cystoscopy,insert ureteral stent (Right, 11/6/2020).     Family History  family history includes Cancer in her brother, brother, father, and sister; Heart Disease in her brother and father; Hypertension in her father; No Known Problems in her daughter and mother; Psychiatric Illness in her daughter.     Social History   reports that she has never smoked. She has never used smokeless tobacco. She reports that she does not drink alcohol and does not use drugs.    Allergies  Allergies   Allergen Reactions   • Pcn [Penicillins] Rash     \"tongue got thick\"       Medications  Prior to Admission Medications   Prescriptions Last Dose Informant Patient Reported? Taking?   Ferrous Sulfate (IRON PO) 3/7/2021 at 0800 Patient Yes No   Sig: Take 1 Tab by mouth every day.   VITAMIN D PO 3/7/2021 at 0800 Patient Yes No   Sig: Take 1 capsule by mouth every day.   amLODIPine (NORVASC) 5 MG Tab 3/6/2021 at PM Patient No No   Sig: TAKE 1 TABLET BY MOUTH EVERY NIGHT AT BEDTIME   Patient taking differently: Take 5 mg by mouth every bedtime.   diphenhydrAMINE HCl (ALLERGY MEDICINE PO) 3/5/2021 at PM Patient Yes Yes   Sig: Take 1 tablet by mouth as needed (For allergies).   lisinopril (PRINIVIL) 40 MG tablet 3/7/2021 at 0800 Patient No No   Sig: TAKE 1 TABLET BY MOUTH EVERY DAY   Patient taking differently: Take 40 mg by mouth every day.   oxybutynin SR (DITROPAN-XL) 10 MG CR tablet 3/7/2021 at 0800 Patient No No   Sig: Take 1 Tab by mouth " every day.      Facility-Administered Medications: None       Physical Exam  Temp:  [36.4 °C (97.6 °F)] 36.4 °C (97.6 °F)  Pulse:  [46-56] 46  Resp:  [12-21] 21  BP: (160-188)/(56-73) 166/69  SpO2:  [95 %-97 %] 95 %    Physical Exam  Vitals and nursing note reviewed.   Constitutional:       Appearance: Normal appearance.   HENT:      Head: Normocephalic and atraumatic.      Mouth/Throat:      Mouth: Mucous membranes are moist.   Cardiovascular:      Rate and Rhythm: Regular rhythm. Bradycardia present.      Heart sounds: No murmur.   Pulmonary:      Effort: Pulmonary effort is normal. No respiratory distress.      Breath sounds: Normal breath sounds. No wheezing, rhonchi or rales.   Abdominal:      General: Abdomen is flat. Bowel sounds are normal.      Palpations: Abdomen is soft.   Musculoskeletal:         General: No swelling or deformity.   Skin:     General: Skin is warm and dry.   Neurological:      General: No focal deficit present.      Mental Status: She is alert and oriented to person, place, and time.      Comments: Decreased sensation of L lateral face and ear     Psychiatric:         Mood and Affect: Mood normal.         Behavior: Behavior normal.         Laboratory:  Recent Labs     03/08/21  1148   WBC 4.8   RBC 4.72   HEMOGLOBIN 14.8   HEMATOCRIT 44.8   MCV 94.9   MCH 31.4   MCHC 33.0*   RDW 42.9   PLATELETCT 177   MPV 11.1     Recent Labs     03/08/21  1148   SODIUM 143   POTASSIUM 4.4   CHLORIDE 107   CO2 26   GLUCOSE 117*   BUN 18   CREATININE 0.77   CALCIUM 9.0     Recent Labs     03/08/21  1148   ALTSGPT 16   ASTSGOT 18   ALKPHOSPHAT 54   TBILIRUBIN 0.6   GLUCOSE 117*         No results for input(s): NTPROBNP in the last 72 hours.      No results for input(s): TROPONINT in the last 72 hours.    Imaging:  CT-HEAD W/O   Final Result         1.  No evidence of acute intracranial hemorrhage or mass lesion.      2. Cerebral atrophy.         MR-BRAIN-W/O    (Results Pending)   US-CAROTID DOPPLER BILAT     (Results Pending)         Assessment/Plan:  I anticipate this patient is appropriate for observation status at this time.    * Left facial numbness  Assessment & Plan  - No associated symptoms of dysarthria, dysphagia, otalgia etc - isolated numbness since last evening, outside of tPA window  - Initial CT brain is negative  - Check MRI brain, carotid u/s, B12 level and lipid panel  - Permissive HTN   - PT/OT/ST   - Start ASA and statin, telemetry monitor  - No associated palpitations   - No change in hearing or otalgia to suggest Arias Nava      Bradycardia  Assessment & Plan  - Largely asymptomatic, not on beta blockade  - TSH mildly suppressed  - Free T4 in the am   - No chest pain or palpitations      Essential hypertension  Assessment & Plan  Hold home meds for permissive hypertension  PRN hydralazine      Subclinical hyperthyroidism  Assessment & Plan  - Follows with Endocrine as an outpatient  - Not on medication  Prophy: SCDs pending MRI results

## 2021-03-09 ENCOUNTER — APPOINTMENT (OUTPATIENT)
Dept: RADIOLOGY | Facility: MEDICAL CENTER | Age: 86
End: 2021-03-09
Attending: HOSPITALIST
Payer: MEDICARE

## 2021-03-09 LAB
ANION GAP SERPL CALC-SCNC: 13 MMOL/L (ref 7–16)
BASOPHILS # BLD AUTO: 1.1 % (ref 0–1.8)
BASOPHILS # BLD: 0.07 K/UL (ref 0–0.12)
BUN SERPL-MCNC: 23 MG/DL (ref 8–22)
CALCIUM SERPL-MCNC: 8.7 MG/DL (ref 8.4–10.2)
CHLORIDE SERPL-SCNC: 109 MMOL/L (ref 96–112)
CHOLEST SERPL-MCNC: 152 MG/DL (ref 100–199)
CO2 SERPL-SCNC: 19 MMOL/L (ref 20–33)
CREAT SERPL-MCNC: 0.84 MG/DL (ref 0.5–1.4)
EOSINOPHIL # BLD AUTO: 0.1 K/UL (ref 0–0.51)
EOSINOPHIL NFR BLD: 1.6 % (ref 0–6.9)
ERYTHROCYTE [DISTWIDTH] IN BLOOD BY AUTOMATED COUNT: 42.5 FL (ref 35.9–50)
GLUCOSE SERPL-MCNC: 80 MG/DL (ref 65–99)
HCT VFR BLD AUTO: 43.9 % (ref 37–47)
HDLC SERPL-MCNC: 29 MG/DL
HGB BLD-MCNC: 14.5 G/DL (ref 12–16)
IMM GRANULOCYTES # BLD AUTO: 0.02 K/UL (ref 0–0.11)
IMM GRANULOCYTES NFR BLD AUTO: 0.3 % (ref 0–0.9)
LDLC SERPL CALC-MCNC: 97 MG/DL
LYMPHOCYTES # BLD AUTO: 1.93 K/UL (ref 1–4.8)
LYMPHOCYTES NFR BLD: 30.7 % (ref 22–41)
MAGNESIUM SERPL-MCNC: 2.1 MG/DL (ref 1.5–2.5)
MCH RBC QN AUTO: 31.2 PG (ref 27–33)
MCHC RBC AUTO-ENTMCNC: 33 G/DL (ref 33.6–35)
MCV RBC AUTO: 94.4 FL (ref 81.4–97.8)
MONOCYTES # BLD AUTO: 0.65 K/UL (ref 0–0.85)
MONOCYTES NFR BLD AUTO: 10.4 % (ref 0–13.4)
NEUTROPHILS # BLD AUTO: 3.51 K/UL (ref 2–7.15)
NEUTROPHILS NFR BLD: 55.9 % (ref 44–72)
NRBC # BLD AUTO: 0 K/UL
NRBC BLD-RTO: 0 /100 WBC
PLATELET # BLD AUTO: 175 K/UL (ref 164–446)
PMV BLD AUTO: 11.7 FL (ref 9–12.9)
POTASSIUM SERPL-SCNC: 5 MMOL/L (ref 3.6–5.5)
RBC # BLD AUTO: 4.65 M/UL (ref 4.2–5.4)
SODIUM SERPL-SCNC: 141 MMOL/L (ref 135–145)
T4 FREE SERPL-MCNC: 1.16 NG/DL (ref 0.93–1.7)
TRIGL SERPL-MCNC: 132 MG/DL (ref 0–149)
VIT B12 SERPL-MCNC: 721 PG/ML (ref 211–911)
WBC # BLD AUTO: 6.3 K/UL (ref 4.8–10.8)

## 2021-03-09 PROCEDURE — 84439 ASSAY OF FREE THYROXINE: CPT

## 2021-03-09 PROCEDURE — 99226 PR SUBSEQUENT OBSERVATION CARE,LEVEL III: CPT | Performed by: HOSPITALIST

## 2021-03-09 PROCEDURE — G0378 HOSPITAL OBSERVATION PER HR: HCPCS

## 2021-03-09 PROCEDURE — 85025 COMPLETE CBC W/AUTO DIFF WBC: CPT

## 2021-03-09 PROCEDURE — 83735 ASSAY OF MAGNESIUM: CPT

## 2021-03-09 PROCEDURE — 700102 HCHG RX REV CODE 250 W/ 637 OVERRIDE(OP): Performed by: FAMILY MEDICINE

## 2021-03-09 PROCEDURE — 80048 BASIC METABOLIC PNL TOTAL CA: CPT

## 2021-03-09 PROCEDURE — A9270 NON-COVERED ITEM OR SERVICE: HCPCS | Performed by: FAMILY MEDICINE

## 2021-03-09 PROCEDURE — 70551 MRI BRAIN STEM W/O DYE: CPT | Mod: ME

## 2021-03-09 PROCEDURE — 80061 LIPID PANEL: CPT

## 2021-03-09 PROCEDURE — 83036 HEMOGLOBIN GLYCOSYLATED A1C: CPT

## 2021-03-09 PROCEDURE — 92610 EVALUATE SWALLOWING FUNCTION: CPT

## 2021-03-09 RX ORDER — AMOXICILLIN 250 MG
2 CAPSULE ORAL 2 TIMES DAILY
Status: CANCELLED | OUTPATIENT
Start: 2021-03-09

## 2021-03-09 RX ORDER — BISACODYL 10 MG
10 SUPPOSITORY, RECTAL RECTAL
Status: CANCELLED | OUTPATIENT
Start: 2021-03-09

## 2021-03-09 RX ORDER — POLYETHYLENE GLYCOL 3350 17 G/17G
1 POWDER, FOR SOLUTION ORAL
Status: CANCELLED | OUTPATIENT
Start: 2021-03-09

## 2021-03-09 RX ADMIN — ATORVASTATIN CALCIUM 40 MG: 40 TABLET, FILM COATED ORAL at 17:51

## 2021-03-09 RX ADMIN — ASPIRIN 324 MG: 81 TABLET, CHEWABLE ORAL at 05:38

## 2021-03-09 RX ADMIN — FERROUS SULFATE TAB 325 MG (65 MG ELEMENTAL FE) 325 MG: 325 (65 FE) TAB at 07:58

## 2021-03-09 ASSESSMENT — ENCOUNTER SYMPTOMS
BRUISES/BLEEDS EASILY: 0
CONSTITUTIONAL NEGATIVE: 1
HEARTBURN: 0
GASTROINTESTINAL NEGATIVE: 1
FOCAL WEAKNESS: 1
MUSCULOSKELETAL NEGATIVE: 1
DOUBLE VISION: 0
COUGH: 0
DIZZINESS: 0
LOSS OF CONSCIOUSNESS: 0
SENSORY CHANGE: 1
ABDOMINAL PAIN: 0
CARDIOVASCULAR NEGATIVE: 1
NERVOUS/ANXIOUS: 0
VOMITING: 0
WHEEZING: 0
CONSTIPATION: 0
HEADACHES: 1
NAUSEA: 0
HEMOPTYSIS: 0
EYES NEGATIVE: 1
PALPITATIONS: 0
BLOOD IN STOOL: 0
RESPIRATORY NEGATIVE: 1
SEIZURES: 0
DIARRHEA: 0
CHILLS: 0
PSYCHIATRIC NEGATIVE: 1
DIAPHORESIS: 0
FEVER: 0

## 2021-03-09 ASSESSMENT — FIBROSIS 4 INDEX: FIB4 SCORE: 2.14

## 2021-03-09 NOTE — PROGRESS NOTES
[facility] Medicine Daily Progress Note    Date of Service  3/9/2021    Chief Complaint  85 y.o. female admitted 3/8/2021 with headache with left-sided facial numbness    Hospital Course  85-year-old female comes in with left-sided facial numbness.  Patient undergoing a TIA work-up.  Pending an MRI of the head as well as an echocardiogram.    Interval Problem Update  MRI of head  Echo  Negative carotid ultrasound  Aspirin statin    Consultants/Specialty  None    Code Status  Full Code    Disposition  To be determined    Review of Systems  Review of Systems   Constitutional: Negative.  Negative for chills, diaphoresis and fever.   HENT: Negative.    Eyes: Negative.  Negative for double vision.   Respiratory: Negative.  Negative for cough, hemoptysis and wheezing.    Cardiovascular: Negative.  Negative for chest pain, palpitations and leg swelling.   Gastrointestinal: Negative.  Negative for abdominal pain, blood in stool, constipation, diarrhea, heartburn, nausea and vomiting.   Genitourinary: Negative.  Negative for frequency, hematuria and urgency.   Musculoskeletal: Negative.  Negative for joint pain.   Skin: Negative.  Negative for itching and rash.   Neurological: Positive for sensory change, focal weakness and headaches. Negative for dizziness, seizures and loss of consciousness.   Endo/Heme/Allergies: Negative.  Does not bruise/bleed easily.   Psychiatric/Behavioral: Negative.  Negative for suicidal ideas. The patient is not nervous/anxious.    All other systems reviewed and are negative.       Physical Exam  Temp:  [36.6 °C (97.8 °F)-37 °C (98.6 °F)] 36.7 °C (98.1 °F)  Pulse:  [54-97] 68  Resp:  [17-18] 17  BP: (133-154)/(53-74) 138/74  SpO2:  [90 %-92 %] 92 %    Physical Exam  Vitals and nursing note reviewed. Exam conducted with a chaperone present.   Constitutional:       Appearance: Normal appearance. She is well-developed and normal weight.   HENT:      Head: Normocephalic and atraumatic.      Right Ear:  External ear normal.      Left Ear: External ear normal.      Nose: Nose normal.      Mouth/Throat:      Mouth: Mucous membranes are moist.      Pharynx: Oropharynx is clear.   Eyes:      Extraocular Movements: Extraocular movements intact.      Conjunctiva/sclera: Conjunctivae normal.      Pupils: Pupils are equal, round, and reactive to light.   Neck:      Thyroid: No thyromegaly.      Vascular: No JVD.   Cardiovascular:      Rate and Rhythm: Normal rate and regular rhythm.   Pulmonary:      Effort: Pulmonary effort is normal.      Breath sounds: Normal breath sounds.   Chest:      Chest wall: No tenderness.   Abdominal:      General: Abdomen is flat. There is no distension.      Palpations: There is no mass.      Tenderness: There is no abdominal tenderness. There is no guarding or rebound.   Musculoskeletal:         General: Normal range of motion.      Cervical back: Normal range of motion and neck supple.      Right lower leg: No edema.      Left lower leg: No edema.   Lymphadenopathy:      Cervical: No cervical adenopathy.   Skin:     General: Skin is warm and dry.      Capillary Refill: Capillary refill takes more than 3 seconds.      Findings: No rash.   Neurological:      General: No focal deficit present.      Mental Status: She is alert and oriented to person, place, and time. Mental status is at baseline.      Cranial Nerves: No cranial nerve deficit.      Deep Tendon Reflexes: Reflexes are normal and symmetric.   Psychiatric:         Mood and Affect: Mood normal.         Behavior: Behavior normal.         Thought Content: Thought content normal.         Judgment: Judgment normal.         Fluids  No intake or output data in the 24 hours ending 03/09/21 1510    Laboratory  Recent Labs     03/08/21  1148 03/09/21  0229   WBC 4.8 6.3   RBC 4.72 4.65   HEMOGLOBIN 14.8 14.5   HEMATOCRIT 44.8 43.9   MCV 94.9 94.4   MCH 31.4 31.2   MCHC 33.0* 33.0*   RDW 42.9 42.5   PLATELETCT 177 175   MPV 11.1 11.7      Recent Labs     03/08/21  1148 03/09/21  0229   SODIUM 143 141   POTASSIUM 4.4 5.0   CHLORIDE 107 109   CO2 26 19*   GLUCOSE 117* 80   BUN 18 23*   CREATININE 0.77 0.84   CALCIUM 9.0 8.7             Recent Labs     03/09/21  0229   TRIGLYCERIDE 132   HDL 29*   LDL 97       Imaging  US-CAROTID DOPPLER BILAT   Final Result      CT-HEAD W/O   Final Result         1.  No evidence of acute intracranial hemorrhage or mass lesion.      2. Cerebral atrophy.         MR-BRAIN-W/O    (Results Pending)        Assessment/Plan  * Left facial numbness  Assessment & Plan  Patient reports a right sided headache.  Patient also reports shaking of both upper extremities with a headache  Headache is been apparently there for many years and because of that she actually got cataract surgery.  CT of the head negative  Carotid ultrasound negative  MRI of the head pending  Echocardiogram to be ordered      Bradycardia  Assessment & Plan  - Largely asymptomatic, not on beta blockade  - TSH mildly suppressed  - Free T4 in the am   - No chest pain or palpitations      Essential hypertension  Assessment & Plan  Holding blood pressure management to allow permissive hypertension      Vitamin D deficiency- (present on admission)  Assessment & Plan  Supplementation    Subclinical hyperthyroidism  Assessment & Plan  - Follows with Endocrine as an outpatient  - Not on medication    Dyslipidemia- (present on admission)  Assessment & Plan  Low-cholesterol diet  Statin  Fasting lipid panel         VTE prophylaxis: SCD's

## 2021-03-09 NOTE — CARE PLAN
Problem: Communication  Goal: The ability to communicate needs accurately and effectively will improve  Outcome: PROGRESSING AS EXPECTED  Note: Pt verbalizes  needs call light in reach.      Problem: Knowledge Deficit  Goal: Knowledge of disease process/condition, treatment plan, diagnostic tests, and medications will improve  Outcome: PROGRESSING AS EXPECTED  Note: Updated pt on plan of care, MRI scheduled for this am. No additional questions at this time.

## 2021-03-09 NOTE — PROGRESS NOTES
Telemetry Strip     Strip printed: 1509  Measurements from am strip were as follows:  Rhythm: SB  HR: 50s  Measurements: 0.20/ 0.08/ 0.42  Ectopy: r PVC, r PAC             Normal Values  Rhythm SR  HR Range    Measurements 0.12-0.20 / 0.06-0.10  / 0.30-0.52

## 2021-03-09 NOTE — THERAPY
"Speech Language Pathology   Clinical Swallow Evaluation     Patient Name: Milena Ang  AGE:  85 y.o., SEX:  female  Medical Record #: [id number]  Today's Date: 3/9/2021     Precautions: Swallow Precautions (See Comments)    Assessment    Per H&P, Patient is 85 y.o. female \"who presented 3/8/2021 with numbness and tingling to the L lateral face and ear.  She has a history of toxic multinodular goiter with resulting subclinical hyperthyroidism (not on meds), overactive bladder, kidney stones, chronic diarrhea, and HTN.\" CT head: No evidence of acute intracranial hemorrhage or mass lesion. Brain MRI pending. No recent CXR on file. No SLP hx.     Pt was seen today for CSE. Pt was AAOx4 and denied hx of dysphagia, PNA and/or GERD. Pt eats a regular diet at baseline. Oral mech exam was grossly WFL; however, Pt con't to endorse L-sided facial numbness. (Pt wonders whether new onset of facial numbness is secondary/due to recent covid vaccine that she received Saturday 3/6/21.) Otherwise, Pt denies acute cognitive-linguistic deficits and/or other symptoms. Pt was provided with PO trials (as noted below.) Pt fed herself with appropriate feeding rate and bolus size. Pt consumed single and serial sips (via cup and straw) with no subtle nor overt clinical s/sx of aspiration/penetration. Pt also consumed applesauce and pudding with no coughing/choking or other difficulties. With fruit cocktail, Pt demonstrated no clinical s/sx of aspiration/penetration with single pieces of fruit (peaches), but had increasing throat clearing with mixed consistencies of peaches, pears and pineapples with fruit juice which is concerning for possible aspiration/penetration; however, she denied globus sensation and endorsed chronic throat clearing in the morning. Pt also endorsed need for follow-up given hx of benign multinodular goiter. Pt consumed jayden cracker and had no further coughing/choking, but did have delayed coughing after a few " minutes after session.    At this time, recommend to con't current diet of Regular solids, Thin liquids with strict adherence to safe swallow precautions. Pt is okay for meds per tolerance. Please hold PO if any difficulties/concerns or change in status. Pt may benefit from outpatient ENT consult for endoscopy, to further anatomy/physiology and for outpatient SLP services to address dysphagia PRN. (Note: No recent CXR on file; recommend current CXR to provide further clinical information.) SLP following to ensure diet tolerance; will also await brain MRI results.    Note: Cognitive-linguistic evaluation order does not appear indicated at this time; will cancel/complete.    Plan    Recommend Speech Therapy 3 times per week until therapy goals are met for the following treatments:  Dysphagia Training and Patient / Family / Caregiver Education.    Discharge Recommendations: Recommend outpatient speech therapy services(as needed if new/worsening symptoms arise vs. s/p ENT consult)    Objective     03/09/21 0959   Prior Level Of Function   Communication Within Functional Limits   Swallow Within Functional Limits   Dentition Intact   Oral Motor Eval    Is Patient Able to Complete Oral Motor Eval Yes, Within Normal Limits  (endorsed L-side facial numbness)   Laryngeal Function   Voice Quality Within Functional Limits   Volutional Cough Within Functional Limits   Excursion Upon Swallow Complete   Oral Food Presentation   Single Swallow Thin (0) Within Functional Limits   Serial Swallow Thin (0) Within Functional Limits   Liquidised (3) Within Functional Limits   Pureed (4) Within Functional Limits   Soft & Bite-Sized (6) - (Dysphagia III) Within Functional Limits   Regular (7) Minimal  (mild throat clearing with mixed consistencies)   Self Feeding Independent   Dysphagia Strategies / Recommendations   Compensatory Strategies Head of Bed 90 Degrees During Eating / Drinking;Single Sips / Bites  (protective throat clearing as  needed)   Diet / Liquid Recommendation Regular (7);Thin (0)   Medication Administration  Whole with Liquid Wash   Therapy Interventions Dysphagia Therapy By Speech Language Pathologist   Short Term Goals   Short Term Goal # 1 Pt will consume PO diet of Regular/thins with no clinical s/sx of aspiration/penetration.

## 2021-03-09 NOTE — PROGRESS NOTES
2 RN Skin Check    2 RN skin check complete.   Devices in place: tele box.  Skin assessed under devices: yes.  Confirmed pressure ulcers found on: NA.  New potential pressure ulcers noted on NA. Wound consult placed N/A.  The following interventions in place NA.

## 2021-03-09 NOTE — DIETARY
"Nutrition services: Day 0 of admit.  Milena Ang is a 85 y.o. female with admitting DX of Facial numbness. DX includes bradycardia, HTN, vitamin D deficiency, dyslipidemia.     Consult received for MST of 3 on nutrition screen due to report of 14-23 lb wt loss x > 1 year and poor PO.       Assessment:  Height: 160 cm (5' 3\")  Weight: 55.9 kg (123 lb 3.8 oz)  Body mass index is 21.83 kg/m²., BMI classification: Low based on her age  Diet/Intake: Cardiac/50-75% at breakfast    Evaluation:   1. RD attempted to visit pt x 2. Other staff was in room with pt.   2. Weight hx reviewed in Epic:  1. 6/18/20=56.2 kg  2. 8/6/19=58 kg  Weight loss that is noted is not significant.   3. RD was not able to discuss PO hx with pt. PO intake this morning at breakfast was adequate.     Malnutrition Risk: criteria not met.     Recommendations/Plan:   1. Encourage intake of >50%  2. Document intake of all meals  as % taken in ADL's to provide interdisciplinary communication across all shifts.   3. Monitor weight.  4. Nutrition rep will continue to see patient for ongoing meal and snack preferences.     RD will follow.        "

## 2021-03-10 ENCOUNTER — APPOINTMENT (OUTPATIENT)
Dept: CARDIOLOGY | Facility: MEDICAL CENTER | Age: 86
End: 2021-03-10
Attending: HOSPITALIST
Payer: MEDICARE

## 2021-03-10 ENCOUNTER — NON-PROVIDER VISIT (OUTPATIENT)
Dept: CARDIOLOGY | Facility: MEDICAL CENTER | Age: 86
End: 2021-03-10
Payer: MEDICARE

## 2021-03-10 VITALS
OXYGEN SATURATION: 95 % | HEIGHT: 63 IN | RESPIRATION RATE: 16 BRPM | WEIGHT: 123.24 LBS | TEMPERATURE: 97.7 F | DIASTOLIC BLOOD PRESSURE: 70 MMHG | BODY MASS INDEX: 21.84 KG/M2 | SYSTOLIC BLOOD PRESSURE: 158 MMHG | HEART RATE: 61 BPM

## 2021-03-10 DIAGNOSIS — I63.511 ACUTE RIGHT MCA STROKE (HCC): ICD-10-CM

## 2021-03-10 DIAGNOSIS — I47.10 SVT (SUPRAVENTRICULAR TACHYCARDIA) (HCC): ICD-10-CM

## 2021-03-10 DIAGNOSIS — R00.1 BRADYCARDIA: ICD-10-CM

## 2021-03-10 LAB
LV EJECT FRACT  99904: 65
LV EJECT FRACT MOD 2C 99903: 73.13
LV EJECT FRACT MOD 4C 99902: 56.7
LV EJECT FRACT MOD BP 99901: 66.49

## 2021-03-10 PROCEDURE — 700102 HCHG RX REV CODE 250 W/ 637 OVERRIDE(OP): Performed by: FAMILY MEDICINE

## 2021-03-10 PROCEDURE — 99205 OFFICE O/P NEW HI 60 MIN: CPT | Performed by: PSYCHIATRY & NEUROLOGY

## 2021-03-10 PROCEDURE — 97165 OT EVAL LOW COMPLEX 30 MIN: CPT

## 2021-03-10 PROCEDURE — 99217 PR OBSERVATION CARE DISCHARGE: CPT | Performed by: HOSPITALIST

## 2021-03-10 PROCEDURE — 93306 TTE W/DOPPLER COMPLETE: CPT | Mod: 26 | Performed by: INTERNAL MEDICINE

## 2021-03-10 PROCEDURE — G0378 HOSPITAL OBSERVATION PER HR: HCPCS

## 2021-03-10 PROCEDURE — A9270 NON-COVERED ITEM OR SERVICE: HCPCS | Performed by: FAMILY MEDICINE

## 2021-03-10 PROCEDURE — 97161 PT EVAL LOW COMPLEX 20 MIN: CPT

## 2021-03-10 PROCEDURE — 93306 TTE W/DOPPLER COMPLETE: CPT

## 2021-03-10 RX ORDER — LISINOPRIL 40 MG/1
40 TABLET ORAL
Qty: 90 TABLET | Refills: 0 | Status: SHIPPED | OUTPATIENT
Start: 2021-03-11 | End: 2021-08-03

## 2021-03-10 RX ORDER — AMLODIPINE BESYLATE 5 MG/1
5 TABLET ORAL DAILY
Qty: 90 TABLET | Refills: 0 | Status: SHIPPED | OUTPATIENT
Start: 2021-03-11 | End: 2022-04-26 | Stop reason: SDUPTHER

## 2021-03-10 RX ORDER — ASPIRIN 325 MG
325 TABLET ORAL DAILY
Qty: 30 TABLET | Refills: 0 | Status: SHIPPED | OUTPATIENT
Start: 2021-03-11 | End: 2021-08-03

## 2021-03-10 RX ORDER — ATORVASTATIN CALCIUM 80 MG/1
80 TABLET, FILM COATED ORAL EVERY EVENING
Qty: 90 TABLET | Refills: 3 | Status: SHIPPED | OUTPATIENT
Start: 2021-03-10 | End: 2021-12-01

## 2021-03-10 RX ORDER — ATORVASTATIN CALCIUM 40 MG/1
80 TABLET, FILM COATED ORAL EVERY EVENING
Status: DISCONTINUED | OUTPATIENT
Start: 2021-03-10 | End: 2021-03-10 | Stop reason: HOSPADM

## 2021-03-10 RX ADMIN — ASPIRIN 325 MG ORAL TABLET 325 MG: 325 PILL ORAL at 06:16

## 2021-03-10 RX ADMIN — FERROUS SULFATE TAB 325 MG (65 MG ELEMENTAL FE) 325 MG: 325 (65 FE) TAB at 07:40

## 2021-03-10 ASSESSMENT — COGNITIVE AND FUNCTIONAL STATUS - GENERAL
SUGGESTED CMS G CODE MODIFIER MOBILITY: CH
MOBILITY SCORE: 24
DAILY ACTIVITIY SCORE: 24
SUGGESTED CMS G CODE MODIFIER DAILY ACTIVITY: CH

## 2021-03-10 ASSESSMENT — GAIT ASSESSMENTS
DISTANCE (FEET): 200
GAIT LEVEL OF ASSIST: SUPERVISED

## 2021-03-10 ASSESSMENT — ACTIVITIES OF DAILY LIVING (ADL): TOILETING: INDEPENDENT

## 2021-03-10 NOTE — THERAPY
Physical Therapy     Patient Name: Milena Ang  Age:  85 y.o., Sex:  female  Medical Record #: [id number]  Today's Date: 3/9/2021         03/09/21 1045   Initial Contact Note    Initial Contact Note Order Received and Verified. Physical Therapy Evaluation NOT Completed Because Patient Does Not Require Acute Physical Therapy at this Time.   Interdisciplinary Plan of Care Collaboration   IDT Collaboration with  Nursing   Collaboration Comments Spoke with RN, no acute PT needs at this time. Pts symptoms are strictly sensory in the face only, pt is ambulating Indep in room.

## 2021-03-10 NOTE — PROGRESS NOTES
Telemetry Strip     Strip printed: 1418  Measurements from am strip were as follows:  Rhythm: SB-SR  HR: 53-74  Measurements: 0.20/ 0.08/ 0.40  Ectopy: r PVC             Normal Values  Rhythm SR  HR Range    Measurements 0.12-0.20 / 0.06-0.10  / 0.30-0.52

## 2021-03-10 NOTE — THERAPY
Occupational Therapy   Initial Evaluation     Patient Name: Milena Ang  Age:  85 y.o., Sex:  female  Medical Record #: [id number]  Today's Date: 3/10/2021     Precautions  Precautions: Swallow Precautions ( See Comments)    Assessment  Patient is 85 y.o. female admitted with L sided facial numbness and tingling. 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 Pt was previously independent in all ADLs and most IADLs and resides in a 1 story home with her  (who is handicap) and her grand daughter (who assists with cooking and home management). Based on my findings I do not see any lasting deficits. Pt preformed all ADLs with ease and no noted coordination, safety, or vision impairment. Provided education on lifestyle management for stroke prevention. I anticipate that pt is safe to DC home and OT is no longer indicated at this time.     Plan    Recommend Occupational Therapy for Evaluation only    DC Equipment Recommendations: (P) None  Discharge Recommendations: (P) Anticipate that the patient will have no further occupational therapy needs after discharge from the hospital        03/10/21 1144   Prior Living Situation   Prior Services   (Home with inttermitten assist from grand daughter)   Housing / Facility 1 Story House   Steps Into Home 2   Steps In Home 0   Bathroom Set up Walk In Shower;Grab Bars;Shower Chair   Equipment Owned Grab Bar(s) In Tub / Shower;Grab Bar(s) By Toilet;Tub / Shower Seat   Lives with - Patient's Self Care Capacity Spouse;Adult Children   Comments Lives with spouse (handicap) and grand daughter who assists with IADLs as needed   Prior Level of ADL Function   Self Feeding Independent   Grooming / Hygiene Independent   Bathing Independent   Dressing Independent   Toileting Independent   Prior Level of IADL Function   Laundry Independent   Kitchen Mobility Independent   Finances Independent   Home Management  Independent   Shopping Independent   Prior Level Of Mobility Independent Without Device in Home   Driving / Transportation Driving Independent   Vitals   Pulse 61   Patient BP Position Sitting   Blood Pressure  158/70   Respiration 16   Pulse Oximetry 95 %   O2 Delivery Device None - Room Air   Cognition    Level of Consciousness Alert   Active ROM Upper Body   Active ROM Upper Body  WDL   Strength Upper Body   Upper Body Strength  WDL   Balance Assessment   Sitting Balance (Static) Normal   Sitting Balance (Dynamic) Normal   Standing Balance (Static) Normal   Standing Balance (Dynamic) Good   Weight Shift Sitting Normal   Weight Shift Standing Good   Bed Mobility    Comments In chair pre/post session    ADL Assessment   Grooming Independent   Upper Body Dressing Independent   Lower Body Dressing Independent   Toileting Modified Independent   Comments No concerns with ADLs noted at this time   How much help from another person does the patient currently need...   Putting on and taking off regular lower body clothing? 4   Bathing (including washing, rinsing, and drying)? 4   Toileting, which includes using a toilet, bedpan, or urinal? 4   Putting on and taking off regular upper body clothing? 4   Taking care of personal grooming such as brushing teeth? 4   Eating meals? 4   6 Clicks Daily Activity Score 24   Functional Mobility   Sit to Stand Independent   Mobility Ambulated to/from bathroom for toileting (observed with nursing)   Visual Perception   Visual Perception  WDL   Comments Reported some numbness, tingling   Activity Tolerance   Sitting in Chair 10 minutes   Standing 5 minutes   Education Group   Education Provided Stroke   Role of Occupational Therapist Patient Response Patient;Acceptance;Verbal Demonstration;Action Demonstration   Stroke Patient Response Patient;Acceptance;Explanation;Verbal Demonstration   Anticipated Discharge Equipment and Recommendations   DC Equipment Recommendations None   Discharge  Recommendations Anticipate that the patient will have no further occupational therapy needs after discharge from the hospital   Interdisciplinary Plan of Care Collaboration   IDT Collaboration with  Nursing   Patient Position at End of Therapy Seated;Phone within Reach;Tray Table within Reach;Call Light within Reach  (Recieving an ECHO post session)   Collaboration Comments OT findings   Session Information   Date / Session Number  3/10, #1 (OT eval only)   Priority 0

## 2021-03-10 NOTE — PROGRESS NOTES
Telemetry Strip     Strip printed: Yes  Rhythm: SB/SR   HR: 41-77  Measurements: 0.20/ 0.08/ 0.44  Ectopy: r PAC, r PVC              Normal Values  Rhythm SR  HR Range    Measurements 0.12-0.20 / 0.06-0.10  / 0.30-0.52

## 2021-03-10 NOTE — DISCHARGE INSTRUCTIONS
Discharge Instructions    Discharged to home by car with relative. Discharged via wheelchair, hospital escort: Yes.  Special equipment needed: Not Applicable    Be sure to schedule a follow-up appointment with your primary care doctor or any specialists as instructed.     Discharge Plan:   Diet Plan: Discussed  Activity Level: Discussed  Confirmed Follow up Appointment: Patient to Call and Schedule Appointment  Confirmed Symptoms Management: Discussed  Medication Reconciliation Updated: Yes  Influenza Vaccine Indication: Patient Refuses    I understand that a diet low in cholesterol, fat, and sodium is recommended for good health. Unless I have been given specific instructions below for another diet, I accept this instruction as my diet prescription.   Other diet: mediterranean      Special Instructions:     Stroke/CVA/TIA/Hemorrhagic Ischemia Discharge Instructions  You have had a stroke. Your risk factors have been identified as follows:  High blood pressure  It is important that you reduce your risk factors to avoid another stroke in the future. Here are some general guidelines to follow:  · Eat healthy - avoid food high in fat.  · Get regular exercise.  · Maintain a healthy weight.  · Avoid smoking.  · Avoid alcohol and illegal drug use.  · Take your medications as directed.  For more information regarding risk factors, refer to pages 17-19 in your Stroke Patient Education Guide. Stroke Education Guide was given to patient.    Warning signs of a stroke include (which can also be found on page 3 of your Stroke Patient Education Guide):  · Sudden numbness of weakness of the face, arm or leg (especially on one side of the body).  · Sudden confusion, trouble speaking or understanding.  · Sudden trouble seeing in one or both eyes.  · Sudden trouble walking, dizziness, loss of balance or coordination.  · Sudden severe headache with no known cause.  It is very important to get treatment quickly when a stroke occurs. If  you experience any of the above warning signs, call [phone] immediately.     Some patients who have had a stroke will be going home on a blood thinner medication called Warfarin (Coumadin).  This medication requires very close monitoring and follow up.  This follow up can be provided by either your Primary Care Physician or by [facility] Outpatient Anticoagulation Service.  The Outpatient Anticoagulation Service is located at the [facility] at [facility] ([facility]).  If you do not know when your follow up appointment is scheduled, call [phone] to verify your appointment time.      · Is patient discharged on Warfarin / Coumadin?   No     Depression / Suicide Risk    As you are discharged from this [facility], it is important to learn how to keep safe from harming yourself.    Recognize the warning signs:  · Abrupt changes in personality, positive or negative- including increase in energy   · Giving away possessions  · Change in eating patterns- significant weight changes-  positive or negative  · Change in sleeping patterns- unable to sleep or sleeping all the time   · Unwillingness or inability to communicate  · Depression  · Unusual sadness, discouragement and loneliness  · Talk of wanting to die  · Neglect of personal appearance   · Rebelliousness- reckless behavior  · Withdrawal from people/activities they love  · Confusion- inability to concentrate     If you or a loved one observes any of these behaviors or has concerns about self-harm, here's what you can do:  · Talk about it- your feelings and reasons for harming yourself  · Remove any means that you might use to hurt yourself (examples: pills, rope, extension cords, firearm)  · Get professional help from the community (Mental Health, Substance Abuse, psychological counseling)  · Do not be alone:Call your Safe Contact- someone whom you trust who will be there for you.  · Call your local  CRISIS HOTLINE 603-7385 or 265-469-6692  · Call your local Children's Mobile Crisis Response Team [place] (590) 802-7383 or www.[employer]  · Call the toll free National Suicide Prevention Hotlines   · National Suicide Prevention Lifeline 045-790-LAMN (6616)  · [place] Leaning Line Network 800-SUICIDE (691-4612)      Ischemic Stroke    An ischemic stroke (cerebrovascular accident, or CVA) is the sudden death of brain tissue that occurs when an area of the brain does not get enough oxygen. It is a medical emergency that must be treated right away. An ischemic stroke can cause permanent loss of brain function. This can cause problems with how different parts of your body function.  What are the causes?  This condition is caused by a decrease of oxygen supply to an area of the brain, which may be the result of:  A small blood clot (embolus) or a buildup of plaque in the blood vessels (atherosclerosis) that blocks blood flow in the brain.  An abnormal heart rhythm (atrial fibrillation).  A blocked or damaged artery in the head or neck.  Sometimes the cause of stroke is not known (cryptogenic).  What increases the risk?  Certain factors may make you more likely to develop this condition. Some of these factors are things that you can change, such as:  Obesity.  Smoking cigarettes.  Taking oral birth control, especially if you also use tobacco.  Physical inactivity.  Excessive alcohol use.  Use of illegal drugs, especially cocaine and methamphetamine.  Other risk factors include:  High blood pressure (hypertension).  High cholesterol.  Diabetes mellitus.  Heart disease.  Being , , , or .  Being over age 60.  Family history of stroke.  Previous history of blood clots, stroke, or transient ischemic attack (TIA).  Sickle cell disease.  Being a woman with a history of preeclampsia.  Migraine headache.  Sleep apnea.  Irregular heartbeats, such as atrial  fibrillation.  Chronic inflammatory diseases, such as rheumatoid arthritis or lupus.  Blood clotting disorders (hypercoagulable state).  What are the signs or symptoms?  Symptoms of this condition usually develop suddenly, or you may notice them after waking up from sleep. Symptoms may include sudden:  Weakness or numbness in your face, arm, or leg, especially on one side of your body.  Trouble walking or difficulty moving your arms or legs.  Loss of balance or coordination.  Confusion.  Slurred speech (dysarthria).  Trouble speaking, understanding speech, or both (aphasia).  Vision changes--such as double vision, blurred vision, or loss of vision--in one or both eyes.  Dizziness.  Nausea and vomiting.  Severe headache with no known cause. The headache is often described as the worst headache ever experienced.  If possible, make note of the exact time that you last felt like your normal self and what time your symptoms started. Tell your health care provider.  If symptoms come and go, this could be a sign of a warning stroke, or TIA. Get help right away, even if you feel better.  How is this diagnosed?  This condition may be diagnosed based on:  Your symptoms, your medical history, and a physical exam.  CT scan of the brain.  MRI.  CT angiogram. This test uses a computer to take X-rays of your arteries. A dye may be injected into your blood to show the inside of your blood vessels more clearly.  MRI angiogram. This is a type of MRI that is used to evaluate the blood vessels.  Cerebral angiogram. This test uses X-rays and a dye to show the blood vessels in the brain and neck.  You may need to see a health care provider who specializes in stroke care. A stroke specialist can be seen in person or through communication using telephone or television technology (telemedicine).  Other tests may also be done to find the cause of the stroke, such as:  Electrocardiogram (ECG).  Continuous heart  monitoring.  Echocardiogram.  Transesophageal echocardiogram (CARL).  Carotid ultrasound.  A scan of the brain circulation.  Blood tests.  Sleep study to check for sleep apnea.  How is this treated?  Treatment for this condition will depend on the duration, severity, and cause of your symptoms and on the area of the brain affected. It is very important to get treatment at the first sign of stroke symptoms. Some treatments work better if they are done within 3-6 hours of the onset of stroke symptoms. These initial treatments may include:  Aspirin.  Medicines to control blood pressure.  Medicine given by injection to dissolve the blood clot (thrombolytic).  Treatments given directly to the affected artery to remove or dissolve the blood clot.  Other treatment options may include:  Oxygen.  IV fluids.  Medicines to thin the blood (anticoagulants or antiplatelets).  Procedures to increase blood flow.  Medicines and changes to your diet may be used to help treat and manage risk factors for stroke, such as diabetes, high cholesterol, and high blood pressure.  After a stroke, you may work with physical, speech, mental health, or occupational therapists to help you recover.  Follow these instructions at home:  Medicines  Take over-the-counter and prescription medicines only as told by your health care provider.  If you were told to take a medicine to thin your blood, such as aspirin or an anticoagulant, take it exactly as told by your health care provider.  Taking too much blood-thinning medicine can cause bleeding.  If you do not take enough blood-thinning medicine, you will not have the protection that you need against another stroke and other problems.  Understand the side effects of taking anticoagulant medicine. When taking this type of medicine, make sure you:  Hold pressure over any cuts for longer than usual.  Tell your dentist and other health care providers that you are taking anticoagulants before you have any  procedures that may cause bleeding.  Avoid activities that may cause trauma or injury.  Eating and drinking  Follow instructions from your health care provider about diet.  Eat healthy foods.  If your ability to swallow was affected by the stroke, you may need to take steps to avoid choking, such as:  Taking small bites when eating.  Eating foods that are soft or pureed.  Safety  Follow instructions from your health care team about physical activity.  Use a walker or cane as told by your health care provider.  Take steps to create a safe home environment in order to reduce the risk of falls. This may include:  Having your home looked at by specialists.  Installing grab bars in the bedroom and bathroom.  Using safety equipment, such as raised toilets and a seat in the shower.  General instructions  Do not use any tobacco products, such as cigarettes, chewing tobacco, and e-cigarettes. If you need help quitting, ask your health care provider.  Limit alcohol intake to no more than 1 drink a day for nonpregnant women and 2 drinks a day for men. One drink equals 12 oz of beer, 5 oz of wine, or 1½ oz of hard liquor.  If you need help to stop using drugs or alcohol, ask your health care provider about a referral to a program or specialist.  Maintain an active and healthy lifestyle. Get regular exercise as told by your health care provider.  Keep all follow-up visits as told by your health care provider, including visits with all specialists on your health care team. This is important.  How is this prevented?  Your risk of another stroke can be decreased by managing high blood pressure, high cholesterol, diabetes, heart disease, sleep apnea, and obesity. It can also be decreased by quitting smoking, limiting alcohol, and staying physically active.  Your health care provider will continue to work with you on measures to prevent short-term and long-term complications of stroke.  Get help right away if:    You have any  "symptoms of a stroke. \"BE FAST\" is an easy way to remember the main warning signs of a stroke:  B - Balance. Signs are dizziness, sudden trouble walking, or loss of balance.  E - Eyes. Signs are trouble seeing or a sudden change in vision.  F - Face. Signs are sudden weakness or numbness of the face, or the face or eyelid drooping on one side.  A - Arms. Signs are weakness or numbness in an arm. This happens suddenly and usually on one side of the body.  S - Speech. Signs are sudden trouble speaking, slurred speech, or trouble understanding what people say.  T - Time. Time to call emergency services. Write down what time symptoms started.  You have other signs of a stroke, such as:  A sudden, severe headache with no known cause.  Nausea or vomiting.  Seizure.  These symptoms may represent a serious problem that is an emergency. Do not wait to see if the symptoms will go away. Get medical help right away. Call your local emergency services (911 in the [place] Do not drive yourself to the hospital.  Summary  An ischemic stroke (cerebrovascular accident, or CVA) is the sudden death of brain tissue that occurs when an area of the brain does not get enough oxygen.  Symptoms of this condition usually develop suddenly, or you may notice them after waking up from sleep.  It is very important to get treatment at the first sign of stroke symptoms. Stroke is a medical emergency that must be treated right away.  This information is not intended to replace advice given to you by your health care provider. Make sure you discuss any questions you have with your health care provider.  Document Released: 12/18/2006 Document Revised: 09/06/2019 Document Reviewed: 03/15/2017  Elsevier Patient Education © 2020 Elsevier Inc.    Mediterranean Diet  A Mediterranean diet refers to food and lifestyle choices that are based on the traditions of countries located on the [place]. This way of eating has been shown to help prevent " certain conditions and improve outcomes for people who have chronic diseases, like kidney disease and heart disease.  What are tips for following this plan?  Lifestyle  · Cook and eat meals together with your family, when possible.  · Drink enough fluid to keep your urine clear or pale yellow.  · Be physically active every day. This includes:  ? Aerobic exercise like running or swimming.  ? Leisure activities like gardening, walking, or housework.  · Get 7-8 hours of sleep each night.  · If recommended by your health care provider, drink red wine in moderation. This means 1 glass a day for nonpregnant women and 2 glasses a day for men. A glass of wine equals 5 oz (150 mL).  Reading food labels    · Check the serving size of packaged foods. For foods such as rice and pasta, the serving size refers to the amount of cooked product, not dry.  · Check the total fat in packaged foods. Avoid foods that have saturated fat or trans fats.  · Check the ingredients list for added sugars, such as corn syrup.  Shopping  · At the grocery store, buy most of your food from the areas near the walls of the store. This includes:  ? Fresh fruits and vegetables (produce).  ? Grains, beans, nuts, and seeds. Some of these may be available in unpackaged forms or large amounts (in bulk).  ? Fresh seafood.  ? Poultry and eggs.  ? Low-fat dairy products.  · Buy whole ingredients instead of prepackaged foods.  · Buy fresh fruits and vegetables in-season from local [place] markets.  · Buy frozen fruits and vegetables in resealable bags.  · If you do not have access to quality fresh seafood, buy precooked frozen shrimp or canned fish, such as tuna, salmon, or sardines.  · Buy small amounts of raw or cooked vegetables, salads, or olives from the deli or salad bar at your store.  · Stock your pantry so you always have certain foods on hand, such as olive oil, canned tuna, canned tomatoes, rice, pasta, and beans.  Cooking  · Cook foods with  extra-virgin olive oil instead of using butter or other vegetable oils.  · Have meat as a side dish, and have vegetables or grains as your main dish. This means having meat in small portions or adding small amounts of meat to foods like pasta or stew.  · Use beans or vegetables instead of meat in common dishes like chili or lasagna.  · [place] with different cooking methods. Try roasting or broiling vegetables instead of steaming or sautéeing them.  · Add frozen vegetables to soups, stews, pasta, or rice.  · Add nuts or seeds for added healthy fat at each meal. You can add these to yogurt, salads, or vegetable dishes.  · Marinate fish or vegetables using olive oil, lemon juice, garlic, and fresh herbs.  Meal planning    · Plan to eat 1 vegetarian meal one day each week. Try to work up to 2 vegetarian meals, if possible.  · Eat seafood 2 or more times a week.  · Have healthy snacks readily available, such as:  ? Vegetable sticks with hummus.  ? Greek yogurt.  ? Fruit and nut trail mix.  · Eat balanced meals throughout the week. This includes:  ? Fruit: 2-3 servings a day  ? Vegetables: 4-5 servings a day  ? Low-fat dairy: 2 servings a day  ? Fish, poultry, or lean meat: 1 serving a day  ? Beans and legumes: 2 or more servings a week  ? Nuts and seeds: 1-2 servings a day  ? Whole grains: 6-8 servings a day  ? Extra-virgin olive oil: 3-4 servings a day  · Limit red meat and sweets to only a few servings a month  What are my food choices?  · Mediterranean diet  ? Recommended  § Grains: Whole-grain pasta. Brown rice. Bulgar wheat. Polenta. Couscous. Whole-wheat bread. Oatmeal. Quinoa.  § Vegetables: Artichokes. Beets. Broccoli. Cabbage. Carrots. Eggplant. Green beans. Chard. Kale. Spinach. Onions. Leeks. Peas. Squash. Tomatoes. Peppers. Radishes.  § Fruits: Apples. Apricots. Avocado. Berries. Bananas. Cherries. Dates. Figs. Grapes. [place]. Melon. Oranges. Peaches. Plums. Pomegranate.  § Meats and other protein foods:  Beans. Almonds. Sunflower seeds. Pine nuts. Peanuts. Cod. [place]. Scallops. Shrimp. Tuna. Tilapia. Clams. Oysters. Eggs.  § Dairy: Low-fat milk. Cheese. Greek yogurt.  § Beverages: Water. Red wine. Herbal tea.  § Fats and oils: Extra virgin olive oil. Avocado oil. Grape seed oil.  § Sweets and desserts: Greek yogurt with honey. Baked apples. Poached pears. Trail mix.  § Seasoning and other foods: Basil. Cilantro. Coriander. Cumin. Mint. Parsley. [place]. Rosemary. Tarragon. Garlic. Oregano. Thyme. Pepper. Balsalmic vinegar. Tahini. Hummus. Tomato sauce. Olives. Mushrooms.  ? Limit these  § Grains: Prepackaged pasta or rice dishes. Prepackaged cereal with added sugar.  § Vegetables: Deep fried potatoes (french fries).  § Fruits: Fruit canned in syrup.  § Meats and other protein foods: Beef. Pork. Lamb. Poultry with skin. Hot dogs. Flowers.  § Dairy: Ice cream. Sour cream. Whole milk.  § Beverages: Juice. Sugar-sweetened soft drinks. Beer. Liquor and spirits.  § Fats and oils: Butter. Canola oil. Vegetable oil. Beef fat (tallow). Lard.  § Sweets and desserts: Cookies. Cakes. Pies. Candy.  § Seasoning and other foods: Mayonnaise. Premade sauces and marinades.  The items listed may not be a complete list. Talk with your dietitian about what dietary choices are right for you.  Summary  · The Mediterranean diet includes both food and lifestyle choices.  · Eat a variety of fresh fruits and vegetables, beans, nuts, seeds, and whole grains.  · Limit the amount of red meat and sweets that you eat.  · Talk with your health care provider about whether it is safe for you to drink red wine in moderation. This means 1 glass a day for nonpregnant women and 2 glasses a day for men. A glass of wine equals 5 oz (150 mL).  This information is not intended to replace advice given to you by your health care provider. Make sure you discuss any questions you have with your health care provider.  Document Released: 08/10/2017 Document Revised:  08/17/2017 Document Reviewed: 08/10/2017  Elsevier Patient Education © 2020 Elsevier Inc.

## 2021-03-10 NOTE — THERAPY
Physical Therapy   Initial Evaluation     Patient Name: Milena Ang  Age:  85 y.o., Sex:  female  Medical Record #: [id number]  Today's Date: 3/10/2021     Precautions: Swallow Precautions ( See Comments)    Assessment  Patient is 85 y.o. 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  Pt is presenting with continual complaints of numbness L side of face and scalp otherwise no neuro deficits present. Strength, balance, and coordination intact bilaterally. Pt is able to ambulate safely without an AD, no LOB. No further skilled PT needs at this time. DC PT.       Plan    Recommend Physical Therapy for Evaluation only     DC Equipment Recommendations: None  Discharge Recommendations: Anticipate that the patient will have no further physical therapy needs after discharge from the hospital        03/10/21 0855   Prior Living Situation   Housing / Facility 1 Story House   Steps Into Home 2   Steps In Home 0   Equipment Owned Grab Bar(s) In Tub / Shower;Grab Bar(s) By Toilet   Lives with - Patient's Self Care Capacity Spouse;Adult Children   Comments Pts spouse and granddtr live with her, granddtr assists with some IADLs   Prior Level of Functional Mobility   Bed Mobility Independent   Transfer Status Independent   Ambulation Independent   Assistive Devices Used None   Stairs Independent   Cognition    Level of Consciousness Alert   Comments Oriented x4, cooperative.   Passive ROM Lower Body   Passive ROM Lower Body Not Tested   Active ROM Lower Body    Active ROM Lower Body  WDL   Strength Lower Body   Lower Body Strength  WDL   Sensation Lower Body   Lower Extremity Sensation   WDL   Lower Body Muscle Tone   Lower Body Muscle Tone  WDL   Strength Upper Body   Upper Body Strength  WDL   Upper Body Muscle Tone   Upper Body Muscle Tone  WDL   Neurological Concerns   Neurological Concerns Yes   Comments only neuro concerns is c/o numbness and L side of face and scalp   Coordination  Upper Body   Coordination WDL   Coordination Lower Body    Coordination Lower Body  WDL   Balance Assessment   Sitting Balance (Static) Good   Sitting Balance (Dynamic) Good   Standing Balance (Static) Good   Standing Balance (Dynamic) Good   Weight Shift Sitting Good   Weight Shift Standing Good   Gait Analysis   Gait Level Of Assist Supervised   Assistive Device None   Distance (Feet) 200   # of Times Distance was Traveled 1   Comments no LOB with gait   Bed Mobility    Supine to Sit Supervised   Sit to Supine Supervised   Functional Mobility   Sit to Stand Supervised

## 2021-03-10 NOTE — CARE PLAN
Problem: Communication  Goal: The ability to communicate needs accurately and effectively will improve  Outcome: PROGRESSING AS EXPECTED     Problem: Safety  Goal: Will remain free from injury  Outcome: PROGRESSING AS EXPECTED     Problem: Venous Thromboembolism (VTW)/Deep Vein Thrombosis (DVT) Prevention:  Goal: Patient will participate in Venous Thrombosis (VTE)/Deep Vein Thrombosis (DVT)Prevention Measures  Outcome: PROGRESSING AS EXPECTED     Problem: Bowel/Gastric:  Goal: Normal bowel function is maintained or improved  Outcome: PROGRESSING AS EXPECTED     Problem: Pain Management  Goal: Pain level will decrease to patient's comfort goal  Outcome: PROGRESSING AS EXPECTED

## 2021-03-10 NOTE — CONSULTS
"Neurology STROKE Consult  Neurohospitalist Service, [facility]    Referring Physician: Imani Tuttle M.D.    HPI: Milena Ang is a 85 y.o. woman presenting for whom neurology has been consulted for focality.  As documented by Tricia Martinez 3/9/21, \"history of high cholesterol and hypertension who presents complaining of left external ear and side of head numbness since yesterday.  She reports 'a strange feeling.'  She denies rash or pain.  She states her left eye 'feels heavy'... Patient did not take her blood pressure or any of her medications yesterday evening or this morning.\"    Today, 3/10/21 the patient denies headache.  Reports last known well of 1800 on 3/8/21 while at home when she felt acute onset fatigue described as \"I didn't feel right.\"  This was followed by the sensation of numbness in her left face, described as a decrease in sensation rather than a paresthesia.  Denies vertigo, changes in vision, or changes in speech.  She has not had a similar episode in the past.  Denies vertigo.  The patient is a non-smoker, does not have exposure to secondhand smoke, and denies sleep apnea.    Review of systems: In addition to what is detailed in the HPI above, all other systems reviewed and are negative.    Past Medical History:    has a past medical history of Cataract, Cervical high risk human papillomavirus (HPV) DNA test positive, Colon polyp, Diverticulosis of colon, Dyslipidemia, Female bladder prolapse (11/04/2020), High cholesterol, Hypertension, Incontinence, Incontinence (11/04/2020), Kidney stone, Menopausal and postmenopausal disorder, OSTEOPOROSIS, and Urinary bladder disorder (11/04/2020).    FHx:  family history includes Cancer in her brother, brother, father, and sister; Heart Disease in her brother and father; Hypertension in her father; No Known Problems in her daughter and mother; Psychiatric Illness in her daughter.    SHx:   reports that she has never " "smoked. She has never used smokeless tobacco. She reports that she does not drink alcohol and does not use drugs.    Allergies:  Allergies   Allergen Reactions   • Pcn [Penicillins] Rash     \"tongue got thick\"       Medications:    Current Facility-Administered Medications:   •  atorvastatin (LIPITOR) tablet 80 mg, 80 mg, Oral, Q EVENING, Imani Tuttle M.D.  •  Pharmacy consult request - Allow for permissive hypertension: SBP up to 220 mmHg/DBP up to 120 mmHg x 48 hours, , Other, PHARMACY TO DOSE, Yumiko Holman M.D.  •  senna-docusate (PERICOLACE or SENOKOT S) 8.6-50 MG per tablet 2 tablet, 2 tablet, Oral, BID **AND** polyethylene glycol/lytes (MIRALAX) PACKET 1 Packet, 1 Packet, Oral, QDAY PRN **AND** magnesium hydroxide (MILK OF MAGNESIA) suspension 30 mL, 30 mL, Oral, QDAY PRN **AND** bisacodyl (DULCOLAX) suppository 10 mg, 10 mg, Rectal, QDAY PRN, Yumiko Holman M.D.  •  acetaminophen (Tylenol) tablet 650 mg, 650 mg, Oral, Q6HRS PRN, Yumiko Holman M.D.  •  aspirin (ASA) tablet 325 mg, 325 mg, Oral, DAILY, 325 mg at 03/10/21 0616 **OR** aspirin (ASA) chewable tab 324 mg, 324 mg, Oral, DAILY, 324 mg at 03/09/21 0538 **OR** aspirin (ASA) suppository 300 mg, 300 mg, Rectal, DAILY, Yumiko Holman M.D.  •  ferrous sulfate tablet 325 mg, 325 mg, Oral, QDAY with Breakfast, Yumiko Holman M.D., 325 mg at 03/10/21 0740  •  [DISCONTINUED] labetalol (NORMODYNE/TRANDATE) injection 10 mg, 10 mg, Intravenous, Q4HRS PRN **OR** hydrALAZINE (APRESOLINE) injection 10 mg, 10 mg, Intravenous, Q2HRS PRN, Yumiko Holman M.D.    Physical Examination:    Vitals:    03/09/21 2341 03/10/21 0404 03/10/21 0647 03/10/21 0732   BP: 139/60 (!) 96/54 (!) 166/55 136/64   Pulse: 61 (!) 54 (!) 51    Resp: 17 17 18    Temp: 36.4 °C (97.5 °F) 36.4 °C (97.6 °F) 36.6 °C (97.9 °F)    TempSrc: Oral Oral Oral    SpO2: 92% 95% 95%    Weight:       Height:           General: Patient is awake and in no acute distress  Eyes: examination of optic " disks not indicated at this time given acuity of consult  CV: RRR    NEUROLOGICAL EXAM:     Mental status: Awake, alert and fully oriented, follows commands  Speech and language: speech is not dysarthric. The patient is able to name and repeat.  Cranial nerve exam: Pupils are equal, round and reactive to light bilaterally. Visual fields are full. Extraocular muscles are intact. Sensation in the face is diminished to 90% on the left compared to the right to light touch but is equal to temperature. Face is symmetric without nasolabial fold flattening. Hearing to finger rub equal. Palate elevates symmetrically. Shoulder shrug is full. Tongue is midline.  Motor exam: Strength is 5/5 in all extremities both distally and proximally.  No pronator drift nor orbtting. Tone is normal. No abnormal movements were seen on exam.  Sensory exam: No sensory deficits identified in extremities  Deep tendon reflexes: 1+ and symmetric. Toes down-going bilaterally.  Coordination: no ataxia   Gait: normal    NIH Stroke Scale:    1a. Level of Consciousness (Alert, drowsy, etc): 0= Alert    1b. LOC Questions (Month, age): 0= Answers both correctly    1c. LOC Commands (Open/close eyes make fist/let go): 0= Obeys both correctly    2.   Best Gaze (Eyes open - patient follows examiner's finger on face): 0= Normal    3.   Visual Fields (introduce visual stimulus/threat to patient's field quadrants): 0= No visual loss  4.   Facial Paresis (Show teeth, raise eyebrows and squeeze eyes shut): 0= Normal     5a. Motor Arm - Left (Elevate arm to 90 degrees if patient is sitting, 45 degrees if  supine): 0= No drift    5b. Motor Arm - Right (Elevate arm to 90 degrees if patient is sitting, 45 degrees if supine): 0= No drift    6a. Motor Leg - Left (Elevate leg 30 degrees with patient supine): 0= No drift    6b. Motor Leg - Right  (Elevate leg 30 degrees with patient supine): 0= No drift    7.   Limb Ataxia (Finger-nose, heel down shin): 0= No  ataxia    8.   Sensory (Pin prick to face, arm, trunk and leg - compare side to side): 1= Partial loss    9.  Best Language (Name item, describe a picture and read sentences): 0= No aphasia    10. Dysarthria (Evaluate speech clarity by patient repeating listed words): 0= Normal articulation    11. Extinction and Inattention (Use information from prior testing to identify neglect or  double simultaneous stimuli testing): 0= No neglect    Total NIH Score: 1    Modified Louis Scale (MRS): 0 = No symptoms    Objective Data:    Labs:  Lab Results   Component Value Date/Time    PROTHROMBTM 13.5 10/11/2020 06:29 AM    INR 1.00 10/11/2020 06:29 AM      Lab Results   Component Value Date/Time    WBC 6.3 03/09/2021 02:29 AM    RBC 4.65 03/09/2021 02:29 AM    HEMOGLOBIN 14.5 03/09/2021 02:29 AM    HEMATOCRIT 43.9 03/09/2021 02:29 AM    MCV 94.4 03/09/2021 02:29 AM    MCH 31.2 03/09/2021 02:29 AM    MCHC 33.0 (L) 03/09/2021 02:29 AM    MPV 11.7 03/09/2021 02:29 AM    NEUTSPOLYS 55.90 03/09/2021 02:29 AM    LYMPHOCYTES 30.70 03/09/2021 02:29 AM    MONOCYTES 10.40 03/09/2021 02:29 AM    EOSINOPHILS 1.60 03/09/2021 02:29 AM    BASOPHILS 1.10 03/09/2021 02:29 AM      Lab Results   Component Value Date/Time    SODIUM 141 03/09/2021 02:29 AM    POTASSIUM 5.0 03/09/2021 02:29 AM    CHLORIDE 109 03/09/2021 02:29 AM    CO2 19 (L) 03/09/2021 02:29 AM    GLUCOSE 80 03/09/2021 02:29 AM    BUN 23 (H) 03/09/2021 02:29 AM    CREATININE 0.84 03/09/2021 02:29 AM    CREATININE 0.88 04/06/2010 12:00 AM    BUNCREATRAT 22 04/06/2010 12:00 AM    GLOMRATE >59 04/06/2010 12:00 AM      Lab Results   Component Value Date/Time    CHOLSTRLTOT 152 03/09/2021 02:29 AM    LDL 97 03/09/2021 02:29 AM    HDL 29 (A) 03/09/2021 02:29 AM    TRIGLYCERIDE 132 03/09/2021 02:29 AM       Lab Results   Component Value Date/Time    ALKPHOSPHAT 54 03/08/2021 11:48 AM    ASTSGOT 18 03/08/2021 11:48 AM    ALTSGPT 16 03/08/2021 11:48 AM    TBILIRUBIN 0.6 03/08/2021 11:48 AM         Imaging/Testing:    I interpreted and/or reviewed the patient's neuroimaging    MR-BRAIN-W/O   Final Result      1.  Tiny areas of acute infarcts in the right centrum semiovale and right posterior frontal gray matter.   2.  Mild cerebral volume loss.   3.  Moderate chronic microvascular ischemic disease.      .      US-CAROTID DOPPLER BILAT   Final Result      CT-HEAD W/O   Final Result         1.  No evidence of acute intracranial hemorrhage or mass lesion.      2. Cerebral atrophy.         EC-ECHOCARDIOGRAM COMPLETE W/ CONT    (Results Pending)       Assessment and Plan:    Milena Ang is a 85 y.o. woman with a history of HTN and HLD with intermittent medication compliance presenting for whom neurology has been consulted for numbness vs. Paresthesia.  The patient was deemed not to be a candidate for acute stroke intervention given initial clinical assessment in the [facility] ED was felt to be consistent with alternative etiology however MRI imaging has resulted in the interim revealing two small foci of acute infarction in the right cortex.  The differential diagnosis as it pertains to etiology includes large vessel disease ie. thromboembolism vs. cardioembolic phenomena.  Clinically stable and pending discharge.    Plan:    - Permissive HTN for 24-48 hours from onset of symptoms followed by goal BP <140 systolic. Long term BP goal (s/p 1-2 weeks from onset) is normotension  - obtain normoglycemia and avoid hypo- or hyper -natremia; aim for normothermia  - secondary stroke prevention therapy with antiplatelet regimen as prescribed  - high intensity statin per SPARCL Trial  - serum studies for stroke risk factors: lipid panel & hemoglobin A1C  - Obtain a 2D echocardiogram  - outpatient extended cardiac event monitoring for afib (eg. Zio patch) to be placed in clinic  - CTA head and neck may be performed out patient prior to clinic follow up as she is pending discharge from [facility]  - referral placed for  stroke bridge clinic  - Mediterranean diet    The evaluation of the patient, and recommended management, was discussed with Dr. Imani Tuttle.    Felice López MD  Neurohospitalist, Acute Care Services   of Neurology

## 2021-03-10 NOTE — PROGRESS NOTES
Telemetry Shift Summary    Rhythm NSR  HR Range 61-75  Ectopy R PAC R PVC  Measurements 0.18/0.10/0.38

## 2021-03-17 ENCOUNTER — APPOINTMENT (RX ONLY)
Dept: URBAN - METROPOLITAN AREA CLINIC 22 | Facility: CLINIC | Age: 86
Setting detail: DERMATOLOGY
End: 2021-03-17

## 2021-03-17 ENCOUNTER — HOSPITAL ENCOUNTER (OUTPATIENT)
Dept: LAB | Facility: MEDICAL CENTER | Age: 86
End: 2021-03-17
Attending: INTERNAL MEDICINE
Payer: MEDICARE

## 2021-03-17 ENCOUNTER — OFFICE VISIT (OUTPATIENT)
Dept: NEUROLOGY | Facility: MEDICAL CENTER | Age: 86
End: 2021-03-17
Attending: NURSE PRACTITIONER
Payer: MEDICARE

## 2021-03-17 VITALS
TEMPERATURE: 96.7 F | WEIGHT: 120.37 LBS | OXYGEN SATURATION: 96 % | BODY MASS INDEX: 21.33 KG/M2 | DIASTOLIC BLOOD PRESSURE: 62 MMHG | SYSTOLIC BLOOD PRESSURE: 120 MMHG | HEIGHT: 63 IN | HEART RATE: 57 BPM

## 2021-03-17 DIAGNOSIS — Z85.828 PERSONAL HISTORY OF OTHER MALIGNANT NEOPLASM OF SKIN: ICD-10-CM

## 2021-03-17 DIAGNOSIS — D18.0 HEMANGIOMA: ICD-10-CM

## 2021-03-17 DIAGNOSIS — L72.8 OTHER FOLLICULAR CYSTS OF THE SKIN AND SUBCUTANEOUS TISSUE: ICD-10-CM

## 2021-03-17 DIAGNOSIS — Z71.89 OTHER SPECIFIED COUNSELING: ICD-10-CM

## 2021-03-17 DIAGNOSIS — E78.2 MIXED HYPERLIPIDEMIA: ICD-10-CM

## 2021-03-17 DIAGNOSIS — L82.0 INFLAMED SEBORRHEIC KERATOSIS: ICD-10-CM

## 2021-03-17 DIAGNOSIS — D22 MELANOCYTIC NEVI: ICD-10-CM

## 2021-03-17 DIAGNOSIS — R73.03 PREDIABETES: ICD-10-CM

## 2021-03-17 DIAGNOSIS — I69.30 LATE EFFECT OF STROKE: ICD-10-CM

## 2021-03-17 DIAGNOSIS — L81.4 OTHER MELANIN HYPERPIGMENTATION: ICD-10-CM

## 2021-03-17 DIAGNOSIS — L57.0 ACTINIC KERATOSIS: ICD-10-CM

## 2021-03-17 DIAGNOSIS — I10 ESSENTIAL HYPERTENSION: ICD-10-CM

## 2021-03-17 DIAGNOSIS — L82.1 OTHER SEBORRHEIC KERATOSIS: ICD-10-CM

## 2021-03-17 PROBLEM — D18.01 HEMANGIOMA OF SKIN AND SUBCUTANEOUS TISSUE: Status: ACTIVE | Noted: 2021-03-17

## 2021-03-17 PROBLEM — D48.5 NEOPLASM OF UNCERTAIN BEHAVIOR OF SKIN: Status: ACTIVE | Noted: 2021-03-17

## 2021-03-17 PROBLEM — D22.5 MELANOCYTIC NEVI OF TRUNK: Status: ACTIVE | Noted: 2021-03-17

## 2021-03-17 PROCEDURE — 99212 OFFICE O/P EST SF 10 MIN: CPT | Performed by: NURSE PRACTITIONER

## 2021-03-17 PROCEDURE — ? INCISION AND DRAINAGE

## 2021-03-17 PROCEDURE — 17003 DESTRUCT PREMALG LES 2-14: CPT

## 2021-03-17 PROCEDURE — 11102 TANGNTL BX SKIN SINGLE LES: CPT

## 2021-03-17 PROCEDURE — 82784 ASSAY IGA/IGD/IGG/IGM EACH: CPT

## 2021-03-17 PROCEDURE — 36415 COLL VENOUS BLD VENIPUNCTURE: CPT

## 2021-03-17 PROCEDURE — ? ADDITIONAL NOTES

## 2021-03-17 PROCEDURE — ? SUNSCREEN RECOMMENDATIONS

## 2021-03-17 PROCEDURE — 83516 IMMUNOASSAY NONANTIBODY: CPT

## 2021-03-17 PROCEDURE — ? PHOTO-DOCUMENTATION

## 2021-03-17 PROCEDURE — 10060 I&D ABSCESS SIMPLE/SINGLE: CPT

## 2021-03-17 PROCEDURE — 17000 DESTRUCT PREMALG LESION: CPT | Mod: 59

## 2021-03-17 PROCEDURE — ? LIQUID NITROGEN

## 2021-03-17 PROCEDURE — ? COUNSELING

## 2021-03-17 PROCEDURE — 99213 OFFICE O/P EST LOW 20 MIN: CPT | Mod: 25

## 2021-03-17 PROCEDURE — 99215 OFFICE O/P EST HI 40 MIN: CPT | Performed by: NURSE PRACTITIONER

## 2021-03-17 PROCEDURE — ? BIOPSY BY SHAVE METHOD

## 2021-03-17 ASSESSMENT — ENCOUNTER SYMPTOMS
FOCAL WEAKNESS: 0
BRUISES/BLEEDS EASILY: 0
TINGLING: 0
BLURRED VISION: 0
NECK PAIN: 0
WEAKNESS: 0
NAUSEA: 0
FEVER: 0
VOMITING: 0
SENSORY CHANGE: 1
BACK PAIN: 0
DEPRESSION: 0
COUGH: 1
CHILLS: 0
PALPITATIONS: 0
MEMORY LOSS: 0
DIZZINESS: 0
NERVOUS/ANXIOUS: 0
HEADACHES: 0
HEARTBURN: 0

## 2021-03-17 ASSESSMENT — LOCATION DETAILED DESCRIPTION DERM
LOCATION DETAILED: EPIGASTRIC SKIN
LOCATION DETAILED: LEFT SUPERIOR FOREHEAD
LOCATION DETAILED: RIGHT ANTERIOR PROXIMAL THIGH
LOCATION DETAILED: RIGHT MEDIAL SUPERIOR CHEST
LOCATION DETAILED: LEFT INFERIOR FOREHEAD
LOCATION DETAILED: LEFT SUPERIOR MEDIAL UPPER BACK
LOCATION DETAILED: LEFT PROXIMAL DORSAL FOREARM
LOCATION DETAILED: RIGHT PROXIMAL DORSAL FOREARM
LOCATION DETAILED: LEFT ANTERIOR PROXIMAL THIGH
LOCATION DETAILED: RIGHT SUPERIOR FOREHEAD
LOCATION DETAILED: RIGHT SUPERIOR MEDIAL MIDBACK

## 2021-03-17 ASSESSMENT — LOCATION ZONE DERM
LOCATION ZONE: TRUNK
LOCATION ZONE: LEG
LOCATION ZONE: FACE
LOCATION ZONE: ARM

## 2021-03-17 ASSESSMENT — LOCATION SIMPLE DESCRIPTION DERM
LOCATION SIMPLE: RIGHT FOREARM
LOCATION SIMPLE: LEFT FOREHEAD
LOCATION SIMPLE: LEFT THIGH
LOCATION SIMPLE: ABDOMEN
LOCATION SIMPLE: LEFT UPPER BACK
LOCATION SIMPLE: LEFT FOREARM
LOCATION SIMPLE: RIGHT FOREHEAD
LOCATION SIMPLE: RIGHT LOWER BACK
LOCATION SIMPLE: CHEST
LOCATION SIMPLE: RIGHT THIGH

## 2021-03-17 ASSESSMENT — FIBROSIS 4 INDEX: FIB4 SCORE: 2.16

## 2021-03-17 NOTE — PROCEDURE: BIOPSY BY SHAVE METHOD
Detail Level: Detailed
Depth Of Biopsy: dermis
Was A Bandage Applied: Yes
Size Of Lesion In Cm: 1
X Size Of Lesion In Cm: 0
Biopsy Type: H and E
Biopsy Method: Personna blade
Anesthesia Type: 1% lidocaine with 1:100,000 epinephrine and a 1:3 solution of 8.4% sodium bicarbonate
Hemostasis: Drysol
Wound Care: Vaseline
Dressing: bandage
Destruction After The Procedure: No
Type Of Destruction Used: Curettage
Curettage Text: The wound bed was treated with curettage after the biopsy was performed.
Cryotherapy Text: The wound bed was treated with cryotherapy after the biopsy was performed.
Electrodesiccation Text: The wound bed was treated with electrodesiccation after the biopsy was performed.
Electrodesiccation And Curettage Text: The wound bed was treated with electrodesiccation and curettage after the biopsy was performed.
Silver Nitrate Text: The wound bed was treated with silver nitrate after the biopsy was performed.
Lab: [id number]
Lab Facility: 
Consent: Written consent was obtained and risks were reviewed including but not limited to scarring, infection, bleeding, scabbing, incomplete removal, nerve damage and allergy to anesthesia.
Post-Care Instructions: I reviewed with the patient in detail post-care instructions. Patient is to keep the biopsy site dry overnight, and then apply bacitracin twice daily until healed. Patient may apply hydrogen peroxide soaks to remove any crusting.
Notification Instructions: Patient will be notified of biopsy results. However, patient instructed to call the office if not contacted within 2 weeks.
Billing Type: Third-Party Bill
Information: Selecting Yes will display possible errors in your note based on the variables you have selected. This validation is only offered as a suggestion for you. PLEASE NOTE THAT THE VALIDATION TEXT WILL BE REMOVED WHEN YOU FINALIZE YOUR NOTE. IF YOU WANT TO FAX A PRELIMINARY NOTE YOU WILL NEED TO TOGGLE THIS TO 'NO' IF YOU DO NOT WANT IT IN YOUR FAXED NOTE.

## 2021-03-17 NOTE — PROCEDURE: INCISION AND DRAINAGE
Detail Level: Simple
Lesion Type: Cyst
Method: 11 blade
Curette: No
Size Of Lesion In Cm (Optional But May Be Required For Some Insurances): 0
Wound Care: Vaseline
Dressing: dry sterile dressing
Epidermal Sutures: 4-0 Ethilon
Epidermal Closure: simple interrupted
Suture Text: The incision was partially closed with
Preparation Text: The area was prepped in the usual clean fashion.
Curette Text (Optional): After the contents were expressed a curette was used to partially remove the cyst wall.
Consent was obtained and risks were reviewed including but not limited to delayed wound healing, infection, need for multiple I and D's, and pain.
Post-Care Instructions: I reviewed with the patient in detail post-care instructions. Patient should keep wound covered and call the office should any redness, pain, swelling or worsening occur.

## 2021-03-17 NOTE — PATIENT INSTRUCTIONS
If any new signs of stroke:  sudden weakness, numbness, speech difficulty (slurring or difficulty finding words), imbalance, incoordination, worse headache of life or vision loss occur, call 911.      Take all medications as prescribed unless instructed by your provider.        Stroke Prevention  Some medical conditions and lifestyle choices can lead to a higher risk for a stroke. You can help to prevent a stroke by making nutrition, lifestyle, and other changes.  What nutrition changes can be made?    · Eat healthy foods.  ? Choose foods that are high in fiber. These include:  § Fresh fruits.  § Fresh vegetables.  § Whole grains.  ? Eat at least 5 or more servings of fruits and vegetables each day. Try to fill half of your plate at each meal with fruits and vegetables.  ? Choose lean protein foods. These include:  § Lowfat (lean) cuts of meat.  § Chicken without skin.  § Fish.  § Tofu.  § Beans.  § Nuts.  ? Eat low-fat dairy products.  ? Avoid foods that:  § Are high in salt (sodium).  § Have saturated fat.  § Have trans fat.  § Have cholesterol.  § Are processed.  § Are premade.  · Follow eating guidelines as told by your doctor. These may include:  ? Reducing how many calories you eat and drink each day.  ? Limiting how much salt you eat or drink each day to 1,500 milligrams (mg).  ? Using only healthy fats for cooking. These include:  § Olive oil.  § Canola oil.  § Sunflower oil.  ? Counting how many carbohydrates you eat and drink each day.  What lifestyle changes can be made?  · Try to stay at a healthy weight. Talk to your doctor about what a good weight is for you.  · Get at least 30 minutes of moderate physical activity at least 5 days a week. This can include:  ? Fast walking.  ? Biking.  ? Swimming.  · Do not use any products that have nicotine or tobacco. This includes cigarettes and e-cigarettes. If you need help quitting, ask your doctor. Avoid being around tobacco smoke in general.  · Limit how much  "alcohol you drink to no more than 1 drink a day for nonpregnant women and 2 drinks a day for men. One drink equals 12 oz of beer, 5 oz of wine, or 1½ oz of hard liquor.  · Do not use drugs.  · Avoid taking birth control pills. Talk to your doctor about the risks of taking birth control pills if:  ? You are over 35 years old.  ? You smoke.  ? You get migraines.  ? You have had a blood clot.  What other changes can be made?  · Manage your cholesterol.  ? It is important to eat a healthy diet.  ? If your cholesterol cannot be managed through your diet, you may also need to take medicines. Take medicines as told by your doctor.  · Manage your diabetes.  ? It is important to eat a healthy diet and to exercise regularly.  ? If your blood sugar cannot be managed through diet and exercise, you may need to take medicines. Take medicines as told by your doctor.  · Control your high blood pressure (hypertension).  ? Try to keep your blood pressure below 130/80. This can help lower your risk of stroke.  ? It is important to eat a healthy diet and to exercise regularly.  ? If your blood pressure cannot be managed through diet and exercise, you may need to take medicines. Take medicines as told by your doctor.  ? Ask your doctor if you should check your blood pressure at home.  ? Have your blood pressure checked every year. Do this even if your blood pressure is normal.  · Talk to your doctor about getting checked for a sleep disorder. Signs of this can include:  ? Snoring a lot.  ? Feeling very tired.  · Take over-the-counter and prescription medicines only as told by your doctor. These may include aspirin or blood thinners (antiplatelets or anticoagulants).  · Make sure that any other medical conditions you have are managed.  Where to find more information  · American Stroke Association: www.strokeassociation.org  · National Stroke Association: www.stroke.org  Get help right away if:  · You have any symptoms of stroke. \"BE " "FAST\" is an easy way to remember the main warning signs:  ? B - Balance. Signs are dizziness, sudden trouble walking, or loss of balance.  ? E - Eyes. Signs are trouble seeing or a sudden change in how you see.  ? F - Face. Signs are sudden weakness or loss of feeling of the face, or the face or eyelid drooping on one side.  ? A - Arms. Signs are weakness or loss of feeling in an arm. This happens suddenly and usually on one side of the body.  ? S - Speech. Signs are sudden trouble speaking, slurred speech, or trouble understanding what people say.  ? T - Time. Time to call emergency services. Write down what time symptoms started.  · You have other signs of stroke, such as:  ? A sudden, very bad headache with no known cause.  ? Feeling sick to your stomach (nausea).  ? Throwing up (vomiting).  ? Jerky movements you cannot control (seizure).  These symptoms may represent a serious problem that is an emergency. Do not wait to see if the symptoms will go away. Get medical help right away. Call your local emergency services (911 in the [place] Do not drive yourself to the hospital.  Summary  · You can prevent a stroke by eating healthy, exercising, not smoking, drinking less alcohol, and treating other health problems, such as diabetes, high blood pressure, or high cholesterol.  · Do not use any products that contain nicotine or tobacco, such as cigarettes and e-cigarettes.  · Get help right away if you have any signs or symptoms of a stroke.  This information is not intended to replace advice given to you by your health care provider. Make sure you discuss any questions you have with your health care provider.  Document Released: 06/18/2013 Document Revised: 02/13/2020 Document Reviewed: 03/21/2018  Elsevier Patient Education © 2020 [employer]    "

## 2021-03-17 NOTE — PROCEDURE: PHOTO-DOCUMENTATION
Details (Free Text): Verbal consent obtained from the patient today
Detail Level: Detailed
Photo Preface (Leave Blank If You Do Not Want): Photographs were obtained today

## 2021-03-17 NOTE — PROGRESS NOTES
Subjective:    Milena Ang  is an 85 y.o. left handed female who presents to [facility] for evaluation of right cortical infarcts.       She presented to [facility] on 3/8/2021 with complaints of left sided facial numbness.    PMH includes  HLD, non-rheumatic aortic valve insufficiency, HTN, bradycardia, and incontinence.   Social History:  Denies  History of smoking, denies alcohol or drug use. She lives with  and grand-daughter.   Family Hx:  Cancer, heart disease, HTN,     She was not on Aspirin prior to stroke. She was on 40mg of Atorvastatin. At discharge she was given ASA 325mg and Atorvastatin 80mg.     She is here alone today, she still has numbness on the left side of the head; otherwise she is at baseline, denies any other neurological deficits.          Review of Systems   Constitutional: Negative for chills and fever.   HENT: Negative for hearing loss.    Eyes: Negative for blurred vision.   Respiratory: Positive for cough.    Cardiovascular: Negative for chest pain and palpitations.   Gastrointestinal: Negative for heartburn, nausea and vomiting.   Genitourinary: Negative.  Negative for dysuria and urgency.   Musculoskeletal: Negative for back pain and neck pain.   Neurological: Positive for sensory change. Negative for dizziness, tingling, focal weakness, weakness and headaches.   Endo/Heme/Allergies: Does not bruise/bleed easily.   Psychiatric/Behavioral: Negative for depression and memory loss. The patient is not nervous/anxious.        Current Outpatient Medications on File Prior to Visit   Medication Sig Dispense Refill   • lisinopril (PRINIVIL) 40 MG tablet Take 1 tablet by mouth every day. 90 tablet 0   • amLODIPine (NORVASC) 5 MG Tab Take 1 tablet by mouth every day. 90 tablet 0   • aspirin (ASA) 325 MG Tab Take 1 tablet by mouth every day. 30 tablet 0   • atorvastatin (LIPITOR) 80 MG tablet Take 1 tablet by mouth every evening. 90 tablet 3   •  diphenhydrAMINE HCl (ALLERGY MEDICINE PO) Take 1 tablet by mouth as needed (For allergies).     • Ferrous Sulfate (IRON PO) Take 1 Tab by mouth every day.     • oxybutynin SR (DITROPAN-XL) 10 MG CR tablet Take 1 Tab by mouth every day. 90 Tab 3   • VITAMIN D PO Take 1 capsule by mouth every day.       No current facility-administered medications on file prior to visit.     Past Medical History:   Diagnosis Date   • Cataract     11-4-2020 H/O IOL OU   • Cervical high risk human papillomavirus (HPV) DNA test positive    • Colon polyp     hyperplastic   • Diverticulosis of colon    • Dyslipidemia    • Female bladder prolapse 11/04/2020   • High cholesterol    • Hypertension    • Incontinence     pessary   • Incontinence 11/04/2020    Pt. states does not have a pessary in place.   • Kidney stone    • Menopausal and postmenopausal disorder    • OSTEOPOROSIS    • Urinary bladder disorder 11/04/2020    Bladder Prolapse      Objective:   I personally reviewed imaging below and agree with findings:    1Tiny areas of acute infarcts in the right centrum semiovale and right posterior frontal gray matter.  2.  Mild cerebral volume loss.  3.  Moderate chronic microvascular ischemic disease.     Carotid US  Right carotid.    Very mild plaque of the carotid bifurcation. Doppler velocities are normal.    Plaque is smooth on the surface and homogeneous with low acoustic density.    The internal carotid artery is tortuous.       Left carotid.    Flow velocities and Doppler waveforms are normal throughout the carotid    system without evidence of plaque.    The internal carotid artery is tortuous.       Bilateral subclavian and vertebral artery waveforms are antegrade and    normal in character and velocity.     TTE:  LVEF  65%, normal diastolic function, mildly dilated LA.      Stroke Labs:  Creat 0.83, LDL 97, A1C 5.7.     Encounter Vitals  Standard Vitals  Vitals  Blood Pressure : 120/62  Temperature: 35.9 °C (96.7 °F)  Temp src:  "Temporal  Pulse: (!) 57  Pulse Oximetry: 96 %  Height: 160 cm (5' 3\")  Weight: 54.6 kg (120 lb 5.9 oz)  Encounter Vitals  Temperature: 35.9 °C (96.7 °F)  Temp src: Temporal  Blood Pressure : 120/62  Pulse: (!) 57  Pulse Oximetry: 96 %  Weight: 54.6 kg (120 lb 5.9 oz)  Height: 160 cm (5' 3\")  BMI (Calculated): 21.32      LMP 07/01/1986      Physical Exam      Constitutional:  Alert, no apparent distress,  Psych:   mood and affect WNL  Neuro:  Oriented X 4, speech fluent, naming and memory intact  Muskuloskeletal:  Moves all extremities equally, strength 5/5 bilaterally, no drift  CN II: Visual fields are full to confrontation. Fundoscopic exam is normal with sharp discs and no vascular changes. Pupils are 3mm and briskly reactive to light.   CN III, IV, VI  EOMs intact, no ptosis  CN V: Facial sensation is intact to pinprick in all 3 divisions bilaterally. Corneal responses are intact.  CN VII: Face is symmetric with normal eye closure and smile.  CN VII: Hearing is normal to rubbing fingers  CN IX, X: Palate elevates symmetrically. Phonation is normal.  CN XI: Head turning and shoulder shrug are intact  CN XII: Tongue is midline with normal movements and no atrophy.                           Sensation to PP equal bilaterally                 No limb ataxia with finger to nose and heel to shin                 Ambulates with steady gait.                 Rhomberg negative                Biceps,brachioradialis, tricep, patellar and ankle reflex all 2+     Cardiovascular:    S1S2, no abnormal rhythm auscultated, no peripheral edema  Neck:                     No carotid bruits noted   Pulmonary:            Respirations easy, lungs clear to auscultation all fields.     Skin:                     No obvious rashes.      Iniital NIHSS   unknown      Current NIHSS    1a. LOC: 0  1b. LOC Questions: 0  1c. LOC Commands: 0  2. Best Gaze:0  3. Visual Fields: 0  4. Facial Paresis: 0  5a. Motor arm left: 0  5b. Motor arm right: " 0  6a. Motor leg left: 0  6b. Motor leg right: 0  7. Sensory: 0  8. Best Language: 0  9. Limb Ataxia: 0  10. Dysarthria: 0  11. Extinction/Inattention: 0    Total Score Current  0          Current mRS  1           Assessment/Plan:     1. Late effect of stroke:  Multifocal right cortical infarcts.  Likely etiology is embolic.  CTA of head was deferred in the hospital.  She has residual subjective numbness of left side of head, no other deficits.    She does not have any rehabilitation needs at this time.     Presumed mechanism by TOAST:  __Large Artery Atherosclerosis  __Small Vessel (Lacunar)  __Cardioembolic  __Other (Sickle Cell, Vasculitis, Hypercoagulable)  __Unknown  XX__ESUS      zio patch placed today  Will return in 14 days for replacement.  I have instructed her to remove the patch the night before and mail back.  She should shower before the appointment so that the area is clean and dry and ready to be re-prepped for the the patch.     CTA of head pending.      2. Essential hypertension  Blood pressure goal less than 130/80, at goal today, continue follow up with PCP    3. Mixed hyperlipidemia    LDL goal < 70, current 97, continue Atorvastatin 80mg, discussed medication side effects, will need follow up with primary care evaluate liver function and intervals and refill  Exercise at least 30 minutes daily, avoid red meat, fried foods, butter, cheese.   Eat 5-6 servings of vegetables and fruits daily, choose lean white meat without skin (chicken, turkey, white fish)--baked, broiled or grilled.    4. Prediabetes  A1C 5.7, discussed dietary and exercise modifications.      I spent a total of 61 minutes caring for patient,  my time includes counseling, review of systems, HPI and assessment, review of images, labs and testing as above.  I reviewed the hospital records, PMH, social and family history. Applying cardiac monitor and proving instruction.       I have counseled patient on stroke prevention  strategies, stroke symptoms and mimics.  Diet and exercise modifications.  We discussed medication side effects and instructions.     If any new signs of stroke:  sudden weakness, numbness, speech difficulty (slurring or difficulty finding words), imbalance, incoordination, worse headache of life or vision loss occur, call 911.      Take all medications as prescribed unless instructed by your provider.      I have provided patient a written personalized stroke prevention plan, it is filed under the media tab under ‘[facility]”.    Follow up 3/31/2021 for Zio Patch replacment.

## 2021-03-19 ENCOUNTER — HOSPITAL ENCOUNTER (OUTPATIENT)
Facility: MEDICAL CENTER | Age: 86
End: 2021-03-19
Attending: INTERNAL MEDICINE
Payer: MEDICARE

## 2021-03-19 LAB
G LAMBLIA+C PARVUM AG STL QL RAPID: NORMAL
IGA SERPL-MCNC: 292 MG/DL (ref 68–408)
SIGNIFICANT IND 70042: NORMAL
SITE SITE: NORMAL
SOURCE SOURCE: NORMAL

## 2021-03-19 PROCEDURE — 83993 ASSAY FOR CALPROTECTIN FECAL: CPT

## 2021-03-19 PROCEDURE — 87899 AGENT NOS ASSAY W/OPTIC: CPT | Mod: 91

## 2021-03-19 PROCEDURE — 83986 ASSAY PH BODY FLUID NOS: CPT

## 2021-03-19 PROCEDURE — 87329 GIARDIA AG IA: CPT

## 2021-03-19 PROCEDURE — 87045 FECES CULTURE AEROBIC BACT: CPT

## 2021-03-19 PROCEDURE — 87328 CRYPTOSPORIDIUM AG IA: CPT

## 2021-03-19 PROCEDURE — 87493 C DIFF AMPLIFIED PROBE: CPT

## 2021-03-19 PROCEDURE — 89055 LEUKOCYTE ASSESSMENT FECAL: CPT

## 2021-03-19 PROCEDURE — 36415 COLL VENOUS BLD VENIPUNCTURE: CPT

## 2021-03-19 PROCEDURE — 87324 CLOSTRIDIUM AG IA: CPT | Mod: XU

## 2021-03-20 LAB
C DIFF DNA SPEC QL NAA+PROBE: NEGATIVE
C DIFF TOX A+B STL QL IA: NEGATIVE
C DIFF TOX GENS STL QL NAA+PROBE: NORMAL
E COLI SXT1+2 STL IA: NORMAL
SIGNIFICANT IND 70042: NORMAL
SITE SITE: NORMAL
SOURCE SOURCE: NORMAL
WBC STL QL MICRO: NORMAL

## 2021-03-21 LAB
BACTERIA STL CULT: NORMAL
C JEJUNI+C COLI AG STL QL: NORMAL
E COLI SXT1+2 STL IA: NORMAL
MISCELLANEOUS LAB RESULT MISCLAB: NORMAL
SIGNIFICANT IND 70042: NORMAL
SITE SITE: NORMAL
SOURCE SOURCE: NORMAL

## 2021-03-22 ENCOUNTER — OFFICE VISIT (OUTPATIENT)
Dept: MEDICAL GROUP | Facility: PHYSICIAN GROUP | Age: 86
End: 2021-03-22
Payer: MEDICARE

## 2021-03-22 ENCOUNTER — NURSE TRIAGE (OUTPATIENT)
Dept: HEALTH INFORMATION MANAGEMENT | Facility: OTHER | Age: 86
End: 2021-03-22

## 2021-03-22 VITALS
SYSTOLIC BLOOD PRESSURE: 110 MMHG | HEART RATE: 61 BPM | HEIGHT: 63 IN | RESPIRATION RATE: 14 BRPM | TEMPERATURE: 97.2 F | OXYGEN SATURATION: 96 % | WEIGHT: 120 LBS | DIASTOLIC BLOOD PRESSURE: 68 MMHG | BODY MASS INDEX: 21.26 KG/M2

## 2021-03-22 DIAGNOSIS — Z86.73 HISTORY OF CVA (CEREBROVASCULAR ACCIDENT): ICD-10-CM

## 2021-03-22 DIAGNOSIS — R20.0 LEFT FACIAL NUMBNESS: ICD-10-CM

## 2021-03-22 DIAGNOSIS — I10 ESSENTIAL HYPERTENSION: ICD-10-CM

## 2021-03-22 DIAGNOSIS — R63.4 WEIGHT LOSS: ICD-10-CM

## 2021-03-22 DIAGNOSIS — E78.5 DYSLIPIDEMIA: ICD-10-CM

## 2021-03-22 DIAGNOSIS — Z09 HOSPITAL DISCHARGE FOLLOW-UP: ICD-10-CM

## 2021-03-22 LAB
CALPROTECTIN STL-MCNT: 68 UG/G
GLIADIN PEPTIDE+TTG IGA+IGG SER QL IA: 7 UNITS (ref 0–19)

## 2021-03-22 PROCEDURE — 99214 OFFICE O/P EST MOD 30 MIN: CPT | Performed by: PHYSICIAN ASSISTANT

## 2021-03-22 ASSESSMENT — FIBROSIS 4 INDEX: FIB4 SCORE: 2.16

## 2021-03-22 NOTE — TELEPHONE ENCOUNTER
ED on 3/8 given statin & BP med, she feels like the statin is too strong, also losing wt was 130 pounds 6 months ago, now 118 this morning.  She did not realize Lipitor was a statin.  She has questions about her meds.      No travel, no known covid exposure, no symptoms.      Has had loose bowels for 6 months.  Tested for covid on 3/8 & had received both covid vaccine.

## 2021-03-22 NOTE — PROGRESS NOTES
CC:   Chief Complaint   Patient presents with   • ED Follow-Up   • Dyslipidemia   • Hypertension          HISTORY OF PRESENT ILLNESS: Patient is a 85 y.o. female established patient who presents today to establish care with me and discuss the following issues:      Hospital discharge follow-up  Patient was admitted into [facility] March 8 through March 10, 2021, complaint of left-sided ear and head numbness.  Initially had a CT the head that showed no abnormalities.  She was admitted to telemetry and underwent a cardiac marker evaluation and echocardiogram.  She also had an MRI of the brain which did show an acute CVA in the right centrum semiovale and right posterior frontal gray matter in tiny areas.  Demonstrated that stroke was most likely embolic.  She was placed on aspirin and a statin.  Neurology was consulted and she was referred to the stroke clinic outpatient.  Patient was sent with orders for PT and OT.  Upon discharge patient had no neurological deficits and was back to her baseline.  She was put on atorvastatin 80 mg daily.    OT and PT, she was discharged, because no movement deficits.     Dyslipidemia  This is a chronic condition.   Latest Labs:   Lab Results   Component Value Date/Time    CHOLSTRLTOT 152 03/09/2021 02:29 AM    LDL 97 03/09/2021 02:29 AM    HDL 29 (A) 03/09/2021 02:29 AM    TRIGLYCERIDE 132 03/09/2021 02:29 AM      Medications: was initiated atorvastatin 80 mg    Family History of high cholesterol or heart disease? yes  Recent stroke- put on this.    Patient has been more fatigued the last few days- but changes days. Some days she has extreme fatigue, other days more her normal herself.     Essential hypertension  On lisinopril 40 mg in AM and amlodipine 5 mg at night. BP is very good today at 110/68.     Left facial numbness  Left facial numbness persists since onset 2 weeks ago when she went to ED.     Weight loss  Patient continues to lose weight, again discussed with the patient I  think this could be secondary to her overactive thyroid.  She has a follow-up with endocrinology next Tuesday.  She also continues to have intermittent diarrhea in which she is being followed for gastroenterology.  Again discussed with patient that this could be a secondary side effect to her low TSH.  Upon reviewing her labs again with her today we discussed how it is reassuring that her free T3 and free T4 are within normal limits.  But I think that this could be contributing to some of the symptoms that she has been experiencing off and on the last several months.      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 10/11/2020   • Bradycardia 03/08/2021   • Essential hypertension 01/14/2015   • Subclinical hyperthyroidism 03/08/2021   • Dyslipidemia    • History of CVA (cerebrovascular accident) 03/22/2021   • Weight loss 03/22/2021   • Hospital discharge follow-up 03/03/2021   • Functional diarrhea 03/03/2021   • Low TSH level 02/08/2021   • Allergies 02/08/2021   • Fatigue 07/03/2020   • Elevated glucose 08/07/2019   • Overactive bladder 08/06/2018   • Vitamin D deficiency 11/18/2017   • Nonrheumatic aortic valve insufficiency 05/02/2017   • Osteopenia of spine 05/02/2017   • Murmur 03/23/2017   • Incontinence    • Diverticulosis of colon       Allergies:Pcn [penicillins]    Current Outpatient Medications   Medication Sig Dispense Refill   • lisinopril (PRINIVIL) 40 MG tablet Take 1 tablet by mouth every day. 90 tablet 0   • amLODIPine (NORVASC) 5 MG Tab Take 1 tablet by mouth every day. 90 tablet 0   • aspirin (ASA) 325 MG Tab Take 1 tablet by mouth every day. 30 tablet 0   • atorvastatin (LIPITOR) 80 MG tablet Take 1 tablet by mouth every evening. 90 tablet 3   • Ferrous Sulfate (IRON PO) Take 1 Tab by mouth every day.     • oxybutynin SR (DITROPAN-XL) 10 MG CR tablet Take 1 Tab by mouth every day. 90 Tab 3  "  • VITAMIN D PO Take 1 capsule by mouth every day.       No current facility-administered medications for this visit.       Social History     Tobacco Use   • Smoking status: Never Smoker   • Smokeless tobacco: Never Used   Substance Use Topics   • Alcohol use: No   • Drug use: No     Social History     Social History Narrative   • Not on file       Family History   Problem Relation Age of Onset   • Cancer Father         pancreatic   • Heart Disease Father         MI at age 57   • Hypertension Father    • Cancer Brother         melanoma   • Heart Disease Brother    • No Known Problems Mother    • Cancer Sister         ovaren cancer   • Cancer Brother         throat   • Psychiatric Illness Daughter    • No Known Problems Daughter        Review of Systems:    Constitutional: No Fevers, Chills  Eyes: No vision changes  ENT: No hearing changes  Resp: No Shortness of breath  CV: No Chest pain  GI: No Nausea/Vomiting  MSK: No weakness  Skin: No rashes  Neuro: No Headaches  Psych: No Suicidal ideations    All remaining systems reviewed and found to be negative, except as stated above.    Exam:    /68   Pulse 61   Temp 36.2 °C (97.2 °F) (Temporal)   Resp 14   Ht 1.6 m (5' 3\")   Wt 54.4 kg (120 lb)   SpO2 96%  Body mass index is 21.26 kg/m².    General:  Well nourished, well developed female in NAD  HENT: Atraumatic, normocephalic  EYES: Extraocular movements intact  NECK: Supple, FROM  CHEST: No deformities, Equal chest expansion  RESP: Unlabored, no stridor or audible wheeze  HEART: Regular Rate and rhythm.   Extremities: No Clubbing, Cyanosis, or Edema  Skin: Warm/dry, without rashes  Neuro: A/O x 4, due to COVID-19- did not have patient remove face mask to test cranial nerves.  Motor/sensory grossly intact  Psych: Normal behavior, normal affect      Lab review:  Labs are reviewed and discussed with a patient  Lab Results   Component Value Date/Time    CHOLSTRLTOT 152 03/09/2021 02:29 AM    LDL 97 03/09/2021 " 02:29 AM    HDL 29 (A) 03/09/2021 02:29 AM    TRIGLYCERIDE 132 03/09/2021 02:29 AM       Lab Results   Component Value Date/Time    SODIUM 141 03/09/2021 02:29 AM    POTASSIUM 5.0 03/09/2021 02:29 AM    CHLORIDE 109 03/09/2021 02:29 AM    CO2 19 (L) 03/09/2021 02:29 AM    GLUCOSE 80 03/09/2021 02:29 AM    BUN 23 (H) 03/09/2021 02:29 AM    CREATININE 0.84 03/09/2021 02:29 AM    CREATININE 0.88 04/06/2010 12:00 AM    BUNCREATRAT 22 04/06/2010 12:00 AM    GLOMRATE >59 04/06/2010 12:00 AM     Lab Results   Component Value Date/Time    ALKPHOSPHAT 54 03/08/2021 11:48 AM    ASTSGOT 18 03/08/2021 11:48 AM    ALTSGPT 16 03/08/2021 11:48 AM    TBILIRUBIN 0.6 03/08/2021 11:48 AM      Lab Results   Component Value Date/Time    HBA1C 5.7 (A) 12/28/2020 02:25 PM    HBA1C 5.9 (A) 06/18/2020 10:55 AM    HBA1C 6.0 (H) 08/06/2019 11:18 AM     No results found for: TSH  Lab Results   Component Value Date/Time    FREET4 1.16 03/09/2021 02:29 AM    FREET4 1.00 01/05/2021 10:54 AM       Lab Results   Component Value Date/Time    WBC 6.3 03/09/2021 02:29 AM    RBC 4.65 03/09/2021 02:29 AM    HEMOGLOBIN 14.5 03/09/2021 02:29 AM    HEMATOCRIT 43.9 03/09/2021 02:29 AM    MCV 94.4 03/09/2021 02:29 AM    MCH 31.2 03/09/2021 02:29 AM    MCHC 33.0 (L) 03/09/2021 02:29 AM    MPV 11.7 03/09/2021 02:29 AM    NEUTSPOLYS 55.90 03/09/2021 02:29 AM    LYMPHOCYTES 30.70 03/09/2021 02:29 AM    MONOCYTES 10.40 03/09/2021 02:29 AM    EOSINOPHILS 1.60 03/09/2021 02:29 AM    BASOPHILS 1.10 03/09/2021 02:29 AM          Assessment/Plan:  1. Hospital discharge follow-up  Status post stroke, patient is now on 325 mg aspirin, atorvastatin 80 mg.  Patient no longer needs PT or OT, back to baseline other than continued numbness over her left parietal region.  Otherwise no motor deficits.  Patient has a follow-up with neurology.  She continues to wear the Holter monitor to rule out arrhythmia.  2. Dyslipidemia  I did recommend the patient continue with atorvastatin  80 mg daily.  As of right now she is tolerating without side effect.  Discussed with patient this is a prophylactic medication for her history of a stroke and to prevent plaque buildup.  Patient agreeable to plan at this time.  3. Essential hypertension  Chronic condition.  Stable.  Continue lisinopril 40 mg daily and amlodipine 5 mg at night.  4. Left facial numbness  Status post CVA.  No motor deficits.  5. Weight Loss  Patient continues to lose weight, again discussed with the patient I think this could be secondary to her overactive thyroid.  She has a follow-up with endocrinology next Tuesday.  She also continues to have intermittent diarrhea in which she is being followed for gastroenterology.  Again discussed with patient that this could be a secondary side effect to her low TSH.  Upon reviewing her labs again with her today we discussed how it is reassuring that her free T3 and free T4 are within normal limits.  But I think that this could be contributing to some of the symptoms that she has been experiencing off and on the last several months.    Patient continues to try to have adequate nutrition intake, she is also drinking protein shakes.    Follow-up: Return for F/u in may as scheduled. .    Please note that this dictation was created using voice recognition software. I have made every reasonable attempt to correct obvious errors, but I expect that there are errors of grammar and possibly content that I did not discover before finalizing the note.

## 2021-03-22 NOTE — ASSESSMENT & PLAN NOTE
This is a chronic condition.   Latest Labs:   Lab Results   Component Value Date/Time    CHOLSTRLTOT 152 03/09/2021 02:29 AM    LDL 97 03/09/2021 02:29 AM    HDL 29 (A) 03/09/2021 02:29 AM    TRIGLYCERIDE 132 03/09/2021 02:29 AM      Medications: was initiated atorvastatin 80 mg    Family History of high cholesterol or heart disease? yes  Recent stroke- put on this.    Patient has been more fatigued the last few days- but changes days. Some days she has extreme fatigue, other days more her normal herself.

## 2021-03-22 NOTE — ASSESSMENT & PLAN NOTE
Patient was admitted into [facility] March 8 through March 10, 2021, complaint of left-sided ear and head numbness.  Initially had a CT the head that showed no abnormalities.  She was admitted to telemetry and underwent a cardiac marker evaluation and echocardiogram.  She also had an MRI of the brain which did show an acute CVA in the right centrum semiovale and right posterior frontal gray matter in tiny areas.  Demonstrated that stroke was most likely embolic.  She was placed on aspirin and a statin.  Neurology was consulted and she was referred to the stroke clinic outpatient.  Patient was sent with orders for PT and OT.  Upon discharge patient had no neurological deficits and was back to her baseline.  She was put on atorvastatin 80 mg daily.    OT and PT, she was discharged, because no movement deficits.

## 2021-03-22 NOTE — ASSESSMENT & PLAN NOTE
Patient continues to lose weight, again discussed with the patient I think this could be secondary to her overactive thyroid.  She has a follow-up with endocrinology next Tuesday.  She also continues to have intermittent diarrhea in which she is being followed for gastroenterology.  Again discussed with patient that this could be a secondary side effect to her low TSH.  Upon reviewing her labs again with her today we discussed how it is reassuring that her free T3 and free T4 are within normal limits.  But I think that this could be contributing to some of the symptoms that she has been experiencing off and on the last several months.

## 2021-03-30 ENCOUNTER — OFFICE VISIT (OUTPATIENT)
Dept: ENDOCRINOLOGY | Facility: MEDICAL CENTER | Age: 86
End: 2021-03-30
Attending: NURSE PRACTITIONER
Payer: MEDICARE

## 2021-03-30 VITALS
HEIGHT: 63 IN | OXYGEN SATURATION: 97 % | HEART RATE: 68 BPM | BODY MASS INDEX: 20.38 KG/M2 | WEIGHT: 115 LBS | DIASTOLIC BLOOD PRESSURE: 70 MMHG | SYSTOLIC BLOOD PRESSURE: 116 MMHG

## 2021-03-30 DIAGNOSIS — E05.90 SUBCLINICAL HYPERTHYROIDISM: ICD-10-CM

## 2021-03-30 PROCEDURE — 99214 OFFICE O/P EST MOD 30 MIN: CPT | Performed by: NURSE PRACTITIONER

## 2021-03-30 PROCEDURE — 99212 OFFICE O/P EST SF 10 MIN: CPT | Performed by: NURSE PRACTITIONER

## 2021-03-30 ASSESSMENT — FIBROSIS 4 INDEX: FIB4 SCORE: 2.16

## 2021-03-30 NOTE — PROGRESS NOTES
Chief Complaint: Consult requested by Shwetha Meyers P.A.-C. for evaluation of subclinical hyperthyroidism.  Dr. Duffy so graciously did a E-Consult visit with PCP on 02/08/2021.    HPI:     Milena Ang is a 85 y.o. female with history of subclinical hyperthyroidism diagnosed on 02/08/2021 and is here for initial evaluation.  She reports the following symptoms: Weight loss over 6 months, unable to focus recently-in last 3 weeks. Increased nervousness-in last 3 weeks.  Wearing holter monitor. Sleeping well 4-6 hours per night.   Patient recently admitted on 03/10/2021 for possible stroke.  This was ruled out but she was diagnosed with a TIA and her symptoms have resolved.  Patient states she has a lingering tingling numbness along the left side of her face into the top of her head but that comes and goes.  She denies heat intolerance, palpitations and tremors.  Patient denies lumps or enlargement in the neck.    She reports a family history of thyroid disease in multiple relatives both linear and extended family.  Patient denies taking thyroid hormone or biotin. Denies having any recent IV contrast exposure. Denies any recent URI or having neck pain.    Weight loss of 10 pounds over 6 months.     Thyroid ultrasound 01/12/2021:  FINDINGS:  The thyroid gland is heterogeneous.  Vascularity is normal.     The right lobe of the thyroid gland measures 1.58 cm x 4.80 cm x 1.52 cm.  The left lobe of the thyroid gland measures 0.76 cm x 2.75 cm x 0.97 cm.  The isthmus measures 0.19 cm.     Nodules >= 1cm:     Nodule #1  Location:  Right  upper  Size:  2.3 x 1.4 x 1.2 cm  Composition:  Solid-2  Echogenicity:  Hypoechoic-2  Shape:  Wider than tall-0  Margins:  Smooth-0  Echogenic Foci:  Microscopic-3     ACR TIRADS points/category:  7 - TR5 - Highly Suspicious     Nodule #2  Location:  Right  mid  Size:  1.0 x 0.9 x 0.8 cm  Composition:  Solid-2  Echogenicity:  Hypoechoic-2  Shape:  Wider than tall-0  Margins:   Smooth-0  Echogenic Foci:  None-0     ACR TIRADS points/category:  4 - TR4 - Moderately Suspicious     Nodule #3  Location:  Right  lower  Size:  1.0 x 0.8 x 0.8 cm  Composition:  Solid-2  Echogenicity:  Hypoechoic-2  Shape:  Wider than tall-0  Margins:  Smooth-0  Echogenic Foci:  Microscopic-3     ACR TIRADS points/category:  7 - TR5 - Highly Suspicious     Nodule #4  Location:  Left  mid  Size:  0.6 x 0.7 x 0.4 cm  Composition:  Solid-2  Echogenicity:  Hypoechoic-2  Shape:  Wider than tall-0  Margins:  Smooth-0  Echogenic Foci:  Microscopic-3     ACR TIRADS points/category:  7 - TR5 - Highly Suspicious      IMPRESSION: 1.  There are 3 solid nodules in the right lobe and one nodule measuring less than 1 cm and the left lobe.  2.   Ultrasound-guided biopsy of the mass located superiorly in the right lobe as well as the mass located inferiorly in the right lobe is recommended.  3.  Follow-up ultrasound is recommended in one year for the mass in the mid right lobe as well as the small left lobe nodule.     As per Dr. Trejo's recommendation on 02/08/2021, patient had ultrasound of thyroid with FNA.  Biopsy results were reviewed with PCP.  FNA 02/04/2021: A. Right upper thyroid fine needle aspiration: [place] system category II-Benign: consistent with benign follicular nodule. The presence of scattered macrophages is indicative of a cysticnature of the lesion.   B. Right lower thyroid fine needle aspiration: [place] system category II-Benign: consistent with benign follicular nodule. The presence of scattered macrophages is indicative of a cystic nature of the lesion.      Ref. Range 3/8/2021 11:48 3/9/2021 02:29   TSH Latest Ref Range: 0.380 - 5.330 uIU/mL 0.324 (L)    Free T-4 Latest Ref Range: 0.93 - 1.70 ng/dL  1.16       In review of patient's thyroid testing since 01/19/2016 forward to 03/09/2021 patient has been consistently subclinical hyperthyroidism.  Her labs to date have not varied.  Free T4 is within  normal range.    Patient's medications, allergies, and social histories were reviewed and updated as appropriate.      ROS:     CONS:     No fever, no chills, no weight loss, no fatigue   EYES:      No diplopia, no blurry vision, no redness of eyes, no swelling of eyelids   ENT:    No hearing loss, No ear pain, No sore throat, no dysphagia, no neck swelling   CV:     No chest pain, no palpitations, no claudication, no orthopnea, no PND   PULM:    No SOB, no cough, no hemoptysis, no wheezing    GI:   No nausea, no vomiting, no diarrhea, no constipation, no bloody stools   :  Passing urine well, no dysuria, no hematuria   ENDO:   No polyuria, no polydipsia, no heat intolerance, no cold intolerance   NEURO: No headaches, no dizziness, no convulsions, no tremors   MUSC:  No joint swellings, no arthralgias, no myalgias, no weakness   SKIN:   No rash, no ulcers, no dry skin   PSYCH:   No depression, no anxiety, no difficulty sleeping       Past Medical History:  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 10/11/2020   • Bradycardia 03/08/2021   • Essential hypertension 01/14/2015   • Subclinical hyperthyroidism 03/08/2021   • Dyslipidemia    • History of CVA (cerebrovascular accident) 03/22/2021   • Weight loss 03/22/2021   • Hospital discharge follow-up 03/03/2021   • Functional diarrhea 03/03/2021   • Low TSH level 02/08/2021   • Allergies 02/08/2021   • Fatigue 07/03/2020   • Elevated glucose 08/07/2019   • Overactive bladder 08/06/2018   • Vitamin D deficiency 11/18/2017   • Nonrheumatic aortic valve insufficiency 05/02/2017   • Osteopenia of spine 05/02/2017   • Murmur 03/23/2017   • Incontinence    • Diverticulosis of colon        Past Surgical History:  Past Surgical History:   Procedure Laterality Date   • PB CYSTOURETHROSCOPY,URETER CATHETER Right 11/6/2020    Procedure: CYSTOSCOPY, WITH BILATERAL  RETROGRADE PYELOGRAM- URETERO;  Surgeon: Ernesto Gamboa M.D.;  Location: SURGERY [facility];  Service: Urology   • PB CYSTO/URETERO/PYELOSCOPY, DX Bilateral 11/6/2020    Procedure: URETEROSCOPY;  Surgeon: Ernesto Gamboa M.D.;  Location: SURGERY [facility];  Service: Urology   • PB CYSTOSCOPY,INSERT URETERAL STENT Right 11/6/2020    Procedure: CYSTOSCOPY, WITH URETERAL STENT INSERTION;  Surgeon: Ernesto Gamboa M.D.;  Location: SURGERY [facility];  Service: Urology   • BLADDER SLING FEMALE  7/12    Dr. Quinn   • CATARACT EXTRACTION WITH IOL Bilateral    • CHOLECYSTECTOMY          Allergies:  Pcn [penicillins]     Current Medications:    Current Outpatient Medications:   •  lisinopril (PRINIVIL) 40 MG tablet, Take 1 tablet by mouth every day., Disp: 90 tablet, Rfl: 0  •  amLODIPine (NORVASC) 5 MG Tab, Take 1 tablet by mouth every day., Disp: 90 tablet, Rfl: 0  •  aspirin (ASA) 325 MG Tab, Take 1 tablet by mouth every day., Disp: 30 tablet, Rfl: 0  •  atorvastatin (LIPITOR) 80 MG tablet, Take 1 tablet by mouth every evening., Disp: 90 tablet, Rfl: 3  •  Ferrous Sulfate (IRON PO), Take 1 Tab by mouth every day., Disp: , Rfl:   •  oxybutynin SR (DITROPAN-XL) 10 MG CR tablet, Take 1 Tab by mouth every day., Disp: 90 Tab, Rfl: 3  •  VITAMIN D PO, Take 1 capsule by mouth every day. 5000 units, Disp: , Rfl:     Social History:  Social History     Socioeconomic History   • Marital status:      Spouse name: Rivera   • Number of children: 2   • Years of education: college   • Highest education level: Not on file   Occupational History   • Occupation: Retired Teacher     Employer: OTHER   Tobacco Use   • Smoking status: Never Smoker   • Smokeless tobacco: Never Used   Substance and Sexual Activity   • Alcohol use: No   • Drug use: No   • Sexual activity: Never   Other Topics Concern   • Not on file   Social History Narrative   • Not on file     Social Determinants of Health     Financial Resource Strain:    •  "Difficulty of Paying Living Expenses:    Food Insecurity:    • Worried About Running Out of Food in the Last Year:    • Ran Out of Food in the Last Year:    Transportation Needs:    • Lack of Transportation (Medical):    • Lack of Transportation (Non-Medical):    Physical Activity:    • Days of Exercise per Week:    • Minutes of Exercise per Session:    Stress:    • Feeling of Stress :    Social Connections:    • Frequency of Communication with Friends and Family:    • Frequency of Social Gatherings with Friends and Family:    • Attends Yazdanism Services:    • Active Member of Clubs or Organizations:    • Attends Club or Organization Meetings:    • Marital Status:    Intimate Partner Violence:    • Fear of Current or Ex-Partner:    • Emotionally Abused:    • Physically Abused:    • Sexually Abused:         Family History:   Family History   Problem Relation Age of Onset   • Cancer Father         pancreatic   • Heart Disease Father         MI at age 57   • Hypertension Father    • Cancer Brother         melanoma   • Heart Disease Brother    • No Known Problems Mother    • Cancer Sister         ovaren cancer   • Cancer Brother         throat   • Psychiatric Illness Daughter    • No Known Problems Daughter          PHYSICAL EXAM:   Vital signs: /70   Pulse 68   Ht 1.6 m (5' 3\")   Wt 52.2 kg (115 lb)   LMP 07/01/1986   SpO2 97%   BMI 20.37 kg/m²   GENERAL: Well-developed, well-nourished  in no apparent distress.   EYE: No ocular and eyelid asymmetry, Anicteric sclerae,  PERRL, No exophthalmos or lidlag  HENT: Hearing grossly intact, Normocephalic, atraumatic. Pink, moist mucous membranes, No exudate  NECK: Supple. Trachea midline.   CARDIOVASCULAR: Regular rate and rhythm. No murmurs, rubs, or gallops.   LUNGS: Clear to auscultation bilaterally   ABDOMEN: Soft, nontender with positive bowel sounds.   EXTREMITIES: No clubbing, cyanosis, or edema.   NEUROLOGICAL: Cranial nerves II-XII are grossly intact "   Symmetric reflexes at the patella no proximal muscle weakness, No visible tremor with both outstretched hands  LYMPH: No cervical, supraclavicular,  adenopathy palpated.   SKIN: No rashes, lesions. Turgor is normal.    ASSESSMENT/PLAN:   1. Subclinical hyperthyroidism  Stable.  Prescription therapy is not indicated at this time.  Will repeat labs before next appointment in 6 months.  Will repeat ultrasound of thyroid in 1 year.    Patient concerned that she continues to lose weight.  Detailed discussion regarding advanced age and how appetite decreases thus were not in taking as much as we did a couple of years ago.  When her appetite decreases the amount of food that we are able to eat also is limited.  Patient is already consuming protein drinks on a daily basis.  Provider expressed that this is the best way to maintain her muscle mass at this time and to eat when she feels hungry.  Patient has also been very concerned, anxious, about her recent neurological events.  Again signs and symptoms have resolved, patient is alert and oriented x4 ambulating without DME equipment and is able to care for herself and her .    These concerns however is not related to her subclinical hyperthyroidism.    Future labs:    - FREE THYROXINE; Future  - T3 FREE; Future  - TSH; Future  - TSI; Future  - THYROID PEROXIDASE  (TPO) AB; Future  - VITAMIN D,25 HYDROXY; Future  - VITAMIN B12; Future    Repeat labs 1 week prior to next appointment.  Next appointment in 6 months.    Thank you kindly for allowing me to participate in the thyroid care plan for this patient.    Diandra Stanley, APRN  03/30/21    CC:   Shwetha Meyers P.A.-C.

## 2021-03-31 ENCOUNTER — OFFICE VISIT (OUTPATIENT)
Dept: NEUROLOGY | Facility: MEDICAL CENTER | Age: 86
End: 2021-03-31
Attending: NURSE PRACTITIONER
Payer: MEDICARE

## 2021-03-31 ENCOUNTER — NON-PROVIDER VISIT (OUTPATIENT)
Dept: CARDIOLOGY | Facility: MEDICAL CENTER | Age: 86
End: 2021-03-31
Payer: MEDICARE

## 2021-03-31 VITALS
SYSTOLIC BLOOD PRESSURE: 120 MMHG | WEIGHT: 118.39 LBS | DIASTOLIC BLOOD PRESSURE: 60 MMHG | HEIGHT: 63 IN | BODY MASS INDEX: 20.98 KG/M2 | HEART RATE: 67 BPM | RESPIRATION RATE: 12 BRPM | OXYGEN SATURATION: 95 % | TEMPERATURE: 97.7 F

## 2021-03-31 DIAGNOSIS — I47.10 SVT (SUPRAVENTRICULAR TACHYCARDIA) (HCC): ICD-10-CM

## 2021-03-31 DIAGNOSIS — I63.9 CEREBRAL INFARCTION, UNSPECIFIED MECHANISM (HCC): ICD-10-CM

## 2021-03-31 DIAGNOSIS — I69.30 LATE EFFECT OF STROKE: ICD-10-CM

## 2021-03-31 PROCEDURE — 99212 OFFICE O/P EST SF 10 MIN: CPT | Performed by: NURSE PRACTITIONER

## 2021-03-31 ASSESSMENT — FIBROSIS 4 INDEX: FIB4 SCORE: 2.16

## 2021-03-31 NOTE — PROGRESS NOTES
"Subjective:    HPI  Milena Ang  is an 85 y.o. left handed female who is here today for follow up for Zio Patch Placement for right cortical infarct       She presented to [facility] on 3/8/2021 with complaints of left sided facial numbness.     PMH includes  HLD, non-rheumatic aortic valve insufficiency, HTN, bradycardia, and incontinence.   Social History:  Denies  History of smoking, denies alcohol or drug use. She lives with  and grand-daughter.   Family Hx:  Cancer, heart disease, HTN,      She was not on Aspirin prior to stroke. She was on 40mg of Atorvastatin. At discharge she was given ASA 325mg and Atorvastatin 80mg.      She is here alone today, she still has numbness on the left side of the head; otherwise she is at baseline, denies any other neurological       She is here today with her daughter.  She denies any symptoms of stroke. States blood pressure at home less than 130/80.       Objective:     /60   Pulse 67   Temp 36.5 °C (97.7 °F) (Temporal)   Resp 12   Ht 1.6 m (5' 3\")   Wt 53.7 kg (118 lb 6.2 oz)   LMP 07/01/1986   SpO2 95%   BMI 20.97 kg/m²      No skin irration at area of Zio Patch  Physical assessment  Alert and oriented.  Ambulates with steady gait.  Moves all extremities equally     Assessment/Plan:     1. Late effect of stroke      Replacement of Zio Patch today    Follow up in 3 months.      "

## 2021-04-08 ENCOUNTER — TELEPHONE (OUTPATIENT)
Dept: ENDOCRINOLOGY | Facility: MEDICAL CENTER | Age: 86
End: 2021-04-08

## 2021-04-08 ENCOUNTER — TELEPHONE (OUTPATIENT)
Dept: CARDIOLOGY | Facility: MEDICAL CENTER | Age: 86
End: 2021-04-08

## 2021-04-08 PROCEDURE — 93248 EXT ECG>7D<15D REV&INTERPJ: CPT | Performed by: INTERNAL MEDICINE

## 2021-04-08 NOTE — TELEPHONE ENCOUNTER
VOICEMAIL  1. Caller Name: Milena Ang                        Call Back Number: [phone] (home)       2. Message: Pt called and LVM on 03/15/21 because she needed to miguel angel a NP annemarie.    3. Patient approves office to leave a detailed voicemail/MyChart message: yes    Pt was scheduled on 03/22/21 to see sveta on 03/30/21. Patient came to her annemarie and has a fv scheduled.

## 2021-04-08 NOTE — TELEPHONE ENCOUNTER
Pt. Called and LVM on 3/16 needing to schedule an appointment. Upon investigation, pt. Was seen on 3/30/2021.

## 2021-04-20 ENCOUNTER — HOSPITAL ENCOUNTER (OUTPATIENT)
Dept: RADIOLOGY | Facility: MEDICAL CENTER | Age: 86
End: 2021-04-20
Attending: PHYSICIAN ASSISTANT
Payer: MEDICARE

## 2021-04-20 DIAGNOSIS — E05.90 SUBCLINICAL HYPERTHYROIDISM: ICD-10-CM

## 2021-04-20 PROCEDURE — 78014 THYROID IMAGING W/BLOOD FLOW: CPT | Mod: MG

## 2021-04-20 PROCEDURE — A9516 IODINE I-123 SOD IODIDE MIC: HCPCS

## 2021-04-21 ENCOUNTER — TELEPHONE (OUTPATIENT)
Dept: MEDICAL GROUP | Facility: PHYSICIAN GROUP | Age: 86
End: 2021-04-21

## 2021-04-23 DIAGNOSIS — E05.90 HYPERTHYROIDISM: ICD-10-CM

## 2021-04-23 DIAGNOSIS — E04.1 HOT THYROID NODULE: ICD-10-CM

## 2021-04-27 ENCOUNTER — TELEMEDICINE (OUTPATIENT)
Dept: MEDICAL GROUP | Facility: PHYSICIAN GROUP | Age: 86
End: 2021-04-27
Payer: MEDICARE

## 2021-04-27 VITALS — WEIGHT: 116 LBS | HEIGHT: 63 IN | RESPIRATION RATE: 12 BRPM | BODY MASS INDEX: 20.55 KG/M2

## 2021-04-27 DIAGNOSIS — E78.5 DYSLIPIDEMIA: ICD-10-CM

## 2021-04-27 DIAGNOSIS — N13.9 OBSTRUCTIVE UROPATHY: ICD-10-CM

## 2021-04-27 DIAGNOSIS — R73.03 PREDIABETES: ICD-10-CM

## 2021-04-27 DIAGNOSIS — Z79.899 HIGH RISK MEDICATION USE: ICD-10-CM

## 2021-04-27 DIAGNOSIS — E05.90 SUBCLINICAL HYPERTHYROIDISM: ICD-10-CM

## 2021-04-27 DIAGNOSIS — I10 ESSENTIAL HYPERTENSION: ICD-10-CM

## 2021-04-27 PROCEDURE — 99214 OFFICE O/P EST MOD 30 MIN: CPT | Mod: 95,CR | Performed by: PHYSICIAN ASSISTANT

## 2021-04-27 ASSESSMENT — FIBROSIS 4 INDEX: FIB4 SCORE: 2.16

## 2021-04-27 NOTE — ASSESSMENT & PLAN NOTE
Thyroid uptake scan results reviewed with patient.  Increased uptake in the large exophytic nodule in the upper pole right thyroid gland.  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  It is on this results to Diandra Stalney and endocrinology.  They did schedule the patient for a follow-up appointment to discuss treatment options, Dr. Trejo was consulted and they may try her on a low dose of methimazole.    Patient continues to struggle with weight- but maintaining. Still having diarrhea - doing better and bouts of fatigue.

## 2021-04-27 NOTE — PROGRESS NOTES
"Virtual Visit: Established Patient   This visit was conducted via Zoom using secure and encrypted videoconferencing technology. The patient was in a private location in the state of [place].    The patient's identity was confirmed and verbal consent was obtained for this virtual visit.    Subjective:   CC:   Chief Complaint   Patient presents with   • Follow-Up   • Thyroid Problem       Milena Ang is a 85 y.o. female presenting for evaluation and management of:     Subclinical hyperthyroidism  Thyroid uptake scan results reviewed with patient.  Increased uptake in the large exophytic nodule in the upper pole right thyroid gland.  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  It is on this results to Diandra Stanley and endocrinology.  They did schedule the patient for a follow-up appointment to discuss treatment options, Dr. Trejo was consulted and they may try her on a low dose of methimazole.    Patient continues to struggle with weight- but maintaining. Still having diarrhea - doing better and bouts of fatigue.     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     Essential hypertension  Chronic condition, stable. On lisinopril 40 mg.     Prediabetes  Checking FBS at home was 120 yesterday. She is working on low carb and sugars.     Dyslipidemia  Chronic condition, stable. Continues atorvastatin 80 mg.       ROS   Denies any recent fevers or chills. No nausea or vomiting. No chest pains or shortness of breath.     Allergies   Allergen Reactions   • Pcn [Penicillins] Rash     \"tongue got thick\"       Current medicines (including changes today)  Current Outpatient Medications   Medication Sig Dispense Refill   • lisinopril (PRINIVIL) 40 MG tablet Take 1 tablet by mouth every day. 90 tablet 0   • amLODIPine (NORVASC) 5 MG Tab Take 1 tablet by mouth every day. 90 tablet 0   • aspirin (ASA) 325 MG Tab Take 1 tablet by mouth every day. 30 tablet 0   • " atorvastatin (LIPITOR) 80 MG tablet Take 1 tablet by mouth every evening. 90 tablet 3   • Ferrous Sulfate (IRON PO) Take 1 Tab by mouth every day.     • oxybutynin SR (DITROPAN-XL) 10 MG CR tablet Take 1 Tab by mouth every day. 90 Tab 3   • VITAMIN D PO Take 1 capsule by mouth every day. 5000 units       No current facility-administered medications for this visit.       Patient Active Problem List    Diagnosis Date Noted   • Left facial numbness 03/08/2021     Priority: High   • 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 10/11/2020     Priority: High   • Bradycardia 03/08/2021     Priority: Medium   • Essential hypertension 01/14/2015     Priority: Medium   • Subclinical hyperthyroidism 03/08/2021     Priority: Low   • Dyslipidemia      Priority: Low   • History of CVA (cerebrovascular accident) 03/22/2021   • Weight loss 03/22/2021   • Hospital discharge follow-up 03/03/2021   • Functional diarrhea 03/03/2021   • Low TSH level 02/08/2021   • Allergies 02/08/2021   • Fatigue 07/03/2020   • Prediabetes 08/07/2019   • Overactive bladder 08/06/2018   • Vitamin D deficiency 11/18/2017   • Nonrheumatic aortic valve insufficiency 05/02/2017   • Osteopenia of spine 05/02/2017   • Murmur 03/23/2017   • Incontinence    • Diverticulosis of colon        Family History   Problem Relation Age of Onset   • Cancer Father         pancreatic   • Heart Disease Father         MI at age 57   • Hypertension Father    • Cancer Brother         melanoma   • Heart Disease Brother    • No Known Problems Mother    • Cancer Sister         ovaren cancer   • Cancer Brother         throat   • Psychiatric Illness Daughter    • No Known Problems Daughter        She  has a past medical history of Cataract, Cervical high risk human papillomavirus (HPV) DNA test positive, Colon polyp, Diverticulosis of colon, Dyslipidemia, Female bladder prolapse (11/04/2020), High cholesterol, Hypertension, Incontinence, Incontinence (11/04/2020),  "Kidney stone, Menopausal and postmenopausal disorder, OSTEOPOROSIS, and Urinary bladder disorder (11/04/2020).  She  has a past surgical history that includes bladder sling female (7/12); cataract extraction with iol (Bilateral); cholecystectomy; pr cystourethroscopy,ureter catheter (Right, 11/6/2020); pr cysto/uretero/pyeloscopy, dx (Bilateral, 11/6/2020); and pr cystoscopy,insert ureteral stent (Right, 11/6/2020).       Objective:   Resp 12   Ht 1.6 m (5' 3\")   Wt 52.6 kg (116 lb)   LMP 07/01/1986   BMI 20.55 kg/m²     Physical Exam:  Constitutional: Alert, no distress, well-groomed.  Skin: No rashes in visible areas.  Eye: Round. Conjunctiva clear, lids normal. No icterus.   ENMT: Lips pink without lesions, good dentition, moist mucous membranes. Phonation normal.  Neck: No masses, no thyromegaly. Moves freely without pain.  Respiratory: Unlabored respiratory effort, no cough or audible wheeze  Psych: Alert and oriented x3, normal affect and mood.       Assessment and Plan:   The following treatment plan was discussed:     1. Subclinical hyperthyroidism  Follow up with [facility], Diandra Stanley would like to see you to discuss treatment options. I will call [facility] to see if they have made an appointment for you.     Patient feels at this point she would still like to try medication for hyperthyroid symptoms. She is hopeful this will help her feel better.  2. 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   3. Essential hypertension  Chronic condition, stable. Continue lisinopril 40 mg daily   4. High risk medication use  - Comp Metabolic Panel; Future  Patient is on statin and ACE, will get updated renal function tests and LFTs before next visit.   5. Dyslipidemia  - Lipid Profile; Future  Chronic condition, stable, continue atorvastatin 80 mg daily. Repeat labs 09/2021.   6. Prediabetes  - HEMOGLOBIN A1C; Future  Chronic " condition, recommend hga1c next labs. Patient has been checking sugar at home, no higher than 120. She is doing low carb and sugars.       Follow-up: Return in about 5 months (around 9/27/2021) for Follow up on labs.        The patient verbalized agreement and understanding of current plan. All questions and concerns were addressed at time of visit.    Please note that this dictation was created using voice recognition software. I have made every reasonable attempt to correct obvious errors, but I expect that there are errors of grammar and possibly content that I did not discover before finalizing the note.

## 2021-04-28 ENCOUNTER — TELEPHONE (OUTPATIENT)
Dept: ENDOCRINOLOGY | Facility: MEDICAL CENTER | Age: 86
End: 2021-04-28

## 2021-04-29 ENCOUNTER — TELEPHONE (OUTPATIENT)
Dept: ENDOCRINOLOGY | Facility: MEDICAL CENTER | Age: 86
End: 2021-04-29

## 2021-04-29 NOTE — TELEPHONE ENCOUNTER
VOICEMAIL  1. Caller Name: Milena Ang                        Call Back Number: [phone]      2. Message: Pt called and LVM on 04/28/21 returning our call to miguel angel an appointment.    3. Patient approves office to leave a detailed voicemail/MyChart message: yes    I contacted patient and LVM letting her know we received her message and I was calling her to schedule an appointment. I provided her with our phone number so she could call us back and schedule.

## 2021-05-12 ENCOUNTER — APPOINTMENT (RX ONLY)
Dept: URBAN - METROPOLITAN AREA CLINIC 22 | Facility: CLINIC | Age: 86
Setting detail: DERMATOLOGY
End: 2021-05-12

## 2021-05-12 DIAGNOSIS — D69.2 OTHER NONTHROMBOCYTOPENIC PURPURA: ICD-10-CM

## 2021-05-12 DIAGNOSIS — Z71.89 OTHER SPECIFIED COUNSELING: ICD-10-CM

## 2021-05-12 DIAGNOSIS — L85.3 XEROSIS CUTIS: ICD-10-CM

## 2021-05-12 PROBLEM — D48.5 NEOPLASM OF UNCERTAIN BEHAVIOR OF SKIN: Status: ACTIVE | Noted: 2021-05-12

## 2021-05-12 PROCEDURE — ? BIOPSY BY SHAVE METHOD

## 2021-05-12 PROCEDURE — ? TREATMENT REGIMEN

## 2021-05-12 PROCEDURE — ? SUNSCREEN RECOMMENDATIONS

## 2021-05-12 PROCEDURE — ? COUNSELING

## 2021-05-12 PROCEDURE — 99213 OFFICE O/P EST LOW 20 MIN: CPT | Mod: 25

## 2021-05-12 PROCEDURE — 11102 TANGNTL BX SKIN SINGLE LES: CPT

## 2021-05-12 ASSESSMENT — LOCATION ZONE DERM: LOCATION ZONE: ARM

## 2021-05-12 ASSESSMENT — LOCATION SIMPLE DESCRIPTION DERM
LOCATION SIMPLE: RIGHT FOREARM
LOCATION SIMPLE: LEFT FOREARM

## 2021-05-12 ASSESSMENT — LOCATION DETAILED DESCRIPTION DERM
LOCATION DETAILED: RIGHT PROXIMAL DORSAL FOREARM
LOCATION DETAILED: LEFT PROXIMAL DORSAL FOREARM

## 2021-05-27 ENCOUNTER — APPOINTMENT (RX ONLY)
Dept: URBAN - METROPOLITAN AREA CLINIC 22 | Facility: CLINIC | Age: 86
Setting detail: DERMATOLOGY
End: 2021-05-27

## 2021-05-27 PROBLEM — C44.622 SQUAMOUS CELL CARCINOMA OF SKIN OF RIGHT UPPER LIMB, INCLUDING SHOULDER: Status: ACTIVE | Noted: 2021-05-27

## 2021-05-27 PROCEDURE — ? PHOTO-DOCUMENTATION

## 2021-05-27 PROCEDURE — ? COUNSELING

## 2021-05-27 PROCEDURE — ? EXCISION

## 2021-05-27 PROCEDURE — 12032 INTMD RPR S/A/T/EXT 2.6-7.5: CPT

## 2021-05-27 PROCEDURE — 11602 EXC TR-EXT MAL+MARG 1.1-2 CM: CPT

## 2021-05-27 NOTE — PROCEDURE: EXCISION
Surgeon Performing The Repair (Optional): Ruiz Irwin PA-C
Biopsy Photograph Reviewed: Yes
Accession #: [id number]
Pathology Comment (Optional): Squamous Cell Carcinoma, Well Differentiated
Date Of Previous Biopsy (Optional): 5/12/21
Size Of Lesion In Cm: 1
X Size Of Lesion In Cm (Optional): 0
Size Of Margin In Cm: 0.2
Anesthesia Volume In Cc: 12
Was An Eye Clamp Used?: No
Eye Clamp Note Details: An eye clamp was used during the procedure.
Excision Method: Elliptical
Repair Type: Intermediate
Suturegard Retention Suture: 2-0 Nylon
Retention Suture Bite Size: 3 mm
Length To Time In Minutes Device Was In Place: 10
Intermediate / Complex Repair - Final Wound Length In Cm: 3.5
Undermining Type: Entire Wound
Debridement Text: The wound edges were debrided prior to proceeding with the closure to facilitate wound healing.
Helical Rim Text: The closure involved the helical rim.
Vermilion Border Text: The closure involved the vermilion border.
Nostril Rim Text: The closure involved the nostril rim.
Retention Suture Text: Retention sutures were placed to support the closure and prevent dehiscence.
Suture Removal: 14 days
Lab: [id number]
Lab Facility: 
Graft Donor Site Bandage (Optional-Leave Blank If You Don't Want In Note): Steri-strips and a pressure bandage were applied to the donor site.
Epidermal Closure Graft Donor Site (Optional): simple interrupted
Billing Type: Third-Party Bill
Excision Depth: adipose tissue
Scalpel Size: 15C blade
Anesthesia Type: 1% lidocaine with epinephrine
Additional Anesthesia Volume In Cc: 6
Hemostasis: Electrocautery
Estimated Blood Loss (Cc): minimal
Detail Level: Detailed
Undermining Location (Optional): in the superficial subcutaneous fat
Deep Sutures: 3-0 Maxon
Number Of Deep Sutures (Optional): 4
Epidermal Sutures: 4-0 Nylon
Epidermal Closure: running cuticular
Wound Care: Vaseline
Dressing: dry sterile dressing
Suturegard Intro: Intraoperative tissue expansion was performed, utilizing the SUTUREGARD device, in order to reduce wound tension.
Suturegard Body: The suture ends were repeatedly re-tightened and re-clamped to achieve the desired tissue expansion.
Hemigard Intro: Due to skin fragility and wound tension, it was decided to use HEMIGARD adhesive retention suture devices to permit a linear closure. The skin was cleaned and dried for a 6cm distance away from the wound. Excessive hair, if present, was removed to allow for adhesion.
Hemigard Postcare Instructions: The HEMIGARD strips are to remain completely dry for at least 5-7 days.
Positioning (Leave Blank If You Do Not Want): The patient was placed in a comfortable position exposing the surgical site.
Pre-Excision Curettage Text (Leave Blank If You Do Not Want): Prior to drawing the surgical margin the visible lesion was removed with electrodesiccation and curettage to clearly define the lesion size.
Complex Repair Preamble Text (Leave Blank If You Do Not Want): Extensive wide undermining was performed.
Intermediate Repair Preamble Text (Leave Blank If You Do Not Want): Undermining was performed with blunt dissection.
Curvilinear Excision Additional Text (Leave Blank If You Do Not Want): The margin was drawn around the clinically apparent lesion.  A curvilinear shape was then drawn on the skin incorporating the lesion and margins.  Incisions were then made along these lines to the appropriate tissue plane and the lesion was extirpated.
Fusiform Excision Additional Text (Leave Blank If You Do Not Want): The margin was drawn around the clinically apparent lesion.  A fusiform shape was then drawn on the skin incorporating the lesion and margins.  Incisions were then made along these lines to the appropriate tissue plane and the lesion was extirpated.
Elliptical Excision Additional Text (Leave Blank If You Do Not Want): The margin was drawn around the clinically apparent lesion.  An elliptical shape was then drawn on the skin incorporating the lesion and margins.  Incisions were then made along these lines to the appropriate tissue plane and the lesion was extirpated.
Saucerization Excision Additional Text (Leave Blank If You Do Not Want): The margin was drawn around the clinically apparent lesion.  Incisions were then made along these lines, in a tangential fashion, to the appropriate tissue plane and the lesion was extirpated.
Slit Excision Additional Text (Leave Blank If You Do Not Want): A linear line was drawn on the skin overlying the lesion. An incision was made slowly until the lesion was visualized.  Once visualized, the lesion was removed with blunt dissection.
Excisional Biopsy Additional Text (Leave Blank If You Do Not Want): The margin was drawn around the clinically apparent lesion. An elliptical shape was then drawn on the skin incorporating the lesion and margins.  Incisions were then made along these lines to the appropriate tissue plane and the lesion was extirpated.
Perilesional Excision Additional Text (Leave Blank If You Do Not Want): The margin was drawn around the clinically apparent lesion. Incisions were then made along these lines to the appropriate tissue plane and the lesion was extirpated.
Repair Performed By Another Provider Text (Leave Blank If You Do Not Want): After the tissue was excised the defect was repaired by another provider.
No Repair - Repaired With Adjacent Surgical Defect Text (Leave Blank If You Do Not Want): After the excision the defect was repaired concurrently with another surgical defect which was in close approximation.
Advancement Flap (Single) Text: The defect edges were debeveled with a #15 scalpel blade.  Given the location of the defect and the proximity to free margins a single advancement flap was deemed most appropriate.  Using a sterile surgical marker, an appropriate advancement flap was drawn incorporating the defect and placing the expected incisions within the relaxed skin tension lines where possible.    The area thus outlined was incised deep to adipose tissue with a #15 scalpel blade.  The skin margins were undermined to an appropriate distance in all directions utilizing iris scissors.
Advancement Flap (Double) Text: The defect edges were debeveled with a #15 scalpel blade.  Given the location of the defect and the proximity to free margins a double advancement flap was deemed most appropriate.  Using a sterile surgical marker, the appropriate advancement flaps were drawn incorporating the defect and placing the expected incisions within the relaxed skin tension lines where possible.    The area thus outlined was incised deep to adipose tissue with a #15 scalpel blade.  The skin margins were undermined to an appropriate distance in all directions utilizing iris scissors.
Burow's Advancement Flap Text: The defect edges were debeveled with a #15 scalpel blade.  Given the location of the defect and the proximity to free margins a Burow's advancement flap was deemed most appropriate.  Using a sterile surgical marker, the appropriate advancement flap was drawn incorporating the defect and placing the expected incisions within the relaxed skin tension lines where possible.    The area thus outlined was incised deep to adipose tissue with a #15 scalpel blade.  The skin margins were undermined to an appropriate distance in all directions utilizing iris scissors.
Chonodrocutaneous Helical Advancement Flap Text: The defect edges were debeveled with a #15 scalpel blade.  Given the location of the defect and the proximity to free margins a chondrocutaneous helical advancement flap was deemed most appropriate.  Using a sterile surgical marker, the appropriate advancement flap was drawn incorporating the defect and placing the expected incisions within the relaxed skin tension lines where possible.    The area thus outlined was incised deep to adipose tissue with a #15 scalpel blade.  The skin margins were undermined to an appropriate distance in all directions utilizing iris scissors.
Crescentic Advancement Flap Text: The defect edges were debeveled with a #15 scalpel blade.  Given the location of the defect and the proximity to free margins a crescentic advancement flap was deemed most appropriate.  Using a sterile surgical marker, the appropriate advancement flap was drawn incorporating the defect and placing the expected incisions within the relaxed skin tension lines where possible.    The area thus outlined was incised deep to adipose tissue with a #15 scalpel blade.  The skin margins were undermined to an appropriate distance in all directions utilizing iris scissors.
A-T Advancement Flap Text: The defect edges were debeveled with a #15 scalpel blade.  Given the location of the defect, shape of the defect and the proximity to free margins an A-T advancement flap was deemed most appropriate.  Using a sterile surgical marker, an appropriate advancement flap was drawn incorporating the defect and placing the expected incisions within the relaxed skin tension lines where possible.    The area thus outlined was incised deep to adipose tissue with a #15 scalpel blade.  The skin margins were undermined to an appropriate distance in all directions utilizing iris scissors.
O-T Advancement Flap Text: The defect edges were debeveled with a #15 scalpel blade.  Given the location of the defect, shape of the defect and the proximity to free margins an O-T advancement flap was deemed most appropriate.  Using a sterile surgical marker, an appropriate advancement flap was drawn incorporating the defect and placing the expected incisions within the relaxed skin tension lines where possible.    The area thus outlined was incised deep to adipose tissue with a #15 scalpel blade.  The skin margins were undermined to an appropriate distance in all directions utilizing iris scissors.
O-L Flap Text: The defect edges were debeveled with a #15 scalpel blade.  Given the location of the defect, shape of the defect and the proximity to free margins an O-L flap was deemed most appropriate.  Using a sterile surgical marker, an appropriate advancement flap was drawn incorporating the defect and placing the expected incisions within the relaxed skin tension lines where possible.    The area thus outlined was incised deep to adipose tissue with a #15 scalpel blade.  The skin margins were undermined to an appropriate distance in all directions utilizing iris scissors.
O-Z Flap Text: The defect edges were debeveled with a #15 scalpel blade.  Given the location of the defect, shape of the defect and the proximity to free margins an O-Z flap was deemed most appropriate.  Using a sterile surgical marker, an appropriate transposition flap was drawn incorporating the defect and placing the expected incisions within the relaxed skin tension lines where possible. The area thus outlined was incised deep to adipose tissue with a #15 scalpel blade.  The skin margins were undermined to an appropriate distance in all directions utilizing iris scissors.
Double O-Z Flap Text: The defect edges were debeveled with a #15 scalpel blade.  Given the location of the defect, shape of the defect and the proximity to free margins a Double O-Z flap was deemed most appropriate.  Using a sterile surgical marker, an appropriate transposition flap was drawn incorporating the defect and placing the expected incisions within the relaxed skin tension lines where possible. The area thus outlined was incised deep to adipose tissue with a #15 scalpel blade.  The skin margins were undermined to an appropriate distance in all directions utilizing iris scissors.
V-Y Flap Text: The defect edges were debeveled with a #15 scalpel blade.  Given the location of the defect, shape of the defect and the proximity to free margins a V-Y flap was deemed most appropriate.  Using a sterile surgical marker, an appropriate advancement flap was drawn incorporating the defect and placing the expected incisions within the relaxed skin tension lines where possible.    The area thus outlined was incised deep to adipose tissue with a #15 scalpel blade.  The skin margins were undermined to an appropriate distance in all directions utilizing iris scissors.
Advancement-Rotation Flap Text: The defect edges were debeveled with a #15 scalpel blade.  Given the location of the defect, shape of the defect and the proximity to free margins an advancement-rotation flap was deemed most appropriate.  Using a sterile surgical marker, an appropriate flap was drawn incorporating the defect and placing the expected incisions within the relaxed skin tension lines where possible. The area thus outlined was incised deep to adipose tissue with a #15 scalpel blade.  The skin margins were undermined to an appropriate distance in all directions utilizing iris scissors.
Mercedes Flap Text: The defect edges were debeveled with a #15 scalpel blade.  Given the location of the defect, shape of the defect and the proximity to free margins a Mercedes flap was deemed most appropriate.  Using a sterile surgical marker, an appropriate advancement flap was drawn incorporating the defect and placing the expected incisions within the relaxed skin tension lines where possible. The area thus outlined was incised deep to adipose tissue with a #15 scalpel blade.  The skin margins were undermined to an appropriate distance in all directions utilizing iris scissors.
Modified Advancement Flap Text: The defect edges were debeveled with a #15 scalpel blade.  Given the location of the defect, shape of the defect and the proximity to free margins a modified advancement flap was deemed most appropriate.  Using a sterile surgical marker, an appropriate advancement flap was drawn incorporating the defect and placing the expected incisions within the relaxed skin tension lines where possible.    The area thus outlined was incised deep to adipose tissue with a #15 scalpel blade.  The skin margins were undermined to an appropriate distance in all directions utilizing iris scissors.
Mucosal Advancement Flap Text: Given the location of the defect, shape of the defect and the proximity to free margins a mucosal advancement flap was deemed most appropriate. Incisions were made with a 15 blade scalpel in the appropriate fashion along the cutaneous vermilion border and the mucosal lip. The remaining actinically damaged mucosal tissue was excised.  The mucosal advancement flap was then elevated to the gingival sulcus with care taken to preserve the neurovascular structures and advanced into the primary defect. Care was taken to ensure that precise realignment of the vermilion border was achieved.
Peng Advancement Flap Text: The defect edges were debeveled with a #15 scalpel blade.  Given the location of the defect, shape of the defect and the proximity to free margins a Peng advancement flap was deemed most appropriate.  Using a sterile surgical marker, an appropriate advancement flap was drawn incorporating the defect and placing the expected incisions within the relaxed skin tension lines where possible. The area thus outlined was incised deep to adipose tissue with a #15 scalpel blade.  The skin margins were undermined to an appropriate distance in all directions utilizing iris scissors.
Hatchet Flap Text: The defect edges were debeveled with a #15 scalpel blade.  Given the location of the defect, shape of the defect and the proximity to free margins a hatchet flap was deemed most appropriate.  Using a sterile surgical marker, an appropriate hatchet flap was drawn incorporating the defect and placing the expected incisions within the relaxed skin tension lines where possible.    The area thus outlined was incised deep to adipose tissue with a #15 scalpel blade.  The skin margins were undermined to an appropriate distance in all directions utilizing iris scissors.
Rotation Flap Text: The defect edges were debeveled with a #15 scalpel blade.  Given the location of the defect, shape of the defect and the proximity to free margins a rotation flap was deemed most appropriate.  Using a sterile surgical marker, an appropriate rotation flap was drawn incorporating the defect and placing the expected incisions within the relaxed skin tension lines where possible.    The area thus outlined was incised deep to adipose tissue with a #15 scalpel blade.  The skin margins were undermined to an appropriate distance in all directions utilizing iris scissors.
Spiral Flap Text: The defect edges were debeveled with a #15 scalpel blade.  Given the location of the defect, shape of the defect and the proximity to free margins a spiral flap was deemed most appropriate.  Using a sterile surgical marker, an appropriate rotation flap was drawn incorporating the defect and placing the expected incisions within the relaxed skin tension lines where possible. The area thus outlined was incised deep to adipose tissue with a #15 scalpel blade.  The skin margins were undermined to an appropriate distance in all directions utilizing iris scissors.
Star Wedge Flap Text: The defect edges were debeveled with a #15 scalpel blade.  Given the location of the defect, shape of the defect and the proximity to free margins a star wedge flap was deemed most appropriate.  Using a sterile surgical marker, an appropriate rotation flap was drawn incorporating the defect and placing the expected incisions within the relaxed skin tension lines where possible. The area thus outlined was incised deep to adipose tissue with a #15 scalpel blade.  The skin margins were undermined to an appropriate distance in all directions utilizing iris scissors.
Transposition Flap Text: The defect edges were debeveled with a #15 scalpel blade.  Given the location of the defect and the proximity to free margins a transposition flap was deemed most appropriate.  Using a sterile surgical marker, an appropriate transposition flap was drawn incorporating the defect.    The area thus outlined was incised deep to adipose tissue with a #15 scalpel blade.  The skin margins were undermined to an appropriate distance in all directions utilizing iris scissors.
Muscle Hinge Flap Text: The defect edges were debeveled with a #15 scalpel blade.  Given the size, depth and location of the defect and the proximity to free margins a muscle hinge flap was deemed most appropriate.  Using a sterile surgical marker, an appropriate hinge flap was drawn incorporating the defect. The area thus outlined was incised with a #15 scalpel blade.  The skin margins were undermined to an appropriate distance in all directions utilizing iris scissors.
Nasal Turnover Hinge Flap Text: The defect edges were debeveled with a #15 scalpel blade.  Given the size, depth, location of the defect and the defect being full thickness a nasal turnover hinge flap was deemed most appropriate.  Using a sterile surgical marker, an appropriate hinge flap was drawn incorporating the defect. The area thus outlined was incised with a #15 scalpel blade. The flap was designed to recreate the nasal mucosal lining and the alar rim. The skin margins were undermined to an appropriate distance in all directions utilizing iris scissors.
Nasalis-Muscle-Based Myocutaneous Island Pedicle Flap Text: Using a #15 blade, an incision was made around the donor flap to the level of the nasalis muscle. Wide lateral undermining was then performed in both the subcutaneous plane above the nasalis muscle, and in a submuscular plane just above periosteum. This allowed the formation of a free nasalis muscle axial pedicle (based on the angular artery) which was still attached to the actual cutaneous flap, increasing its mobility and vascular viability. Hemostasis was obtained with pinpoint electrocoagulation. The flap was mobilized into position and the pivotal anchor points positioned and stabilized with buried interrupted sutures. Subcutaneous and dermal tissues were closed in a multilayered fashion with sutures. Tissue redundancies were excised, and the epidermal edges were apposed without significant tension and sutured with sutures.
Orbicularis Oris Muscle Flap Text: The defect edges were debeveled with a #15 scalpel blade.  Given that the defect affected the competency of the oral sphincter an orbicularis oris muscle flap was deemed most appropriate to restore this competency and normal muscle function.  Using a sterile surgical marker, an appropriate flap was drawn incorporating the defect. The area thus outlined was incised with a #15 scalpel blade.
Melolabial Transposition Flap Text: The defect edges were debeveled with a #15 scalpel blade.  Given the location of the defect and the proximity to free margins a melolabial flap was deemed most appropriate.  Using a sterile surgical marker, an appropriate melolabial transposition flap was drawn incorporating the defect.    The area thus outlined was incised deep to adipose tissue with a #15 scalpel blade.  The skin margins were undermined to an appropriate distance in all directions utilizing iris scissors.
Rhombic Flap Text: The defect edges were debeveled with a #15 scalpel blade.  Given the location of the defect and the proximity to free margins a rhombic flap was deemed most appropriate.  Using a sterile surgical marker, an appropriate rhombic flap was drawn incorporating the defect.    The area thus outlined was incised deep to adipose tissue with a #15 scalpel blade.  The skin margins were undermined to an appropriate distance in all directions utilizing iris scissors.
Rhomboid Transposition Flap Text: The defect edges were debeveled with a #15 scalpel blade.  Given the location of the defect and the proximity to free margins a rhomboid transposition flap was deemed most appropriate.  Using a sterile surgical marker, an appropriate rhomboid flap was drawn incorporating the defect.    The area thus outlined was incised deep to adipose tissue with a #15 scalpel blade.  The skin margins were undermined to an appropriate distance in all directions utilizing iris scissors.
Bi-Rhombic Flap Text: The defect edges were debeveled with a #15 scalpel blade.  Given the location of the defect and the proximity to free margins a bi-rhombic flap was deemed most appropriate.  Using a sterile surgical marker, an appropriate rhombic flap was drawn incorporating the defect. The area thus outlined was incised deep to adipose tissue with a #15 scalpel blade.  The skin margins were undermined to an appropriate distance in all directions utilizing iris scissors.
Helical Rim Advancement Flap Text: The defect edges were debeveled with a #15 blade scalpel.  Given the location of the defect and the proximity to free margins (helical rim) a double helical rim advancement flap was deemed most appropriate.  Using a sterile surgical marker, the appropriate advancement flaps were drawn incorporating the defect and placing the expected incisions between the helical rim and antihelix where possible.  The area thus outlined was incised through and through with a #15 scalpel blade.  With a skin hook and iris scissors, the flaps were gently and sharply undermined and freed up.
Bilateral Helical Rim Advancement Flap Text: The defect edges were debeveled with a #15 blade scalpel.  Given the location of the defect and the proximity to free margins (helical rim) a bilateral helical rim advancement flap was deemed most appropriate.  Using a sterile surgical marker, the appropriate advancement flaps were drawn incorporating the defect and placing the expected incisions between the helical rim and antihelix where possible.  The area thus outlined was incised through and through with a #15 scalpel blade.  With a skin hook and iris scissors, the flaps were gently and sharply undermined and freed up.
Ear Star Wedge Flap Text: The defect edges were debeveled with a #15 blade scalpel.  Given the location of the defect and the proximity to free margins (helical rim) an ear star wedge flap was deemed most appropriate.  Using a sterile surgical marker, the appropriate flap was drawn incorporating the defect and placing the expected incisions between the helical rim and antihelix where possible.  The area thus outlined was incised through and through with a #15 scalpel blade.
Banner Transposition Flap Text: The defect edges were debeveled with a #15 scalpel blade.  Given the location of the defect and the proximity to free margins a Banner transposition flap was deemed most appropriate.  Using a sterile surgical marker, an appropriate flap drawn around the defect. The area thus outlined was incised deep to adipose tissue with a #15 scalpel blade.  The skin margins were undermined to an appropriate distance in all directions utilizing iris scissors.
Bilobed Flap Text: The defect edges were debeveled with a #15 scalpel blade.  Given the location of the defect and the proximity to free margins a bilobe flap was deemed most appropriate.  Using a sterile surgical marker, an appropriate bilobe flap drawn around the defect.    The area thus outlined was incised deep to adipose tissue with a #15 scalpel blade.  The skin margins were undermined to an appropriate distance in all directions utilizing iris scissors.
Bilobed Transposition Flap Text: The defect edges were debeveled with a #15 scalpel blade.  Given the location of the defect and the proximity to free margins a bilobed transposition flap was deemed most appropriate.  Using a sterile surgical marker, an appropriate bilobe flap drawn around the defect.    The area thus outlined was incised deep to adipose tissue with a #15 scalpel blade.  The skin margins were undermined to an appropriate distance in all directions utilizing iris scissors.
Trilobed Flap Text: The defect edges were debeveled with a #15 scalpel blade.  Given the location of the defect and the proximity to free margins a trilobed flap was deemed most appropriate.  Using a sterile surgical marker, an appropriate trilobed flap drawn around the defect.    The area thus outlined was incised deep to adipose tissue with a #15 scalpel blade.  The skin margins were undermined to an appropriate distance in all directions utilizing iris scissors.
Dorsal Nasal Flap Text: The defect edges were debeveled with a #15 scalpel blade.  Given the location of the defect and the proximity to free margins a dorsal nasal flap was deemed most appropriate.  Using a sterile surgical marker, an appropriate dorsal nasal flap was drawn around the defect.    The area thus outlined was incised deep to adipose tissue with a #15 scalpel blade.  The skin margins were undermined to an appropriate distance in all directions utilizing iris scissors.
Island Pedicle Flap Text: The defect edges were debeveled with a #15 scalpel blade.  Given the location of the defect, shape of the defect and the proximity to free margins an island pedicle advancement flap was deemed most appropriate.  Using a sterile surgical marker, an appropriate advancement flap was drawn incorporating the defect, outlining the appropriate donor tissue and placing the expected incisions within the relaxed skin tension lines where possible.    The area thus outlined was incised deep to adipose tissue with a #15 scalpel blade.  The skin margins were undermined to an appropriate distance in all directions around the primary defect and laterally outward around the island pedicle utilizing iris scissors.  There was minimal undermining beneath the pedicle flap.
Island Pedicle Flap With Canthal Suspension Text: The defect edges were debeveled with a #15 scalpel blade.  Given the location of the defect, shape of the defect and the proximity to free margins an island pedicle advancement flap was deemed most appropriate.  Using a sterile surgical marker, an appropriate advancement flap was drawn incorporating the defect, outlining the appropriate donor tissue and placing the expected incisions within the relaxed skin tension lines where possible. The area thus outlined was incised deep to adipose tissue with a #15 scalpel blade.  The skin margins were undermined to an appropriate distance in all directions around the primary defect and laterally outward around the island pedicle utilizing iris scissors.  There was minimal undermining beneath the pedicle flap. A suspension suture was placed in the canthal tendon to prevent tension and prevent ectropion.
Alar Island Pedicle Flap Text: The defect edges were debeveled with a #15 scalpel blade.  Given the location of the defect, shape of the defect and the proximity to the alar rim an island pedicle advancement flap was deemed most appropriate.  Using a sterile surgical marker, an appropriate advancement flap was drawn incorporating the defect, outlining the appropriate donor tissue and placing the expected incisions within the nasal ala running parallel to the alar rim. The area thus outlined was incised with a #15 scalpel blade.  The skin margins were undermined minimally to an appropriate distance in all directions around the primary defect and laterally outward around the island pedicle utilizing iris scissors.  There was minimal undermining beneath the pedicle flap.
Double Island Pedicle Flap Text: The defect edges were debeveled with a #15 scalpel blade.  Given the location of the defect, shape of the defect and the proximity to free margins a double island pedicle advancement flap was deemed most appropriate.  Using a sterile surgical marker, an appropriate advancement flap was drawn incorporating the defect, outlining the appropriate donor tissue and placing the expected incisions within the relaxed skin tension lines where possible.    The area thus outlined was incised deep to adipose tissue with a #15 scalpel blade.  The skin margins were undermined to an appropriate distance in all directions around the primary defect and laterally outward around the island pedicle utilizing iris scissors.  There was minimal undermining beneath the pedicle flap.
Island Pedicle Flap-Requiring Vessel Identification Text: The defect edges were debeveled with a #15 scalpel blade.  Given the location of the defect, shape of the defect and the proximity to free margins an island pedicle advancement flap was deemed most appropriate.  Using a sterile surgical marker, an appropriate advancement flap was drawn, based on the axial vessel mentioned above, incorporating the defect, outlining the appropriate donor tissue and placing the expected incisions within the relaxed skin tension lines where possible.    The area thus outlined was incised deep to adipose tissue with a #15 scalpel blade.  The skin margins were undermined to an appropriate distance in all directions around the primary defect and laterally outward around the island pedicle utilizing iris scissors.  There was minimal undermining beneath the pedicle flap.
Keystone Flap Text: The defect edges were debeveled with a #15 scalpel blade.  Given the location of the defect, shape of the defect a keystone flap was deemed most appropriate.  Using a sterile surgical marker, an appropriate keystone flap was drawn incorporating the defect, outlining the appropriate donor tissue and placing the expected incisions within the relaxed skin tension lines where possible. The area thus outlined was incised deep to adipose tissue with a #15 scalpel blade.  The skin margins were undermined to an appropriate distance in all directions around the primary defect and laterally outward around the flap utilizing iris scissors.
O-T Plasty Text: The defect edges were debeveled with a #15 scalpel blade.  Given the location of the defect, shape of the defect and the proximity to free margins an O-T plasty was deemed most appropriate.  Using a sterile surgical marker, an appropriate O-T plasty was drawn incorporating the defect and placing the expected incisions within the relaxed skin tension lines where possible.    The area thus outlined was incised deep to adipose tissue with a #15 scalpel blade.  The skin margins were undermined to an appropriate distance in all directions utilizing iris scissors.
O-Z Plasty Text: The defect edges were debeveled with a #15 scalpel blade.  Given the location of the defect, shape of the defect and the proximity to free margins an O-Z plasty (double transposition flap) was deemed most appropriate.  Using a sterile surgical marker, the appropriate transposition flaps were drawn incorporating the defect and placing the expected incisions within the relaxed skin tension lines where possible.    The area thus outlined was incised deep to adipose tissue with a #15 scalpel blade.  The skin margins were undermined to an appropriate distance in all directions utilizing iris scissors.  Hemostasis was achieved with electrocautery.  The flaps were then transposed into place, one clockwise and the other counterclockwise, and anchored with interrupted buried subcutaneous sutures.
Double O-Z Plasty Text: The defect edges were debeveled with a #15 scalpel blade.  Given the location of the defect, shape of the defect and the proximity to free margins a Double O-Z plasty (double transposition flap) was deemed most appropriate.  Using a sterile surgical marker, the appropriate transposition flaps were drawn incorporating the defect and placing the expected incisions within the relaxed skin tension lines where possible. The area thus outlined was incised deep to adipose tissue with a #15 scalpel blade.  The skin margins were undermined to an appropriate distance in all directions utilizing iris scissors.  Hemostasis was achieved with electrocautery.  The flaps were then transposed into place, one clockwise and the other counterclockwise, and anchored with interrupted buried subcutaneous sutures.
V-Y Plasty Text: The defect edges were debeveled with a #15 scalpel blade.  Given the location of the defect, shape of the defect and the proximity to free margins an V-Y advancement flap was deemed most appropriate.  Using a sterile surgical marker, an appropriate advancement flap was drawn incorporating the defect and placing the expected incisions within the relaxed skin tension lines where possible.    The area thus outlined was incised deep to adipose tissue with a #15 scalpel blade.  The skin margins were undermined to an appropriate distance in all directions utilizing iris scissors.
H Plasty Text: Given the location of the defect, shape of the defect and the proximity to free margins a H-plasty was deemed most appropriate for repair.  Using a sterile surgical marker, the appropriate advancement arms of the H-plasty were drawn incorporating the defect and placing the expected incisions within the relaxed skin tension lines where possible. The area thus outlined was incised deep to adipose tissue with a #15 scalpel blade. The skin margins were undermined to an appropriate distance in all directions utilizing iris scissors.  The opposing advancement arms were then advanced into place in opposite direction and anchored with interrupted buried subcutaneous sutures.
W Plasty Text: The lesion was extirpated to the level of the fat with a #15 scalpel blade.  Given the location of the defect, shape of the defect and the proximity to free margins a W-plasty was deemed most appropriate for repair.  Using a sterile surgical marker, the appropriate transposition arms of the W-plasty were drawn incorporating the defect and placing the expected incisions within the relaxed skin tension lines where possible.    The area thus outlined was incised deep to adipose tissue with a #15 scalpel blade.  The skin margins were undermined to an appropriate distance in all directions utilizing iris scissors.  The opposing transposition arms were then transposed into place in opposite direction and anchored with interrupted buried subcutaneous sutures.
Z Plasty Text: The lesion was extirpated to the level of the fat with a #15 scalpel blade.  Given the location of the defect, shape of the defect and the proximity to free margins a Z-plasty was deemed most appropriate for repair.  Using a sterile surgical marker, the appropriate transposition arms of the Z-plasty were drawn incorporating the defect and placing the expected incisions within the relaxed skin tension lines where possible.    The area thus outlined was incised deep to adipose tissue with a #15 scalpel blade.  The skin margins were undermined to an appropriate distance in all directions utilizing iris scissors.  The opposing transposition arms were then transposed into place in opposite direction and anchored with interrupted buried subcutaneous sutures.
Zygomaticofacial Flap Text: Given the location of the defect, shape of the defect and the proximity to free margins a zygomaticofacial flap was deemed most appropriate for repair.  Using a sterile surgical marker, the appropriate flap was drawn incorporating the defect and placing the expected incisions within the relaxed skin tension lines where possible. The area thus outlined was incised deep to adipose tissue with a #15 scalpel blade with preservation of a vascular pedicle.  The skin margins were undermined to an appropriate distance in all directions utilizing iris scissors.  The flap was then placed into the defect and anchored with interrupted buried subcutaneous sutures.
Cheek Interpolation Flap Text: A decision was made to reconstruct the defect utilizing an interpolation axial flap and a staged reconstruction.  A telfa template was made of the defect.  This telfa template was then used to outline the Cheek Interpolation flap.  The donor area for the pedicle flap was then injected with anesthesia.  The flap was excised through the skin and subcutaneous tissue down to the layer of the underlying musculature.  The interpolation flap was carefully excised within this deep plane to maintain its blood supply.  The edges of the donor site were undermined.   The donor site was closed in a primary fashion.  The pedicle was then rotated into position and sutured.  Once the tube was sutured into place, adequate blood supply was confirmed with blanching and refill.  The pedicle was then wrapped with xeroform gauze and dressed appropriately with a telfa and gauze bandage to ensure continued blood supply and protect the attached pedicle.
Cheek-To-Nose Interpolation Flap Text: A decision was made to reconstruct the defect utilizing an interpolation axial flap and a staged reconstruction.  A telfa template was made of the defect.  This telfa template was then used to outline the Cheek-To-Nose Interpolation flap.  The donor area for the pedicle flap was then injected with anesthesia.  The flap was excised through the skin and subcutaneous tissue down to the layer of the underlying musculature.  The interpolation flap was carefully excised within this deep plane to maintain its blood supply.  The edges of the donor site were undermined.   The donor site was closed in a primary fashion.  The pedicle was then rotated into position and sutured.  Once the tube was sutured into place, adequate blood supply was confirmed with blanching and refill.  The pedicle was then wrapped with xeroform gauze and dressed appropriately with a telfa and gauze bandage to ensure continued blood supply and protect the attached pedicle.
Interpolation Flap Text: A decision was made to reconstruct the defect utilizing an interpolation axial flap and a staged reconstruction.  A telfa template was made of the defect.  This telfa template was then used to outline the interpolation flap.  The donor area for the pedicle flap was then injected with anesthesia.  The flap was excised through the skin and subcutaneous tissue down to the layer of the underlying musculature.  The interpolation flap was carefully excised within this deep plane to maintain its blood supply.  The edges of the donor site were undermined.   The donor site was closed in a primary fashion.  The pedicle was then rotated into position and sutured.  Once the tube was sutured into place, adequate blood supply was confirmed with blanching and refill.  The pedicle was then wrapped with xeroform gauze and dressed appropriately with a telfa and gauze bandage to ensure continued blood supply and protect the attached pedicle.
Melolabial Interpolation Flap Text: A decision was made to reconstruct the defect utilizing an interpolation axial flap and a staged reconstruction.  A telfa template was made of the defect.  This telfa template was then used to outline the melolabial interpolation flap.  The donor area for the pedicle flap was then injected with anesthesia.  The flap was excised through the skin and subcutaneous tissue down to the layer of the underlying musculature.  The pedicle flap was carefully excised within this deep plane to maintain its blood supply.  The edges of the donor site were undermined.   The donor site was closed in a primary fashion.  The pedicle was then rotated into position and sutured.  Once the tube was sutured into place, adequate blood supply was confirmed with blanching and refill.  The pedicle was then wrapped with xeroform gauze and dressed appropriately with a telfa and gauze bandage to ensure continued blood supply and protect the attached pedicle.
Mastoid Interpolation Flap Text: A decision was made to reconstruct the defect utilizing an interpolation axial flap and a staged reconstruction.  A telfa template was made of the defect.  This telfa template was then used to outline the mastoid interpolation flap.  The donor area for the pedicle flap was then injected with anesthesia.  The flap was excised through the skin and subcutaneous tissue down to the layer of the underlying musculature.  The pedicle flap was carefully excised within this deep plane to maintain its blood supply.  The edges of the donor site were undermined.   The donor site was closed in a primary fashion.  The pedicle was then rotated into position and sutured.  Once the tube was sutured into place, adequate blood supply was confirmed with blanching and refill.  The pedicle was then wrapped with xeroform gauze and dressed appropriately with a telfa and gauze bandage to ensure continued blood supply and protect the attached pedicle.
Posterior Auricular Interpolation Flap Text: A decision was made to reconstruct the defect utilizing an interpolation axial flap and a staged reconstruction.  A telfa template was made of the defect.  This telfa template was then used to outline the posterior auricular interpolation flap.  The donor area for the pedicle flap was then injected with anesthesia.  The flap was excised through the skin and subcutaneous tissue down to the layer of the underlying musculature.  The pedicle flap was carefully excised within this deep plane to maintain its blood supply.  The edges of the donor site were undermined.   The donor site was closed in a primary fashion.  The pedicle was then rotated into position and sutured.  Once the tube was sutured into place, adequate blood supply was confirmed with blanching and refill.  The pedicle was then wrapped with xeroform gauze and dressed appropriately with a telfa and gauze bandage to ensure continued blood supply and protect the attached pedicle.
Paramedian Forehead Flap Text: A decision was made to reconstruct the defect utilizing an interpolation axial flap and a staged reconstruction.  A telfa template was made of the defect.  This telfa template was then used to outline the paramedian forehead pedicle flap.  The donor area for the pedicle flap was then injected with anesthesia.  The flap was excised through the skin and subcutaneous tissue down to the layer of the underlying musculature.  The pedicle flap was carefully excised within this deep plane to maintain its blood supply.  The edges of the donor site were undermined.   The donor site was closed in a primary fashion.  The pedicle was then rotated into position and sutured.  Once the tube was sutured into place, adequate blood supply was confirmed with blanching and refill.  The pedicle was then wrapped with xeroform gauze and dressed appropriately with a telfa and gauze bandage to ensure continued blood supply and protect the attached pedicle.
Lip Wedge Excision Repair Text: Given the location of the defect and the proximity to free margins a full thickness wedge repair was deemed most appropriate.  Using a sterile surgical marker, the appropriate repair was drawn incorporating the defect and placing the expected incisions perpendicular to the vermilion border.  The vermilion border was also meticulously outlined to ensure appropriate reapproximation during the repair.  The area thus outlined was incised through and through with a #15 scalpel blade.  The muscularis and dermis were reaproximated with deep sutures following hemostasis. Care was taken to realign the vermilion border before proceeding with the superficial closure.  Once the vermilion was realigned the superfical and mucosal closure was finished.
Ftsg Text: The defect edges were debeveled with a #15 scalpel blade.  Given the location of the defect, shape of the defect and the proximity to free margins a full thickness skin graft was deemed most appropriate.  Using a sterile surgical marker, the primary defect shape was transferred to the donor site. The area thus outlined was incised deep to adipose tissue with a #15 scalpel blade.  The harvested graft was then trimmed of adipose tissue until only dermis and epidermis was left.  The skin margins of the secondary defect were undermined to an appropriate distance in all directions utilizing iris scissors.  The secondary defect was closed with interrupted buried subcutaneous sutures.  The skin edges were then re-apposed with running  sutures.  The skin graft was then placed in the primary defect and oriented appropriately.
Split-Thickness Skin Graft Text: The defect edges were debeveled with a #15 scalpel blade.  Given the location of the defect, shape of the defect and the proximity to free margins a split thickness skin graft was deemed most appropriate.  Using a sterile surgical marker, the primary defect shape was transferred to the donor site. The split thickness graft was then harvested.  The skin graft was then placed in the primary defect and oriented appropriately.
Burow's Graft Text: The defect edges were debeveled with a #15 scalpel blade.  Given the location of the defect, shape of the defect, the proximity to free margins and the presence of a standing cone deformity a Burow's skin graft was deemed most appropriate. The standing cone was removed and this tissue was then trimmed to the shape of the primary defect. The adipose tissue was also removed until only dermis and epidermis were left.  The skin margins of the secondary defect were undermined to an appropriate distance in all directions utilizing iris scissors.  The secondary defect was closed with interrupted buried subcutaneous sutures.  The skin edges were then re-apposed with running  sutures.  The skin graft was then placed in the primary defect and oriented appropriately.
Cartilage Graft Text: The defect edges were debeveled with a #15 scalpel blade.  Given the location of the defect, shape of the defect, the fact the defect involved a full thickness cartilage defect a cartilage graft was deemed most appropriate.  An appropriate donor site was identified, cleansed, and anesthetized. The cartilage graft was then harvested and transferred to the recipient site, oriented appropriately and then sutured into place.  The secondary defect was then repaired using a primary closure.
Composite Graft Text: The defect edges were debeveled with a #15 scalpel blade.  Given the location of the defect, shape of the defect, the proximity to free margins and the fact the defect was full thickness a composite graft was deemed most appropriate.  The defect was outline and then transferred to the donor site.  A full thickness graft was then excised from the donor site. The graft was then placed in the primary defect, oriented appropriately and then sutured into place.  The secondary defect was then repaired using a primary closure.
Epidermal Autograft Text: The defect edges were debeveled with a #15 scalpel blade.  Given the location of the defect, shape of the defect and the proximity to free margins an epidermal autograft was deemed most appropriate.  Using a sterile surgical marker, the primary defect shape was transferred to the donor site. The epidermal graft was then harvested.  The skin graft was then placed in the primary defect and oriented appropriately.
Dermal Autograft Text: The defect edges were debeveled with a #15 scalpel blade.  Given the location of the defect, shape of the defect and the proximity to free margins a dermal autograft was deemed most appropriate.  Using a sterile surgical marker, the primary defect shape was transferred to the donor site. The area thus outlined was incised deep to adipose tissue with a #15 scalpel blade.  The harvested graft was then trimmed of adipose and epidermal tissue until only dermis was left.  The skin graft was then placed in the primary defect and oriented appropriately.
Skin Substitute Text: The defect edges were debeveled with a #15 scalpel blade.  Given the location of the defect, shape of the defect and the proximity to free margins a skin substitute graft was deemed most appropriate.  The graft material was trimmed to fit the size of the defect. The graft was then placed in the primary defect and oriented appropriately.
Tissue Cultured Epidermal Autograft Text: The defect edges were debeveled with a #15 scalpel blade.  Given the location of the defect, shape of the defect and the proximity to free margins a tissue cultured epidermal autograft was deemed most appropriate.  The graft was then trimmed to fit the size of the defect.  The graft was then placed in the primary defect and oriented appropriately.
Xenograft Text: The defect edges were debeveled with a #15 scalpel blade.  Given the location of the defect, shape of the defect and the proximity to free margins a xenograft was deemed most appropriate.  The graft was then trimmed to fit the size of the defect.  The graft was then placed in the primary defect and oriented appropriately.
Purse String (Intermediate) Text: Given the location of the defect and the characteristics of the surrounding skin a purse string intermediate closure was deemed most appropriate.  Undermining was performed circumfirentially around the surgical defect.  A purse string suture was then placed and tightened.
Purse String (Simple) Text: Given the location of the defect and the characteristics of the surrounding skin a purse string simple closure was deemed most appropriate.  Undermining was performed circumferentially around the surgical defect.  A purse string suture was then placed and tightened.
Partial Purse String (Intermediate) Text: Given the location of the defect and the characteristics of the surrounding skin an intermediate purse string closure was deemed most appropriate.  Undermining was performed circumferentially around the surgical defect.  A purse string suture was then placed and tightened. Wound tension of the circular defect prevented complete closure of the wound.
Partial Purse String (Simple) Text: Given the location of the defect and the characteristics of the surrounding skin a simple purse string closure was deemed most appropriate.  Undermining was performed circumferentially around the surgical defect.  A purse string suture was then placed and tightened. Wound tension of the circular defect prevented complete closure of the wound.
Complex Repair And Single Advancement Flap Text: The defect edges were debeveled with a #15 scalpel blade.  The primary defect was closed partially with a complex linear closure.  Given the location of the remaining defect, shape of the defect and the proximity to free margins a single advancement flap was deemed most appropriate for complete closure of the defect.  Using a sterile surgical marker, an appropriate advancement flap was drawn incorporating the defect and placing the expected incisions within the relaxed skin tension lines where possible.    The area thus outlined was incised deep to adipose tissue with a #15 scalpel blade.  The skin margins were undermined to an appropriate distance in all directions utilizing iris scissors.
Complex Repair And Double Advancement Flap Text: The defect edges were debeveled with a #15 scalpel blade.  The primary defect was closed partially with a complex linear closure.  Given the location of the remaining defect, shape of the defect and the proximity to free margins a double advancement flap was deemed most appropriate for complete closure of the defect.  Using a sterile surgical marker, an appropriate advancement flap was drawn incorporating the defect and placing the expected incisions within the relaxed skin tension lines where possible.    The area thus outlined was incised deep to adipose tissue with a #15 scalpel blade.  The skin margins were undermined to an appropriate distance in all directions utilizing iris scissors.
Complex Repair And Modified Advancement Flap Text: The defect edges were debeveled with a #15 scalpel blade.  The primary defect was closed partially with a complex linear closure.  Given the location of the remaining defect, shape of the defect and the proximity to free margins a modified advancement flap was deemed most appropriate for complete closure of the defect.  Using a sterile surgical marker, an appropriate advancement flap was drawn incorporating the defect and placing the expected incisions within the relaxed skin tension lines where possible.    The area thus outlined was incised deep to adipose tissue with a #15 scalpel blade.  The skin margins were undermined to an appropriate distance in all directions utilizing iris scissors.
Complex Repair And A-T Advancement Flap Text: The defect edges were debeveled with a #15 scalpel blade.  The primary defect was closed partially with a complex linear closure.  Given the location of the remaining defect, shape of the defect and the proximity to free margins an A-T advancement flap was deemed most appropriate for complete closure of the defect.  Using a sterile surgical marker, an appropriate advancement flap was drawn incorporating the defect and placing the expected incisions within the relaxed skin tension lines where possible.    The area thus outlined was incised deep to adipose tissue with a #15 scalpel blade.  The skin margins were undermined to an appropriate distance in all directions utilizing iris scissors.
Complex Repair And O-T Advancement Flap Text: The defect edges were debeveled with a #15 scalpel blade.  The primary defect was closed partially with a complex linear closure.  Given the location of the remaining defect, shape of the defect and the proximity to free margins an O-T advancement flap was deemed most appropriate for complete closure of the defect.  Using a sterile surgical marker, an appropriate advancement flap was drawn incorporating the defect and placing the expected incisions within the relaxed skin tension lines where possible.    The area thus outlined was incised deep to adipose tissue with a #15 scalpel blade.  The skin margins were undermined to an appropriate distance in all directions utilizing iris scissors.
Complex Repair And O-L Flap Text: The defect edges were debeveled with a #15 scalpel blade.  The primary defect was closed partially with a complex linear closure.  Given the location of the remaining defect, shape of the defect and the proximity to free margins an O-L flap was deemed most appropriate for complete closure of the defect.  Using a sterile surgical marker, an appropriate flap was drawn incorporating the defect and placing the expected incisions within the relaxed skin tension lines where possible.    The area thus outlined was incised deep to adipose tissue with a #15 scalpel blade.  The skin margins were undermined to an appropriate distance in all directions utilizing iris scissors.
Complex Repair And Bilobe Flap Text: The defect edges were debeveled with a #15 scalpel blade.  The primary defect was closed partially with a complex linear closure.  Given the location of the remaining defect, shape of the defect and the proximity to free margins a bilobe flap was deemed most appropriate for complete closure of the defect.  Using a sterile surgical marker, an appropriate advancement flap was drawn incorporating the defect and placing the expected incisions within the relaxed skin tension lines where possible.    The area thus outlined was incised deep to adipose tissue with a #15 scalpel blade.  The skin margins were undermined to an appropriate distance in all directions utilizing iris scissors.
Complex Repair And Melolabial Flap Text: The defect edges were debeveled with a #15 scalpel blade.  The primary defect was closed partially with a complex linear closure.  Given the location of the remaining defect, shape of the defect and the proximity to free margins a melolabial flap was deemed most appropriate for complete closure of the defect.  Using a sterile surgical marker, an appropriate advancement flap was drawn incorporating the defect and placing the expected incisions within the relaxed skin tension lines where possible.    The area thus outlined was incised deep to adipose tissue with a #15 scalpel blade.  The skin margins were undermined to an appropriate distance in all directions utilizing iris scissors.
Complex Repair And Rotation Flap Text: The defect edges were debeveled with a #15 scalpel blade.  The primary defect was closed partially with a complex linear closure.  Given the location of the remaining defect, shape of the defect and the proximity to free margins a rotation flap was deemed most appropriate for complete closure of the defect.  Using a sterile surgical marker, an appropriate advancement flap was drawn incorporating the defect and placing the expected incisions within the relaxed skin tension lines where possible.    The area thus outlined was incised deep to adipose tissue with a #15 scalpel blade.  The skin margins were undermined to an appropriate distance in all directions utilizing iris scissors.
Complex Repair And Rhombic Flap Text: The defect edges were debeveled with a #15 scalpel blade.  The primary defect was closed partially with a complex linear closure.  Given the location of the remaining defect, shape of the defect and the proximity to free margins a rhombic flap was deemed most appropriate for complete closure of the defect.  Using a sterile surgical marker, an appropriate advancement flap was drawn incorporating the defect and placing the expected incisions within the relaxed skin tension lines where possible.    The area thus outlined was incised deep to adipose tissue with a #15 scalpel blade.  The skin margins were undermined to an appropriate distance in all directions utilizing iris scissors.
Complex Repair And Transposition Flap Text: The defect edges were debeveled with a #15 scalpel blade.  The primary defect was closed partially with a complex linear closure.  Given the location of the remaining defect, shape of the defect and the proximity to free margins a transposition flap was deemed most appropriate for complete closure of the defect.  Using a sterile surgical marker, an appropriate advancement flap was drawn incorporating the defect and placing the expected incisions within the relaxed skin tension lines where possible.    The area thus outlined was incised deep to adipose tissue with a #15 scalpel blade.  The skin margins were undermined to an appropriate distance in all directions utilizing iris scissors.
Complex Repair And V-Y Plasty Text: The defect edges were debeveled with a #15 scalpel blade.  The primary defect was closed partially with a complex linear closure.  Given the location of the remaining defect, shape of the defect and the proximity to free margins a V-Y plasty was deemed most appropriate for complete closure of the defect.  Using a sterile surgical marker, an appropriate advancement flap was drawn incorporating the defect and placing the expected incisions within the relaxed skin tension lines where possible.    The area thus outlined was incised deep to adipose tissue with a #15 scalpel blade.  The skin margins were undermined to an appropriate distance in all directions utilizing iris scissors.
Complex Repair And M Plasty Text: The defect edges were debeveled with a #15 scalpel blade.  The primary defect was closed partially with a complex linear closure.  Given the location of the remaining defect, shape of the defect and the proximity to free margins an M plasty was deemed most appropriate for complete closure of the defect.  Using a sterile surgical marker, an appropriate advancement flap was drawn incorporating the defect and placing the expected incisions within the relaxed skin tension lines where possible.    The area thus outlined was incised deep to adipose tissue with a #15 scalpel blade.  The skin margins were undermined to an appropriate distance in all directions utilizing iris scissors.
Complex Repair And Double M Plasty Text: The defect edges were debeveled with a #15 scalpel blade.  The primary defect was closed partially with a complex linear closure.  Given the location of the remaining defect, shape of the defect and the proximity to free margins a double M plasty was deemed most appropriate for complete closure of the defect.  Using a sterile surgical marker, an appropriate advancement flap was drawn incorporating the defect and placing the expected incisions within the relaxed skin tension lines where possible.    The area thus outlined was incised deep to adipose tissue with a #15 scalpel blade.  The skin margins were undermined to an appropriate distance in all directions utilizing iris scissors.
Complex Repair And W Plasty Text: The defect edges were debeveled with a #15 scalpel blade.  The primary defect was closed partially with a complex linear closure.  Given the location of the remaining defect, shape of the defect and the proximity to free margins a W plasty was deemed most appropriate for complete closure of the defect.  Using a sterile surgical marker, an appropriate advancement flap was drawn incorporating the defect and placing the expected incisions within the relaxed skin tension lines where possible.    The area thus outlined was incised deep to adipose tissue with a #15 scalpel blade.  The skin margins were undermined to an appropriate distance in all directions utilizing iris scissors.
Complex Repair And Z Plasty Text: The defect edges were debeveled with a #15 scalpel blade.  The primary defect was closed partially with a complex linear closure.  Given the location of the remaining defect, shape of the defect and the proximity to free margins a Z plasty was deemed most appropriate for complete closure of the defect.  Using a sterile surgical marker, an appropriate advancement flap was drawn incorporating the defect and placing the expected incisions within the relaxed skin tension lines where possible.    The area thus outlined was incised deep to adipose tissue with a #15 scalpel blade.  The skin margins were undermined to an appropriate distance in all directions utilizing iris scissors.
Complex Repair And Dorsal Nasal Flap Text: The defect edges were debeveled with a #15 scalpel blade.  The primary defect was closed partially with a complex linear closure.  Given the location of the remaining defect, shape of the defect and the proximity to free margins a dorsal nasal flap was deemed most appropriate for complete closure of the defect.  Using a sterile surgical marker, an appropriate flap was drawn incorporating the defect and placing the expected incisions within the relaxed skin tension lines where possible.    The area thus outlined was incised deep to adipose tissue with a #15 scalpel blade.  The skin margins were undermined to an appropriate distance in all directions utilizing iris scissors.
Complex Repair And Ftsg Text: The defect edges were debeveled with a #15 scalpel blade.  The primary defect was closed partially with a complex linear closure.  Given the location of the defect, shape of the defect and the proximity to free margins a full thickness skin graft was deemed most appropriate to repair the remaining defect.  The graft was trimmed to fit the size of the remaining defect.  The graft was then placed in the primary defect, oriented appropriately, and sutured into place.
Complex Repair And Burow's Graft Text: The defect edges were debeveled with a #15 scalpel blade.  The primary defect was closed partially with a complex linear closure.  Given the location of the defect, shape of the defect, the proximity to free margins and the presence of a standing cone deformity a Burow's graft was deemed most appropriate to repair the remaining defect.  The graft was trimmed to fit the size of the remaining defect.  The graft was then placed in the primary defect, oriented appropriately, and sutured into place.
Complex Repair And Split-Thickness Skin Graft Text: The defect edges were debeveled with a #15 scalpel blade.  The primary defect was closed partially with a complex linear closure.  Given the location of the defect, shape of the defect and the proximity to free margins a split thickness skin graft was deemed most appropriate to repair the remaining defect.  The graft was trimmed to fit the size of the remaining defect.  The graft was then placed in the primary defect, oriented appropriately, and sutured into place.
Complex Repair And Epidermal Autograft Text: The defect edges were debeveled with a #15 scalpel blade.  The primary defect was closed partially with a complex linear closure.  Given the location of the defect, shape of the defect and the proximity to free margins an epidermal autograft was deemed most appropriate to repair the remaining defect.  The graft was trimmed to fit the size of the remaining defect.  The graft was then placed in the primary defect, oriented appropriately, and sutured into place.
Complex Repair And Dermal Autograft Text: The defect edges were debeveled with a #15 scalpel blade.  The primary defect was closed partially with a complex linear closure.  Given the location of the defect, shape of the defect and the proximity to free margins an dermal autograft was deemed most appropriate to repair the remaining defect.  The graft was trimmed to fit the size of the remaining defect.  The graft was then placed in the primary defect, oriented appropriately, and sutured into place.
Complex Repair And Tissue Cultured Epidermal Autograft Text: The defect edges were debeveled with a #15 scalpel blade.  The primary defect was closed partially with a complex linear closure.  Given the location of the defect, shape of the defect and the proximity to free margins an tissue cultured epidermal autograft was deemed most appropriate to repair the remaining defect.  The graft was trimmed to fit the size of the remaining defect.  The graft was then placed in the primary defect, oriented appropriately, and sutured into place.
Complex Repair And Xenograft Text: The defect edges were debeveled with a #15 scalpel blade.  The primary defect was closed partially with a complex linear closure.  Given the location of the defect, shape of the defect and the proximity to free margins a xenograft was deemed most appropriate to repair the remaining defect.  The graft was trimmed to fit the size of the remaining defect.  The graft was then placed in the primary defect, oriented appropriately, and sutured into place.
Complex Repair And Skin Substitute Graft Text: The defect edges were debeveled with a #15 scalpel blade.  The primary defect was closed partially with a complex linear closure.  Given the location of the remaining defect, shape of the defect and the proximity to free margins a skin substitute graft was deemed most appropriate to repair the remaining defect.  The graft was trimmed to fit the size of the remaining defect.  The graft was then placed in the primary defect, oriented appropriately, and sutured into place.
Path Notes (To The Dermatopathologist): Please check margins.
Consent was obtained from the patient. The risks and benefits to therapy were discussed in detail. Specifically, the risks of infection, scarring, bleeding, prolonged wound healing, incomplete removal, allergy to anesthesia, nerve injury and recurrence were addressed. Prior to the procedure, the treatment site was clearly identified and confirmed by the patient. All components of Universal Protocol/PAUSE Rule completed.
Post-Care Instructions: I reviewed with the patient in detail post-care instructions. Patient is not to engage in any heavy lifting, exercise, or swimming for the next 14 days. Should the patient develop any fevers, chills, bleeding, severe pain patient will contact the office immediately.
Home Suture Removal Text: Patient was provided a home suture removal kit and will remove their sutures at home.  If they have any questions or difficulties they will call the office.
Where Do You Want The Question To Include Opioid Counseling Located?: Case Summary Tab
Information: Selecting Yes will display possible errors in your note based on the variables you have selected. This validation is only offered as a suggestion for you. PLEASE NOTE THAT THE VALIDATION TEXT WILL BE REMOVED WHEN YOU FINALIZE YOUR NOTE. IF YOU WANT TO FAX A PRELIMINARY NOTE YOU WILL NEED TO TOGGLE THIS TO 'NO' IF YOU DO NOT WANT IT IN YOUR FAXED NOTE.

## 2021-06-08 ENCOUNTER — OFFICE VISIT (OUTPATIENT)
Dept: ENDOCRINOLOGY | Facility: MEDICAL CENTER | Age: 86
End: 2021-06-08
Attending: NURSE PRACTITIONER
Payer: MEDICARE

## 2021-06-08 ENCOUNTER — APPOINTMENT (RX ONLY)
Dept: URBAN - METROPOLITAN AREA CLINIC 22 | Facility: CLINIC | Age: 86
Setting detail: DERMATOLOGY
End: 2021-06-08

## 2021-06-08 VITALS
WEIGHT: 115.3 LBS | DIASTOLIC BLOOD PRESSURE: 78 MMHG | HEIGHT: 63 IN | OXYGEN SATURATION: 95 % | HEART RATE: 81 BPM | SYSTOLIC BLOOD PRESSURE: 162 MMHG | RESPIRATION RATE: 16 BRPM | BODY MASS INDEX: 20.43 KG/M2

## 2021-06-08 DIAGNOSIS — E04.1 THYROID NODULE, HOT: ICD-10-CM

## 2021-06-08 DIAGNOSIS — E05.90 SUBCLINICAL HYPERTHYROIDISM: ICD-10-CM

## 2021-06-08 DIAGNOSIS — Z48.02 ENCOUNTER FOR REMOVAL OF SUTURES: ICD-10-CM

## 2021-06-08 PROCEDURE — 99211 OFF/OP EST MAY X REQ PHY/QHP: CPT | Performed by: NURSE PRACTITIONER

## 2021-06-08 PROCEDURE — ? COUNSELING

## 2021-06-08 PROCEDURE — ? PHOTO-DOCUMENTATION

## 2021-06-08 PROCEDURE — ? SUTURE REMOVAL (GLOBAL PERIOD)

## 2021-06-08 PROCEDURE — 99214 OFFICE O/P EST MOD 30 MIN: CPT | Performed by: NURSE PRACTITIONER

## 2021-06-08 ASSESSMENT — FIBROSIS 4 INDEX: FIB4 SCORE: 2.16

## 2021-06-08 ASSESSMENT — LOCATION DETAILED DESCRIPTION DERM: LOCATION DETAILED: RIGHT DISTAL DORSAL FOREARM

## 2021-06-08 ASSESSMENT — LOCATION SIMPLE DESCRIPTION DERM: LOCATION SIMPLE: RIGHT FOREARM

## 2021-06-08 ASSESSMENT — LOCATION ZONE DERM: LOCATION ZONE: ARM

## 2021-06-08 NOTE — PROCEDURE: SUTURE REMOVAL (GLOBAL PERIOD)
Detail Level: Detailed
Add 34522 Cpt? (Important Note: In 2017 The Use Of 65344 Is Being Tracked By Cms To Determine Future Global Period Reimbursement For Global Periods): no

## 2021-06-08 NOTE — PROGRESS NOTES
Chief Complaint: Follow-up for subclinical hyperthyroidism.      HPI:     Milena Ang is a 85 y.o. female with for continued evaluation & treatment of the followin.  History of subclinical hyperthyroidism diagnosed on 2021   At the time of last appointment with this provider it was recommended patient repeat her labs in 6 months and then have a follow-up appointment.  Patient's primary care was still concerned secondary to the symptomology that the patient was reporting that she reached out to Dr. Trejo once again and he recommended a thyroid uptake and scan.  During the patient's initial consultation the treatment plan was discussed with Dr. Duffy and he was in agreement with continued monitoring of thyroid function panel and appointment in 6 months.    Weight has maintained since April. Has instituted protein shake daily.    Here initial evaluation.  She reports the following symptoms: Weight loss over 6 months, unable to focus recently-in last 3 weeks. Increased nervousness-in last 3 weeks.  Wearing holter monitor. Sleeping well 4-6 hours per night.   Patient recently admitted on 03/10/2021 for possible stroke.  This was ruled out but she was diagnosed with a TIA and her symptoms have resolved.  Patient states she has a lingering tingling numbness along the left side of her face into the top of her head but that comes and goes.  She denies heat intolerance, palpitations and tremors.  Patient denies lumps or enlargement in the neck.    Hx of CVA in 2021 prior to initial consultation with endocrinology.  Since her stroke she has felt increased anxiousness and ongoing numbness to the left side of her face and ear. MRI of the brain which did show an acute CVA in the right centrum semiovale and right posterior frontal gray matter in tiny areas.     Patient denies palpitations and diarrhea.  Reports decreased appeptite but eats quickly and positive for tachylalia.     Thyroid  ultrasound 01/12/2021:  FINDINGS:  The thyroid gland is heterogeneous.  Vascularity is normal.     The right lobe of the thyroid gland measures 1.58 cm x 4.80 cm x 1.52 cm.  The left lobe of the thyroid gland measures 0.76 cm x 2.75 cm x 0.97 cm.  The isthmus measures 0.19 cm.     Nodules >= 1cm:     Nodule #1  Location:  Right  upper  Size:  2.3 x 1.4 x 1.2 cm  Composition:  Solid-2  Echogenicity:  Hypoechoic-2  Shape:  Wider than tall-0  Margins:  Smooth-0  Echogenic Foci:  Microscopic-3     ACR TIRADS points/category:  7 - TR5 - Highly Suspicious     Nodule #2  Location:  Right  mid  Size:  1.0 x 0.9 x 0.8 cm  Composition:  Solid-2  Echogenicity:  Hypoechoic-2  Shape:  Wider than tall-0  Margins:  Smooth-0  Echogenic Foci:  None-0     ACR TIRADS points/category:  4 - TR4 - Moderately Suspicious     Nodule #3  Location:  Right  lower  Size:  1.0 x 0.8 x 0.8 cm  Composition:  Solid-2  Echogenicity:  Hypoechoic-2  Shape:  Wider than tall-0  Margins:  Smooth-0  Echogenic Foci:  Microscopic-3     ACR TIRADS points/category:  7 - TR5 - Highly Suspicious     Nodule #4  Location:  Left  mid  Size:  0.6 x 0.7 x 0.4 cm  Composition:  Solid-2  Echogenicity:  Hypoechoic-2  Shape:  Wider than tall-0  Margins:  Smooth-0  Echogenic Foci:  Microscopic-3     ACR TIRADS points/category:  7 - TR5 - Highly Suspicious      IMPRESSION: 1.  There are 3 solid nodules in the right lobe and one nodule measuring less than 1 cm and the left lobe.  2.   Ultrasound-guided biopsy of the mass located superiorly in the right lobe as well as the mass located inferiorly in the right lobe is recommended.  3.  Follow-up ultrasound is recommended in one year for the mass in the mid right lobe as well as the small left lobe nodule.     As per Dr. Trejo's recommendation on 02/08/2021, patient had ultrasound of thyroid with FNA.  Biopsy results were reviewed with PCP.  FNA 02/04/2021: A. Right upper thyroid fine needle aspiration: [place] system  category II-Benign: consistent with benign follicular nodule. The presence of scattered macrophages is indicative of a cysticnature of the lesion.   B. Right lower thyroid fine needle aspiration: [place] system category II-Benign: consistent with benign follicular nodule. The presence of scattered macrophages is indicative of a cystic nature of the lesion.     Thyroid uptake and scan 04/20/2021: FINDINGS: There is a large exophytic nodule in the upper pole right thyroid gland with increased uptake.  The five-hour uptake measurement equals 11.7 %. IMPRESSION: Increased uptake in the large exophytic nodule in the upper pole right thyroid gland, concerning for autonomous hot nodule.    Patient did not complete labs prior to this appointment.     Ref. Range 3/8/2021 11:48 3/9/2021 02:29   TSH Latest Ref Range: 0.380 - 5.330 uIU/mL 0.324 (L)    Free T-4 Latest Ref Range: 0.93 - 1.70 ng/dL  1.16         Patient's medications, allergies, and social histories were reviewed and updated as appropriate.      ROS:     CONS:     No fever, no chills, no weight loss, no fatigue   EYES:      No diplopia, no blurry vision, no redness of eyes, no swelling of eyelids   ENT:    No hearing loss, No ear pain, No sore throat, no dysphagia, no neck swelling   CV:     No chest pain, no palpitations, no claudication, no orthopnea, no PND   PULM:    No SOB, no cough, no hemoptysis, no wheezing    GI:   No nausea, no vomiting, no diarrhea, no constipation, no bloody stools   :  Passing urine well, no dysuria, no hematuria   ENDO:   No polyuria, no polydipsia, no heat intolerance, no cold intolerance   NEURO: No headaches, no dizziness, no convulsions, no tremors   MUSC:  No joint swellings, no arthralgias, no myalgias, no weakness   SKIN:   No rash, no ulcers, no dry skin   PSYCH:   No depression, no anxiety, no difficulty sleeping       Past Medical History:  Patient Active Problem List    Diagnosis Date Noted   • History of CVA  (cerebrovascular accident) 03/22/2021   • Weight loss 03/22/2021   • Left facial numbness 03/08/2021   • Bradycardia 03/08/2021   • Subclinical hyperthyroidism 03/08/2021   • Hospital discharge follow-up 03/03/2021   • Functional diarrhea 03/03/2021   • Low TSH level 02/08/2021   • Allergies 02/08/2021   • 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 10/11/2020   • Fatigue 07/03/2020   • Prediabetes 08/07/2019   • Overactive bladder 08/06/2018   • Vitamin D deficiency 11/18/2017   • Nonrheumatic aortic valve insufficiency 05/02/2017   • Osteopenia of spine 05/02/2017   • Murmur 03/23/2017   • Essential hypertension 01/14/2015   • Dyslipidemia    • Incontinence    • Diverticulosis of colon        Past Surgical History:  Past Surgical History:   Procedure Laterality Date   • PB CYSTOURETHROSCOPY,URETER CATHETER Right 11/6/2020    Procedure: CYSTOSCOPY, WITH BILATERAL RETROGRADE PYELOGRAM- URETERO;  Surgeon: Ernesto aGmboa M.D.;  Location: [facility];  Service: Urology   • PB CYSTO/URETERO/PYELOSCOPY, DX Bilateral 11/6/2020    Procedure: URETEROSCOPY;  Surgeon: Ernesto Gamboa M.D.;  Location: [facility];  Service: Urology   • PB CYSTOSCOPY,INSERT URETERAL STENT Right 11/6/2020    Procedure: CYSTOSCOPY, WITH URETERAL STENT INSERTION;  Surgeon: Ernesto Gamboa M.D.;  Location: [facility];  Service: Urology   • BLADDER SLING FEMALE  7/12    Dr. Quinn   • CATARACT EXTRACTION WITH IOL Bilateral    • CHOLECYSTECTOMY          Allergies:  Pcn [penicillins]     Current Medications:    Current Outpatient Medications:   •  lisinopril (PRINIVIL) 40 MG tablet, Take 1 tablet by mouth every day., Disp: 90 tablet, Rfl: 0  •  amLODIPine (NORVASC) 5 MG Tab, Take 1 tablet by mouth every day., Disp: 90 tablet, Rfl: 0  •  aspirin (ASA) 325 MG Tab, Take 1 tablet by mouth every day., Disp: 30 tablet, Rfl: 0  •  atorvastatin (LIPITOR) 80 MG tablet, Take 1 tablet by mouth every evening., Disp: 90 tablet,  Rfl: 3  •  Ferrous Sulfate (IRON PO), Take 1 Tab by mouth every day., Disp: , Rfl:   •  oxybutynin SR (DITROPAN-XL) 10 MG CR tablet, Take 1 Tab by mouth every day., Disp: 90 Tab, Rfl: 3  •  VITAMIN D PO, Take 1 capsule by mouth every day. 5000 units, Disp: , Rfl:     Social History:  Social History     Socioeconomic History   • Marital status:      Spouse name: Rivera   • Number of children: 2   • Years of education: college   • Highest education level: Not on file   Occupational History   • Occupation: Retired Teacher     Employer: OTHER   Tobacco Use   • Smoking status: Never Smoker   • Smokeless tobacco: Never Used   Vaping Use   • Vaping Use: Never used   Substance and Sexual Activity   • Alcohol use: No   • Drug use: No   • Sexual activity: Never   Other Topics Concern   • Not on file   Social History Narrative   • Not on file     Social Determinants of Health     Financial Resource Strain:    • Difficulty of Paying Living Expenses:    Food Insecurity:    • Worried About Running Out of Food in the Last Year:    • Ran Out of Food in the Last Year:    Transportation Needs:    • Lack of Transportation (Medical):    • Lack of Transportation (Non-Medical):    Physical Activity:    • Days of Exercise per Week:    • Minutes of Exercise per Session:    Stress:    • Feeling of Stress :    Social Connections:    • Frequency of Communication with Friends and Family:    • Frequency of Social Gatherings with Friends and Family:    • Attends [employer] Services:    • Active Member of Clubs or Organizations:    • Attends Club or Organization Meetings:    • Marital Status:    Intimate Partner Violence:    • Fear of Current or Ex-Partner:    • Emotionally Abused:    • Physically Abused:    • Sexually Abused:         Family History:   Family History   Problem Relation Age of Onset   • Cancer Father         pancreatic   • Heart Disease Father         MI at age 57   • Hypertension Father    • Cancer Brother          "melanoma   • Heart Disease Brother    • No Known Problems Mother    • Cancer Sister         ovaren cancer   • Cancer Brother         throat   • Psychiatric Illness Daughter    • No Known Problems Daughter          PHYSICAL EXAM:   Vital signs: BP (!) 162/78 (BP Location: Right arm, Patient Position: Sitting, BP Cuff Size: Adult)   Pulse 81   Resp 16   Ht 1.6 m (5' 3\")   Wt 52.3 kg (115 lb 4.8 oz)   LMP 07/01/1986   SpO2 95%   BMI 20.42 kg/m²   GENERAL: Well-developed, well-nourished  in no apparent distress.   EYE: No ocular and eyelid asymmetry, Anicteric sclerae,  PERRL, No exophthalmos or lidlag  HENT: Hearing grossly intact, Normocephalic, atraumatic. Pink, moist mucous membranes, No exudate  NECK: Supple. Trachea midline.   CARDIOVASCULAR: Regular rate and rhythm. No murmurs, rubs, or gallops.   LUNGS: Clear to auscultation bilaterally   ABDOMEN: Soft, nontender with positive bowel sounds.   EXTREMITIES: No clubbing, cyanosis, or edema.   NEUROLOGICAL: Cranial nerves II-XII are grossly intact   Symmetric reflexes at the patella no proximal muscle weakness, No visible tremor with both outstretched hands  LYMPH: No cervical, supraclavicular,  adenopathy palpated.   SKIN: No rashes, lesions. Turgor is normal.    ASSESSMENT/PLAN:   1. Subclinical hyperthyroidism  Stable.  Thyroid uptake and scan was positive for 'hot nodule'.  No current thyroid function test is available to assess TSH and free T4 level at this time.  These lab assays are required prior to starting prescription therapy.  If there is continued TSH suppression with an increase the free T4 level that patient will be started on a very low dose of methimazole therapy.    Continue to question if all of patient's reported symptoms are directly related to thyroid nodule.  Once labs are completed provider will call and discuss next plan of treatment.    This plan was discussed and reviewed in detail with Dr. Trejo.    Future labs:    - FREE " THYROXINE; Future  - T3 FREE; Future  - TSH; Future  - TSI; Future  - THYROID PEROXIDASE  (TPO) AB; Future  - VITAMIN D,25 HYDROXY; Future  - VITAMIN B12; Future    Repeat labs 1 week prior to next appointment.  Next appointment in 6 months.    Thank you kindly for allowing me to participate in the thyroid care plan for this patient.    Diandra Stanley, MARCUS  06/08/2021.    CC:   Shwetha Meyers P.A.-C.

## 2021-06-09 ENCOUNTER — HOSPITAL ENCOUNTER (OUTPATIENT)
Dept: LAB | Facility: MEDICAL CENTER | Age: 86
End: 2021-06-09
Attending: NURSE PRACTITIONER
Payer: MEDICARE

## 2021-06-09 DIAGNOSIS — E05.90 SUBCLINICAL HYPERTHYROIDISM: ICD-10-CM

## 2021-06-09 PROCEDURE — 84445 ASSAY OF TSI GLOBULIN: CPT

## 2021-06-09 PROCEDURE — 84443 ASSAY THYROID STIM HORMONE: CPT

## 2021-06-09 PROCEDURE — 36415 COLL VENOUS BLD VENIPUNCTURE: CPT

## 2021-06-09 PROCEDURE — 82306 VITAMIN D 25 HYDROXY: CPT

## 2021-06-09 PROCEDURE — 84481 FREE ASSAY (FT-3): CPT

## 2021-06-09 PROCEDURE — 84439 ASSAY OF FREE THYROXINE: CPT

## 2021-06-09 PROCEDURE — 86376 MICROSOMAL ANTIBODY EACH: CPT

## 2021-06-10 LAB
25(OH)D3 SERPL-MCNC: 64 NG/ML (ref 30–100)
T3FREE SERPL-MCNC: 3.13 PG/ML (ref 2–4.4)
T4 FREE SERPL-MCNC: 1.05 NG/DL (ref 0.93–1.7)
TSH SERPL DL<=0.005 MIU/L-ACNC: 0.14 UIU/ML (ref 0.38–5.33)

## 2021-06-11 LAB — THYROPEROXIDASE AB SERPL-ACNC: 0.3 IU/ML (ref 0–9)

## 2021-06-12 LAB — TSI SER-ACNC: <0.1 IU/L

## 2021-06-23 ENCOUNTER — OFFICE VISIT (OUTPATIENT)
Dept: NEUROLOGY | Facility: MEDICAL CENTER | Age: 86
End: 2021-06-23
Attending: NURSE PRACTITIONER
Payer: MEDICARE

## 2021-06-23 VITALS
TEMPERATURE: 98.2 F | DIASTOLIC BLOOD PRESSURE: 50 MMHG | BODY MASS INDEX: 21.09 KG/M2 | RESPIRATION RATE: 14 BRPM | HEIGHT: 63 IN | OXYGEN SATURATION: 94 % | WEIGHT: 119.05 LBS | SYSTOLIC BLOOD PRESSURE: 96 MMHG | HEART RATE: 85 BPM

## 2021-06-23 DIAGNOSIS — R73.03 PREDIABETES: ICD-10-CM

## 2021-06-23 DIAGNOSIS — I49.9 CARDIAC ARRHYTHMIA, UNSPECIFIED CARDIAC ARRHYTHMIA TYPE: ICD-10-CM

## 2021-06-23 DIAGNOSIS — I10 ESSENTIAL HYPERTENSION: ICD-10-CM

## 2021-06-23 DIAGNOSIS — I69.30 LATE EFFECT OF STROKE: ICD-10-CM

## 2021-06-23 DIAGNOSIS — E78.2 MIXED HYPERLIPIDEMIA: ICD-10-CM

## 2021-06-23 PROCEDURE — 99215 OFFICE O/P EST HI 40 MIN: CPT | Performed by: NURSE PRACTITIONER

## 2021-06-23 PROCEDURE — 99212 OFFICE O/P EST SF 10 MIN: CPT | Performed by: NURSE PRACTITIONER

## 2021-06-23 ASSESSMENT — ENCOUNTER SYMPTOMS
NERVOUS/ANXIOUS: 0
BACK PAIN: 0
HEARTBURN: 0
COUGH: 1
DIZZINESS: 0
SENSORY CHANGE: 1
FEVER: 0
BLURRED VISION: 0
NAUSEA: 0
HEADACHES: 0
DEPRESSION: 1
VOMITING: 0
NECK PAIN: 0
FOCAL WEAKNESS: 1
PALPITATIONS: 0

## 2021-06-23 ASSESSMENT — FIBROSIS 4 INDEX: FIB4 SCORE: 2.16

## 2021-06-23 NOTE — PROGRESS NOTES
Subjective:    HPI        Milena Ang  is an 85 y.o. left handed female who presents to [facility] for follow up,    She was last seen on 3/31/2021 for evaluation of right cortical infarcts.         She presented to [facility] on 3/8/2021 with complaints of left sided facial numbness.     PMH includes  HLD, non-rheumatic aortic valve insufficiency, HTN, bradycardia, and incontinence.   Social History:  Denies  History of smoking, denies alcohol or drug use. She lives with  and grand-daughter.   Family Hx:  Cancer, heart disease, HTN,      She was not on Aspirin prior to stroke. She was on 40mg of Atorvastatin. At discharge she was given ASA 325mg and Atorvastatin 80mg.      She is here alone today, she still has numbness on the left side of the head and some poor coordination of the left 5th digit which interferes with her handwriting. Blood pressure at home 120s-140s/60s.  She continues on Atorvastatin 80mg and ASA 325mg without any side effects.      She saw a cardiologist, Dr. Toribio.  Those notes are not in our system.         Review of Systems   Constitutional: Negative for fever.   HENT: Negative for hearing loss.    Eyes: Negative for blurred vision.   Respiratory: Positive for cough.    Cardiovascular: Negative for chest pain and palpitations.   Gastrointestinal: Negative for heartburn, nausea and vomiting.   Genitourinary: Negative.    Musculoskeletal: Negative for back pain and neck pain.   Skin: Negative for rash.   Neurological: Positive for sensory change and focal weakness. Negative for dizziness and headaches.   Psychiatric/Behavioral: Positive for depression. The patient is not nervous/anxious.        Current Outpatient Medications on File Prior to Visit   Medication Sig Dispense Refill   • lisinopril (PRINIVIL) 40 MG tablet Take 1 tablet by mouth every day. 90 tablet 0   • amLODIPine (NORVASC) 5 MG Tab Take 1 tablet by mouth every day. 90 tablet 0   • aspirin  (ASA) 325 MG Tab Take 1 tablet by mouth every day. 30 tablet 0   • atorvastatin (LIPITOR) 80 MG tablet Take 1 tablet by mouth every evening. 90 tablet 3   • Ferrous Sulfate (IRON PO) Take 1 Tab by mouth every day.     • oxybutynin SR (DITROPAN-XL) 10 MG CR tablet Take 1 Tab by mouth every day. 90 Tab 3   • VITAMIN D PO Take 1 capsule by mouth every day. 5000 units       No current facility-administered medications on file prior to visit.     Past Medical History:   Diagnosis Date   • Cataract     11-4-2020 H/O IOL OU   • Cervical high risk human papillomavirus (HPV) DNA test positive    • Colon polyp     hyperplastic   • Diverticulosis of colon    • Dyslipidemia    • Female bladder prolapse 11/04/2020   • High cholesterol    • Hypertension    • Incontinence     pessary   • Incontinence 11/04/2020    Pt. states does not have a pessary in place.   • Kidney stone    • Menopausal and postmenopausal disorder    • OSTEOPOROSIS    • Urinary bladder disorder 11/04/2020    Bladder Prolapse        Objective:   MRI brain    1Tiny areas of acute infarcts in the right centrum semiovale and right posterior frontal gray matter.  2.  Mild cerebral volume loss.  3.  Moderate chronic microvascular ischemic disease.     Carotid US  Right carotid.    Very mild plaque of the carotid bifurcation. Doppler velocities are normal.    Plaque is smooth on the surface and homogeneous with low acoustic density.    The internal carotid artery is tortuous.       Left carotid.    Flow velocities and Doppler waveforms are normal throughout the carotid    system without evidence of plaque.    The internal carotid artery is tortuous.       Bilateral subclavian and vertebral artery waveforms are antegrade and    normal in character and velocity.      TTE:  LVEF  65%, normal diastolic function, mildly dilated LA.       Stroke Labs:  Creat 0.83, LDL 97, A1C 5.7.     28 cardiac monitor with runs of SVT, read by Dr. Alford, he could not exclude  "atrial fibrillation.     BP (!) 98/54 (BP Location: Right arm, Patient Position: Sitting, BP Cuff Size: Small adult)   Pulse 85   Temp 36.8 °C (98.2 °F) (Temporal)   Resp 14   Ht 1.6 m (5' 3\")   Wt 54 kg (119 lb 0.8 oz)   LMP 07/01/1986   SpO2 94%   BMI 21.09 kg/m²      PHYSICAL ASSESSMENT  Constitutional:  Alert, no apparent distress,  Psych:   mood and affect WNL  Muskuloskeletal:  Moves all extremities equally, strength 5/5 bilaterally, no drift  NEUROLOGICAL ASSESSMENT  Oriented X 4, speech fluent, naming and memory intact  CN II: Visual fields are full to confrontation. Fundoscopic exam is normal with sharp discs and no vascular changes. Pupils are 3  mm and briskly reactive to light.   CN III, IV, VI  EOMs intact, no ptosis  CN V: Facial sensation decreased to PP left V1-V3. . Corneal responses are intact.  CN VII: Face is symmetric with normal eye closure and smile.  CN VII: Hearing is normal to rubbing fingers  CN IX, X: Palate elevates symmetrically. Phonation is normal.  CN XI: Head turning and shoulder shrug are intact  CN XII: Tongue is midline with normal movements and no atrophy.                           Sensation to PP decreased LUE                 No limb ataxia with finger to nose and heel to shin                 Ambulates with steady gait.                 Rhomberg negative                Cardiovascular:    S1S2, no abnormal rhythm auscultated, no peripheral edema  Neck:                     No carotid bruits noted   Pulmonary:            Respirations easy, lungs clear to auscultation all fields.     Skin:                     No obvious rashes.          Iniital NIHSS Unknown      Current NIHSS    1a. LOC: 0  1b. LOC Questions: 0  1c. LOC Commands: 0  2. Best Gaze:0  3. Visual Fields: 0  4. Facial Paresis: 0  5a. Motor arm left: 0  5b. Motor arm right: 0  6a. Motor leg left: 0  6b. Motor leg right: 0  7. Sensory: 1  8. Best Language: 0  9. Limb Ataxia: 0  10. Dysarthria: 0  11. " Extinction/Inattention: 0    Total Score Current  0          Current mRS  2       Assessment/Plan:     1. Late effect of stroke  Late effect of stroke:  Multifocal right cortical infarcts.  Likely etiology is embolic, CTA negative for significant stenosis or atherosclerotic disease.   She has residual subjective numbness of left side of head, no other deficits.     She does not have any rehabilitation needs at this time.      Presumed mechanism by TOAST:  __Large Artery Atherosclerosis  __Small Vessel (Lacunar)  __Cardioembolic  __Other (Sickle Cell, Vasculitis, Hypercoagulable)  __Unknown  XX__ESUS      See #5       2. Essential hypertension  Blood pressure goal less than 130/80, at goal today, continue follow up with PCP       3. Mixed hyperlipidemia  LDL goal < 70, current 97, continue Atorvastatin 80mg, discussed medication side effects, will need follow up with primary care evaluate liver function and intervals and refill  Exercise at least 30 minutes daily, avoid red meat, fried foods, butter, cheese.   Eat 5-6 servings of vegetables and fruits daily, choose lean white meat without skin (chicken, turkey, white fish)--baked, broiled or grilled.     4. Prediabetes  A1C 5.7, discussed dietary and exercise modifications.    5. Cardiac arrhythmia, unspecified cardiac arrhythmia type  Per note from 28 day Zio patch, Dr. Farah could not rule out atrial fibrillation.  At this point, it would not be unreasonable to either A) treat with antic-coagulation or B) Have patient consult with cardiology to determine need for anti-coagulation.  Considering embolic infarcts, atrial fibrillation is a likely culprit given her age, history of HTN and dilated LA on TTE.     From a neurology perspective, it would be reasonable to start a blood thinner such as Eliquis (2.5mg BID-reduced dose due to age and weight) , would recommend stopping ASA when starting Eliquis.     She would like to see the cardiologist that she has  already seen--Dr. Toribio.  If he recommends that she see the [facility]ologist, she will ask me for a referral     I gave patient a copy of my note today so that she can review the recommendations.    I spent a total of 48  minutes caring for patient,  my time includes counseling, review of systems, HPI and assessment, review of images, labs and testing as above.  I reviewed the hospital records, PMH, social and family history. Applying cardiac monitor and proving instruction.         I have counseled patient on stroke prevention strategies, stroke symptoms and mimics.  Diet and exercise modifications.  We discussed medication side effects and instructions.      If any new signs of stroke:  sudden weakness, numbness, speech difficulty (slurring or difficulty finding words), imbalance, incoordination, worse headache of life or vision loss occur, call 911.       Take all medications as prescribed unless instructed by your provider.      Follow up in 2 months.   I

## 2021-06-23 NOTE — PATIENT INSTRUCTIONS
If any new signs of stroke:  sudden weakness, numbness, speech difficulty (slurring or difficulty finding words), imbalance, incoordination, worse headache of life or vision loss occur, call 911.      Take all medications as prescribed unless instructed by your provider.            Stroke Prevention  Some medical conditions and lifestyle choices can lead to a higher risk for a stroke. You can help to prevent a stroke by making nutrition, lifestyle, and other changes.  What nutrition changes can be made?    · Eat healthy foods.  ? Choose foods that are high in fiber. These include:  § Fresh fruits.  § Fresh vegetables.  § Whole grains.  ? Eat at least 5 or more servings of fruits and vegetables each day. Try to fill half of your plate at each meal with fruits and vegetables.  ? Choose lean protein foods. These include:  § Lowfat (lean) cuts of meat.  § Chicken without skin.  § Fish.  § Tofu.  § Beans.  § Nuts.  ? Eat low-fat dairy products.  ? Avoid foods that:  § Are high in salt (sodium).  § Have saturated fat.  § Have trans fat.  § Have cholesterol.  § Are processed.  § Are premade.  · Follow eating guidelines as told by your doctor. These may include:  ? Reducing how many calories you eat and drink each day.  ? Limiting how much salt you eat or drink each day to 1,500 milligrams (mg).  ? Using only healthy fats for cooking. These include:  § Olive oil.  § Canola oil.  § Sunflower oil.  ? Counting how many carbohydrates you eat and drink each day.  What lifestyle changes can be made?  · Try to stay at a healthy weight. Talk to your doctor about what a good weight is for you.  · Get at least 30 minutes of moderate physical activity at least 5 days a week. This can include:  ? Fast walking.  ? Biking.  ? Swimming.  · Do not use any products that have nicotine or tobacco. This includes cigarettes and e-cigarettes. If you need help quitting, ask your doctor. Avoid being around tobacco smoke in general.  · Limit how  "much alcohol you drink to no more than 1 drink a day for nonpregnant women and 2 drinks a day for men. One drink equals 12 oz of beer, 5 oz of wine, or 1½ oz of hard liquor.  · Do not use drugs.  · Avoid taking birth control pills. Talk to your doctor about the risks of taking birth control pills if:  ? You are over 35 years old.  ? You smoke.  ? You get migraines.  ? You have had a blood clot.  What other changes can be made?  · Manage your cholesterol.  ? It is important to eat a healthy diet.  ? If your cholesterol cannot be managed through your diet, you may also need to take medicines. Take medicines as told by your doctor.  · Manage your diabetes.  ? It is important to eat a healthy diet and to exercise regularly.  ? If your blood sugar cannot be managed through diet and exercise, you may need to take medicines. Take medicines as told by your doctor.  · Control your high blood pressure (hypertension).  ? Try to keep your blood pressure below 130/80. This can help lower your risk of stroke.  ? It is important to eat a healthy diet and to exercise regularly.  ? If your blood pressure cannot be managed through diet and exercise, you may need to take medicines. Take medicines as told by your doctor.  ? Ask your doctor if you should check your blood pressure at home.  ? Have your blood pressure checked every year. Do this even if your blood pressure is normal.  · Talk to your doctor about getting checked for a sleep disorder. Signs of this can include:  ? Snoring a lot.  ? Feeling very tired.  · Take over-the-counter and prescription medicines only as told by your doctor. These may include aspirin or blood thinners (antiplatelets or anticoagulants).  · Make sure that any other medical conditions you have are managed.  Where to find more information  · American Stroke Association: www.strokeassociation.org  · National Stroke Association: www.stroke.org  Get help right away if:  · You have any symptoms of stroke. \"BE " "FAST\" is an easy way to remember the main warning signs:  ? B - Balance. Signs are dizziness, sudden trouble walking, or loss of balance.  ? E - Eyes. Signs are trouble seeing or a sudden change in how you see.  ? F - Face. Signs are sudden weakness or loss of feeling of the face, or the face or eyelid drooping on one side.  ? A - Arms. Signs are weakness or loss of feeling in an arm. This happens suddenly and usually on one side of the body.  ? S - Speech. Signs are sudden trouble speaking, slurred speech, or trouble understanding what people say.  ? T - Time. Time to call emergency services. Write down what time symptoms started.  · You have other signs of stroke, such as:  ? A sudden, very bad headache with no known cause.  ? Feeling sick to your stomach (nausea).  ? Throwing up (vomiting).  ? Jerky movements you cannot control (seizure).  These symptoms may represent a serious problem that is an emergency. Do not wait to see if the symptoms will go away. Get medical help right away. Call your local emergency services (911 in the [place] Do not drive yourself to the hospital.  Summary  · You can prevent a stroke by eating healthy, exercising, not smoking, drinking less alcohol, and treating other health problems, such as diabetes, high blood pressure, or high cholesterol.  · Do not use any products that contain nicotine or tobacco, such as cigarettes and e-cigarettes.  · Get help right away if you have any signs or symptoms of a stroke.  This information is not intended to replace advice given to you by your health care provider. Make sure you discuss any questions you have with your health care provider.  Document Released: 06/18/2013 Document Revised: 02/13/2020 Document Reviewed: 03/21/2018  Elsevier Patient Education © 2020 [employer]vier Inc.    "

## 2021-06-30 ENCOUNTER — APPOINTMENT (OUTPATIENT)
Dept: ENDOCRINOLOGY | Facility: MEDICAL CENTER | Age: 86
End: 2021-06-30
Attending: NURSE PRACTITIONER
Payer: MEDICARE

## 2021-07-01 ENCOUNTER — TELEPHONE (OUTPATIENT)
Dept: CARDIOLOGY | Facility: MEDICAL CENTER | Age: 86
End: 2021-07-01

## 2021-07-01 NOTE — TELEPHONE ENCOUNTER
Spoke with PT, and she said she already has a cardiologist outside of [facility].   [facility]    ----- Message from Ramirez Olvera Ass't sent at 6/28/2021  2:58 PM PDT -----  Regarding: Appointment Needed  Pt had Zio AT placed by Neuro. Needs appt scheduled with Cardiology Cardiology.   Thank you!

## 2021-07-09 ENCOUNTER — TELEPHONE (OUTPATIENT)
Dept: ENDOCRINOLOGY | Facility: MEDICAL CENTER | Age: 86
End: 2021-07-09

## 2021-07-09 DIAGNOSIS — E05.90 SUBCLINICAL HYPERTHYROIDISM: ICD-10-CM

## 2021-07-09 NOTE — TELEPHONE ENCOUNTER
Reviewed recent lab results, Thyroid uptake and scan and patient symptoms.  Patient states she is maintaining her weight at 115 pounds, incorporating protein shakes 1-2 a day, denies palpitations, denies anxiousness and tremors.    Patient is in agreement that we will monitor her symptoms and monitor her thyroid function panel.  At any time her symptoms increase, she begins losing weight, having heart palpitations increased heat intolerance we will start methimazole at a very low dose.    Patient has an appointment with me at the end of September and we will repeat thyroid function panel at that time.

## 2021-08-03 ENCOUNTER — PRE-ADMISSION TESTING (OUTPATIENT)
Dept: ADMISSIONS | Facility: MEDICAL CENTER | Age: 86
End: 2021-08-03
Attending: SURGERY
Payer: MEDICARE

## 2021-08-03 DIAGNOSIS — Z01.812 PRE-OPERATIVE LABORATORY EXAMINATION: ICD-10-CM

## 2021-08-03 DIAGNOSIS — Z01.810 PRE-OPERATIVE CARDIOVASCULAR EXAMINATION: ICD-10-CM

## 2021-08-03 LAB
ANION GAP SERPL CALC-SCNC: 13 MMOL/L (ref 7–16)
BUN SERPL-MCNC: 34 MG/DL (ref 8–22)
CALCIUM SERPL-MCNC: 9.7 MG/DL (ref 8.5–10.5)
CHLORIDE SERPL-SCNC: 106 MMOL/L (ref 96–112)
CO2 SERPL-SCNC: 23 MMOL/L (ref 20–33)
CREAT SERPL-MCNC: 1.23 MG/DL (ref 0.5–1.4)
EKG IMPRESSION: NORMAL
ERYTHROCYTE [DISTWIDTH] IN BLOOD BY AUTOMATED COUNT: 45 FL (ref 35.9–50)
GLUCOSE SERPL-MCNC: 88 MG/DL (ref 65–99)
HCT VFR BLD AUTO: 48 % (ref 37–47)
HGB BLD-MCNC: 15.7 G/DL (ref 12–16)
MCH RBC QN AUTO: 31.7 PG (ref 27–33)
MCHC RBC AUTO-ENTMCNC: 32.7 G/DL (ref 33.6–35)
MCV RBC AUTO: 97 FL (ref 81.4–97.8)
PLATELET # BLD AUTO: 175 K/UL (ref 164–446)
PMV BLD AUTO: 12.7 FL (ref 9–12.9)
POTASSIUM SERPL-SCNC: 3.6 MMOL/L (ref 3.6–5.5)
RBC # BLD AUTO: 4.95 M/UL (ref 4.2–5.4)
SODIUM SERPL-SCNC: 142 MMOL/L (ref 135–145)
WBC # BLD AUTO: 6.7 K/UL (ref 4.8–10.8)

## 2021-08-03 PROCEDURE — 86800 THYROGLOBULIN ANTIBODY: CPT

## 2021-08-03 PROCEDURE — 93005 ELECTROCARDIOGRAM TRACING: CPT

## 2021-08-03 PROCEDURE — 36415 COLL VENOUS BLD VENIPUNCTURE: CPT

## 2021-08-03 PROCEDURE — 84432 ASSAY OF THYROGLOBULIN: CPT

## 2021-08-03 PROCEDURE — 80048 BASIC METABOLIC PNL TOTAL CA: CPT

## 2021-08-03 PROCEDURE — 93010 ELECTROCARDIOGRAM REPORT: CPT | Performed by: INTERNAL MEDICINE

## 2021-08-03 PROCEDURE — 85027 COMPLETE CBC AUTOMATED: CPT

## 2021-08-03 RX ORDER — IRBESARTAN AND HYDROCHLOROTHIAZIDE 300; 12.5 MG/1; MG/1
1 TABLET, FILM COATED ORAL
COMMUNITY
Start: 2021-06-28 | End: 2021-12-17 | Stop reason: SDUPTHER

## 2021-08-03 ASSESSMENT — FIBROSIS 4 INDEX: FIB4 SCORE: 2.19

## 2021-08-03 NOTE — OR NURSING
COVID-19 Pre-Surgery Screenin. Do you have an undiagnosed respiratory illness or symptoms such as coughing or sneezing? NO     • Onset of Sx: -    • Acute vs. chronic respiratory illness: -     2. Do you have an unexplained fever greater than 100.4 degrees Fahrenheit or 38 degrees Celsius? NO  ?  3. Have you had direct exposure to a patient who tested positive for Covid-19? NO    Patient informed of current visitation and mask policies by this RN.COVID-19 Pre-Surgery Screening:     Pt did not answer generic voicemail was left with call back number.

## 2021-08-04 ENCOUNTER — ANESTHESIA EVENT (OUTPATIENT)
Dept: SURGERY | Facility: MEDICAL CENTER | Age: 86
End: 2021-08-04
Payer: MEDICARE

## 2021-08-04 ENCOUNTER — HOSPITAL ENCOUNTER (OUTPATIENT)
Facility: MEDICAL CENTER | Age: 86
End: 2021-08-04
Attending: SURGERY | Admitting: SURGERY
Payer: MEDICARE

## 2021-08-04 ENCOUNTER — ANESTHESIA (OUTPATIENT)
Dept: SURGERY | Facility: MEDICAL CENTER | Age: 86
End: 2021-08-04
Payer: MEDICARE

## 2021-08-04 VITALS
HEART RATE: 50 BPM | OXYGEN SATURATION: 93 % | BODY MASS INDEX: 21.09 KG/M2 | HEIGHT: 63 IN | SYSTOLIC BLOOD PRESSURE: 126 MMHG | RESPIRATION RATE: 18 BRPM | TEMPERATURE: 97 F | WEIGHT: 119.05 LBS | DIASTOLIC BLOOD PRESSURE: 51 MMHG

## 2021-08-04 DIAGNOSIS — G89.18 POST-OPERATIVE PAIN: ICD-10-CM

## 2021-08-04 PROBLEM — R79.89 LOW TSH LEVEL: Status: RESOLVED | Noted: 2021-02-08 | Resolved: 2021-08-04

## 2021-08-04 PROBLEM — Z09 HOSPITAL DISCHARGE FOLLOW-UP: Status: RESOLVED | Noted: 2021-03-03 | Resolved: 2021-08-04

## 2021-08-04 PROBLEM — E05.90 SUBCLINICAL HYPERTHYROIDISM: Status: RESOLVED | Noted: 2021-03-08 | Resolved: 2021-08-04

## 2021-08-04 LAB — PATHOLOGY CONSULT NOTE: NORMAL

## 2021-08-04 PROCEDURE — 700111 HCHG RX REV CODE 636 W/ 250 OVERRIDE (IP): Performed by: ANESTHESIOLOGY

## 2021-08-04 PROCEDURE — 160046 HCHG PACU - 1ST 60 MINS PHASE II: Performed by: SURGERY

## 2021-08-04 PROCEDURE — 502594 HCHG SCISSOR HANDLE, HARMONIC ACE: Performed by: SURGERY

## 2021-08-04 PROCEDURE — 501838 HCHG SUTURE GENERAL: Performed by: SURGERY

## 2021-08-04 PROCEDURE — 160029 HCHG SURGERY MINUTES - 1ST 30 MINS LEVEL 4: Performed by: SURGERY

## 2021-08-04 PROCEDURE — 160002 HCHG RECOVERY MINUTES (STAT): Performed by: SURGERY

## 2021-08-04 PROCEDURE — 160041 HCHG SURGERY MINUTES - EA ADDL 1 MIN LEVEL 4: Performed by: SURGERY

## 2021-08-04 PROCEDURE — 700102 HCHG RX REV CODE 250 W/ 637 OVERRIDE(OP): Performed by: ANESTHESIOLOGY

## 2021-08-04 PROCEDURE — 700101 HCHG RX REV CODE 250: Performed by: ANESTHESIOLOGY

## 2021-08-04 PROCEDURE — 160036 HCHG PACU - EA ADDL 30 MINS PHASE I: Performed by: SURGERY

## 2021-08-04 PROCEDURE — A9270 NON-COVERED ITEM OR SERVICE: HCPCS | Performed by: ANESTHESIOLOGY

## 2021-08-04 PROCEDURE — 160047 HCHG PACU  - EA ADDL 30 MINS PHASE II: Performed by: SURGERY

## 2021-08-04 PROCEDURE — 160009 HCHG ANES TIME/MIN: Performed by: SURGERY

## 2021-08-04 PROCEDURE — 88307 TISSUE EXAM BY PATHOLOGIST: CPT

## 2021-08-04 PROCEDURE — 160048 HCHG OR STATISTICAL LEVEL 1-5: Performed by: SURGERY

## 2021-08-04 PROCEDURE — 160035 HCHG PACU - 1ST 60 MINS PHASE I: Performed by: SURGERY

## 2021-08-04 PROCEDURE — 160025 RECOVERY II MINUTES (STATS): Performed by: SURGERY

## 2021-08-04 PROCEDURE — 700105 HCHG RX REV CODE 258: Performed by: SURGERY

## 2021-08-04 RX ORDER — ROCURONIUM BROMIDE 10 MG/ML
INJECTION, SOLUTION INTRAVENOUS PRN
Status: DISCONTINUED | OUTPATIENT
Start: 2021-08-04 | End: 2021-08-04 | Stop reason: SURG

## 2021-08-04 RX ORDER — ONDANSETRON 2 MG/ML
4 INJECTION INTRAMUSCULAR; INTRAVENOUS
Status: DISCONTINUED | OUTPATIENT
Start: 2021-08-04 | End: 2021-08-04 | Stop reason: HOSPADM

## 2021-08-04 RX ORDER — HYDROMORPHONE HYDROCHLORIDE 1 MG/ML
0.4 INJECTION, SOLUTION INTRAMUSCULAR; INTRAVENOUS; SUBCUTANEOUS
Status: DISCONTINUED | OUTPATIENT
Start: 2021-08-04 | End: 2021-08-04 | Stop reason: HOSPADM

## 2021-08-04 RX ORDER — HYDROMORPHONE HYDROCHLORIDE 1 MG/ML
0.2 INJECTION, SOLUTION INTRAMUSCULAR; INTRAVENOUS; SUBCUTANEOUS
Status: DISCONTINUED | OUTPATIENT
Start: 2021-08-04 | End: 2021-08-04 | Stop reason: HOSPADM

## 2021-08-04 RX ORDER — HALOPERIDOL 5 MG/ML
1 INJECTION INTRAMUSCULAR
Status: DISCONTINUED | OUTPATIENT
Start: 2021-08-04 | End: 2021-08-04 | Stop reason: HOSPADM

## 2021-08-04 RX ORDER — DIPHENHYDRAMINE HYDROCHLORIDE 50 MG/ML
12.5 INJECTION INTRAMUSCULAR; INTRAVENOUS
Status: DISCONTINUED | OUTPATIENT
Start: 2021-08-04 | End: 2021-08-04 | Stop reason: HOSPADM

## 2021-08-04 RX ORDER — LIDOCAINE HYDROCHLORIDE 20 MG/ML
INJECTION, SOLUTION EPIDURAL; INFILTRATION; INTRACAUDAL; PERINEURAL PRN
Status: DISCONTINUED | OUTPATIENT
Start: 2021-08-04 | End: 2021-08-04 | Stop reason: SURG

## 2021-08-04 RX ORDER — LABETALOL HYDROCHLORIDE 5 MG/ML
5 INJECTION, SOLUTION INTRAVENOUS
Status: DISCONTINUED | OUTPATIENT
Start: 2021-08-04 | End: 2021-08-04 | Stop reason: HOSPADM

## 2021-08-04 RX ORDER — GLYCOPYRROLATE 0.2 MG/ML
INJECTION INTRAMUSCULAR; INTRAVENOUS PRN
Status: DISCONTINUED | OUTPATIENT
Start: 2021-08-04 | End: 2021-08-04 | Stop reason: SURG

## 2021-08-04 RX ORDER — SODIUM CHLORIDE, SODIUM LACTATE, POTASSIUM CHLORIDE, CALCIUM CHLORIDE 600; 310; 30; 20 MG/100ML; MG/100ML; MG/100ML; MG/100ML
INJECTION, SOLUTION INTRAVENOUS CONTINUOUS
Status: ACTIVE | OUTPATIENT
Start: 2021-08-04 | End: 2021-08-04

## 2021-08-04 RX ORDER — HYDRALAZINE HYDROCHLORIDE 20 MG/ML
5 INJECTION INTRAMUSCULAR; INTRAVENOUS
Status: DISCONTINUED | OUTPATIENT
Start: 2021-08-04 | End: 2021-08-04 | Stop reason: HOSPADM

## 2021-08-04 RX ORDER — OXYCODONE HCL 5 MG/5 ML
10 SOLUTION, ORAL ORAL
Status: COMPLETED | OUTPATIENT
Start: 2021-08-04 | End: 2021-08-04

## 2021-08-04 RX ORDER — OXYCODONE HCL 5 MG/5 ML
5 SOLUTION, ORAL ORAL
Status: COMPLETED | OUTPATIENT
Start: 2021-08-04 | End: 2021-08-04

## 2021-08-04 RX ORDER — HYDROMORPHONE HYDROCHLORIDE 1 MG/ML
0.1 INJECTION, SOLUTION INTRAMUSCULAR; INTRAVENOUS; SUBCUTANEOUS
Status: DISCONTINUED | OUTPATIENT
Start: 2021-08-04 | End: 2021-08-04 | Stop reason: HOSPADM

## 2021-08-04 RX ORDER — ONDANSETRON 2 MG/ML
INJECTION INTRAMUSCULAR; INTRAVENOUS PRN
Status: DISCONTINUED | OUTPATIENT
Start: 2021-08-04 | End: 2021-08-04 | Stop reason: SURG

## 2021-08-04 RX ORDER — HYDROCODONE BITARTRATE AND ACETAMINOPHEN 5; 325 MG/1; MG/1
1-2 TABLET ORAL EVERY 4 HOURS PRN
Qty: 30 TABLET | Refills: 0 | Status: SHIPPED | OUTPATIENT
Start: 2021-08-04 | End: 2021-08-11

## 2021-08-04 RX ORDER — DEXAMETHASONE SODIUM PHOSPHATE 4 MG/ML
INJECTION, SOLUTION INTRA-ARTICULAR; INTRALESIONAL; INTRAMUSCULAR; INTRAVENOUS; SOFT TISSUE PRN
Status: DISCONTINUED | OUTPATIENT
Start: 2021-08-04 | End: 2021-08-04 | Stop reason: SURG

## 2021-08-04 RX ADMIN — FENTANYL CITRATE 25 MCG: 50 INJECTION, SOLUTION INTRAMUSCULAR; INTRAVENOUS at 13:05

## 2021-08-04 RX ADMIN — OXYCODONE HYDROCHLORIDE 5 MG: 5 SOLUTION ORAL at 13:28

## 2021-08-04 RX ADMIN — HYDRALAZINE HYDROCHLORIDE 5 MG: 20 INJECTION INTRAMUSCULAR; INTRAVENOUS at 12:49

## 2021-08-04 RX ADMIN — OXYCODONE HYDROCHLORIDE 5 MG: 5 SOLUTION ORAL at 12:59

## 2021-08-04 RX ADMIN — ROCURONIUM BROMIDE 20 MG: 10 INJECTION, SOLUTION INTRAVENOUS at 10:54

## 2021-08-04 RX ADMIN — SODIUM CHLORIDE, POTASSIUM CHLORIDE, SODIUM LACTATE AND CALCIUM CHLORIDE: 600; 310; 30; 20 INJECTION, SOLUTION INTRAVENOUS at 09:28

## 2021-08-04 RX ADMIN — DEXAMETHASONE SODIUM PHOSPHATE 4 MG: 4 INJECTION, SOLUTION INTRA-ARTICULAR; INTRALESIONAL; INTRAMUSCULAR; INTRAVENOUS; SOFT TISSUE at 11:06

## 2021-08-04 RX ADMIN — ONDANSETRON 4 MG: 2 INJECTION INTRAMUSCULAR; INTRAVENOUS at 11:31

## 2021-08-04 RX ADMIN — LIDOCAINE HYDROCHLORIDE 40 MG: 20 INJECTION, SOLUTION EPIDURAL; INFILTRATION; INTRACAUDAL at 10:54

## 2021-08-04 RX ADMIN — EPHEDRINE SULFATE 5 MG: 50 INJECTION, SOLUTION INTRAVENOUS at 11:02

## 2021-08-04 RX ADMIN — FENTANYL CITRATE 25 MCG: 50 INJECTION, SOLUTION INTRAMUSCULAR; INTRAVENOUS at 11:14

## 2021-08-04 RX ADMIN — GLYCOPYRROLATE 0.2 MG: 0.2 INJECTION INTRAMUSCULAR; INTRAVENOUS at 11:01

## 2021-08-04 RX ADMIN — PROPOFOL 100 MG: 10 INJECTION, EMULSION INTRAVENOUS at 10:54

## 2021-08-04 RX ADMIN — FENTANYL CITRATE 50 MCG: 50 INJECTION, SOLUTION INTRAMUSCULAR; INTRAVENOUS at 10:51

## 2021-08-04 RX ADMIN — SUGAMMADEX 100 MG: 100 INJECTION, SOLUTION INTRAVENOUS at 11:05

## 2021-08-04 RX ADMIN — FENTANYL CITRATE 25 MCG: 50 INJECTION, SOLUTION INTRAMUSCULAR; INTRAVENOUS at 11:19

## 2021-08-04 ASSESSMENT — PAIN DESCRIPTION - PAIN TYPE
TYPE: SURGICAL PAIN

## 2021-08-04 ASSESSMENT — FIBROSIS 4 INDEX: FIB4 SCORE: 2.19

## 2021-08-04 ASSESSMENT — PAIN SCALES - GENERAL: PAIN_LEVEL: 2

## 2021-08-04 NOTE — OR NURSING
1342 - Handoff report received from TASHI Sampson.   Patient resting comfortably in bed     1416 - Handoff report to TASHI Sampson

## 2021-08-04 NOTE — ANESTHESIA PREPROCEDURE EVALUATION
Echo 3/21 with nl EF    Relevant Problems   NEURO   (positive) History of CVA (cerebrovascular accident)      CARDIAC   (positive) Cardiac arrhythmia   (positive) Essential hypertension   (positive) Murmur   (positive) Nonrheumatic aortic valve insufficiency       Physical Exam    Airway   Mallampati: II  TM distance: >3 FB  Neck ROM: full       Cardiovascular - normal exam  Rhythm: regular  Rate: normal  (-) murmur     Dental - normal exam           Pulmonary - normal exam  Breath sounds clear to auscultation     Abdominal    Neurological - normal exam                 Anesthesia Plan    ASA 2       Plan - general       Airway plan will be ETT          Induction: intravenous    Postoperative Plan: Postoperative administration of opioids is intended.    Pertinent diagnostic labs and testing reviewed    Informed Consent:    Anesthetic plan and risks discussed with patient.    Use of blood products discussed with: patient whom consented to blood products.

## 2021-08-04 NOTE — OP REPORT
Operative Report    Date: 8/4/2021    Surgeon: Mercedes Byrne M.D.    Assistant: Jonathan VELAZQUEZ    My assistant, Jonathan Coffman, was medically necessary for this procedure. He placed the precordial electrodes of the NIMS and monitored the right recurrent laryngeal nerve throughout the procedure. He also by retracting tissues allowed me the surgical field visualization necessary for a safe surgery. Finally, he also assisted with hemostasis and wound closure.     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 Correct side and site were identified.  GETA was smoothly induced. The patient was prepped and draped in the usual sterile fashion.    With the patient in the supine position, the head of the bed slightly elevated, the neck slightly extended, and the patient intubated with a NIM endotracheal tube, the precordial electrodes were placed by the surgical assistant, who then monitored the right recurrent laryngeal nerve throughout the procedure.     The neck was prepared with betadiene and draped in the usual fashion. The neck was entered through a lower transverse cervical incision and carried down through the platysma. Subplatysmal flaps were dissected superiorly and inferiorly down to the sternal notch. The midline cervical fascia was incised down to the capsule of the thyroid. The strap muscles were mobilized off the right thyroid lobe, and the superior pole vessels progressively divided with the Harmonic.  The thyroid veins were divided with the Harmonic. The recurrent  laryngeal nerve and both parathyroids were identified and protected. Branches of the inferior thyroid artery were divided on the capsule of the gland with the Harmonic. Smaller vessels were either divided with the Harmonic or, when near to the recurrent nerve, divided between clamps and suture ligated with 3-0 Vicryl suture as the gland was dissected free from the nerve and off the trachea. The gland was transected at the medial border of the left thyroid lobe and the right thyroid lobe and the isthmus were sent for permanent pathologic exam.    Good hemostasis was assured.     The integrity of the right recurrent laryngeal nerve was documented. Small pieces of Surgicel Fibrillar were placed in in the right side of the neck. The midline cervical fascia was reapproximated with running 3-0 Vicryl. Platysma was reapproximated with interrupted 3-0 Vicryl. The skin was closed with monocryl.    The patient was awakened from general anesthetic, and was taken to the recovery room in stable condition.    Sponge and needle counts were correct at the end of the case.     Specimen: right thyroid lobe and isthmus    EBL: 25 mL    Dispo: stable, extubated, to PACU    Mercedes Byrne M.D.  [facility]  [phone]

## 2021-08-04 NOTE — ANESTHESIA TIME REPORT
Anesthesia Start and Stop Event Times     Date Time Event    8/4/2021 1035 Ready for Procedure     1049 Anesthesia Start     1147 Anesthesia Stop        Responsible Staff  08/04/21    Name Role Begin End    Chiquis House M.D. Anesth 1049 1147        Preop Diagnosis (Free Text):  Pre-op Diagnosis     THYROTOXICOSIS WITH SINGLE THYROID NODULE        Preop Diagnosis (Codes):    Post op Diagnosis  Multinodular goiter      Premium Reason  Non-Premium    Comments: locum

## 2021-08-04 NOTE — ANESTHESIA PROCEDURE NOTES
Airway    Date/Time: 8/4/2021 10:59 AM  Performed by: Chiquis House M.D.  Authorized by: Chiquis House M.D.     Location:  OR  Urgency:  Elective  Indications for Airway Management:  Anesthesia      Spontaneous Ventilation: absent    Sedation Level:  Deep  Preoxygenated: Yes    Patient Position:  Sniffing  Final Airway Type:  Endotracheal airway  Final Endotracheal Airway:  ETT  Cuffed: Yes    Technique Used for Successful ETT Placement:  Direct laryngoscopy    Insertion Site:  Oral  Blade Type:  Baldemar  Laryngoscope Blade/Videolaryngoscope Blade Size:  3  ETT Size (mm):  6.0  Measured from:  Lips  ETT to Lips (cm):  22  Placement Verified by: auscultation and capnometry    Cormack-Lehane Classification:  Grade IIa - partial view of glottis  Number of Attempts at Approach:  1

## 2021-08-04 NOTE — OR NURSING
1143- Pt to PACU bay 3 from OR s/p right lobectomy. Report from anesthesia and OR RN. On 4L O2 via mask. Respirations even and unlabored. VSS. Neck incision with gauze and tegaderm, CDI. Ice pack applied.    1238- Pt waking up. Weaned off of supplemental O2. Reports pain as 2/10- declines pain medication at this time.    1245- Phase II criteria met    1249- 5mg IV hydralazine given for hypertension    1259- 5mg PO oxycodone given for 4/10 throat pain.    1305- 25mcg IV fentanyl given for 4/10 throat pain.    1328- Additional 5mg PO oxycodone given for 4/10 pain.    1333- Discharge instructions discussed with patient's , Rivera. All Questions answered. Verbalized understanding.    1342- Handoff report to Darvin AKINS    1415- Report back from Darvin AKINS, care reassumed. Pt transferred to bathroom via gurney. Changing with assistance.    1440- Pt transferred to recliner chair. Pt's ride contacted    1500- PIV removed with tip intact    1515- Pt instructed on use of IS. Return demonstration completed.    1535- Pt escorted out of department in wheelchair with all belongings. Discharged home to responsible adult.

## 2021-08-04 NOTE — ANESTHESIA POSTPROCEDURE EVALUATION
Patient: Milena Ang    Procedure Summary     Date: 08/04/21 Room / Location: [facility] ROOM 23 / SURGERY SAME DAY [facility]    Anesthesia Start: 1049 Anesthesia Stop: 1147    Procedure: LOBECTOMY, THYROID - TOTAL, UNILATERAL. (Right Neck) Diagnosis: (THYROTOXICOSIS WITH SINGLE THYROID NODULE)    Surgeons: Mercedes Byrne M.D. Responsible Provider: Chiquis House M.D.    Anesthesia Type: general ASA Status: 2          Final Anesthesia Type: general  Last vitals  BP   Blood Pressure : (!) 165/63    Temp   36.2 °C (97.2 °F)    Pulse   (!) 53   Resp   18    SpO2   99 %      Anesthesia Post Evaluation    Patient location during evaluation: PACU  Patient participation: complete - patient participated  Level of consciousness: awake and alert  Pain score: 2    Airway patency: patent  Anesthetic complications: no  Cardiovascular status: hemodynamically stable  Respiratory status: acceptable  Hydration status: euvolemic    PONV: none          No complications documented.     Nurse Pain Score: 0 (NPRS)

## 2021-08-04 NOTE — DISCHARGE INSTRUCTIONS
ACTIVITY: Rest and take it easy for the first 24 hours.  A responsible adult is recommended to remain with you during that time.  It is normal to feel sleepy.  We encourage you to not do anything that requires balance, judgment or coordination.     MILD FLU-LIKE SYMPTOMS ARE NORMAL. YOU MAY EXPERIENCE GENERALIZED MUSCLE ACHES, THROAT IRRITATION, HEADACHE AND/OR SOME NAUSEA.    FOR 24 HOURS DO NOT:  Drive, operate machinery or run household appliances.  Drink beer or alcoholic beverages.   Make important decisions or sign legal documents.    SPECIAL INSTRUCTIONS:     Discharge instructions after partial thyroidectomy:     You had surgery called a partial thyroidectomy. This means that part of your thyroid gland was removed. The main job of the thyroid gland is to make thyroid hormone. This hormone controls your body’s metabolism. This is the way your body creates and uses energy. Most of the time, after part of your thyroid is removed, the remainder will make up for what has been removed, and you will not need to take thyroid pills. We will check your thyroid function about a month after surgery to determine this.      Recovering After Surgery:     Get plenty of rest.     Avoid heavy lifting and strenuous activity for 3-5 weeks.     Walk a few times daily - but don’t push yourself too hard. Slowly increase your pace and distance, as you feel able.     Return to work when your doctor says it’s okay, typically after 7 days.     Keeping Your Doctor’s Appointments:     See your doctor for regular visits. These are needed to monitor your health and your thyroid levels.     Have routine blood tests done. These check the level of thyroid hormone in your body. This helps your doctor know whether to start or adjust the dosage of your medication if needed.     When to Call Your Doctor:  Call your doctor right away if you have any of the following:  Fever above 100.4°F  Swelling or bleeding at the incision  site  Choking  Trouble breathing  A sore throat that lasts longer than 7 days  Tingling or cramps in your hands, feet, or lips     FOLLOW-UP:  · Please call my office at [phone] to make an appointment in 1 week     Office address:   [address]  [place] [address]        Mercedes Byrne M.D.  [facility]  [phone]       DIET: To avoid nausea, slowly advance diet as tolerated, avoiding spicy or greasy foods for the first day.  Add more substantial food to your diet according to your physician's instructions.  INCREASE FLUIDS AND FIBER TO AVOID CONSTIPATION.    SURGICAL DRESSING/BATHING:  You have a waterproof dressing in place, OK to shower but do not submerge the incision or dressing. You may remove your dressing after 48 hours. Underneath there will be a piece of tape (called a steri strip) that you can leave in place until it falls off. OK to shower after the waterproof dressing is removed. No other specific care is required for your incision.    FOLLOW-UP APPOINTMENT:  A follow-up appointment should be arranged with your doctor in 1 week; call to schedule.    You should CALL YOUR PHYSICIAN if you develop:  Fever greater than 101 degrees F.  Pain not relieved by medication, or persistent nausea or vomiting.  Excessive bleeding (blood soaking through dressing) or unexpected drainage from the wound.  Extreme redness or swelling around the incision site, drainage of pus or foul smelling drainage.  Inability to urinate or empty your bladder within 8 hours.  Problems with breathing or chest pain.    You should call 911 if you develop problems with breathing or chest pain.  If you are unable to contact your doctor or surgical center, you should go to the nearest emergency room or urgent care center.  Dr. Byrne's telephone #: [phone]    If any questions arise, call your doctor.  If your doctor is not available, please feel free to call the Surgical Center at [phone]. The Contact  Center is open Monday through Friday 7AM to 5PM and may speak to a nurse at [phone], or toll free at [phone].     A registered nurse may call you a few days after your surgery to see how you are doing after your procedure.    MEDICATIONS: Resume taking daily medication.  Take prescribed pain medication with food.  If no medication is prescribed, you may take non-aspirin pain medication if needed.  PAIN MEDICATION CAN BE VERY CONSTIPATING.  Take a stool softener or laxative such as senokot, pericolace, or milk of magnesia if needed.    Prescription given for Norco.  Last pain medication given at ***.    Dr. Byrne has prescribed pain medication that includes Acetaminophen (Tylenol). Do not take additional Acetaminophen (Tylenol) while taking the prescribed medication.    Depression / Suicide Risk    As you are discharged from this [facility] facility, it is important to learn how to keep safe from harming yourself.    Recognize the warning signs:  · Abrupt changes in personality, positive or negative- including increase in energy   · Giving away possessions  · Change in eating patterns- significant weight changes-  positive or negative  · Change in sleeping patterns- unable to sleep or sleeping all the time   · Unwillingness or inability to communicate  · Depression  · Unusual sadness, discouragement and loneliness  · Talk of wanting to die  · Neglect of personal appearance   · Rebelliousness- reckless behavior  · Withdrawal from people/activities they love  · Confusion- inability to concentrate     If you or a loved one observes any of these behaviors or has concerns about self-harm, here's what you can do:  · Talk about it- your feelings and reasons for harming yourself  · Remove any means that you might use to hurt yourself (examples: pills, rope, extension cords, firearm)  · Get professional help from the community (Mental Health, Substance Abuse, psychological counseling)  · Do not be alone:Call  your Safe Contact- someone whom you trust who will be there for you.  · Call your local CRISIS HOTLINE 132-5446 or 252-215-9964  · Call your local Children's Mobile Crisis Response Team [place] (000) 592-2169 or www.[employer]  · Call the toll free National Suicide Prevention Hotlines   · National Suicide Prevention Lifeline 620-441-MTGO (2965)  · National [employer] Line Network 800-SUICIDE (019-7941)

## 2021-08-04 NOTE — PROGRESS NOTES
Discharge instructions after partial thyroidectomy:    You had surgery called a partial thyroidectomy. This means that part of your thyroid gland was removed. The main job of the thyroid gland is to make thyroid hormone. This hormone controls your body’s metabolism. This is the way your body creates and uses energy. Most of the time, after part of your thyroid is removed, the remainder will make up for what has been removed, and you will not need to take thyroid pills. We will check your thyroid function about a month after surgery to determine this.     Recovering After Surgery:    Get plenty of rest.    Incisional care: you have a waterproof dressing in place, OK to shower but do not submerge the incision or dressing. You may remove your dressing after 48 hours. Underneath there will be a piece of tape (called a steri strip) that you can leave in place until it falls off. OK to shower after the waterproof dressing is removed. No other specific care is required for your incision.    Avoid heavy lifting and strenuous activity for 3-5 weeks.    Walk a few times daily - but don’t push yourself too hard. Slowly increase your pace and distance, as you feel able.    Return to work when your doctor says it’s okay, typically after 7 days.    Keeping Your Doctor’s Appointments:    See your doctor for regular visits. These are needed to monitor your health and your thyroid levels.    Have routine blood tests done. These check the level of thyroid hormone in your body. This helps your doctor know whether to start or adjust the dosage of your medication if needed.    When to Call Your Doctor:  Call your doctor right away if you have any of the following:  Fever above 100.4°F  Swelling or bleeding at the incision site  Choking  Trouble breathing  A sore throat that lasts longer than 7 days  Tingling or cramps in your hands, feet, or lips    FOLLOW-UP:  ? Please call my office at [phone] to make an appointment in 1  week    Office address:  [address] [place] [address] [address]  [place] [address]      Mercedes Byrne M.D.  [facility]  [phone]

## 2021-08-05 LAB
THYROGLOB AB SERPL-ACNC: <0.9 IU/ML (ref 0–4)
THYROGLOB SERPL-MCNC: 23.7 NG/ML (ref 1.3–31.8)
THYROGLOB SERPL-MCNC: NORMAL NG/ML (ref 1.3–31.8)

## 2021-08-30 ENCOUNTER — PATIENT MESSAGE (OUTPATIENT)
Dept: HEALTH INFORMATION MANAGEMENT | Facility: OTHER | Age: 86
End: 2021-08-30

## 2021-09-08 ENCOUNTER — HOSPITAL ENCOUNTER (OUTPATIENT)
Dept: LAB | Facility: MEDICAL CENTER | Age: 86
End: 2021-09-08
Attending: SURGERY
Payer: MEDICARE

## 2021-09-08 LAB — TSH SERPL DL<=0.005 MIU/L-ACNC: 11.6 UIU/ML (ref 0.38–5.33)

## 2021-09-08 PROCEDURE — 36415 COLL VENOUS BLD VENIPUNCTURE: CPT

## 2021-09-08 PROCEDURE — 84443 ASSAY THYROID STIM HORMONE: CPT

## 2021-09-20 ENCOUNTER — TELEPHONE (OUTPATIENT)
Dept: ENDOCRINOLOGY | Facility: MEDICAL CENTER | Age: 86
End: 2021-09-20

## 2021-09-20 NOTE — TELEPHONE ENCOUNTER
VOICEMAIL  1. Caller Name: Milena Ang                        Call Back Number: [phone]      2. Message: Patient called to cancel her appointment.    3. Patient approves office to leave a detailed voicemail/MyChart message: yes    I called patient and lvm I asked her if she wanted to r/s to please call us back and I provided her with our phone number.

## 2021-09-27 ENCOUNTER — TELEPHONE (OUTPATIENT)
Dept: HEALTH INFORMATION MANAGEMENT | Facility: OTHER | Age: 86
End: 2021-09-27

## 2021-09-27 NOTE — TELEPHONE ENCOUNTER
Member responding to postcard that she will need a [facility] PCP. Member is on Preferred Plan, Member has seen a PCP at [facility] prior, update PCP in [employer] and [facility], Educate Member about SCP  Program and provide call in number of [phone] and advise she will receive further information when a  is assigned to that location. Until then anyone on the team is available to assist her. All was understood. Member has no further questions or concerns.

## 2021-10-04 ENCOUNTER — HOSPITAL ENCOUNTER (OUTPATIENT)
Dept: LAB | Facility: MEDICAL CENTER | Age: 86
End: 2021-10-04
Attending: INTERNAL MEDICINE
Payer: MEDICARE

## 2021-10-04 LAB
ALBUMIN SERPL BCP-MCNC: 4.6 G/DL (ref 3.2–4.9)
ALBUMIN/GLOB SERPL: 1.7 G/DL
ALP SERPL-CCNC: 56 U/L (ref 30–99)
ALT SERPL-CCNC: 20 U/L (ref 2–50)
ANION GAP SERPL CALC-SCNC: 14 MMOL/L (ref 7–16)
AST SERPL-CCNC: 14 U/L (ref 12–45)
BASOPHILS # BLD AUTO: 1 % (ref 0–1.8)
BASOPHILS # BLD: 0.07 K/UL (ref 0–0.12)
BILIRUB SERPL-MCNC: 0.8 MG/DL (ref 0.1–1.5)
BUN SERPL-MCNC: 20 MG/DL (ref 8–22)
CALCIUM SERPL-MCNC: 9.3 MG/DL (ref 8.5–10.5)
CHLORIDE SERPL-SCNC: 105 MMOL/L (ref 96–112)
CHOLEST SERPL-MCNC: 151 MG/DL (ref 100–199)
CO2 SERPL-SCNC: 25 MMOL/L (ref 20–33)
CREAT SERPL-MCNC: 0.81 MG/DL (ref 0.5–1.4)
CREAT UR-MCNC: 30.12 MG/DL
EOSINOPHIL # BLD AUTO: 0.04 K/UL (ref 0–0.51)
EOSINOPHIL NFR BLD: 0.6 % (ref 0–6.9)
ERYTHROCYTE [DISTWIDTH] IN BLOOD BY AUTOMATED COUNT: 46.3 FL (ref 35.9–50)
EST. AVERAGE GLUCOSE BLD GHB EST-MCNC: 120 MG/DL
FASTING STATUS PATIENT QL REPORTED: NORMAL
GLOBULIN SER CALC-MCNC: 2.7 G/DL (ref 1.9–3.5)
GLUCOSE SERPL-MCNC: 117 MG/DL (ref 65–99)
HBA1C MFR BLD: 5.8 % (ref 4–5.6)
HCT VFR BLD AUTO: 47.8 % (ref 37–47)
HDLC SERPL-MCNC: 52 MG/DL
HGB BLD-MCNC: 15.7 G/DL (ref 12–16)
IMM GRANULOCYTES # BLD AUTO: 0.07 K/UL (ref 0–0.11)
IMM GRANULOCYTES NFR BLD AUTO: 1 % (ref 0–0.9)
LDLC SERPL CALC-MCNC: 68 MG/DL
LYMPHOCYTES # BLD AUTO: 1.92 K/UL (ref 1–4.8)
LYMPHOCYTES NFR BLD: 26.8 % (ref 22–41)
MCH RBC QN AUTO: 31.8 PG (ref 27–33)
MCHC RBC AUTO-ENTMCNC: 32.8 G/DL (ref 33.6–35)
MCV RBC AUTO: 96.8 FL (ref 81.4–97.8)
MICROALBUMIN UR-MCNC: <1.2 MG/DL
MICROALBUMIN/CREAT UR: NORMAL MG/G (ref 0–30)
MONOCYTES # BLD AUTO: 0.62 K/UL (ref 0–0.85)
MONOCYTES NFR BLD AUTO: 8.7 % (ref 0–13.4)
NEUTROPHILS # BLD AUTO: 4.44 K/UL (ref 2–7.15)
NEUTROPHILS NFR BLD: 61.9 % (ref 44–72)
NRBC # BLD AUTO: 0 K/UL
NRBC BLD-RTO: 0 /100 WBC
PLATELET # BLD AUTO: 169 K/UL (ref 164–446)
PMV BLD AUTO: 13 FL (ref 9–12.9)
POTASSIUM SERPL-SCNC: 3.4 MMOL/L (ref 3.6–5.5)
PROT SERPL-MCNC: 7.3 G/DL (ref 6–8.2)
RBC # BLD AUTO: 4.94 M/UL (ref 4.2–5.4)
SODIUM SERPL-SCNC: 144 MMOL/L (ref 135–145)
T4 SERPL-MCNC: 8.6 UG/DL (ref 4–12)
TRIGL SERPL-MCNC: 153 MG/DL (ref 0–149)
TSH SERPL DL<=0.005 MIU/L-ACNC: 2.94 UIU/ML (ref 0.38–5.33)
WBC # BLD AUTO: 7.2 K/UL (ref 4.8–10.8)

## 2021-10-04 PROCEDURE — 80061 LIPID PANEL: CPT

## 2021-10-04 PROCEDURE — 85025 COMPLETE CBC W/AUTO DIFF WBC: CPT

## 2021-10-04 PROCEDURE — 82570 ASSAY OF URINE CREATININE: CPT

## 2021-10-04 PROCEDURE — 84443 ASSAY THYROID STIM HORMONE: CPT

## 2021-10-04 PROCEDURE — 36415 COLL VENOUS BLD VENIPUNCTURE: CPT

## 2021-10-04 PROCEDURE — 84436 ASSAY OF TOTAL THYROXINE: CPT

## 2021-10-04 PROCEDURE — 80053 COMPREHEN METABOLIC PANEL: CPT

## 2021-10-04 PROCEDURE — 82043 UR ALBUMIN QUANTITATIVE: CPT

## 2021-10-04 PROCEDURE — 83036 HEMOGLOBIN GLYCOSYLATED A1C: CPT

## 2021-11-03 ENCOUNTER — HOSPITAL ENCOUNTER (OUTPATIENT)
Dept: LAB | Facility: MEDICAL CENTER | Age: 86
End: 2021-11-03
Attending: SURGERY
Payer: MEDICARE

## 2021-11-03 LAB — TSH SERPL DL<=0.005 MIU/L-ACNC: 6.14 UIU/ML (ref 0.38–5.33)

## 2021-11-03 PROCEDURE — 36415 COLL VENOUS BLD VENIPUNCTURE: CPT

## 2021-11-03 PROCEDURE — 84443 ASSAY THYROID STIM HORMONE: CPT

## 2021-11-10 ENCOUNTER — APPOINTMENT (RX ONLY)
Dept: URBAN - METROPOLITAN AREA CLINIC 22 | Facility: CLINIC | Age: 86
Setting detail: DERMATOLOGY
End: 2021-11-10

## 2021-11-10 DIAGNOSIS — L81.4 OTHER MELANIN HYPERPIGMENTATION: ICD-10-CM

## 2021-11-10 DIAGNOSIS — Z85.828 PERSONAL HISTORY OF OTHER MALIGNANT NEOPLASM OF SKIN: ICD-10-CM

## 2021-11-10 DIAGNOSIS — L57.0 ACTINIC KERATOSIS: ICD-10-CM

## 2021-11-10 DIAGNOSIS — D69.2 OTHER NONTHROMBOCYTOPENIC PURPURA: ICD-10-CM

## 2021-11-10 DIAGNOSIS — Z71.89 OTHER SPECIFIED COUNSELING: ICD-10-CM

## 2021-11-10 DIAGNOSIS — D18.0 HEMANGIOMA: ICD-10-CM

## 2021-11-10 PROBLEM — D18.01 HEMANGIOMA OF SKIN AND SUBCUTANEOUS TISSUE: Status: ACTIVE | Noted: 2021-11-10

## 2021-11-10 PROCEDURE — 17003 DESTRUCT PREMALG LES 2-14: CPT

## 2021-11-10 PROCEDURE — 99213 OFFICE O/P EST LOW 20 MIN: CPT | Mod: 25

## 2021-11-10 PROCEDURE — ? SUNSCREEN RECOMMENDATIONS

## 2021-11-10 PROCEDURE — ? COUNSELING

## 2021-11-10 PROCEDURE — ? LIQUID NITROGEN

## 2021-11-10 PROCEDURE — 17000 DESTRUCT PREMALG LESION: CPT

## 2021-11-10 ASSESSMENT — LOCATION DETAILED DESCRIPTION DERM
LOCATION DETAILED: RIGHT DISTAL DORSAL FOREARM
LOCATION DETAILED: RIGHT CLAVICULAR NECK
LOCATION DETAILED: RIGHT LATERAL BUCCAL CHEEK
LOCATION DETAILED: RIGHT MEDIAL SUPERIOR CHEST
LOCATION DETAILED: RIGHT SUPERIOR LATERAL BUCCAL CHEEK
LOCATION DETAILED: LEFT CENTRAL TEMPLE
LOCATION DETAILED: LEFT PROXIMAL DORSAL FOREARM
LOCATION DETAILED: SUPERIOR MID FOREHEAD
LOCATION DETAILED: RIGHT PROXIMAL DORSAL FOREARM
LOCATION DETAILED: RIGHT INFERIOR TEMPLE
LOCATION DETAILED: RIGHT INFERIOR LATERAL BUCCAL CHEEK
LOCATION DETAILED: LEFT PROXIMAL RADIAL DORSAL FOREARM
LOCATION DETAILED: LEFT INFERIOR LATERAL FOREHEAD
LOCATION DETAILED: RIGHT LATERAL EYEBROW
LOCATION DETAILED: RIGHT ELBOW
LOCATION DETAILED: LEFT INFERIOR FOREHEAD
LOCATION DETAILED: LEFT FOREHEAD
LOCATION DETAILED: RIGHT INFERIOR LATERAL FOREHEAD

## 2021-11-10 ASSESSMENT — LOCATION SIMPLE DESCRIPTION DERM
LOCATION SIMPLE: RIGHT FOREHEAD
LOCATION SIMPLE: RIGHT FOREARM
LOCATION SIMPLE: RIGHT TEMPLE
LOCATION SIMPLE: RIGHT ELBOW
LOCATION SIMPLE: LEFT TEMPLE
LOCATION SIMPLE: LEFT FOREHEAD
LOCATION SIMPLE: RIGHT CHEEK
LOCATION SIMPLE: CHEST
LOCATION SIMPLE: LEFT FOREARM
LOCATION SIMPLE: RIGHT EYEBROW
LOCATION SIMPLE: RIGHT ANTERIOR NECK
LOCATION SIMPLE: SUPERIOR FOREHEAD

## 2021-11-10 ASSESSMENT — LOCATION ZONE DERM
LOCATION ZONE: FACE
LOCATION ZONE: NECK
LOCATION ZONE: ARM
LOCATION ZONE: TRUNK

## 2021-11-10 NOTE — PROCEDURE: LIQUID NITROGEN
Show Applicator Variable?: Yes
Duration Of Freeze Thaw-Cycle (Seconds): 3
Render Note In Bullet Format When Appropriate: No
Detail Level: Detailed
Consent: The patient's consent was obtained including but not limited to risks of crusting, scabbing, blistering, scarring, darker or lighter pigmentary change, recurrence, incomplete removal and infection.
Number Of Freeze-Thaw Cycles: 2 freeze-thaw cycles
Post-Care Instructions: I reviewed with the patient in detail post-care instructions. Patient is to wear sunprotection, and avoid picking at any of the treated lesions. Pt may apply Vaseline to crusted or scabbing areas.

## 2021-11-29 ENCOUNTER — TELEPHONE (OUTPATIENT)
Dept: ENDOCRINOLOGY | Facility: MEDICAL CENTER | Age: 86
End: 2021-11-29

## 2021-11-29 NOTE — TELEPHONE ENCOUNTER
Milena has not been feeling well and PCP recommended she schedule with us. Since [facility] doesn't have openings until Mar/April, Milena was fine with seeing Elia on 12/01

## 2021-11-30 ENCOUNTER — NON-PROVIDER VISIT (OUTPATIENT)
Dept: MEDICAL GROUP | Facility: PHYSICIAN GROUP | Age: 86
End: 2021-11-30
Payer: MEDICARE

## 2021-11-30 DIAGNOSIS — Z23 NEED FOR VACCINATION: ICD-10-CM

## 2021-11-30 PROCEDURE — 90471 IMMUNIZATION ADMIN: CPT | Performed by: INTERNAL MEDICINE

## 2021-11-30 PROCEDURE — 90750 HZV VACC RECOMBINANT IM: CPT | Performed by: INTERNAL MEDICINE

## 2021-11-30 RX ORDER — CHOLESTYRAMINE 4 G/9G
POWDER, FOR SUSPENSION ORAL
COMMUNITY
Start: 2021-09-29 | End: 2022-01-06

## 2021-11-30 RX ORDER — CIPROFLOXACIN 500 MG/1
500 TABLET, FILM COATED ORAL 2 TIMES DAILY
COMMUNITY
Start: 2021-09-29 | End: 2021-12-09

## 2021-11-30 RX ORDER — LEVOTHYROXINE SODIUM 0.07 MG/1
75 TABLET ORAL
COMMUNITY
Start: 2021-10-13 | End: 2021-12-01

## 2021-11-30 RX ORDER — LEVOTHYROXINE SODIUM 0.1 MG/1
100 TABLET ORAL
COMMUNITY
Start: 2021-11-11 | End: 2021-12-01 | Stop reason: DRUGHIGH

## 2021-11-30 RX ORDER — VENLAFAXINE HYDROCHLORIDE 75 MG/1
75 CAPSULE, EXTENDED RELEASE ORAL DAILY
COMMUNITY
Start: 2021-10-13 | End: 2022-05-10

## 2021-11-30 RX ORDER — PREDNISONE 20 MG/1
TABLET ORAL
COMMUNITY
Start: 2021-09-29 | End: 2021-12-09

## 2021-11-30 RX ORDER — GABAPENTIN 100 MG/1
100 CAPSULE ORAL
COMMUNITY
Start: 2021-11-15 | End: 2021-12-01

## 2021-11-30 NOTE — PROGRESS NOTES
"Milena Ang is a 86 y.o. female here for a non-provider visit for:   SHINGRIX (Shingles)    Reason for immunization: Overdue/Provider Recommended  Immunization records indicate need for vaccine: Yes, confirmed with Epic  Minimum interval has been met for this vaccine: Yes  ABN completed: Yes    VIS Dated  10/30/2019 was given to patient: Yes  All IAC Questionnaire questions were answered \"No.\"    Patient tolerated injection and no adverse effects were observed or reported: Yes    Pt scheduled for next dose in series: Not Indicated  "

## 2021-12-01 ENCOUNTER — HOSPITAL ENCOUNTER (OUTPATIENT)
Dept: LAB | Facility: MEDICAL CENTER | Age: 86
End: 2021-12-01
Attending: PHYSICIAN ASSISTANT
Payer: MEDICARE

## 2021-12-01 ENCOUNTER — OFFICE VISIT (OUTPATIENT)
Dept: MEDICAL GROUP | Facility: PHYSICIAN GROUP | Age: 86
End: 2021-12-01
Payer: MEDICARE

## 2021-12-01 VITALS
TEMPERATURE: 98.2 F | SYSTOLIC BLOOD PRESSURE: 110 MMHG | DIASTOLIC BLOOD PRESSURE: 60 MMHG | WEIGHT: 119 LBS | BODY MASS INDEX: 21.09 KG/M2 | HEART RATE: 72 BPM | RESPIRATION RATE: 16 BRPM | OXYGEN SATURATION: 94 % | HEIGHT: 63 IN

## 2021-12-01 DIAGNOSIS — E87.6 HYPOKALEMIA: ICD-10-CM

## 2021-12-01 DIAGNOSIS — E89.0 POSTOPERATIVE HYPOTHYROIDISM: ICD-10-CM

## 2021-12-01 DIAGNOSIS — Z09 HOSPITAL DISCHARGE FOLLOW-UP: ICD-10-CM

## 2021-12-01 DIAGNOSIS — R20.0 LEFT FACIAL NUMBNESS: ICD-10-CM

## 2021-12-01 DIAGNOSIS — Z86.73 HISTORY OF CVA (CEREBROVASCULAR ACCIDENT): ICD-10-CM

## 2021-12-01 PROBLEM — E78.5 DYSLIPIDEMIA: Status: ACTIVE | Noted: 2021-11-15

## 2021-12-01 PROBLEM — K57.30 DIVERTICULOSIS OF COLON: Status: ACTIVE | Noted: 2021-11-15

## 2021-12-01 PROBLEM — R32 INCONTINENCE: Status: ACTIVE | Noted: 2021-11-15

## 2021-12-01 LAB — POTASSIUM SERPL-SCNC: 3.7 MMOL/L (ref 3.6–5.5)

## 2021-12-01 PROCEDURE — 84132 ASSAY OF SERUM POTASSIUM: CPT

## 2021-12-01 PROCEDURE — 99214 OFFICE O/P EST MOD 30 MIN: CPT | Performed by: PHYSICIAN ASSISTANT

## 2021-12-01 PROCEDURE — 36415 COLL VENOUS BLD VENIPUNCTURE: CPT

## 2021-12-01 RX ORDER — LEVOTHYROXINE SODIUM 88 UG/1
88 TABLET ORAL
Qty: 30 TABLET | Refills: 2 | Status: SHIPPED | OUTPATIENT
Start: 2021-12-01 | End: 2022-05-11

## 2021-12-01 ASSESSMENT — FIBROSIS 4 INDEX: FIB4 SCORE: 1.57

## 2021-12-01 NOTE — ASSESSMENT & PLAN NOTE
Since her last visit patient was seen in [facility] on November 15, 2021.  Patient presented with left-sided facial and neck numbness.  She has a history of a CVA in April 2021.  Patient states that she was feeling sick every other day.  CT of the head showed mild diffuse cerebral atrophy, mild to moderate chronic small vessel ischemic change of periventricular white matter bilaterally, no acute intracranial abnormality.  Chest x-ray was normal.  Labs are relatively unremarkable.

## 2021-12-01 NOTE — LETTER
[employer] [facility]  Shwetha Meyers P.A.-C.  [address]  [place] [address]  Fax: [phone]   Authorization for Release/Disclosure of   Protected Health Information   Name: JOHNATHAN ANG : 1935 SSN: [id number]   Address: [facility] [place] [address] Phone:    [phone] (home)    I authorize the entity listed below to release/disclose the PHI below to:   Formerly [facility]/Shwetha Meyers P.A.-C. and Shwetha Meyers P.A.-C.   Provider or Entity Name:  Dr. Toribio- VANESSA please.    Address   City, State, Zip   Phone:      Fax:     Reason for request: continuity of care   Information to be released:    [  ] LAST COLONOSCOPY,  including any PATH REPORT and follow-up  [  ] LAST FIT/COLOGUARD RESULT [  ] LAST DEXA  [  ] LAST MAMMOGRAM  [  ] LAST PAP  [  ] LAST LABS [  ] RETINA EXAM REPORT  [  ] IMMUNIZATION RECORDS  [ X ] Release all info      [  ] Check here and initial the line next to each item to release ALL health information INCLUDING  _____ Care and treatment for drug and / or alcohol abuse  _____ HIV testing, infection status, or AIDS  _____ Genetic Testing    DATES OF SERVICE OR TIME PERIOD TO BE DISCLOSED: _____________  I understand and acknowledge that:  * This Authorization may be revoked at any time by you in writing, except if your health information has already been used or disclosed.  * Your health information that will be used or disclosed as a result of you signing this authorization could be re-disclosed by the recipient. If this occurs, your re-disclosed health information may no longer be protected by State or Federal laws.  * You may refuse to sign this Authorization. Your refusal will not affect your ability to obtain treatment.  * This Authorization becomes effective upon signing and will  on (date) __________.      If no date is indicated, this Authorization will  one (1) year from the signature date.    Name: Johnathan Ang    Signature:   Date:      12/1/2021       PLEASE FAX REQUESTED RECORDS BACK TO: [phone]

## 2021-12-01 NOTE — ASSESSMENT & PLAN NOTE
Patient had her right side of thyroid out by Dr. Mercedes Byrne on 08/04/21 . She is having hot flashes in the last month since she was increased from 75 mcg to 100 mcg. Her last TSH was a bit low 0.99 on 11/15.

## 2021-12-02 ENCOUNTER — TELEPHONE (OUTPATIENT)
Dept: MEDICAL GROUP | Facility: PHYSICIAN GROUP | Age: 86
End: 2021-12-02

## 2021-12-02 NOTE — TELEPHONE ENCOUNTER
----- Message from Shwetha Meyers P.A.-C. sent at 12/2/2021  9:26 AM PST -----  Please call patient about their results.     Results showed: repeat potassium is normal. Reassuring news.     Thank you,    Shwetha Meyers PA-C

## 2021-12-03 ENCOUNTER — APPOINTMENT (OUTPATIENT)
Dept: ENDOCRINOLOGY | Facility: MEDICAL CENTER | Age: 86
End: 2021-12-03
Payer: MEDICARE

## 2021-12-09 ENCOUNTER — OFFICE VISIT (OUTPATIENT)
Dept: NEUROLOGY | Facility: MEDICAL CENTER | Age: 86
End: 2021-12-09
Attending: PHYSICAL MEDICINE & REHABILITATION
Payer: MEDICARE

## 2021-12-09 VITALS
RESPIRATION RATE: 12 BRPM | TEMPERATURE: 98 F | HEIGHT: 63 IN | SYSTOLIC BLOOD PRESSURE: 138 MMHG | WEIGHT: 120.81 LBS | HEART RATE: 55 BPM | BODY MASS INDEX: 21.41 KG/M2 | DIASTOLIC BLOOD PRESSURE: 84 MMHG | OXYGEN SATURATION: 96 %

## 2021-12-09 DIAGNOSIS — Z86.73 HISTORY OF STROKE: ICD-10-CM

## 2021-12-09 PROCEDURE — 99212 OFFICE O/P EST SF 10 MIN: CPT | Performed by: PSYCHIATRY & NEUROLOGY

## 2021-12-09 PROCEDURE — 99215 OFFICE O/P EST HI 40 MIN: CPT | Performed by: PSYCHIATRY & NEUROLOGY

## 2021-12-09 ASSESSMENT — FIBROSIS 4 INDEX: FIB4 SCORE: 1.57

## 2021-12-09 NOTE — PROGRESS NOTES
"[facility]  GENERAL NEUROLOGY  NEW PATIENT VISIT    Referral source: Shwetha Meyers PA-C    CC: history of stroke, left facial numbness    HISTORY OF ILLNESS:  Milena Ang is a 86 y.o. woman with a history most notable for HTN, HLD, prediabetes, stroke, and aortic valve insufficiency.  Today, she was unaccompanied, and she provided the following history:    3/2021:  Around the time of the stroke Milena noticed tingling over thee left side of the head.  She has had persistent numbness over the left side of the head since the time.    Shortly after the stroke Milena developed fatigue.  She is in bed for many hours per night.  She estimates she sleeps perhaps 7 hours/night.  Her  reports that she doesn't snore.  She does not wake up feeling refreshed.  She has never consulted with sleep medicine.    Milena can't recall when additional symptoms arose.  At some point she developed a sensation of \"fullness\" and \"drainage\" in the throat.  She has noticed mucous production.  She can either spit this out or swallow it.  Empiric treatment for allergies was ineffective.  Steroids were ineffective.    MEDICAL AND SURGICAL HISTORY:  Past Medical History:   Diagnosis Date   • Cataract     11-4-2020 H/O IOL OU   • Cervical high risk human papillomavirus (HPV) DNA test positive    • Colon polyp     hyperplastic   • Disorder of thyroid    • Diverticulosis of colon    • Dyslipidemia    • Female bladder prolapse 11/04/2020   • High cholesterol    • Hypertension    • Incontinence     pessary   • Incontinence 11/04/2020    Pt. states does not have a pessary in place.   • Indigestion    • Kidney stone    • Menopausal and postmenopausal disorder    • OSTEOPOROSIS    • Urinary bladder disorder 11/04/2020    Bladder Prolapse     Past Surgical History:   Procedure Laterality Date   • PB THYROID LOBECTOMY,UNILAT Right 8/4/2021    Procedure: LOBECTOMY, THYROID - TOTAL, UNILATERAL.;  Surgeon: Mercedes Byrne M.D.;  " Location: SURGERY SAME DAY [facility];  Service: General   • PB CYSTOURETHROSCOPY,URETER CATHETER Right 11/6/2020    Procedure: CYSTOSCOPY, WITH BILATERAL RETROGRADE PYELOGRAM- URETERO;  Surgeon: Ernesto Gamboa M.D.;  Location: SURGERY [facility];  Service: Urology   • PB CYSTO/URETERO/PYELOSCOPY, DX Bilateral 11/6/2020    Procedure: URETEROSCOPY;  Surgeon: Ernesto Gamboa M.D.;  Location: SURGERY [facility];  Service: Urology   • PB CYSTOSCOPY,INSERT URETERAL STENT Right 11/6/2020    Procedure: CYSTOSCOPY, WITH URETERAL STENT INSERTION;  Surgeon: Ernesto Gamboa M.D.;  Location: SURGERY [facility];  Service: Urology   • BLADDER SLING FEMALE  7/12    Dr. Quinn   • CATARACT EXTRACTION WITH IOL Bilateral    • CHOLECYSTECTOMY       MEDICATIONS:  Current Outpatient Medications   Medication Sig   • levothyroxine (SYNTHROID) 88 MCG Tab Take 1 Tablet by mouth every morning on an empty stomach.   • venlafaxine XR (EFFEXOR XR) 75 MG CAPSULE SR 24 HR Take 75 mg by mouth every day.   • metoprolol tartrate (LOPRESSOR) 25 MG Tab Take 25 mg by mouth.   • Ferrous Sulfate (IRON PO) Take 1 Tab by mouth every day.   • oxybutynin SR (DITROPAN-XL) 10 MG CR tablet Take 1 Tab by mouth every day.   • VITAMIN D PO Take 1 capsule by mouth every day. 5000 units   • cholestyramine (QUESTRAN) 4 GM/DOSE powder Once a day   • ciprofloxacin (CIPRO) 500 MG Tab Take 500 mg by mouth 2 times a day.   • predniSONE (DELTASONE) 20 MG Tab TAKE 1 TABLET BY MOUTH TWICE DAILY FOR 3 DAYS THEN TAKE 1 TABLET BY MOUTH EVERY MORNING THEREAFTER   • irbesartan-hydrochlorothiazide (AVALIDE) 300-12.5 MG per tablet Take 1 tablet by mouth every day. (Patient not taking: Reported on 12/9/2021)   • amLODIPine (NORVASC) 5 MG Tab Take 1 tablet by mouth every day.     SOCIAL HISTORY:  Social History     Tobacco Use   • Smoking status: Never Smoker   • Smokeless tobacco: Never Used   Substance Use Topics   • Alcohol use: No     Social History     Social History Narrative   •  Not on file     FAMILY HISTORY:  Family History   Problem Relation Age of Onset   • Cancer Father         pancreatic   • Heart Disease Father         MI at age 57   • Hypertension Father    • Cancer Brother         melanoma   • Heart Disease Brother    • No Known Problems Mother    • Cancer Sister         ovaren cancer   • Cancer Brother         throat   • Psychiatric Illness Daughter    • No Known Problems Daughter      REVIEW OF SYSTEMS:  A ROS was completed.  Pertinent positives and negatives were included in the HPI, above.  All other systems were reviewed and are negative.    PHYSICAL EXAM:  General/Medical:  - NAD  - hair, skin, nails, and joints were normal    Neuro:  MENTAL STATUS: awake and alert; no deficits of speech or language; oriented to person, place, and time; affect was appropriate to situation    CRANIAL NERVES:    II: acuity: J1+/J1+, fields: intact to confrontation, pupils: 3/3 to 2/2 without a relative afferent pupillary defect, discs: sharp    III/IV/VI: versions: intact without nystagmus    V: facial sensation: reduced over the left head    VII: facial expression: symmetric    VIII: hearing: intact to finger rub    IX/X: palate: elevates symmetrically    XI: shoulder shrug: symmetric    XII: tongue: midline    MOTOR:  - bulk: normal throughout  - tone: normal throughout  Upper Extremity Strength  (R/L)    5/5   Elbow flexion 5/5   Elbow extension 5/5   Shoulder abduction 5/5     Lower Extremity Strength  (R/L)   Hip flexion 5/5   Knee extension 5/5   Knee flexion 5/5   Ankle plantarflexion 5/5   Ankle dorsiflexion 5/5     - can walk on toes and heels  - pronator drift: absent  - abnormal movements: none    SENSATION:  - light touch: grossly intact over the upper and lower extremities  - vibration (R/L, seconds): NT/NT at the great toes  - pinprick: NT  - proprioception: NT  - Romberg: absent    COORDINATION:  - finger to nose: normal, no ataxia on exam  - finger tapping: rapid and  "accurate, bilaterally    REFLEXES:  Reflex Right Left   BR 2+ 2+   Biceps 2+ 2+   Triceps 2+ 2+   Patellae 2+ 2+   Achilles NT NT   Toes NT NT     GAIT:  - narrow base and normal  - heel-raised/toe-raised gait: intact/intact  - tandem gait: intact    REVIEW OF IMAGING STUDIES: I reviewed the following studies:  MRI Brain:  Date: 3/9/2021  W/o and w/ contrast?: without  Indication: \"TIA\"  Comparison: none  Impression:  \"1) Tiny areas of acute infarcts in the right centrum semiovale and right posterior frontal gray matter.  2) Mild cerebral volume loss.  3) Moderate chronic microvascular ischemic disease.\"    Carotid Ultrasound:  Date: 3/8/2021  Impression:  \" No significant carotid stenosis on either side.  No evidence for occlusion.\"    Echocardiogram:  Date: 3/9/2021  Impression:  \"Normal left ventricular systolic function. Left ventricular ejection fraction is visually estimated to be 65%.  Normal diastolic function.  Normal inferior vena cava size and inspiratory collapse.\"    REVIEW OF LABORATORY STUDIES:  10/4/2021:  - CBC w/ diff: within acceptable limits  - CMP: notable for K: 3.4, glucose: 117    11/3/2021:  - TSH: 6.140    ASSESSMENT:  Milena Ang is a 86 y.o. woman with a history of stroke as well as HTN, HLD, prediabetes, and aortic valve insufficiency.  I reviewed Milena's imaging and laboratory workup.  It is unclear to me why she is not on at least an aspirin and a high-intensity statin for secondary stroke prevention.  At her last visit with the stroke bridge clinic it was recommended that she take atorvastatin 80 mg daily.  I asked Milena about this, and she couldn't provide me with an explanation for why she was not taking these.  I am not convinced that her presenting symptoms (fatigue, sensation of mucous in the back of the throat) are related to her history of stroke.  Her neurologic exam (with the exception of left face/head numbness) was fairly unremarkable.  I recommended conservative " "measures including moderate exercise.  I wonder about the possibility of SHIVA.  I offered her a referral to Sleep Medicine, but she declined for now.    PLAN:  History of Stroke:  - recommend aspirin and high-intensity statin for secondary stroke prevention    Fatigue:  - moderate exercise  - Milena will consider a referral to Sleep Medicine    Follow-Up:  - Return if symptoms worsen or fail to improve.    Signed: Dashawn Pascual M.D.    BILLING DOCUMENTATION:   I spent 45 minutes reviewing the medical record, interviewing and examining the patient, discussing my impression (see \"assessment\" above), and coordinating care.  "

## 2021-12-17 RX ORDER — IRBESARTAN AND HYDROCHLOROTHIAZIDE 300; 12.5 MG/1; MG/1
1 TABLET, FILM COATED ORAL
Qty: 90 TABLET | Refills: 1 | Status: SHIPPED | OUTPATIENT
Start: 2021-12-17 | End: 2021-12-22 | Stop reason: SDUPTHER

## 2021-12-22 RX ORDER — IRBESARTAN AND HYDROCHLOROTHIAZIDE 300; 12.5 MG/1; MG/1
1 TABLET, FILM COATED ORAL
Qty: 100 TABLET | Refills: 0 | Status: SHIPPED | OUTPATIENT
Start: 2021-12-22 | End: 2022-05-05

## 2021-12-22 NOTE — TELEPHONE ENCOUNTER
Received request via: Pharmacy    Was the patient seen in the last year in this department? Yes    Does the patient have an active prescription (recently filled or refills available) for medication(s) requested? Yes. Insurance will pay for 100 day supply

## 2022-01-03 ENCOUNTER — HOSPITAL ENCOUNTER (OUTPATIENT)
Dept: LAB | Facility: MEDICAL CENTER | Age: 87
End: 2022-01-03
Attending: PHYSICIAN ASSISTANT
Payer: MEDICARE

## 2022-01-03 DIAGNOSIS — E89.0 POSTOPERATIVE HYPOTHYROIDISM: ICD-10-CM

## 2022-01-03 LAB
T3FREE SERPL-MCNC: 2.37 PG/ML (ref 2–4.4)
T4 FREE SERPL-MCNC: 1.46 NG/DL (ref 0.93–1.7)
TSH SERPL DL<=0.005 MIU/L-ACNC: 0.5 UIU/ML (ref 0.38–5.33)

## 2022-01-03 PROCEDURE — 84439 ASSAY OF FREE THYROXINE: CPT

## 2022-01-03 PROCEDURE — 36415 COLL VENOUS BLD VENIPUNCTURE: CPT

## 2022-01-03 PROCEDURE — 84443 ASSAY THYROID STIM HORMONE: CPT

## 2022-01-03 PROCEDURE — 84481 FREE ASSAY (FT-3): CPT

## 2022-01-03 SDOH — ECONOMIC STABILITY: TRANSPORTATION INSECURITY
IN THE PAST 12 MONTHS, HAS THE LACK OF TRANSPORTATION KEPT YOU FROM MEDICAL APPOINTMENTS OR FROM GETTING MEDICATIONS?: NO

## 2022-01-03 SDOH — ECONOMIC STABILITY: FOOD INSECURITY: WITHIN THE PAST 12 MONTHS, YOU WORRIED THAT YOUR FOOD WOULD RUN OUT BEFORE YOU GOT MONEY TO BUY MORE.: NEVER TRUE

## 2022-01-03 SDOH — ECONOMIC STABILITY: INCOME INSECURITY: HOW HARD IS IT FOR YOU TO PAY FOR THE VERY BASICS LIKE FOOD, HOUSING, MEDICAL CARE, AND HEATING?: NOT HARD AT ALL

## 2022-01-03 SDOH — ECONOMIC STABILITY: FOOD INSECURITY: WITHIN THE PAST 12 MONTHS, THE FOOD YOU BOUGHT JUST DIDN'T LAST AND YOU DIDN'T HAVE MONEY TO GET MORE.: NEVER TRUE

## 2022-01-03 SDOH — HEALTH STABILITY: PHYSICAL HEALTH: ON AVERAGE, HOW MANY MINUTES DO YOU ENGAGE IN EXERCISE AT THIS LEVEL?: 0 MIN

## 2022-01-03 SDOH — HEALTH STABILITY: PHYSICAL HEALTH: ON AVERAGE, HOW MANY DAYS PER WEEK DO YOU ENGAGE IN MODERATE TO STRENUOUS EXERCISE (LIKE A BRISK WALK)?: 0 DAYS

## 2022-01-03 SDOH — ECONOMIC STABILITY: HOUSING INSECURITY
IN THE LAST 12 MONTHS, WAS THERE A TIME WHEN YOU DID NOT HAVE A STEADY PLACE TO SLEEP OR SLEPT IN A SHELTER (INCLUDING NOW)?: NO

## 2022-01-03 SDOH — ECONOMIC STABILITY: HOUSING INSECURITY: IN THE LAST 12 MONTHS, HOW MANY PLACES HAVE YOU LIVED?: 1

## 2022-01-03 SDOH — ECONOMIC STABILITY: INCOME INSECURITY: IN THE LAST 12 MONTHS, WAS THERE A TIME WHEN YOU WERE NOT ABLE TO PAY THE MORTGAGE OR RENT ON TIME?: NO

## 2022-01-03 SDOH — HEALTH STABILITY: MENTAL HEALTH
STRESS IS WHEN SOMEONE FEELS TENSE, NERVOUS, ANXIOUS, OR CAN'T SLEEP AT NIGHT BECAUSE THEIR MIND IS TROUBLED. HOW STRESSED ARE YOU?: ONLY A LITTLE

## 2022-01-03 SDOH — ECONOMIC STABILITY: TRANSPORTATION INSECURITY
IN THE PAST 12 MONTHS, HAS LACK OF TRANSPORTATION KEPT YOU FROM MEETINGS, WORK, OR FROM GETTING THINGS NEEDED FOR DAILY LIVING?: NO

## 2022-01-03 SDOH — ECONOMIC STABILITY: TRANSPORTATION INSECURITY
IN THE PAST 12 MONTHS, HAS LACK OF RELIABLE TRANSPORTATION KEPT YOU FROM MEDICAL APPOINTMENTS, MEETINGS, WORK OR FROM GETTING THINGS NEEDED FOR DAILY LIVING?: NO

## 2022-01-03 ASSESSMENT — SOCIAL DETERMINANTS OF HEALTH (SDOH)
HOW OFTEN DO YOU HAVE SIX OR MORE DRINKS ON ONE OCCASION: NEVER
IN A TYPICAL WEEK, HOW MANY TIMES DO YOU TALK ON THE PHONE WITH FAMILY, FRIENDS, OR NEIGHBORS?: MORE THAN THREE TIMES A WEEK
HOW HARD IS IT FOR YOU TO PAY FOR THE VERY BASICS LIKE FOOD, HOUSING, MEDICAL CARE, AND HEATING?: NOT HARD AT ALL
DO YOU BELONG TO ANY CLUBS OR ORGANIZATIONS SUCH AS CHURCH GROUPS UNIONS, FRATERNAL OR ATHLETIC GROUPS, OR SCHOOL GROUPS?: YES
IN A TYPICAL WEEK, HOW MANY TIMES DO YOU TALK ON THE PHONE WITH FAMILY, FRIENDS, OR NEIGHBORS?: MORE THAN THREE TIMES A WEEK
WITHIN THE PAST 12 MONTHS, YOU WORRIED THAT YOUR FOOD WOULD RUN OUT BEFORE YOU GOT THE MONEY TO BUY MORE: NEVER TRUE
HOW OFTEN DO YOU GET TOGETHER WITH FRIENDS OR RELATIVES?: THREE TIMES A WEEK
HOW OFTEN DO YOU ATTEND CHURCH OR RELIGIOUS SERVICES?: MORE THAN 4 TIMES PER YEAR
HOW OFTEN DO YOU ATTEND CHURCH OR RELIGIOUS SERVICES?: MORE THAN 4 TIMES PER YEAR
HOW OFTEN DO YOU HAVE A DRINK CONTAINING ALCOHOL: NEVER
DO YOU BELONG TO ANY CLUBS OR ORGANIZATIONS SUCH AS CHURCH GROUPS UNIONS, FRATERNAL OR ATHLETIC GROUPS, OR SCHOOL GROUPS?: YES
HOW OFTEN DO YOU ATTENT MEETINGS OF THE CLUB OR ORGANIZATION YOU BELONG TO?: MORE THAN 4 TIMES PER YEAR
HOW OFTEN DO YOU GET TOGETHER WITH FRIENDS OR RELATIVES?: THREE TIMES A WEEK
HOW OFTEN DO YOU ATTENT MEETINGS OF THE CLUB OR ORGANIZATION YOU BELONG TO?: MORE THAN 4 TIMES PER YEAR

## 2022-01-03 ASSESSMENT — LIFESTYLE VARIABLES
HOW OFTEN DO YOU HAVE A DRINK CONTAINING ALCOHOL: NEVER
HOW OFTEN DO YOU HAVE SIX OR MORE DRINKS ON ONE OCCASION: NEVER

## 2022-01-04 ENCOUNTER — OFFICE VISIT (OUTPATIENT)
Dept: MEDICAL GROUP | Facility: PHYSICIAN GROUP | Age: 87
End: 2022-01-04
Payer: MEDICARE

## 2022-01-04 VITALS
OXYGEN SATURATION: 97 % | SYSTOLIC BLOOD PRESSURE: 112 MMHG | HEART RATE: 61 BPM | DIASTOLIC BLOOD PRESSURE: 60 MMHG | BODY MASS INDEX: 21.26 KG/M2 | HEIGHT: 63 IN | WEIGHT: 120 LBS | RESPIRATION RATE: 16 BRPM | TEMPERATURE: 98.8 F

## 2022-01-04 DIAGNOSIS — E89.0 POSTOPERATIVE HYPOTHYROIDISM: ICD-10-CM

## 2022-01-04 DIAGNOSIS — I10 ESSENTIAL HYPERTENSION: ICD-10-CM

## 2022-01-04 DIAGNOSIS — N81.4 UTERINE PROLAPSE: ICD-10-CM

## 2022-01-04 DIAGNOSIS — Z86.73 HISTORY OF STROKE: ICD-10-CM

## 2022-01-04 DIAGNOSIS — E78.2 MIXED HYPERLIPIDEMIA: ICD-10-CM

## 2022-01-04 PROCEDURE — 99214 OFFICE O/P EST MOD 30 MIN: CPT

## 2022-01-04 ASSESSMENT — FIBROSIS 4 INDEX: FIB4 SCORE: 1.57

## 2022-01-04 ASSESSMENT — PATIENT HEALTH QUESTIONNAIRE - PHQ9: CLINICAL INTERPRETATION OF PHQ2 SCORE: 0

## 2022-01-04 NOTE — PROGRESS NOTES
CC:   Chief Complaint   Patient presents with   • Establish Care   • Medication Management     Levothyroxine, venlafaxine, Ciprofloxacin 500 Mg,         HISTORY OF PRESENT ILLNESS: Patient is a 86 y.o. female established patient who presents today to discuss the following problems below:     Postoperative hypothyroidism  Patient has postoperative hypothyroidism which is managed with levothyroxine 88 mcg daily.  She gets hot flashes every couple of weeks, and on days that she gets hot flashes she takes 50 mcg of levothyroxine and feels that this helps.  She was prescribed venlafaxine, but has not taken this medication    Essential hypertension  Blood pressure is currently well controlled, however the patient is unsure of what medications she is taking.  She believes she is taking 2 medications and is not sure if she is taking metoprolol, irbesartan-hydrochlorothiazide, or amlodipine.  She reports that she takes 1 medication in the morning and 1 medication at night.  She was previously on lisinopril, this was discontinued    Uterine prolapse  Patient reports history of uterine prolapse.  She has no pelvic pain or vaginal bleeding.  She has a history of mesh implantation which she reports did not work.  She does use a pessary.    Mixed hyperlipidemia  There is a question over patient's current statin usage.  She reports that Dr. Toribio told her that since her lipid panel was normal, that she did not have to take the statin.  However, review from neurology indicates that due to patient's history of stroke, she should be on high intensity statin.  Patient was previously prescribed atorvastatin 80 mg every night.  She has multiple bottles of this at home.  She does not want to take the 80 mg dose, but is amenable to taking a 40 mg dose      Past Medical History:   Diagnosis Date   • Cataract     11-4-2020 H/O IOL OU   • Cervical high risk human papillomavirus (HPV) DNA test positive    • Colon polyp     hyperplastic   •  "Disorder of thyroid    • Diverticulosis of colon    • Dyslipidemia    • Female bladder prolapse 11/04/2020   • High cholesterol    • Hypertension    • Incontinence     pessary   • Incontinence 11/04/2020    Pt. states does not have a pessary in place.   • Indigestion    • Kidney stone    • Menopausal and postmenopausal disorder    • OSTEOPOROSIS    • Urinary bladder disorder 11/04/2020    Bladder Prolapse       Allergies:Penicillins    Review of Systems: Otherwise negative except for as stated above.      Exam: /60   Pulse 61   Temp 37.1 °C (98.8 °F) (Temporal)   Resp 16   Ht 1.6 m (5' 3\")   Wt 54.4 kg (120 lb)   SpO2 97%  Body mass index is 21.26 kg/m².    Gen: Alert and oriented x4. Well developed, well-nourished female in no apparent distress.  Skin: Warm, dry, good turgor, no rashes in visible areas or lacerations appreciated.   Eye: EOM intact, pupils equal, round and reactive, conjunctiva clear, lids normal.  Neck: Trachea midline, no masses, no thyromegaly  Lungs: Normal effort, CTA bilaterally, no wheezes, rhonchi, or rales. No stridor or audible wheezing. Equal chest expansion.   CV: Regular rate and rhythm. No murmurs, rubs, or gallops.  GI:  Soft, non-tender abdomen with no distention.   MSK: Normal gait, moves all extremities.  Neuro: Alert and oriented x 4, non-focal exam with motor and sensory grossly intact.  Ext: No clubbing, cyanosis, edema.  Psych: Normal behavior, affect and mood.      Assessment/Plan:  86 y.o. female with the following -    1. Postoperative hypothyroidism  TSH is significantly improved to 0.5 from 6.1 in November.  Free T4 and T3 are also within normal limits.  I discussed with the patient that I would prefer her to stay on a consistent dose, however if she does take the 50 mcg dose 1-2 times per week, she may continue to do this and we will recheck her TSH in 3 months.    2. Essential hypertension  I have urged the patient to please check what medications she is " taking, as it is very dangerous to take blood pressure medications that are not in accordance to what her provider is aware of.  Patient reports that she will go home and see what medications she is taking.  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  For now, her blood pressure is in excellent range at 112/60 today.    3. Uterine prolapse  Chronic condition, stable.  Patient currently uses a pessary for management.  She will let me know if she would like to do anything else    4. Mixed hyperlipidemia  Chronic condition, stable.  Patient is going to start back on atorvastatin 40 mg every night.  We will recheck a lipid panel in 3 months    5. History of stroke  Recommend the patient begin a daily over-the-counter aspirin as indicated by neurology for her history of stroke.  This is not listed on her formulary, patient will need to obtain this over-the-counter.    There are no diagnoses linked to this encounter.     Follow-up: Return in about 3 months (around 4/4/2022) for recheck thyroid. .    Health Maintenance: Completed      Please note that this dictation was created using voice recognition software. I have made every reasonable attempt to correct obvious errors, but I expect that there are errors of grammar and possibly content that I did not discover before finalizing the note.    Electronically signed by GURVINDER Cohen on January 4, 2022

## 2022-01-04 NOTE — ASSESSMENT & PLAN NOTE
Patient has postoperative hypothyroidism which is managed with levothyroxine 88 mcg daily.  She gets hot flashes every couple of weeks, and on days that she gets hot flashes she takes 50 mcg of levothyroxine and feels that this helps.  She was prescribed venlafaxine, but has not taken this medication

## 2022-01-06 PROBLEM — N81.4 UTERINE PROLAPSE: Status: ACTIVE | Noted: 2022-01-06

## 2022-01-06 RX ORDER — ATORVASTATIN CALCIUM 40 MG/1
40 TABLET, FILM COATED ORAL
Qty: 100 TABLET | Refills: 2 | Status: SHIPPED | OUTPATIENT
Start: 2022-01-06 | End: 2023-11-21

## 2022-01-06 NOTE — ASSESSMENT & PLAN NOTE
Blood pressure is currently well controlled, however the patient is unsure of what medications she is taking.  She believes she is taking 2 medications and is not sure if she is taking metoprolol, irbesartan-hydrochlorothiazide, or amlodipine.  She reports that she takes 1 medication in the morning and 1 medication at night.  She was previously on lisinopril, this was discontinued

## 2022-01-06 NOTE — ASSESSMENT & PLAN NOTE
Patient reports history of uterine prolapse.  She has no pelvic pain or vaginal bleeding.  She has a history of mesh implantation which she reports did not work.  She does use a pessary.

## 2022-01-06 NOTE — ASSESSMENT & PLAN NOTE
There is a question over patient's current statin usage.  She reports that Dr. Toribio told her that since her lipid panel was normal, that she did not have to take the statin.  However, review from neurology indicates that due to patient's history of stroke, she should be on high intensity statin.  Patient was previously prescribed atorvastatin 80 mg every night.  She has multiple bottles of this at home.  She does not want to take the 80 mg dose, but is amenable to taking a 40 mg dose

## 2022-01-13 DIAGNOSIS — E78.2 MIXED HYPERLIPIDEMIA: ICD-10-CM

## 2022-01-13 RX ORDER — CHOLESTYRAMINE 4 G/9G
1 POWDER, FOR SUSPENSION ORAL DAILY
Qty: 120 EACH | Refills: 2 | Status: SHIPPED | OUTPATIENT
Start: 2022-01-13 | End: 2022-05-11

## 2022-01-24 ENCOUNTER — PATIENT MESSAGE (OUTPATIENT)
Dept: HEALTH INFORMATION MANAGEMENT | Facility: OTHER | Age: 87
End: 2022-01-24
Payer: MEDICARE

## 2022-01-28 ENCOUNTER — TELEPHONE (OUTPATIENT)
Dept: NEUROLOGY | Facility: MEDICAL CENTER | Age: 87
End: 2022-01-28

## 2022-01-28 NOTE — TELEPHONE ENCOUNTER
Pt is on asprin ever day she would like to know if she needs to switch to something different, she keeps getting bruises.

## 2022-02-04 ENCOUNTER — APPOINTMENT (OUTPATIENT)
Dept: NEUROLOGY | Facility: MEDICAL CENTER | Age: 87
End: 2022-02-04
Attending: PSYCHIATRY & NEUROLOGY
Payer: MEDICARE

## 2022-02-08 NOTE — TELEPHONE ENCOUNTER
"I spoke to Milena:    A few days ago she \"woke up and couldn't navigate her head.\"    Over the left eye she has \"pain,\" which she describes as a \"numbness, sleeping pain.\"  There is numbness over the left eye and left ear.    This sensation makes her \"head feel light.\"  She is afraid to drive.  She didn't go to [employer] on Sunday because she was concerned about safety.    She has a sensation of \"drainage\" at the back of the throat.  This can last as briefly as 4 hours.    These symptoms are similar to what we discussed at our last visit.  At this point I don't have any specific recommendations for her.  She occasionally takes 1/2 tablet of oxycodone-acetaminophen (given to her by another clinician), and this seems to alleviate her symptoms well.  "

## 2022-02-22 ENCOUNTER — OFFICE VISIT (OUTPATIENT)
Dept: NEUROLOGY | Facility: MEDICAL CENTER | Age: 87
End: 2022-02-22
Attending: PSYCHIATRY & NEUROLOGY
Payer: MEDICARE

## 2022-02-22 VITALS
OXYGEN SATURATION: 95 % | SYSTOLIC BLOOD PRESSURE: 112 MMHG | BODY MASS INDEX: 21.33 KG/M2 | TEMPERATURE: 98.1 F | DIASTOLIC BLOOD PRESSURE: 54 MMHG | WEIGHT: 120.37 LBS | HEIGHT: 63 IN | HEART RATE: 78 BPM | RESPIRATION RATE: 15 BRPM

## 2022-02-22 DIAGNOSIS — I69.30 LATE EFFECT OF STROKE: Primary | ICD-10-CM

## 2022-02-22 DIAGNOSIS — R51.9 HEADACHE IN FRONT OF HEAD: ICD-10-CM

## 2022-02-22 PROCEDURE — 99214 OFFICE O/P EST MOD 30 MIN: CPT | Performed by: PSYCHIATRY & NEUROLOGY

## 2022-02-22 PROCEDURE — 99212 OFFICE O/P EST SF 10 MIN: CPT | Performed by: PSYCHIATRY & NEUROLOGY

## 2022-02-22 RX ORDER — LEVOTHYROXINE SODIUM 0.1 MG/1
100 TABLET ORAL
COMMUNITY
Start: 2022-01-27 | End: 2022-05-10

## 2022-02-22 RX ORDER — ATORVASTATIN CALCIUM 80 MG/1
80 TABLET, FILM COATED ORAL DAILY
COMMUNITY
Start: 2021-12-17 | End: 2022-05-09

## 2022-02-22 ASSESSMENT — FIBROSIS 4 INDEX: FIB4 SCORE: 1.57

## 2022-02-22 NOTE — PROGRESS NOTES
Reason for Consult:   History of Ischemic Stroke> 3/2021    History of present illness:    Milena Ang 86 y.o. 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 She was evaluated at [facility] on 3/8/2021 and then to the [facility] at [facility] on 3/17/2021 (that note was reviewed)    I reviewed Olivia Momin's note from 6/23/2021. Milena was not on Aspirin prior to the stroke event and was on 40 mg of Atorvastatin and now has been on 325 mg ASA and a higher dose of Atorvastatin (80 mg a day).    She has not had any further ischemic (stroke like events) since March 2021 which we reviewed in detail.    Residual low level numbness across left side of lateral posterior head and top of the left ear sparing inside of the mouth,lips. There is also minor numbness (painless) of the left 5th distal finger (to the DIP joint) sparing the left palmar area or above wrist area. There is no discrete weakness of the left hand or fingers in the recent months or since she was aware of this painless numbness 5th distal finger.    There has been no evolving sensory disturbances of the limbs x 4.    She saw [facility] in October 2021 for Cardiology evaluation give the above issue.    There are no clinical features of temporal arteritis like Jaw Claudication, PMR symptoms,low energy-fatigue, fevers, scalp soreness-tenderness in the last 3-6 months.    Gait-balance,usage of the limbs has been well maintained in the last 3-6 months.    No evolving speech-language or communication disturbance(s) in the recent months.    ADL's preserved which we reviewed today including basic hygiene,shopping, check writing or using her basic computer and electronic devices.    No falls or near falls in the last 6-12 months.    Averages 7-8 hours of sleep in the last 3-6 months; rarely awakens once a night to go to the bathroom.    No history of  thunderclap,valsalva,postural,exertional headaches in the recent years.    No history of concussion(s).    No history of significant tobacco or etoh use in her adult life.    Family History- mother's mother (late onset ischemic stroke after age 70).    Patient Active Problem List    Diagnosis Date Noted   • Uterine prolapse 01/06/2022   • Postoperative hypothyroidism 12/01/2021   • Dyslipidemia 11/15/2021   • Incontinence 11/15/2021   • Diverticulosis of colon 11/15/2021   • Late effect of stroke 06/23/2021   • Mixed hyperlipidemia 06/23/2021   • Cardiac arrhythmia 06/23/2021   • History of CVA (cerebrovascular accident) 03/22/2021   • Weight loss 03/22/2021   • Left facial numbness 03/08/2021   • Bradycardia 03/08/2021   • Hospital discharge follow-up 03/03/2021   • Functional diarrhea 03/03/2021   • Allergies 02/08/2021   • 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 10/11/2020   • Fatigue 07/03/2020   • Prediabetes 08/07/2019   • Overactive bladder 08/06/2018   • Vitamin D deficiency 11/18/2017   • Nonrheumatic aortic valve insufficiency 05/02/2017   • Osteopenia of spine 05/02/2017   • Murmur 03/23/2017   • Essential hypertension 01/14/2015       Past medical history:   Past Medical History:   Diagnosis Date   • Cataract     11-4-2020 H/O IOL OU   • Cervical high risk human papillomavirus (HPV) DNA test positive    • Colon polyp     hyperplastic   • Disorder of thyroid    • Diverticulosis of colon    • Dyslipidemia    • Female bladder prolapse 11/04/2020   • High cholesterol    • Hypertension    • Incontinence     pessary   • Incontinence 11/04/2020    Pt. states does not have a pessary in place.   • Indigestion    • Kidney stone    • Menopausal and postmenopausal disorder    • OSTEOPOROSIS    • Urinary bladder disorder 11/04/2020    Bladder Prolapse       Past surgical history:   Past Surgical History:   Procedure Laterality Date   • FL THYROID LOBECTOMY,UNILAT Right 8/4/2021    Procedure:  LOBECTOMY, THYROID - TOTAL, UNILATERAL.;  Surgeon: Mercedes Byrne M.D.;  Location: SURGERY SAME DAY [facility];  Service: General   • LA CYSTOURETHROSCOPY,URETER CATHETER Right 11/6/2020    Procedure: CYSTOSCOPY, WITH BILATERAL RETROGRADE PYELOGRAM- URETERO;  Surgeon: Ernesto Gamboa M.D.;  Location: SURGERY [facility];  Service: Urology   • LA CYSTO/URETERO/PYELOSCOPY, DX Bilateral 11/6/2020    Procedure: URETEROSCOPY;  Surgeon: Ernesto Gamboa M.D.;  Location: SURGERY [facility];  Service: Urology   • LA CYSTOSCOPY,INSERT URETERAL STENT Right 11/6/2020    Procedure: CYSTOSCOPY, WITH URETERAL STENT INSERTION;  Surgeon: Ernesto Gamboa M.D.;  Location: SURGERY [facility];  Service: Urology   • BLADDER SLING FEMALE  7/12    Dr. Quinn   • CATARACT EXTRACTION WITH IOL Bilateral    • CHOLECYSTECTOMY           Social history:   Social History     Socioeconomic History   • Marital status:      Spouse name: Rivera   • Number of children: 2   • Years of education: college   • Highest education level: Bachelor's degree (e.g., BA, AB, BS)   Occupational History   • Occupation: Retired Teacher     Employer: OTHER   Tobacco Use   • Smoking status: Never Smoker   • Smokeless tobacco: Never Used   Vaping Use   • Vaping Use: Never used   Substance and Sexual Activity   • Alcohol use: No   • Drug use: No   • Sexual activity: Never   Other Topics Concern   • Not on file   Social History Narrative   • Not on file     Social Determinants of Health     Financial Resource Strain: Low Risk    • Difficulty of Paying Living Expenses: Not hard at all   Food Insecurity: No Food Insecurity   • Worried About Running Out of Food in the Last Year: Never true   • Ran Out of Food in the Last Year: Never true   Transportation Needs: No Transportation Needs   • Lack of Transportation (Medical): No   • Lack of Transportation (Non-Medical): No   Physical Activity: Inactive   • Days of Exercise per Week: 0 days   • Minutes of Exercise per  "Session: 0 min   Stress: No Stress Concern Present   • Feeling of Stress : Only a little   Social Connections: Socially Integrated   • Frequency of Communication with Friends and Family: More than three times a week   • Frequency of Social Gatherings with Friends and Family: Three times a week   • Attends [employer] Services: More than 4 times per year   • Active Member of Clubs or Organizations: Yes   • Attends Club or Organization Meetings: More than 4 times per year   • Marital Status:    Intimate Partner Violence: Not on file   Housing Stability: Low Risk    • Unable to Pay for Housing in the Last Year: No   • Number of Places Lived in the Last Year: 1   • Unstable Housing in the Last Year: No       Family history:   Family History   Problem Relation Age of Onset   • Cancer Father         pancreatic   • Heart Disease Father         MI at age 57   • Hypertension Father    • Cancer Brother         melanoma   • Heart Disease Brother    • No Known Problems Mother    • Cancer Sister         ovaren cancer   • Cancer Brother         throat   • Psychiatric Illness Daughter    • No Known Problems Daughter          Current medications:   Current Outpatient Medications   Medication   • atorvastatin (LIPITOR) 40 MG Tab   • levothyroxine (SYNTHROID) 88 MCG Tab   • amLODIPine (NORVASC) 5 MG Tab   • oxybutynin SR (DITROPAN-XL) 10 MG CR tablet   • VITAMIN D PO   • cholestyramine (QUESTRAN) 4 g packet   • irbesartan-hydrochlorothiazide (AVALIDE) 300-12.5 MG per tablet   • venlafaxine XR (EFFEXOR XR) 75 MG CAPSULE SR 24 HR   • metoprolol tartrate (LOPRESSOR) 25 MG Tab     No current facility-administered medications for this visit.       Medication Allergy:  Allergies   Allergen Reactions   • Penicillins Rash and Swelling     \"tongue got thick\"           Physical examination:   Vitals:    02/22/22 1315   BP: 112/54   BP Location: Right arm   Patient Position: Sitting   BP Cuff Size: Adult   Pulse: 78   Resp: 15   Temp: 36.7 " "°C (98.1 °F)   TempSrc: Temporal   SpO2: 95%   Weight: 54.6 kg (120 lb 5.9 oz)   Height: 1.6 m (5' 3\")       Normal cephalic atraumatic.  No scalp tenderness to examining the forehead,temples,temporal-parietal areas.  No induration of temple artery areas bilaterally.     Full Range of Movement around the Neck in all directions without restrictions or discrete pain evoked triggers.  No lower extremity edema.      Neurological  Exam:    Mental status: Awake, alert and fully oriented to person, place, time and situation. Normal attention, concentration and fund of knowledge for education level.  Did not appear/act combative,irritable,anxious,paranoid/delusional or aggressive to or with me.  Speech and language: Speech is fluent without errors, clear, intact to repetition and intact to naming.     Follows 3 step motor commands in sequence without significant delay and correctly.    5/5 word recall at 3 and 5 minutes (face,velvet,Restorationism,byron,red).    Easily carried on a discrete ad precise conversation with me.    Cranial nerve exam:  II: Pupils are equally round and reactive to light. Visual fields are intact by confrontation.  III, IV, VI: EOMI, no diplopia, no ptosis.  Pupils 4>3 mm bilaterally.  No visual field cuts to confrontation and finger counting.  V: Sensation to light touch is normal over V1-3 distributions bilaterally.  .  VII: Facial movements are symmetrical. There is no facial droop. .  VIII: Hearing intact to soft speech and finger rub bilaterally  IX: Palate elevates symmetrically, uvula is midline. Dysarthria is not present.  XI: Shoulder shrug are symmetrical and strong.   XII: Tongue protrudes midline.        Motor exam:  Muscle tone is normal in all 4 limbs.    Muscle strength:    Neck Flexors/Extensors: 5/5       Right  Left  Deltoid   5/5  5/5      Biceps   5/5  5/5  Triceps  5/5  5/5   Wrist extensors 5/5  5/5  Wrist flexors  5/5  5/5     5/5  5/5  Interossei  5/5  5/5  Thenar " (APB)  5/5  5/5   Hip flexors  5/5  5/5  Quadriceps  5/5  5/5    Hamstrings  5/5  5/5  Dorsiflexors  5/5  5/5  Plantarflexors  5/5  5/5  Toe extension  5/5  5/5  Toe Flexors                5/5                   5/5    Sensory exam:    Intact to Vibration and Proprioception in bilaterally lower extremity.        Reflexes:       Right  Left  Biceps   2/4  2/4  Triceps  2/4  2/4  Brachioradialis 2/4  2/4  Knee jerk  2/4  2/4  Ankle jerk  2/4  2/4     Frontal release signs are normal.    bilaterally toes are downgoing to plantar stimulation..    Coordination (finger-to-nose, heel/knee/shin, rapid alternating movements) was normal.     There was no truncal ataxia, no tremors, and no dysmetria.     Station and gait - easily stands up from exam chair without retropulsion,veering,leaning,swaying (to either side).   Arm swing symmetrical.    No Rombergism.      Labs and Tests:      Component Ref Range & Units 11 mo ago 2 yr ago 3 yr ago 4 yr ago 5 yr ago 8 yr ago 9 yr ago   Cholesterol,Tot 100 - 199 mg/dL 152  205 High   143  147  141  160  198    Triglycerides 0 - 149 mg/dL 132  170 High   184 High   158 High   235 High   281 High   269 High     HDL >=40 mg/dL 29 Abnormal   36 Abnormal   36 Abnormal   39 Abnormal   34 Abnormal   34 Abnormal   35 Abnormal     LDL <100 mg/dL 97  135 High   70  76  60  70  109 High                Component Ref Range & Units 4 mo ago   (10/4/21) 1 yr ago   (12/28/20) 1 yr ago   (6/18/20) 2 yr ago   (8/6/19) 8 yr ago   (1/30/14) 10 yr ago   (9/15/11) 11 yr ago   (4/6/10)   Glycohemoglobin 4.0 - 5.6 % 5.8 High   5.7 Abnormal  R  5.9 Abnormal  R  6.0 High  R, CM  6.1 High  R, CM  5.5 VC, R, CM  5.7 High  R, CM    Comment: Increased risk for diabetes:  5.7 -6.4%             CONCLUSIONS  Normal left ventricular systolic function. Left ventricular ejection   fraction is visually estimated to be 65%.   Normal diastolic function.  Normal inferior vena cava size and inspiratory collapse.      NEUROIMAGING:   MRI brain     1Tiny areas of acute infarcts in the right centrum semiovale and right posterior frontal gray matter.  2.  Mild cerebral volume loss.  3.  Moderate chronic microvascular ischemic disease.     Carotid US  Right carotid.    Very mild plaque of the carotid bifurcation. Doppler velocities are normal.    Plaque is smooth on the surface and homogeneous with low acoustic density.    The internal carotid artery is tortuous.       Left carotid.    Flow velocities and Doppler waveforms are normal throughout the carotid    system without evidence of plaque.    The internal carotid artery is tortuous.       Bilateral subclavian and vertebral artery waveforms are antegrade and    normal in character and velocity.      TTE:  LVEF  65%, normal diastolic function, mildly dilated LA.       Stroke Labs:  Creat 0.83, LDL 97, A1C 5.7.      She had a 28 day  cardiac monitor with runs of SVT, read by Dr. Alford and no Afib observed.    Impression/Plans/Recommendations:    1.  Late Effect of Ischemic Stroke from 3/2021    She has residual numbness of the left side of the scalp and just above and involving the left mid to upper part of the ear (sparing the left facial area or inside the mouth). This has been stable for well over 6 months and there are no neuropathic pain(s) of the limbs    2. Headache(s)- this occurs infrequently about ever 7-10 days and involving left forehead (above the left eye brow)- no neuropathic features associated.     Location has not changed in the last 6-9 months in terms of frequency or severity (average 3/10 severity)-  Tylenol PRN (gone within 2-4 hours). No migrainous features.    We discussed the option or consideration of another brain imaging test (CT or MRI) and she adamantly does not want pursue this test. Mainly because the headache(s) are very infrequent to her.    3. There are no features of encephalopathy or significant cognitive impairments for age.    4. HTN-  "controlled today; on low dose beta blocker.    We reviewed the below issues regarding ischemic stroke(s)    The goals of preventing a future stroke(s) and TIA(s) are to identify your personal risk factors and reduce them:    The most common risk factors are:    A. High blood pressure  (over 140/90)    B. High Cholesterol (ie, Hyperlipidemia)>> either total cholesterol and/or LDL cholesterol are elevated.    C. Tobacco use (Smoking or Vaping ) and/or excessive drinking (\"excessive\" is subjective of course)    D. Obesity - Body Mass Index (BMI) over 30.0    E. Having Diabetes or Prediabetes -   Fasting Blood Sugar over 125 or A1C% over 7.0      F. Being Sedentary (not consistently getting aerobic forms of exercise).    G. High Salt diet (especially if you have known High Blood Presssure)-   over 2000 milligrams or 2.0 grams per day.  If you have a specific heart condition like congestive heart failure the amount of daily salt (sodium) intake should be much less than this and will depend on the recommendations of your cardiologist or primary care doctor.        BLOOD PRESSURE MONITORING:    I recommend every patient who has had a stroke to use a digital upper arm blood pressure cuff and consistently check his/her blood pressure every day for 2 full weeks in a row.  I suggest checking it in the morning within 1 hour of getting up and in the early afternoon (between 2-4 pm).    If you want to be even more thorough, you can check your blood pressure within 1 hour of going to bed.    The company [employer] makes an excellent digital upper arm blood pressure cuff-  ((NOT  the WRIST VERSION) -   either the [employer] Series 3 or 5 model will work for this purpose.    The goal is get and keep your  blood pressure values under 140/90 and often this means working with your primary care doctor and potential starting or adjusting blood pressure reducing medications.    EXERCISE:    I would strongly recommend trying to get some form of " aerobic exercise about 4 to 5 days per week (30 to 45 minutes worth of exercise per day) with a goal of a minimum of 150 minutes per week.  Common forms of aerobic exercise are rapid walking or even slowly walking up an incline on a hill or street> anything that raises your heart rate (pulse) over your resting heart rate.  Using a treadmill,exercise  or road bike, swimming, rowing, playing tennis, jogging are common ways to get such exercise.  I do not agree that golf is a good way to get aerobic exercise compared to any of the above mentioned activities but it's better than nothing.    Weight:    Your goal body mass index (BMI) should be under 28.  A BMI over 30 is considered being obese. Obese increases the risk of main medical conditions greatly including getting diabetes and high blood pressure.     Diabetes:  The goal is to have  your A1C% under 7.0 and your fasting blood sugar(s) in the normal range. This often requires adjusting one's diet  (eating less sugary foods), exercising (which clearly makes diabetes easier to control in those with diabetes and exercise can even reverse prediabetes). You must work with your primary care doctor on this issue !       Smoking:  Most people are aware or should be aware that any form of smoking (cigarettes,cigars,vaping) can increase the risk for both Stroke(s) and even Heart attacks.  So it is essential to stop using tobacco containing compounds or methods to reduce your vascular risk.    High Cholesterol:    There are many studies that even if you one has normal cholesterol and has a Stroke (whether a small or larger one) that using a HIGH INTENSITY STATIN (either Atorvastatin - known as LIPTOR or Rosuvastatin known as Crestor) significantly decrease one's risk of having another Stroke or TIA (which is essentially a warning stroke event).  The precise dose range for Atorvastatin is between 40 to 80 milligrams a day and for Rosuvastatin is 20 to 40 milligrams a  "day.  Infrequently (3%) of the time, a patient may have muscle aches from either of these forms or types of statins but it's quite common for them to go away; if the muscle aches get worse or persist and are too bothersome, the medication can be lowered, stopped or switched to a different (or another) statin medication.    Rarely, seriously muscle breakdown can occur called rhabdomyolysis which is not predictable but generally occurs to various degrees in under 1% of patients.    Blood Thinners:    There are various types of blood thinning agents used to prevent Stroke(s) or TIA(s).  The most common ones are Aspirin and Clopidogrel (Plavix)- both of these medications are called \"antiplatelet medications\" given how they work to thin your blood out and reduce a blood clot in an artery from forming. Both of these medications are taken by mouth but need to be taken every day for months to years and really indefinitely in most cases to reduce the risk of future Stroke(s) or TIA(s).    There is a 2 to 3% risk of significant bleeding which tends to occur in the stomach or in the urine , sometimes causing easy bruising of the skin or noise bleeds and can not easily be predicted but for most people the risk of preventing a future stroke (a 20-22% risk reduction) by taking an anti platelet medication greatly outweighs the risk of having a life threatening bleeding event which can occur anywhere within the body or within the skull.      Total time spent today or this patient's care was 39    minutes  and included reviewing diagnostic workup to date (labs and imaging that include and  giving advise and suggestions on the present neurological problem and  documenting the clinical information in the EMR.    Follow up PRN at this point.    Jaquan Worthy MD  [facility].   [facility]    "

## 2022-05-03 ENCOUNTER — HOSPITAL ENCOUNTER (OUTPATIENT)
Dept: LAB | Facility: MEDICAL CENTER | Age: 87
End: 2022-05-03
Attending: PHYSICIAN ASSISTANT
Payer: MEDICARE

## 2022-05-03 ENCOUNTER — TELEPHONE (OUTPATIENT)
Dept: HEALTH INFORMATION MANAGEMENT | Facility: OTHER | Age: 87
End: 2022-05-03

## 2022-05-03 ENCOUNTER — HOSPITAL ENCOUNTER (OUTPATIENT)
Dept: LAB | Facility: MEDICAL CENTER | Age: 87
End: 2022-05-03
Payer: MEDICARE

## 2022-05-03 DIAGNOSIS — R73.03 PREDIABETES: ICD-10-CM

## 2022-05-03 DIAGNOSIS — Z79.899 HIGH RISK MEDICATION USE: ICD-10-CM

## 2022-05-03 DIAGNOSIS — E78.2 MIXED HYPERLIPIDEMIA: ICD-10-CM

## 2022-05-03 DIAGNOSIS — E89.0 POSTOPERATIVE HYPOTHYROIDISM: ICD-10-CM

## 2022-05-03 LAB
ALBUMIN SERPL BCP-MCNC: 4.2 G/DL (ref 3.2–4.9)
ALBUMIN/GLOB SERPL: 1.6 G/DL
ALP SERPL-CCNC: 63 U/L (ref 30–99)
ALT SERPL-CCNC: 24 U/L (ref 2–50)
ANION GAP SERPL CALC-SCNC: 8 MMOL/L (ref 7–16)
AST SERPL-CCNC: 18 U/L (ref 12–45)
BILIRUB SERPL-MCNC: 0.9 MG/DL (ref 0.1–1.5)
BUN SERPL-MCNC: 21 MG/DL (ref 8–22)
CALCIUM SERPL-MCNC: 9 MG/DL (ref 8.5–10.5)
CHLORIDE SERPL-SCNC: 109 MMOL/L (ref 96–112)
CHOLEST SERPL-MCNC: 143 MG/DL (ref 100–199)
CO2 SERPL-SCNC: 27 MMOL/L (ref 20–33)
CREAT SERPL-MCNC: 0.78 MG/DL (ref 0.5–1.4)
EST. AVERAGE GLUCOSE BLD GHB EST-MCNC: 114 MG/DL
GFR SERPLBLD CREATININE-BSD FMLA CKD-EPI: 74 ML/MIN/1.73 M 2
GLOBULIN SER CALC-MCNC: 2.7 G/DL (ref 1.9–3.5)
GLUCOSE SERPL-MCNC: 112 MG/DL (ref 65–99)
HBA1C MFR BLD: 5.6 % (ref 4–5.6)
HDLC SERPL-MCNC: 34 MG/DL
LDLC SERPL CALC-MCNC: 79 MG/DL
POTASSIUM SERPL-SCNC: 4.1 MMOL/L (ref 3.6–5.5)
PROT SERPL-MCNC: 6.9 G/DL (ref 6–8.2)
SODIUM SERPL-SCNC: 144 MMOL/L (ref 135–145)
T4 FREE SERPL-MCNC: 2.04 NG/DL (ref 0.93–1.7)
TRIGL SERPL-MCNC: 152 MG/DL (ref 0–149)
TSH SERPL DL<=0.005 MIU/L-ACNC: 0.12 UIU/ML (ref 0.38–5.33)

## 2022-05-03 PROCEDURE — 80053 COMPREHEN METABOLIC PANEL: CPT

## 2022-05-03 PROCEDURE — 80061 LIPID PANEL: CPT

## 2022-05-03 PROCEDURE — 36415 COLL VENOUS BLD VENIPUNCTURE: CPT

## 2022-05-03 PROCEDURE — 83036 HEMOGLOBIN GLYCOSYLATED A1C: CPT

## 2022-05-03 PROCEDURE — 84443 ASSAY THYROID STIM HORMONE: CPT

## 2022-05-03 PROCEDURE — 84439 ASSAY OF FREE THYROXINE: CPT

## 2022-05-05 RX ORDER — IRBESARTAN AND HYDROCHLOROTHIAZIDE 300; 12.5 MG/1; MG/1
1 TABLET, FILM COATED ORAL
Qty: 100 TABLET | Refills: 0 | Status: SHIPPED | OUTPATIENT
Start: 2022-05-05 | End: 2022-11-22 | Stop reason: SDUPTHER

## 2022-05-10 RX ORDER — CLINDAMYCIN HYDROCHLORIDE 300 MG/1
CAPSULE ORAL
COMMUNITY
Start: 2022-05-08 | End: 2022-11-09

## 2022-05-11 ENCOUNTER — APPOINTMENT (RX ONLY)
Dept: URBAN - METROPOLITAN AREA CLINIC 22 | Facility: CLINIC | Age: 87
Setting detail: DERMATOLOGY
End: 2022-05-11

## 2022-05-11 ENCOUNTER — OFFICE VISIT (OUTPATIENT)
Dept: MEDICAL GROUP | Facility: PHYSICIAN GROUP | Age: 87
End: 2022-05-11
Payer: MEDICARE

## 2022-05-11 VITALS
SYSTOLIC BLOOD PRESSURE: 88 MMHG | RESPIRATION RATE: 18 BRPM | HEART RATE: 56 BPM | OXYGEN SATURATION: 95 % | DIASTOLIC BLOOD PRESSURE: 46 MMHG | HEIGHT: 63 IN | TEMPERATURE: 98.8 F | BODY MASS INDEX: 21.09 KG/M2 | WEIGHT: 119 LBS

## 2022-05-11 DIAGNOSIS — L82.1 OTHER SEBORRHEIC KERATOSIS: ICD-10-CM

## 2022-05-11 DIAGNOSIS — L57.0 ACTINIC KERATOSIS: ICD-10-CM

## 2022-05-11 DIAGNOSIS — L81.4 OTHER MELANIN HYPERPIGMENTATION: ICD-10-CM

## 2022-05-11 DIAGNOSIS — D18.0 HEMANGIOMA: ICD-10-CM

## 2022-05-11 DIAGNOSIS — E89.0 POSTOPERATIVE HYPOTHYROIDISM: ICD-10-CM

## 2022-05-11 DIAGNOSIS — Z85.828 PERSONAL HISTORY OF OTHER MALIGNANT NEOPLASM OF SKIN: ICD-10-CM

## 2022-05-11 DIAGNOSIS — D22 MELANOCYTIC NEVI: ICD-10-CM

## 2022-05-11 DIAGNOSIS — I10 ESSENTIAL HYPERTENSION: ICD-10-CM

## 2022-05-11 DIAGNOSIS — Z71.89 OTHER SPECIFIED COUNSELING: ICD-10-CM

## 2022-05-11 PROBLEM — D22.5 MELANOCYTIC NEVI OF TRUNK: Status: ACTIVE | Noted: 2022-05-11

## 2022-05-11 PROBLEM — D18.01 HEMANGIOMA OF SKIN AND SUBCUTANEOUS TISSUE: Status: ACTIVE | Noted: 2022-05-11

## 2022-05-11 PROCEDURE — ? LIQUID NITROGEN

## 2022-05-11 PROCEDURE — 99214 OFFICE O/P EST MOD 30 MIN: CPT

## 2022-05-11 PROCEDURE — 17003 DESTRUCT PREMALG LES 2-14: CPT

## 2022-05-11 PROCEDURE — 99213 OFFICE O/P EST LOW 20 MIN: CPT | Mod: 25

## 2022-05-11 PROCEDURE — 17000 DESTRUCT PREMALG LESION: CPT

## 2022-05-11 PROCEDURE — ? COUNSELING

## 2022-05-11 PROCEDURE — ? SUNSCREEN RECOMMENDATIONS

## 2022-05-11 RX ORDER — ASPIRIN 81 MG/1
81 TABLET, CHEWABLE ORAL DAILY
COMMUNITY
End: 2023-11-21

## 2022-05-11 RX ORDER — LEVOTHYROXINE SODIUM 0.07 MG/1
75 TABLET ORAL
Qty: 60 TABLET | Refills: 1 | Status: SHIPPED | OUTPATIENT
Start: 2022-05-11 | End: 2022-09-26

## 2022-05-11 ASSESSMENT — LOCATION ZONE DERM
LOCATION ZONE: TRUNK
LOCATION ZONE: ARM
LOCATION ZONE: FACE

## 2022-05-11 ASSESSMENT — LOCATION SIMPLE DESCRIPTION DERM
LOCATION SIMPLE: LEFT EYEBROW
LOCATION SIMPLE: CHEST
LOCATION SIMPLE: LEFT FOREHEAD
LOCATION SIMPLE: RIGHT LOWER BACK
LOCATION SIMPLE: LEFT UPPER BACK
LOCATION SIMPLE: ABDOMEN
LOCATION SIMPLE: RIGHT FOREARM
LOCATION SIMPLE: RIGHT TEMPLE

## 2022-05-11 ASSESSMENT — LOCATION DETAILED DESCRIPTION DERM
LOCATION DETAILED: RIGHT DISTAL DORSAL FOREARM
LOCATION DETAILED: LEFT SUPERIOR MEDIAL UPPER BACK
LOCATION DETAILED: RIGHT SUPERIOR MEDIAL MIDBACK
LOCATION DETAILED: RIGHT LATERAL TEMPLE
LOCATION DETAILED: LEFT INFERIOR LATERAL FOREHEAD
LOCATION DETAILED: RIGHT MEDIAL SUPERIOR CHEST
LOCATION DETAILED: LEFT SUPERIOR FOREHEAD
LOCATION DETAILED: EPIGASTRIC SKIN
LOCATION DETAILED: LEFT CENTRAL EYEBROW

## 2022-05-11 ASSESSMENT — FIBROSIS 4 INDEX: FIB4 SCORE: 1.85

## 2022-05-11 NOTE — PROCEDURE: LIQUID NITROGEN
Post-Care Instructions: I reviewed with the patient in detail post-care instructions. Patient is to wear sunprotection, and avoid picking at any of the treated lesions. Pt may apply Vaseline to crusted or scabbing areas.
Duration Of Freeze Thaw-Cycle (Seconds): 3
Render Post-Care Instructions In Note?: no
Number Of Freeze-Thaw Cycles: 2 freeze-thaw cycles
Detail Level: Detailed
Show Aperture Variable?: Yes
Consent: The patient's consent was obtained including but not limited to risks of crusting, scabbing, blistering, scarring, darker or lighter pigmentary change, recurrence, incomplete removal and infection.

## 2022-05-11 NOTE — PROGRESS NOTES
CC:   Chief Complaint   Patient presents with   • Lab Results   • Follow-Up     thyroid        HISTORY OF PRESENT ILLNESS: Patient is a 86 y.o. female established patient who presents today to discuss the following problems below:     Postoperative hypothyroidism  Patient presents to review her recent thyroid labs.  She reports that she has been taking her 88 mcg tablet consistently.  She does report that she is feeling restless in the evenings and having difficulty sleeping at night due to this.  Labs as below   Latest Reference Range & Units 05/03/22 09:29   TSH 0.380 - 5.330 uIU/mL 0.120 (L) [1]   Free T-4 0.93 - 1.70 ng/dL 2.04 (H)   (L): Data is abnormally low  (H): Data is abnormally high  [1] Reference Range:      Pregnant Females, 1st Trimester  0.050-3.700   Pregnant Females, 2nd Trimester  0.310-4.350   Pregnant Females, 3rd Trimester  0.410-5.180     Essential hypertension  Patient is currently taking amlodipine 5 mg daily, irbesartan-hydrochlorothiazide 300-12.5 mg daily, as well as her metoprolol 25 mg daily.  She checks her blood pressure frequently at home and usually is in the 1 20-1 40 range.  She reports that if she does forget her blood pressure medicines, her blood pressure reading can go as high as 146.  In the office today it is surprisingly low at 88/46.  She reports that she is not dizzy or lightheaded, does not feel off balance and is at her normal baseline    Past Medical History:   Diagnosis Date   • Cataract     11-4-2020 H/O IOL OU   • Cervical high risk human papillomavirus (HPV) DNA test positive    • Colon polyp     hyperplastic   • Disorder of thyroid    • Diverticulosis of colon    • Dyslipidemia    • Female bladder prolapse 11/04/2020   • High cholesterol    • Hypertension    • Incontinence     pessary   • Incontinence 11/04/2020    Pt. states does not have a pessary in place.   • Indigestion    • Kidney stone    • Menopausal and postmenopausal disorder    • OSTEOPOROSIS    •  "Urinary bladder disorder 11/04/2020    Bladder Prolapse       Allergies:Penicillins    Review of Systems: Otherwise negative except for as stated above.      Exam: BP (!) 88/46 (BP Location: Right arm, Patient Position: Sitting, BP Cuff Size: Adult)   Pulse (!) 56   Temp 37.1 °C (98.8 °F) (Temporal)   Resp 18   Ht 1.6 m (5' 3\")   Wt 54 kg (119 lb)   SpO2 95%  Body mass index is 21.08 kg/m².    Gen: Alert and oriented x4. Well developed, well-nourished female in no apparent distress.  Skin: Warm, dry, good turgor, no rashes in visible areas or lacerations appreciated.   Eye: EOM intact, pupils equal, round and reactive, conjunctiva clear, lids normal.  Neck: Trachea midline, no masses, no thyromegaly  GI:  Soft, non-tender abdomen with no distention.   MSK: Normal gait, moves all extremities.  Neuro: Alert and oriented x 4, non-focal exam with motor and sensory grossly intact.  Ext: No clubbing, cyanosis, edema.  Psych: Normal behavior, affect and mood.      Assessment/Plan:  86 y.o. female with the following -    1. Postoperative hypothyroidism  Chronic condition, not at goal.  Discussed with patient that she has been somewhat of a hyperthyroid state which is likely attributing to her restlessness and inability to sleep at night.  Recommend decreasing her levothyroxine to 75 mcg and rechecking TSH in 6 weeks.  Patient is agreeable to the plan.  - levothyroxine (SYNTHROID) 75 MCG Tab; Take 1 Tablet by mouth every morning on an empty stomach.  Dispense: 60 Tablet; Refill: 1  - TSH WITH REFLEX TO FT4; Future    2. Essential hypertension  Chronic condition.  Patient again reiterates that she feels at her baseline and is not symptomatic of her low blood pressure.  She was given water in the clinic and felt to be quite stable on her feet and at her baseline.  Patient agrees to recheck her blood pressure after dinner and let me know if she is still low.  Consider reducing blood pressure meds at next visit if her " blood pressure continues to read low    Follow-up: Return in about 7 weeks (around 6/27/2022) for recheck TSH . Sooner for low blood pressure     Health Maintenance: Completed      Please note that this dictation was created using voice recognition software. I have made every reasonable attempt to correct obvious errors, but I expect that there are errors of grammar and possibly content that I did not discover before finalizing the note.    Electronically signed by GURVINDER Cohen on May 11, 2022

## 2022-05-12 NOTE — ASSESSMENT & PLAN NOTE
Patient is currently taking amlodipine 5 mg daily, irbesartan-hydrochlorothiazide 300-12.5 mg daily, as well as her metoprolol 25 mg daily.  She checks her blood pressure frequently at home and usually is in the 1 20-1 40 range.  She reports that if she does forget her blood pressure medicines, her blood pressure reading can go as high as 146.  In the office today it is surprisingly low at 88/46.  She reports that she is not dizzy or lightheaded, does not feel off balance and is at her normal baseline

## 2022-05-12 NOTE — ASSESSMENT & PLAN NOTE
Patient presents to review her recent thyroid labs.  She reports that she has been taking her 88 mcg tablet consistently.  She does report that she is feeling restless in the evenings and having difficulty sleeping at night due to this.  Labs as below   Latest Reference Range & Units 05/03/22 09:29   TSH 0.380 - 5.330 uIU/mL 0.120 (L) [1]   Free T-4 0.93 - 1.70 ng/dL 2.04 (H)   (L): Data is abnormally low  (H): Data is abnormally high  [1] Reference Range:    Pregnant Females, 1st Trimester  0.050-3.700  Pregnant Females, 2nd Trimester  0.310-4.350  Pregnant Females, 3rd Trimester  0.410-5.180

## 2022-06-08 ENCOUNTER — HOSPITAL ENCOUNTER (OUTPATIENT)
Facility: MEDICAL CENTER | Age: 87
End: 2022-06-08
Attending: PHYSICIAN ASSISTANT
Payer: MEDICARE

## 2022-06-08 ENCOUNTER — HOSPITAL ENCOUNTER (OUTPATIENT)
Dept: LAB | Facility: MEDICAL CENTER | Age: 87
End: 2022-06-08
Payer: MEDICARE

## 2022-06-08 DIAGNOSIS — E89.0 POSTOPERATIVE HYPOTHYROIDISM: ICD-10-CM

## 2022-06-08 LAB — TSH SERPL DL<=0.005 MIU/L-ACNC: 1.06 UIU/ML (ref 0.38–5.33)

## 2022-06-08 PROCEDURE — 87186 SC STD MICRODIL/AGAR DIL: CPT

## 2022-06-08 PROCEDURE — 87086 URINE CULTURE/COLONY COUNT: CPT

## 2022-06-08 PROCEDURE — 36415 COLL VENOUS BLD VENIPUNCTURE: CPT

## 2022-06-08 PROCEDURE — 87077 CULTURE AEROBIC IDENTIFY: CPT

## 2022-06-08 PROCEDURE — 84443 ASSAY THYROID STIM HORMONE: CPT

## 2022-06-13 ENCOUNTER — APPOINTMENT (OUTPATIENT)
Dept: MEDICAL GROUP | Facility: PHYSICIAN GROUP | Age: 87
End: 2022-06-13
Payer: MEDICARE

## 2022-06-28 ENCOUNTER — OFFICE VISIT (OUTPATIENT)
Dept: MEDICAL GROUP | Facility: PHYSICIAN GROUP | Age: 87
End: 2022-06-28
Payer: MEDICARE

## 2022-06-28 VITALS
RESPIRATION RATE: 18 BRPM | SYSTOLIC BLOOD PRESSURE: 118 MMHG | BODY MASS INDEX: 21.48 KG/M2 | HEIGHT: 63 IN | OXYGEN SATURATION: 96 % | HEART RATE: 60 BPM | TEMPERATURE: 98.4 F | WEIGHT: 121.2 LBS | DIASTOLIC BLOOD PRESSURE: 58 MMHG

## 2022-06-28 DIAGNOSIS — G31.9 CEREBRAL ATROPHY, MILD (HCC): ICD-10-CM

## 2022-06-28 DIAGNOSIS — Z76.89 ENCOUNTER TO ESTABLISH CARE: ICD-10-CM

## 2022-06-28 DIAGNOSIS — F51.01 PRIMARY INSOMNIA: ICD-10-CM

## 2022-06-28 DIAGNOSIS — E89.0 POSTOPERATIVE HYPOTHYROIDISM: ICD-10-CM

## 2022-06-28 PROBLEM — Z09 HOSPITAL DISCHARGE FOLLOW-UP: Status: RESOLVED | Noted: 2021-03-03 | Resolved: 2022-06-28

## 2022-06-28 PROCEDURE — 99213 OFFICE O/P EST LOW 20 MIN: CPT | Performed by: FAMILY MEDICINE

## 2022-06-28 RX ORDER — MIRABEGRON 50 MG/1
TABLET, FILM COATED, EXTENDED RELEASE ORAL
COMMUNITY
End: 2022-11-09

## 2022-06-28 RX ORDER — SULFAMETHOXAZOLE AND TRIMETHOPRIM 800; 160 MG/1; MG/1
TABLET ORAL
COMMUNITY
Start: 2022-06-13 | End: 2022-11-09

## 2022-06-28 ASSESSMENT — FIBROSIS 4 INDEX: FIB4 SCORE: 1.87

## 2022-06-28 NOTE — ASSESSMENT & PLAN NOTE
This is a chronic problem.  Patient has had some difficulty getting the proper medication dose adjusted.  Her TSH was a little below normal so she was reduced to 75 mcg a day.  She still was having episodes of hot flashes that bothered her.  She found if she did not take them on the days she had the hot flashes she felt better.  This amounted to almost 4 days out of 7.  Her recent TSH while doing that was in normal at 1.0.

## 2022-06-28 NOTE — PROGRESS NOTES
Subjective:     CC: Here to establish care and discuss her thyroid issues.    HPI:   Milena presents today with the following medical concerns:    Encounter to establish care  Patient is here today to establish care.  She also wants to go over her thyroid medication and lab test.  Other than her thyroid issues she is feeling very well.  She did see her gynecologist today and got a new medicine for urine incontinence.    Cerebral atrophy, mild (HCC)  This is a chronic problem.  Is noted on previous head CT scan.  Likely due to aging.    Postoperative hypothyroidism  This is a chronic problem.  Patient has had some difficulty getting the proper medication dose adjusted.  Her TSH was a little below normal so she was reduced to 75 mcg a day.  She still was having episodes of hot flashes that bothered her.  She found if she did not take them on the days she had the hot flashes she felt better.  This amounted to almost 4 days out of 7.  Her recent TSH while doing that was in normal at 1.0.    Primary insomnia  This is a chronic problem.  Patient has periodic troubles with sleep disorder.  She tries to take over-the-counter Unisom but does not do it on a regular basis.  She does not want any prescription medications.      Past Medical History:   Diagnosis Date   • Cataract     11-4-2020 H/O IOL OU   • Cervical high risk human papillomavirus (HPV) DNA test positive    • Colon polyp     hyperplastic   • Disorder of thyroid    • Diverticulosis of colon    • Dyslipidemia    • Female bladder prolapse 11/04/2020   • High cholesterol    • Hypertension    • Incontinence     pessary   • Incontinence 11/04/2020    Pt. states does not have a pessary in place.   • Indigestion    • Kidney stone    • Menopausal and postmenopausal disorder    • OSTEOPOROSIS    • Urinary bladder disorder 11/04/2020    Bladder Prolapse       Social History     Tobacco Use   • Smoking status: Never Smoker   • Smokeless tobacco: Never Used   Vaping Use   •  "Vaping Use: Never used   Substance Use Topics   • Alcohol use: No   • Drug use: No       Current Outpatient Medications Ordered in Epic   Medication Sig Dispense Refill   • sulfamethoxazole-trimethoprim (BACTRIM DS) 800-160 MG tablet TAKE 1 TABLET BY MOUTH EVERY 12 HOURS FOR 7 DAYS     • Mirabegron ER (MYRBETRIQ) 50 MG TABLET SR 24 HR Take  by mouth.     • aspirin (ASA) 81 MG Chew Tab chewable tablet Chew 81 mg every day.     • levothyroxine (SYNTHROID) 75 MCG Tab Take 1 Tablet by mouth every morning on an empty stomach. 60 Tablet 1   • clindamycin (CLEOCIN) 300 MG Cap      • irbesartan-hydrochlorothiazide (AVALIDE) 300-12.5 MG per tablet TAKE 1 TABLET BY MOUTH EVERY  Tablet 0   • amLODIPine (NORVASC) 5 MG Tab Take 1 Tablet by mouth every day. 100 Tablet 2   • metoprolol tartrate (LOPRESSOR) 25 MG Tab Take 1 Tablet by mouth every day. 100 Tablet 2   • atorvastatin (LIPITOR) 40 MG Tab Take 1 Tablet by mouth at bedtime. 100 Tablet 2     No current Epic-ordered facility-administered medications on file.       Allergies:  Penicillins    Health Maintenance: Completed    ROS:  Gen: no fevers/chills, no changes in weight  Eyes: no changes in vision  ENT: no sore throat, no hearing loss, no bloody nose  Pulm: no sob, no cough  CV: no chest pain, no palpitations  GI: no nausea/vomiting, no diarrhea  : no dysuria  MSk: no myalgias  Skin: no rash  Neuro: no headaches, no numbness/tingling  Heme/Lymph: no easy bruising      Objective:       Exam:  /58 (BP Location: Left arm, Patient Position: Sitting, BP Cuff Size: Adult)   Pulse 60   Temp 36.9 °C (98.4 °F) (Temporal)   Resp 18   Ht 1.6 m (5' 3\")   Wt 55 kg (121 lb 3.2 oz)   LMP 07/01/1986   SpO2 96%   BMI 21.47 kg/m²  Body mass index is 21.47 kg/m².    Gen: Alert and oriented, No apparent distress.  Ext: No clubbing, cyanosis, edema.        Labs: Reviewed    Assessment & Plan:     87 y.o. female with the following -     1. Postoperative " hypothyroidism  This is a chronic problem.  Patient just got her new bottle of medication and does not want to throw it away so we will have her take 75 mcg of the Synthyroid every other day.  We will recheck a lab in 1 month.  She is also to see if she feels better on this dose and if her hot flashes resolved.  - TSH WITH REFLEX TO FT4; Future    2. Primary insomnia  This is a chronic problem.  Continue to follow.  Over-the-counter sleep aids discussed.    3. Encounter to establish care  Patient establish care with me today.  Recheck in 6 months.    4. Cerebral atrophy, mild (HCC)  This is a chronic problem likely due to aging.      Return in about 6 months (around 12/28/2022) for Long.    Please note that this dictation was created using voice recognition software. I have made every reasonable attempt to correct obvious errors, but I expect that there are errors of grammar and possibly content that I did not discover before finalizing the note.

## 2022-06-28 NOTE — ASSESSMENT & PLAN NOTE
Patient is here today to establish care.  She also wants to go over her thyroid medication and lab test.  Other than her thyroid issues she is feeling very well.  She did see her gynecologist today and got a new medicine for urine incontinence.

## 2022-06-28 NOTE — ASSESSMENT & PLAN NOTE
This is a chronic problem.  Patient has periodic troubles with sleep disorder.  She tries to take over-the-counter Unisom but does not do it on a regular basis.  She does not want any prescription medications.

## 2022-09-25 DIAGNOSIS — E89.0 POSTOPERATIVE HYPOTHYROIDISM: ICD-10-CM

## 2022-09-26 RX ORDER — LEVOTHYROXINE SODIUM 0.07 MG/1
75 TABLET ORAL
Qty: 60 TABLET | Refills: 0 | Status: SHIPPED | OUTPATIENT
Start: 2022-09-26 | End: 2023-03-16 | Stop reason: SDUPTHER

## 2022-10-20 ENCOUNTER — HOSPITAL ENCOUNTER (OUTPATIENT)
Dept: LAB | Facility: MEDICAL CENTER | Age: 87
End: 2022-10-20
Attending: FAMILY MEDICINE
Payer: MEDICARE

## 2022-10-20 DIAGNOSIS — E89.0 POSTOPERATIVE HYPOTHYROIDISM: ICD-10-CM

## 2022-10-20 LAB — TSH SERPL DL<=0.005 MIU/L-ACNC: 0.67 UIU/ML (ref 0.38–5.33)

## 2022-10-20 PROCEDURE — 36415 COLL VENOUS BLD VENIPUNCTURE: CPT

## 2022-10-20 PROCEDURE — 84443 ASSAY THYROID STIM HORMONE: CPT

## 2022-11-03 ENCOUNTER — PATIENT MESSAGE (OUTPATIENT)
Dept: HEALTH INFORMATION MANAGEMENT | Facility: OTHER | Age: 87
End: 2022-11-03

## 2022-11-09 ENCOUNTER — OFFICE VISIT (OUTPATIENT)
Dept: MEDICAL GROUP | Facility: PHYSICIAN GROUP | Age: 87
End: 2022-11-09
Payer: MEDICARE

## 2022-11-09 VITALS
HEIGHT: 63 IN | BODY MASS INDEX: 22.2 KG/M2 | TEMPERATURE: 98.5 F | DIASTOLIC BLOOD PRESSURE: 56 MMHG | WEIGHT: 125.3 LBS | OXYGEN SATURATION: 97 % | HEART RATE: 65 BPM | RESPIRATION RATE: 16 BRPM | SYSTOLIC BLOOD PRESSURE: 102 MMHG

## 2022-11-09 DIAGNOSIS — R53.83 OTHER FATIGUE: Chronic | ICD-10-CM

## 2022-11-09 DIAGNOSIS — E89.0 POSTOPERATIVE HYPOTHYROIDISM: Chronic | ICD-10-CM

## 2022-11-09 DIAGNOSIS — I10 ESSENTIAL HYPERTENSION: Chronic | ICD-10-CM

## 2022-11-09 DIAGNOSIS — F51.01 PRIMARY INSOMNIA: Chronic | ICD-10-CM

## 2022-11-09 PROBLEM — E78.2 MIXED HYPERLIPIDEMIA: Chronic | Status: ACTIVE | Noted: 2021-06-23

## 2022-11-09 PROCEDURE — 99214 OFFICE O/P EST MOD 30 MIN: CPT

## 2022-11-09 RX ORDER — METOPROLOL SUCCINATE 25 MG/1
25 TABLET, EXTENDED RELEASE ORAL DAILY
Qty: 100 TABLET | Refills: 2 | Status: SHIPPED | OUTPATIENT
Start: 2022-11-09 | End: 2023-07-19 | Stop reason: SDUPTHER

## 2022-11-09 RX ORDER — AMLODIPINE BESYLATE 10 MG/1
10 TABLET ORAL DAILY
Qty: 100 TABLET | Refills: 2 | Status: SHIPPED | OUTPATIENT
Start: 2022-11-09 | End: 2023-11-21 | Stop reason: SDUPTHER

## 2022-11-09 RX ORDER — GABAPENTIN 100 MG/1
100 CAPSULE ORAL
Qty: 30 CAPSULE | Refills: 2 | Status: SHIPPED | OUTPATIENT
Start: 2022-11-09 | End: 2023-03-21

## 2022-11-09 ASSESSMENT — FIBROSIS 4 INDEX: FIB4 SCORE: 1.87

## 2022-11-09 NOTE — ASSESSMENT & PLAN NOTE
She did try doxylamine. A full dose dose make her somewhat groggy. She does sleep well on this medication.  She does report an interesting phenomenon about once a week in which her head is itchy and she gets some numbness and tingling over the left aspect of her frontal lobe, she reports this is where she had her stroke.

## 2022-11-09 NOTE — ASSESSMENT & PLAN NOTE
On amlodipine 5mg daily, irbesartan-hctz 300-12.5, and metoprolol 25 mg daily.  Blood pressure in office today is 102/56.

## 2022-11-09 NOTE — PROGRESS NOTES
Annual Health Assessment Questions:    1.  Are you currently engaging in any exercise or physical activity? Yes    2.  How would you describe your mood or emotional well-being today? anxious    3.  Have you had any falls in the last year? No    4.  Have you noticed any problems with your balance or had difficulty walking? No    5.  In the last six months have you experienced any leakage of urine? Yes    6. DPA/Advanced Directive: Patient has Advance Directive, but it is not on file. Instructed to bring in a copy to scan into their chart.

## 2022-11-09 NOTE — ASSESSMENT & PLAN NOTE
Patient presents for follow-up on her labs.  Since her last visit, she has been taking levothyroxine 75 mcg every day. She does admit to have skipping doses occasionally. TSH is stable.  She reports that overall she is feeling well except for some persistent fatigue and difficulty with sleep.  She reports that she is quite restless at night   Latest Reference Range & Units 10/20/22 11:07   TSH 0.380 - 5.330 uIU/mL 0.670

## 2022-11-09 NOTE — PROGRESS NOTES
CC:   Chief Complaint   Patient presents with    Lab Results    Insomnia        HISTORY OF PRESENT ILLNESS: Patient is a 87 y.o. female established patient who presents today to discuss the following problems below:     Postoperative hypothyroidism  Patient presents for follow-up on her labs.  Since her last visit, she has been taking levothyroxine 75 mcg every day. She does admit to have skipping doses occasionally. TSH is stable.  She reports that overall she is feeling well except for some persistent fatigue and difficulty with sleep.  She reports that she is quite restless at night   Latest Reference Range & Units 10/20/22 11:07   TSH 0.380 - 5.330 uIU/mL 0.670       Primary insomnia  She did try doxylamine. A full dose dose make her somewhat groggy. She does sleep well on this medication.  She does report an interesting phenomenon about once a week in which her head is itchy and she gets some numbness and tingling over the left aspect of her frontal lobe, she reports this is where she had her stroke.    Fatigue  Patient reports that she does have some days when she is fatigued with an itchy head. It occurs when she wakes up in the morning, and is accompanied by a tingling sensation.     Essential hypertension  On amlodipine 5mg daily, irbesartan-hctz 300-12.5, and metoprolol 25 mg daily.  Blood pressure in office today is 102/56.      Past Medical History:   Diagnosis Date    Cataract     11-4-2020 H/O IOL OU    Cervical high risk human papillomavirus (HPV) DNA test positive     Colon polyp     hyperplastic    Disorder of thyroid     Diverticulosis of colon     Dyslipidemia     Female bladder prolapse 11/04/2020    High cholesterol     Hypertension     Incontinence     pessary    Incontinence 11/04/2020    Pt. states does not have a pessary in place.    Indigestion     Kidney stone     Menopausal and postmenopausal disorder     OSTEOPOROSIS     Urinary bladder disorder 11/04/2020    Bladder Prolapse  "      Allergies:Penicillins    Review of Systems: Otherwise negative except for as stated above.      Exam: /56 (BP Location: Left arm, Patient Position: Sitting, BP Cuff Size: Adult)   Pulse 65   Temp 36.9 °C (98.5 °F) (Temporal)   Resp 16   Ht 1.6 m (5' 3\")   Wt 56.8 kg (125 lb 4.8 oz)   SpO2 97%  Body mass index is 22.2 kg/m².    Gen: Alert and oriented x4. Well developed, well-nourished female in no apparent distress.  Skin: Warm, dry, good turgor, no rashes in visible areas or lacerations appreciated.   Eye: EOM intact, pupils equal, round and reactive, conjunctiva clear, lids normal.  Neck: Trachea midline, no masses, no thyromegaly  Lungs: Normal effort, CTA bilaterally, no wheezes, rhonchi, or rales. No stridor or audible wheezing. Equal chest expansion.   CV: Regular rate and rhythm. No murmurs, rubs, or gallops.  GI:  Soft, non-tender abdomen with no distention.   MSK: Normal gait, moves all extremities.  Neuro: Alert and oriented x 4, non-focal exam with motor and sensory grossly intact.  Ext: No clubbing, cyanosis, edema.  Psych: Normal behavior, affect and mood.      Assessment/Plan:  87 y.o. female with the following -    1. Postoperative hypothyroidism  Chronic condition, stable.  Recommend patient continue on levothyroxine 75 mcg daily.  TSH is within normal limits.  Recheck in 6 months    2. Primary insomnia  Chronic condition, not at goal.  Recommend that patient either try cutting doxylamine in half to see if this is still effective without the groggy side effects.  Additionally, numbness and tingling appears to be some sort of neuralgia problem, unsure if related to previous stroke or if she has possible trigeminal neuralgia.  No headaches accompanied by numbness and tingling, just sensory change.  Trial of gabapentin given to take in the evenings.  Patient is highly sensitive to medications  - gabapentin (NEURONTIN) 100 MG Cap; Take 1 Capsule by mouth at bedtime.  Dispense: 30 " Capsule; Refill: 2    3. Other fatigue  Chronic problem, ongoing.  Could be related to insomnia versus other medications.  Changes as below.  Rotated over to extended release form of metoprolol as immediate release could be causing her some fatigue and low energy    4. Essential hypertension  Chronic condition, not at goal.  Patient has a blood pressure log with her today with elevated readings consistently above 140/80.  Continue irbesartan-hydrochlorothiazide 300-12-1/2 mg daily.  Rotate over to long-acting metoprolol.  Increase amlodipine to 10 mg.  Follow-up in 1 month for reevaluation  - metoprolol SR (TOPROL XL) 25 MG TABLET SR 24 HR; Take 1 Tablet by mouth every day.  Dispense: 100 Tablet; Refill: 2  - amLODIPine (NORVASC) 10 MG Tab; Take 1 Tablet by mouth every day.  Dispense: 100 Tablet; Refill: 2        Follow-up: Return in about 4 weeks (around 12/7/2022) for follow up on bp and fatigue .    Health Maintenance: Completed      Please note that this dictation was created using voice recognition software. I have made every reasonable attempt to correct obvious errors, but I expect that there are errors of grammar and possibly content that I did not discover before finalizing the note.    Electronically signed by GURVINDER Cohen on November 9, 2022

## 2022-11-09 NOTE — ASSESSMENT & PLAN NOTE
Patient reports that she does have some days when she is fatigued with an itchy head. It occurs when she wakes up in the morning, and is accompanied by a tingling sensation.

## 2022-11-22 NOTE — TELEPHONE ENCOUNTER
Received request via: Patient    Was the patient seen in the last year in this department? Yes    Does the patient have an active prescription (recently filled or refills available) for medication(s) requested? No    Does the patient have [facility] and need 100 day supply (blood pressure, diabetes and cholesterol meds only)? Yes, quantity updated to 100 days   Requested Prescriptions     Pending Prescriptions Disp Refills    irbesartan-hydrochlorothiazide (AVALIDE) 300-12.5 MG per tablet 100 Tablet 0     Sig: Take 1 Tablet by mouth every day.

## 2022-11-23 RX ORDER — IRBESARTAN AND HYDROCHLOROTHIAZIDE 300; 12.5 MG/1; MG/1
1 TABLET, FILM COATED ORAL
Qty: 100 TABLET | Refills: 0 | Status: SHIPPED | OUTPATIENT
Start: 2022-11-23 | End: 2023-05-22

## 2023-02-02 ENCOUNTER — APPOINTMENT (RX ONLY)
Dept: URBAN - METROPOLITAN AREA CLINIC 22 | Facility: CLINIC | Age: 88
Setting detail: DERMATOLOGY
End: 2023-02-02

## 2023-02-02 DIAGNOSIS — Z71.89 OTHER SPECIFIED COUNSELING: ICD-10-CM

## 2023-02-02 DIAGNOSIS — D69.2 OTHER NONTHROMBOCYTOPENIC PURPURA: ICD-10-CM

## 2023-02-02 DIAGNOSIS — Z85.828 PERSONAL HISTORY OF OTHER MALIGNANT NEOPLASM OF SKIN: ICD-10-CM

## 2023-02-02 DIAGNOSIS — L81.4 OTHER MELANIN HYPERPIGMENTATION: ICD-10-CM

## 2023-02-02 PROBLEM — D48.5 NEOPLASM OF UNCERTAIN BEHAVIOR OF SKIN: Status: ACTIVE | Noted: 2023-02-02

## 2023-02-02 PROCEDURE — ? SUNSCREEN RECOMMENDATIONS

## 2023-02-02 PROCEDURE — ? BIOPSY BY SHAVE METHOD

## 2023-02-02 PROCEDURE — 11102 TANGNTL BX SKIN SINGLE LES: CPT

## 2023-02-02 PROCEDURE — 99213 OFFICE O/P EST LOW 20 MIN: CPT | Mod: 25

## 2023-02-02 PROCEDURE — ? COUNSELING

## 2023-02-02 ASSESSMENT — LOCATION DETAILED DESCRIPTION DERM
LOCATION DETAILED: RIGHT RADIAL DORSAL HAND
LOCATION DETAILED: RIGHT MEDIAL SUPERIOR CHEST
LOCATION DETAILED: LEFT ULNAR DORSAL HAND
LOCATION DETAILED: RIGHT PROXIMAL DORSAL FOREARM
LOCATION DETAILED: LEFT PROXIMAL DORSAL FOREARM
LOCATION DETAILED: LEFT DISTAL DORSAL FOREARM
LOCATION DETAILED: RIGHT DISTAL DORSAL FOREARM

## 2023-02-02 ASSESSMENT — LOCATION ZONE DERM
LOCATION ZONE: HAND
LOCATION ZONE: TRUNK
LOCATION ZONE: ARM

## 2023-02-02 ASSESSMENT — LOCATION SIMPLE DESCRIPTION DERM
LOCATION SIMPLE: RIGHT FOREARM
LOCATION SIMPLE: CHEST
LOCATION SIMPLE: LEFT HAND
LOCATION SIMPLE: RIGHT HAND
LOCATION SIMPLE: LEFT FOREARM

## 2023-02-02 ASSESSMENT — SEVERITY ASSESSMENT: SEVERITY: MILD

## 2023-02-02 NOTE — HPI: SKIN LESION
Is This A New Presentation, Or A Follow-Up?: Skin Lesion
What Type Of Note Output Would You Prefer (Optional)?: Standard Output
How Severe Is Your Skin Lesion?: moderate
Has Your Skin Lesion Been Treated?: been treated
When Was It Treated?: 5/27/2021

## 2023-02-02 NOTE — PROCEDURE: SUNSCREEN RECOMMENDATIONS

## 2023-03-16 ENCOUNTER — OFFICE VISIT (OUTPATIENT)
Dept: MEDICAL GROUP | Facility: PHYSICIAN GROUP | Age: 88
End: 2023-03-16
Payer: MEDICARE

## 2023-03-16 VITALS
RESPIRATION RATE: 20 BRPM | OXYGEN SATURATION: 98 % | WEIGHT: 124.25 LBS | HEIGHT: 62 IN | BODY MASS INDEX: 22.87 KG/M2 | HEART RATE: 68 BPM | SYSTOLIC BLOOD PRESSURE: 126 MMHG | DIASTOLIC BLOOD PRESSURE: 80 MMHG | TEMPERATURE: 98.1 F

## 2023-03-16 DIAGNOSIS — F32.A MILD DEPRESSION: ICD-10-CM

## 2023-03-16 DIAGNOSIS — G47.13 RECURRENT HYPERSOMNIA: ICD-10-CM

## 2023-03-16 DIAGNOSIS — R53.83 OTHER FATIGUE: Chronic | ICD-10-CM

## 2023-03-16 DIAGNOSIS — I10 ESSENTIAL HYPERTENSION: Chronic | ICD-10-CM

## 2023-03-16 DIAGNOSIS — E78.5 DYSLIPIDEMIA: ICD-10-CM

## 2023-03-16 DIAGNOSIS — E89.0 POSTOPERATIVE HYPOTHYROIDISM: ICD-10-CM

## 2023-03-16 DIAGNOSIS — G31.9 CEREBRAL ATROPHY, MILD (HCC): ICD-10-CM

## 2023-03-16 PROCEDURE — 96127 BRIEF EMOTIONAL/BEHAV ASSMT: CPT

## 2023-03-16 PROCEDURE — 99214 OFFICE O/P EST MOD 30 MIN: CPT | Mod: 25

## 2023-03-16 RX ORDER — VENLAFAXINE HYDROCHLORIDE 37.5 MG/1
37.5 CAPSULE, EXTENDED RELEASE ORAL DAILY
Qty: 30 CAPSULE | Refills: 3 | Status: SHIPPED | OUTPATIENT
Start: 2023-03-16 | End: 2023-04-25

## 2023-03-16 RX ORDER — LEVOTHYROXINE SODIUM 0.07 MG/1
75 TABLET ORAL
Qty: 60 TABLET | Refills: 0 | Status: SHIPPED | OUTPATIENT
Start: 2023-03-16 | End: 2023-08-24

## 2023-03-16 ASSESSMENT — PATIENT HEALTH QUESTIONNAIRE - PHQ9
CLINICAL INTERPRETATION OF PHQ2 SCORE: 3
5. POOR APPETITE OR OVEREATING: 0 - NOT AT ALL
SUM OF ALL RESPONSES TO PHQ QUESTIONS 1-9: 10

## 2023-03-16 ASSESSMENT — FIBROSIS 4 INDEX: FIB4 SCORE: 1.87

## 2023-03-16 NOTE — PROGRESS NOTES
Annual Health Assessment Questions:    1.  Are you currently engaging in any exercise or physical activity? No    2.  How would you describe your mood or emotional well-being today? good    3.  Have you had any falls in the last year? No    4.  Have you noticed any problems with your balance or had difficulty walking? No    5.  In the last six months have you experienced any leakage of urine? No    6. DPA/Advanced Directive: Patient has Advance Directive, but it is not on file. Instructed to bring in a copy to scan into their chart.

## 2023-03-16 NOTE — ASSESSMENT & PLAN NOTE
Patient was rotated over to an extended release of metoprolol 25mg daily. Her blood pressure at home has ranged from 120-140/80.

## 2023-03-16 NOTE — PATIENT INSTRUCTIONS
SLEEP STUDY INSTRUCTIONS    1. Our main concern is to provide the best test and evaluation of your sleep and your cooperation in following the guidelines is very necessary.    2. We have no facilities for family members or guests at the sleep center. Special arrangements will be made for children requiring overnight sleep studies.    3. Unless otherwise instructed, AVOID alcoholic beverages on the day of your sleep study.    4. DO NOT drink coffee or caffeine-containing beverages after 12:00 noon on the day of your sleep study.    5. There is NO smoking at the sleep center.    6. Try to maintain a usual daytime schedule prior to the study (avoid unusual physical activity or meals).    7. DO NOT take a nap on the day of your study.    8. This is an outpatient procedure (test); therefore, nursing services, medications, and meals ARE NOT provided. If you take medications, bring them with you and take them on the schedule you do at home.    9. Please fill your sleep aid prescription (Ambien or Lunesta) and bring to your sleep study. Even patients who normally have no problem going to sleep often need a sleep aid in this different environment.    10. We ask that you wear conventional sleep attire (pajamas or sweats) for the sleep study. We discourage patients from wearing only their underwear to bed. We recommend two-piece pajamas as the techs will need to apply sensors to your stomach.    11. Please shampoo your hair the day of the sleep study. Please DO NOT use any other hair or skin products before your arrival (e.g., mousse, gel, hair or body spray, perfume, body lotion etc.) NOTE: Women should not wear heavy makeup prior to arrival as some wires are taped to the face area.    12. The technician will be applying several small electrodes to the scalp, eye area, chin, chest, and legs, plus respiratory effort belts around the chest. Also, there will be a device placed directly under the nose. (THIS WILL NOT OBSTRUCT  YOUR BREATHING.) This is a painless procedure and the skin is not broken.    13. The test is generally completed in six to eight hours; We are usually done between 6 - 7 a.m., unless you are scheduled for a nap study. You may need to come back another night for a second study to complete your treatment plan.    14. Patients who are scheduled for an MSLT (nap study) will stay at the sleep center for the day following their nighttime study. You will be notified if a nap study was ordered for you at the time the night study is scheduled. Generally, patients having a nap study will leave the sleep center by 4 p.m.    15. You will need to bring food for the following day if you are scheduled for a nap study. A refrigerator and microwave are available.    16. A bathroom is available for your use.    17. We are able to adjust the room temperature for your comfort. Please let the technologist know if you are uncomfortable during the study.    18. If you sleep better with a special pillow or stuffed animal, you may bring it along. Service animals are the only live animals permitted.    19. Cable T.V. is available.    20. You will be scheduled for a follow-up appointment three to five days after the sleep study to review your results.    21. A copy of your sleep study is sent to the referring physician approximately two weeks after your study.    22. Any questions can be directed to our staff at [phone].    23. If CPAP therapy is applied, a home unit will be ordered for you through the [employer] company. You will be contacted to schedule delivery after insurance authorization.

## 2023-03-16 NOTE — PROGRESS NOTES
"CC:   Chief Complaint   Patient presents with    Medication Refill        HISTORY OF PRESENT ILLNESS: Patient is a 87 y.o. female established patient who presents today to discuss the following problems below:     Fatigue  Patient reports that she is still having days of having persistent fatigue and tiredness. She reports that she got a fitbit watch that shows she is very restless. She has not problems falling asleep, but still awakens very tired. She did try to take the doxylamine at a half of a dose, but reports that this made her hungry. She did try the gabapentin and discontinued this after 3 days due to fatigue.  She does have questions about whether or not this could be related to depression, which her cardiologist suggested.     Essential hypertension  Patient was rotated over to an extended release of metoprolol 25mg daily. Her blood pressure at home has ranged from 120-140/80.     Cerebral atrophy, mild (HCC)  Cerebral atrophy was noted on previous head CT.  Patient does not feel that she has any issues with her memory, no safety concerns.  She is quite independent      Review of Systems: Otherwise negative except for as stated above.      Exam: /80 (BP Location: Left arm, Patient Position: Sitting, BP Cuff Size: Adult)   Pulse 68   Temp 36.7 °C (98.1 °F) (Temporal)   Resp 20   Ht 1.575 m (5' 2\")   Wt 56.4 kg (124 lb 4 oz)   SpO2 98%  Body mass index is 22.73 kg/m².    Physical Exam  Constitutional:       Appearance: Normal appearance.   Pulmonary:      Effort: Pulmonary effort is normal.   Musculoskeletal:      Cervical back: Normal range of motion and neck supple.   Lymphadenopathy:      Cervical: No cervical adenopathy.   Neurological:      General: No focal deficit present.      Mental Status: She is alert and oriented to person, place, and time.   Psychiatric:         Mood and Affect: Mood normal.         Behavior: Behavior normal.     Depression Screening    Little interest or pleasure in " doing things?  0 - not at all   Feeling down, depressed , or hopeless? 3 - nearly every day   Trouble falling or staying asleep, or sleeping too much?  3 - nearly every day   Feeling tired or having little energy?  3 - nearly every day   Poor appetite or overeating?  0 - not at all   Feeling bad about yourself - or that you are a failure or have let yourself or your family down? 0 - not at all   Trouble concentrating on things, such as reading the newspaper or watching television? 0 - not at all   Moving or speaking so slowly that other people could have noticed.  Or the opposite - being so fidgety or restless that you have been moving around a lot more than usual?  1 - several days   Thoughts that you would be better off dead, or of hurting yourself?  0 - not at all   Patient Health Questionnaire Score: 10       If depressive symptoms identified deferred to follow up visit unless specifically addressed in assesment and plan.    Interpretation of PHQ-9 Total Score   Score Severity   1-4 No Depression   5-9 Mild Depression   10-14 Moderate Depression   15-19 Moderately Severe Depression   20-27 Severe Depression      Assessment/Plan:  87 y.o. female with the following -    1. Other fatigue  Chronic, unclear etiology.  Patient with an intermediate risk for sleep apnea.  Discussed that she may benefit from a sleep study.  We will order this today and get labs updated as below  NELSY - Sleep Apnea Screening      Flowsheet Row Most Recent Value   S - Have you been told that you SNORE? No   T - Are you often TIRED during the day? Yes   O - Do you know if you stop breathing or has anyone witnessed you stop breathing while you were asleep? (OBSTRUCTION) No   P - Do you have high blood PRESSURE or on medication to control high blood pressure? Yes   B - Is your Body Mass Index greater than 35? (BMI) No   A - Are you 50 years old or older? (AGE) Yes   N - Are you a male with a NECK circumference greater than 17 inches, or a  female with a neck circumference greater than 16 inches? No   G - Are you male? (GENDER) No   STOPBANG Total Score 3   SHIVA Risk Intermediate Risk            - Polysomnography, 4 or More; Future  - Referral to Pulmonary and Sleep Medicine  - Comp Metabolic Panel; Future  - CBC WITHOUT DIFFERENTIAL; Future    2. Postoperative hypothyroidism  Chronic, stable.  She is due for updated TSH testing, particularly in the setting of profound persistent fatigue.  For now continue levothyroxine 75 mcg daily  - TSH WITH REFLEX TO FT4; Future  - levothyroxine (SYNTHROID) 75 MCG Tab; Take 1 Tablet by mouth every morning on an empty stomach.  Dispense: 60 Tablet; Refill: 0    3. Dyslipidemia  Chronic, stable.  LDL is within goal at 79.  She continues on atorvastatin 40 mg nightly.  Due for updated labs as below  - Lipid Profile; Future    4. Essential hypertension  Chronic, stable.  Continue irbesartan-hydrochlorothiazide 300-12 and half milligrams daily.  No refill needed today    5. Mild depression  Chronic problem, new to me, not at goal.  Discussed with patient that she does have a positive PHQ-9 score of 10 indicating some mild depression.  She notes that she is quite sensitive to medication, we will start on Effexor 37.5 mg daily, with follow-up in 4 weeks to assess  - venlafaxine XR (EFFEXOR XR) 37.5 MG CAPSULE SR 24 HR; Take 1 Capsule by mouth every day.  Dispense: 30 Capsule; Refill: 3    6. Recurrent hypersomnia  - Polysomnography, 4 or More; Future    7. Cerebral atrophy, mild (HCC)  Chronic, stable.  No safety concerns, patient remains oriented and independent      Follow-up: Return in about 4 weeks (around 4/13/2023) for depression/anxiety follow up.    Health Maintenance: Completed      Please note that this dictation was created using voice recognition software. I have made every reasonable attempt to correct obvious errors, but I expect that there are errors of grammar and possibly content that I did not discover  before finalizing the note.    Electronically signed by GURVINDER Cohen on March 16, 2023

## 2023-03-21 NOTE — ASSESSMENT & PLAN NOTE
Cerebral atrophy was noted on previous head CT.  Patient does not feel that she has any issues with her memory, no safety concerns.  She is quite independent

## 2023-03-23 ENCOUNTER — TELEPHONE (OUTPATIENT)
Dept: HEALTH INFORMATION MANAGEMENT | Facility: OTHER | Age: 88
End: 2023-03-23
Payer: MEDICARE

## 2023-03-27 ENCOUNTER — TELEPHONE (OUTPATIENT)
Dept: HEALTH INFORMATION MANAGEMENT | Facility: OTHER | Age: 88
End: 2023-03-27
Payer: MEDICARE

## 2023-03-28 ENCOUNTER — SLEEP STUDY (OUTPATIENT)
Dept: SLEEP MEDICINE | Facility: MEDICAL CENTER | Age: 88
End: 2023-03-28
Payer: MEDICARE

## 2023-03-28 DIAGNOSIS — G47.13 RECURRENT HYPERSOMNIA: ICD-10-CM

## 2023-03-28 DIAGNOSIS — R53.83 OTHER FATIGUE: Chronic | ICD-10-CM

## 2023-03-28 PROCEDURE — 95810 POLYSOM 6/> YRS 4/> PARAM: CPT | Performed by: STUDENT IN AN ORGANIZED HEALTH CARE EDUCATION/TRAINING PROGRAM

## 2023-03-30 NOTE — PROCEDURES
Patient: JOHNATHAN GUY  ID: [id number] Date: 3/28/2023   MONTAGE: Standard  STUDY TYPE: Diagnostic  RECORDING TECHNIQUE:   After the scalp was prepared, gold plated electrodes were applied to the scalp according to the International 10-20 System. EEG (electroencephalogram) was continuously monitored from the O1-M2, O2-M1, C3-M2, C4-M1, F3-M2, and F4-M1. EOGs (electrooculograms) were monitored by electrodes placed at the left and right outer canthi. Chin EMG (electromyogram) was monitored by electrodes placed on the mentalis and sub-mentalis muscles. Nasal and oral airflow were monitored using a triple port thermocouple as well as oronasal pressure transducer. Respiratory effort was measured by inductive plethysmography technology employing abdominal and thoracic belts. Blood oxygen saturation and pulse were monitored by pulse oximetry. Heart rhythm was monitored by surface electrocardiogram. Leg EMG was studied using surface electrodes placed on left and right anterior tibialis. A microphone was used to monitor tracheal sounds and snoring. Body position was monitored and documented by technician observation.   SCORING CRITERIA:   A modification of the AASM manual for scoring of sleep and associated events was used. Obstructive apneas were scored by cessation of airflow for at least 10 seconds with continuing respiratory effort. Central apneas were scored by cessation of airflow for at least 10 seconds with no respiratory effort. Hypopneas were scored by a 30% or more reduction in airflow for at least 10 seconds accompanied by arterial oxygen desaturation of 3% or an arousal. For CMS (Medicare) patients, per AASM rule 1B, hypopneas are scored by 30% with mild reduction in airflow for at least 10 seconds accompanied by arterial saturation decreased at 4%.    Study start time was 10:45:53 PM. Diagnostic recording time was 7h 8.0m with a total sleep time of 1h 31.0m resulting in a sleep efficiency of 21.26%%. Sleep latency  from the start of the study was 70 minutes and the latency from sleep to REM was 00 minutes. In total,12 arousals were scored for an arousal index of 7.9.  Respiratory:  There were a total of 3 apneas consisting of 0 obstructive apneas, 0 mixed apneas, and 3 central apneas. A total of 18 hypopneas were scored. The apnea index was 1.98 per hour and the hypopnea index was 11.87 per hour resulting in an overall AHI of 13.85. AHI during rem was 0.0 and AHI while supine was 0.00.  Oximetry:  There was a mean oxygen saturation of 89.0%. The minimum oxygen saturation in NREM was 82.0 % and in REM was --. The patient spent 86.7 minutes of TST with SaO2 <88%.  Cardiac:  The highest heart rate seen while awake was 87 BPM while the highest heart rate during sleep was 65 BPM with an average sleeping heart rate of 48 BPM.  Limb Movements:  There were a total of 0 PLMs during sleep which resulted in a PLMS index of 0.0. Of these, 4 were associated with arousals which resulted in a PLMS arousal index of 2.6.      Impression:  1.  Nondiagnostic study with only an hour and a half of sleep recorded (at least 2 hours required)  2.  Low sleep efficiency of 21%  3.  Suspect first night effect regarding sleep study due to prolonged sleep latency and poor sleep efficiency.  4.  Significant nocturnal hypoxia seen with total sleep time at or below 88% saturation of 86.7 minutes  5.  Potential for sleep apnea with an elevated AHI at 13.8 events an hour    Recommendations:  I recommend the patient return for repeat PSG due to having less than 2 hours of sleep.  Potential first night effect.  Suspect there is obstructive sleep apnea present, however the study is nondiagnostic.  Nocturnal hypoxia present out of portion to the level of suspected sleep apnea.  Patient may require PAP therapy and supplemental oxygen however will need further sleep testing.    In some cases alternative treatment options may be proven effective in resolving sleep  apnea. These options include upper airway surgery, the use of a dental orthotic, weight loss, or positional therapy. Clinical correlation is required. In general patients with sleep apnea are advised to avoid alcohol, sedatives and not to operate a motor vehicle while drowsy.  Untreated sleep apnea increases the risk for cardiovascular and neurovascular disease.

## 2023-04-11 ENCOUNTER — HOSPITAL ENCOUNTER (OUTPATIENT)
Dept: LAB | Facility: MEDICAL CENTER | Age: 88
End: 2023-04-11
Payer: MEDICARE

## 2023-04-11 DIAGNOSIS — E78.5 DYSLIPIDEMIA: ICD-10-CM

## 2023-04-11 DIAGNOSIS — E89.0 POSTOPERATIVE HYPOTHYROIDISM: ICD-10-CM

## 2023-04-11 DIAGNOSIS — R53.83 OTHER FATIGUE: Chronic | ICD-10-CM

## 2023-04-11 LAB
ERYTHROCYTE [DISTWIDTH] IN BLOOD BY AUTOMATED COUNT: 45.2 FL (ref 35.9–50)
HCT VFR BLD AUTO: 47.2 % (ref 37–47)
HGB BLD-MCNC: 15.3 G/DL (ref 12–16)
MCH RBC QN AUTO: 31.4 PG (ref 27–33)
MCHC RBC AUTO-ENTMCNC: 32.4 G/DL (ref 33.6–35)
MCV RBC AUTO: 96.7 FL (ref 81.4–97.8)
PLATELET # BLD AUTO: 185 K/UL (ref 164–446)
PMV BLD AUTO: 13.6 FL (ref 9–12.9)
RBC # BLD AUTO: 4.88 M/UL (ref 4.2–5.4)
TSH SERPL DL<=0.005 MIU/L-ACNC: 0.57 UIU/ML (ref 0.38–5.33)
WBC # BLD AUTO: 4.8 K/UL (ref 4.8–10.8)

## 2023-04-11 PROCEDURE — 84443 ASSAY THYROID STIM HORMONE: CPT

## 2023-04-11 PROCEDURE — 85027 COMPLETE CBC AUTOMATED: CPT

## 2023-04-11 PROCEDURE — 36415 COLL VENOUS BLD VENIPUNCTURE: CPT

## 2023-04-11 PROCEDURE — 80053 COMPREHEN METABOLIC PANEL: CPT

## 2023-04-11 PROCEDURE — 80061 LIPID PANEL: CPT

## 2023-04-12 LAB
ALBUMIN SERPL BCP-MCNC: 3.9 G/DL (ref 3.2–4.9)
ALBUMIN/GLOB SERPL: 1.2 G/DL
ALP SERPL-CCNC: 62 U/L (ref 30–99)
ALT SERPL-CCNC: 18 U/L (ref 2–50)
ANION GAP SERPL CALC-SCNC: 17 MMOL/L (ref 7–16)
AST SERPL-CCNC: 24 U/L (ref 12–45)
BILIRUB SERPL-MCNC: 0.7 MG/DL (ref 0.1–1.5)
BUN SERPL-MCNC: 17 MG/DL (ref 8–22)
CALCIUM ALBUM COR SERPL-MCNC: 8.7 MG/DL (ref 8.5–10.5)
CALCIUM SERPL-MCNC: 8.6 MG/DL (ref 8.5–10.5)
CHLORIDE SERPL-SCNC: 110 MMOL/L (ref 96–112)
CHOLEST SERPL-MCNC: 215 MG/DL (ref 100–199)
CO2 SERPL-SCNC: 20 MMOL/L (ref 20–33)
CREAT SERPL-MCNC: 0.68 MG/DL (ref 0.5–1.4)
FASTING STATUS PATIENT QL REPORTED: NORMAL
GFR SERPLBLD CREATININE-BSD FMLA CKD-EPI: 84 ML/MIN/1.73 M 2
GLOBULIN SER CALC-MCNC: 3.2 G/DL (ref 1.9–3.5)
GLUCOSE SERPL-MCNC: 96 MG/DL (ref 65–99)
HDLC SERPL-MCNC: 39 MG/DL
LDLC SERPL CALC-MCNC: 141 MG/DL
POTASSIUM SERPL-SCNC: 4.3 MMOL/L (ref 3.6–5.5)
PROT SERPL-MCNC: 7.1 G/DL (ref 6–8.2)
SODIUM SERPL-SCNC: 147 MMOL/L (ref 135–145)
TRIGL SERPL-MCNC: 175 MG/DL (ref 0–149)

## 2023-04-18 ENCOUNTER — APPOINTMENT (OUTPATIENT)
Dept: MEDICAL GROUP | Facility: PHYSICIAN GROUP | Age: 88
End: 2023-04-18
Payer: MEDICARE

## 2023-04-25 ENCOUNTER — OFFICE VISIT (OUTPATIENT)
Dept: MEDICAL GROUP | Facility: PHYSICIAN GROUP | Age: 88
End: 2023-04-25
Payer: MEDICARE

## 2023-04-25 ENCOUNTER — HOSPITAL ENCOUNTER (OUTPATIENT)
Dept: RADIOLOGY | Facility: MEDICAL CENTER | Age: 88
End: 2023-04-25
Payer: MEDICARE

## 2023-04-25 VITALS
DIASTOLIC BLOOD PRESSURE: 56 MMHG | SYSTOLIC BLOOD PRESSURE: 100 MMHG | HEART RATE: 74 BPM | OXYGEN SATURATION: 98 % | BODY MASS INDEX: 22.56 KG/M2 | RESPIRATION RATE: 17 BRPM | TEMPERATURE: 97.6 F | WEIGHT: 122.6 LBS | HEIGHT: 62 IN
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
## 2023-04-25 DIAGNOSIS — R53.83 OTHER FATIGUE: Chronic | ICD-10-CM

## 2023-04-25 DIAGNOSIS — E78.2 MIXED HYPERLIPIDEMIA: Chronic | ICD-10-CM

## 2023-04-25 PROCEDURE — 99214 OFFICE O/P EST MOD 30 MIN: CPT

## 2023-04-25 PROCEDURE — 72100 X-RAY EXAM L-S SPINE 2/3 VWS: CPT

## 2023-04-25 RX ORDER — VENLAFAXINE HYDROCHLORIDE 75 MG/1
75 CAPSULE, EXTENDED RELEASE ORAL DAILY
Qty: 90 CAPSULE | Refills: 3 | Status: SHIPPED | OUTPATIENT
Start: 2023-04-25 | End: 2023-11-21

## 2023-04-25 RX ORDER — METHOCARBAMOL 500 MG/1
500 TABLET, FILM COATED ORAL 4 TIMES DAILY
Qty: 56 TABLET | Refills: 0 | Status: SHIPPED | OUTPATIENT
Start: 2023-04-25 | End: 2023-05-09

## 2023-04-25 ASSESSMENT — FIBROSIS 4 INDEX: FIB4 SCORE: 2.63

## 2023-04-25 ASSESSMENT — PAIN SCALES - GENERAL: PAINLEVEL: NO PAIN

## 2023-04-25 NOTE — ASSESSMENT & PLAN NOTE
Patient presents for follow-up on her labs.  She is currently on atorvastatin 40 mg every bedtime.  Lipids as below. She does admit that sometimes she does not take all of her medications. She reports that she has increased her walking to 1400 - 6900 steps daily.   Lab Results   Component Value Date/Time    CHOLSTRLTOT 215 (H) 04/11/2023 01:24 PM    TRIGLYCERIDE 175 (H) 04/11/2023 01:24 PM    HDL 39 (A) 04/11/2023 01:24 PM     (H) 04/11/2023 01:24 PM   ]  The ASCVD Risk score (Carmelina CORRALES, et al., 2019) failed to calculate.

## 2023-04-25 NOTE — ASSESSMENT & PLAN NOTE
"Patient reports that she had a fall on Saturday, 4-22.  She bumped into her 's walker.  She is experiencing some persistent back pain from this fall that is located around her waist. She reports that it is progressively worsening, gnawing and constant. She reports she was walking straight and her left foot caught her right foot and she fell straight down and landed in a \"pretzel\" shape.   "

## 2023-04-25 NOTE — PROGRESS NOTES
"CC:   Chief Complaint   Patient presents with    Lab Results     Mixed Hyperlipidemia     Fall     Pt states she had a fall on Saturday, she hit her husbands walker and fell.     Back Pain     Back pain from the fall. Located around her waist.        HISTORY OF PRESENT ILLNESS: Patient is a 87 y.o. female established patient who presents today to discuss the following problems below:     Mixed hyperlipidemia  Patient presents for follow-up on her labs.  She is currently on atorvastatin 40 mg every bedtime.  Lipids as below. She does admit that sometimes she does not take all of her medications. She reports that she has increased her walking to 1400 - 6900 steps daily.   Lab Results   Component Value Date/Time    CHOLSTRLTOT 215 (H) 04/11/2023 01:24 PM    TRIGLYCERIDE 175 (H) 04/11/2023 01:24 PM    HDL 39 (A) 04/11/2023 01:24 PM     (H) 04/11/2023 01:24 PM   ]  The ASCVD Risk score (Carmelina CORRALES, et al., 2019) failed to calculate.      Fatigue  Last visit, patient was referred to have a sleep study done.  Unfortunately the sleep study was nondiagnostic because she did not achieve at least 2 hours of sleep.  Per pulmonologist report, she does appear to have sleep apnea with nocturnal hypoxia, but will need to return for another sleep study. She reports that her sleep is doing better after starting the effexor 37.5mg daily. She feels that this is overall helping her fatigue.     Fall  Patient reports that she had a fall on Saturday, 4-22.  She bumped into her 's walker.  She is experiencing some persistent back pain from this fall that is located around her waist. She reports that it is progressively worsening, gnawing and constant. She reports she was walking straight and her left foot caught her right foot and she fell straight down and landed in a \"pretzel\" shape.     Review of Systems: Otherwise negative except for as stated above.      Exam: /56 (BP Location: Left arm, Patient Position: Sitting, " "BP Cuff Size: Adult)   Pulse 74   Temp 36.4 °C (97.6 °F) (Temporal)   Resp 17   Ht 1.575 m (5' 2\")   Wt 55.6 kg (122 lb 9.6 oz)   SpO2 98%  Body mass index is 22.42 kg/m².    Physical Exam  Constitutional:       Appearance: Normal appearance.   Pulmonary:      Effort: Pulmonary effort is normal.   Musculoskeletal:      Cervical back: Normal range of motion and neck supple.   Lymphadenopathy:      Cervical: No cervical adenopathy.   Neurological:      General: No focal deficit present.      Mental Status: She is alert and oriented to person, place, and time.   Psychiatric:         Mood and Affect: Mood normal.         Behavior: Behavior normal.       Assessment/Plan:  87 y.o. female with the following -    1. Mixed hyperlipidemia  Chronic, not at goal.  Patient's LDL cholesterol is significantly elevated from prior.  She admits she is not always taking her atorvastatin every evening.  She will make a point to take this every evening in combination with co-Q10.  We will plan to recheck her lipids in about 6 months.  If not better improve, would recommend increasing to 80 mg    2. Other fatigue  Chronic, stable.  Patient will have a new sleep study done in October.  In the interim, she does feel that most of her fatigue is getting better after starting on the Effexor.  She is interested in increasing the dose, we will do this today  - venlafaxine XR (EFFEXOR XR) 75 MG CAPSULE SR 24 HR; Take 1 Capsule by mouth every day.  Dispense: 90 Capsule; Refill: 3    3. Fall, initial encounter  Acute problem, rule out fracture. Patient reports difficulty sleeping at night, given robaxin to help with pain. Will call for acute concerns   - DX-LUMBAR SPINE-2 OR 3 VIEWS; Future  - methocarbamol (ROBAXIN) 500 MG Tab; Take 1 Tablet by mouth 4 times a day for 14 days.  Dispense: 56 Tablet; Refill: 0      Follow-up: Return in about 6 months (around 10/25/2023) for lab follow up.    Health Maintenance: Completed      Please note that " this dictation was created using voice recognition software. I have made every reasonable attempt to correct obvious errors, but I expect that there are errors of grammar and possibly content that I did not discover before finalizing the note.    Electronically signed by GURVINDER Cohen on April 25, 2023

## 2023-04-25 NOTE — ASSESSMENT & PLAN NOTE
Last visit, patient was referred to have a sleep study done.  Unfortunately the sleep study was nondiagnostic because she did not achieve at least 2 hours of sleep.  Per pulmonologist report, she does appear to have sleep apnea with nocturnal hypoxia, but will need to return for another sleep study. She reports that her sleep is doing better after starting the effexor 37.5mg daily. She feels that this is overall helping her fatigue.

## 2023-04-26 ENCOUNTER — PATIENT MESSAGE (OUTPATIENT)
Dept: MEDICAL GROUP | Facility: PHYSICIAN GROUP | Age: 88
End: 2023-04-26
Payer: MEDICARE

## 2023-04-26 DIAGNOSIS — S22.089A CLOSED FRACTURE OF TWELFTH THORACIC VERTEBRA, UNSPECIFIED FRACTURE MORPHOLOGY, INITIAL ENCOUNTER (HCC): ICD-10-CM

## 2023-04-27 DIAGNOSIS — S22.089A CLOSED FRACTURE OF TWELFTH THORACIC VERTEBRA, UNSPECIFIED FRACTURE MORPHOLOGY, INITIAL ENCOUNTER (HCC): ICD-10-CM

## 2023-04-27 NOTE — PROGRESS NOTES
Called patient regarding x-ray results and compression fracture. Order placed for stat MRI and referral to IR for consultation regarding kyphoplasty. Patient is currently managing pain with tylenol andkaren leonaxin, feels that she is doing fine in regards to pain control and will be expecting calls from imaging and IR for further evaluation and management

## 2023-04-28 ENCOUNTER — HOSPITAL ENCOUNTER (OUTPATIENT)
Dept: RADIOLOGY | Facility: MEDICAL CENTER | Age: 88
End: 2023-04-28
Payer: MEDICARE

## 2023-04-28 DIAGNOSIS — S22.089A CLOSED FRACTURE OF TWELFTH THORACIC VERTEBRA, UNSPECIFIED FRACTURE MORPHOLOGY, INITIAL ENCOUNTER (HCC): ICD-10-CM

## 2023-04-28 PROCEDURE — 72146 MRI CHEST SPINE W/O DYE: CPT

## 2023-04-28 RX ORDER — TRAMADOL HYDROCHLORIDE 50 MG/1
50 TABLET ORAL EVERY 6 HOURS PRN
Qty: 28 TABLET | Refills: 0 | Status: SHIPPED | OUTPATIENT
Start: 2023-04-28 | End: 2023-05-05

## 2023-05-03 NOTE — PROGRESS NOTES
"Interventional Radiology Consultation      Re: Milena Ang     MRN: [id number]   : 1935    Milena Ang was referred by Frederic VELAZQUEZ.  She is a 87 y.o. female seen in clinic for evaluation and possible vertebral augmentation. There is no neurology consult readily available.     History of Present Illness:   presents with back pain after a fall on 2023. She has a history of osteoporosis diagnosed on DEXA in  that responded to therapy with osteopenia on subsequent scans. Imaging shows an acute T12 compression fracture. Despite conservative measures that have included activity limitation and medication management, the pain has not improved in intensity and She is referred to interventional radiology for evaluation and management of a painful vertebral compression fracture. Today, 's pain is \"manageable\"/10. Worst pain in the past 24 hours is 10/10. It is in her thoracolumbar junction area and radiates bilaterally to her flanks. She takes tramadol every 6 hours as well as a muscle relaxer only for this pack pain, which helps. She has limited her activity, which also helps by not exacerbating her pain. Not taking the medications on time, bending, and twisting, make the pain worse. She is not able to perform activities of daily living due to the pain, specifically driving and cooking. She is able to shower herself and ambulate unassisted. She lives in [place] and is a retired . She is unaccompanied to the consultation today.    She is seen today for review of imaging studies and discussion of possible vertebral augmentation of a compression fracture at T12.     The patient's Clinical Indicators include:  Clinical indicators  T12 Acute compression Fracture after a fall  Osteoporosis     Treatment or Monitoring  Medications List: Tramadol, Robaxin  Non pharmacologic interventions: activity limitation     Risk Factors  Osteoporosis/osteopenia, " pain, exacerbation of fracture      Past Medical History:   Diagnosis Date    Cataract     11-4-2020 H/O IOL OU    Cervical high risk human papillomavirus (HPV) DNA test positive     Colon polyp     hyperplastic    Disorder of thyroid     Diverticulosis of colon     Dyslipidemia     Female bladder prolapse 11/04/2020    High cholesterol     Hypertension     Incontinence     pessary    Incontinence 11/04/2020    Pt. states does not have a pessary in place.    Indigestion     Kidney stone     Menopausal and postmenopausal disorder     OSTEOPOROSIS     Urinary bladder disorder 11/04/2020    Bladder Prolapse     Past Surgical History:   Procedure Laterality Date    DE THYROID LOBECTOMY,UNILAT Right 8/4/2021    Procedure: LOBECTOMY, THYROID - TOTAL, UNILATERAL.;  Surgeon: Mercedes Byrne M.D.;  Location: SURGERY SAME DAY [facility];  Service: General    DE CYSTOURETHROSCOPY,URETER CATHETER Right 11/6/2020    Procedure: CYSTOSCOPY, WITH BILATERAL RETROGRADE PYELOGRAM- URETERO;  Surgeon: Ernesto Gamboa M.D.;  Location: SURGERY [facility];  Service: Urology    DE CYSTO/URETERO/PYELOSCOPY, DX Bilateral 11/6/2020    Procedure: URETEROSCOPY;  Surgeon: Ernesto Gamboa M.D.;  Location: SURGERY [facility];  Service: Urology    DE CYSTOSCOPY,INSERT URETERAL STENT Right 11/6/2020    Procedure: CYSTOSCOPY, WITH URETERAL STENT INSERTION;  Surgeon: Ernesto Gamboa M.D.;  Location: SURGERY [facility];  Service: Urology    BLADDER SLING FEMALE  7/12    Dr. Quinn    CATARACT EXTRACTION WITH IOL Bilateral     CHOLECYSTECTOMY       Social History     Socioeconomic History    Marital status:      Spouse name: Rivera    Number of children: 2    Years of education: college    Highest education level: Bachelor's degree (e.g., BA, AB, BS)   Occupational History    Occupation: Retired Teacher     Employer: OTHER   Tobacco Use    Smoking status: Never    Smokeless tobacco: Never   Vaping Use    Vaping Use: Never used   Substance and  Sexual Activity    Alcohol use: No    Drug use: No    Sexual activity: Not Currently   Other Topics Concern    Not on file   Social History Narrative    Not on file     Social Determinants of Health     Financial Resource Strain: Not on file   Food Insecurity: Not on file   Transportation Needs: Not on file   Physical Activity: Not on file   Stress: Not on file   Social Connections: Not on file   Intimate Partner Violence: Not on file   Housing Stability: Not on file     Family History   Problem Relation Age of Onset    Cancer Father         pancreatic    Heart Disease Father         MI at age 57    Hypertension Father     Cancer Brother         melanoma    Heart Disease Brother     No Known Problems Mother     Cancer Sister         ovaren cancer    Cancer Brother         throat    Psychiatric Illness Daughter     No Known Problems Daughter        Review of Systems   Constitutional: Negative.  Negative for chills, diaphoresis, fever, malaise/fatigue and weight loss.   Respiratory: Negative.     Cardiovascular: Negative.    Gastrointestinal: Negative.    Musculoskeletal:  Positive for back pain.   Skin: Negative.    Neurological:  Negative for sensory change, speech change, focal weakness and weakness.   Psychiatric/Behavioral:  Negative for substance abuse.      A comprehensive 14-point review of systems was negative except as described above.     Labs:    Latest Reference Range & Units Most Recent   WBC 4.8 - 10.8 K/uL 4.8  4/11/23 13:24   RBC 4.20 - 5.40 M/uL 4.88  4/11/23 13:24   Hemoglobin 12.0 - 16.0 g/dL 15.3  4/11/23 13:24   Hematocrit 37.0 - 47.0 % 47.2 (H)  4/11/23 13:24   MCV 81.4 - 97.8 fL 96.7  4/11/23 13:24   MCH 27.0 - 33.0 pg 31.4  4/11/23 13:24   MCHC 33.6 - 35.0 g/dL 32.4 (L)  4/11/23 13:24   RDW 35.9 - 50.0 fL 45.2  4/11/23 13:24   Platelet Count 164 - 446 K/uL 185  4/11/23 13:24   MPV 9.0 - 12.9 fL 13.6 (H)  4/11/23 13:24   Neutrophils-Polys 44.00 - 72.00 % 61.90  10/4/21 10:47   Neutrophils  (Absolute) 2.00 - 7.15 K/uL 4.44  10/4/21 10:47   Lymphocytes 22.00 - 41.00 % 26.80  10/4/21 10:47   Lymphs (Absolute) 1.00 - 4.80 K/uL 1.92  10/4/21 10:47   Monocytes 0.00 - 13.40 % 8.70  10/4/21 10:47   Monos (Absolute) 0.00 - 0.85 K/uL 0.62  10/4/21 10:47   Eosinophils 0.00 - 6.90 % 0.60  10/4/21 10:47   Eos (Absolute) 0.00 - 0.51 K/uL 0.04  10/4/21 10:47   Basophils 0.00 - 1.80 % 1.00  10/4/21 10:47   Baso (Absolute) 0.00 - 0.12 K/uL 0.07  10/4/21 10:47   Immature Granulocytes 0.00 - 0.90 % 1.00 (H)  10/4/21 10:47   Immature Granulocytes (abs) 0.00 - 0.11 K/uL 0.07  10/4/21 10:47   Nucleated RBC /100 WBC 0.00  10/4/21 10:47   NRBC (Absolute) K/uL 0.00  10/4/21 10:47   Sodium 135 - 145 mmol/L 147 (H)  4/11/23 13:24   Potassium 3.6 - 5.5 mmol/L 4.3  4/11/23 13:24   Chloride 96 - 112 mmol/L 110  4/11/23 13:24   Co2 20 - 33 mmol/L 20  4/11/23 13:24   Anion Gap 7.0 - 16.0  17.0 (H)  4/11/23 13:24   Glucose 65 - 99 mg/dL 96  4/11/23 13:24   Bun 8 - 22 mg/dL 17  4/11/23 13:24   Creatinine 0.50 - 1.40 mg/dL 0.68  4/11/23 13:24   GFR (CKD-EPI) >60 mL/min/1.73 m 2 84  4/11/23 13:24   GFR If African American >60 mL/min/1.73 m 2 >60  10/4/21 10:47   GFR If Non  >60  60 (L) (E)  11/15/21 11:13   Bun-Creatinine Ratio  22.2 (E)  11/15/21 11:13   Calcium 8.5 - 10.5 mg/dL 8.6  4/11/23 13:24   Correct Calcium 8.5 - 10.5 mg/dL 8.7  4/11/23 13:24   AST(SGOT) 12 - 45 U/L 24  4/11/23 13:24   ALT(SGPT) 2 - 50 U/L 18  4/11/23 13:24   Alkaline Phosphatase 30 - 99 U/L 62  4/11/23 13:24   Total Bilirubin 0.1 - 1.5 mg/dL 0.7  4/11/23 13:24   Albumin 3.2 - 4.9 g/dL 3.9  4/11/23 13:24   Total Protein 6.0 - 8.2 g/dL 7.1  4/11/23 13:24   Globulin 1.9 - 3.5 g/dL 3.2  4/11/23 13:24   A-G Ratio g/dL 1.2  4/11/23 13:24   Glycohemoglobin 4.0 - 5.6 % 5.6  5/3/22 09:36   Estim. Avg Glu mg/dL 114  5/3/22 09:36   Fasting Status  Fasting  4/11/23 13:24   Cholesterol,Tot 100 - 199 mg/dL 215 (H)  4/11/23 13:24   Triglycerides 0 - 149 mg/dL  175 (H)  4/11/23 13:24   HDL >=40 mg/dL 39 !  4/11/23 13:24   LDL <100 mg/dL 141 (H)  4/11/23 13:24   Micro Alb Creat Ratio 0 - 30 mg/g see below  10/4/21 10:47   Creatinine, Urine mg/dL 30.12  10/4/21 10:47   Microalbumin, Urine Random mg/dL <1.2  10/4/21 10:47   25-Hydroxy   Vitamin D 25 30 - 100 ng/mL 64  6/9/21 11:57   TSH 0.380 - 5.330 uIU/mL 0.570  4/11/23 13:24   Thyroxine -T4 4.0 - 12.0 ug/dL 8.6  10/4/21 10:47   Free T-4 0.93 - 1.70 ng/dL 2.04 (H)  5/3/22 09:29   T3,Free 2.00 - 4.40 pg/mL 2.37  1/3/22 10:11   Thyroid Stim Immunoglobulin <=0.54 IU/L <0.10  6/9/21 11:57   Thyroglobulin by LC-MC/MS-S/P 1.3 - 31.8 ng/mL Not Applicable  8/3/21 15:00   Thyroglobulin 1.3 - 31.8 ng/mL 23.7  8/3/21 15:00   (H): Data is abnormally high  (L): Data is abnormally low  !: Data is abnormal  (E): External lab result    Pathology:  None pertinent available for review    Radiology:   MRI thoracic spine on 4/28/23 at [facility]:  T12 mild burst acute-subacute fracture minimally indents the ventral canal and does not result in central or foraminal stenosis     T10 marrow edema inferiorly is more likely degenerative disc disease greater than nondisplaced fracture related     Multiple hemangiomas are of no acute significance     Mid thoracic contusions abut the cord but do not cause significant central stenosis      Xray L spine 4/25/23 at [facility]:  1.  Acute T12 vertebral body fracture  2.  Possible T11 vertebral body fracture  3.  Multilevel degenerative change  4.  Levoconvex scoliosis  5.  Incidental L4 hemangioma    CT AP 2/9/21 at [facility]:  1.  Negative contrast-enhanced CT of the abdomen and pelvis.  2.  No evidence of bowel obstruction or inflammation. Colonic diverticulosis without evidence of diverticulitis.  3.  Minor intrahepatic and extra hepatic biliary dilatation status post cholecystectomy.  4.  No free fluid  5.  Small hiatal hernia.      DEXA study 4/20/17 at [facility]:  According to the World Health Organization  "classification, bone mineral density of this patient is osteopenia with increased fracture risk.      10-year Probability of Fracture:  Major Osteoporotic     13.0%  Hip     3.2%  Population      [place] ()     Based on left femur neck BMD    DEXA study 3/4/2004 at [facility]:  IMPRESSION:     [employer]IZATION CLASSIFICATION FOR THE LUMBAR SPINE IS   OSTEOPOROTIC WITH HIGH FRACTURE RISK, AND FOR THE LEFT PROXIMAL FEMUR IS   OSTEOPENIC WITH MILD FRACTURE RISK.     Current Outpatient Medications   Medication Sig Dispense Refill    traMADol (ULTRAM) 50 MG Tab Take 1 Tablet by mouth every 6 hours as needed for Moderate Pain for up to 7 days. 28 Tablet 0    methocarbamol (ROBAXIN) 500 MG Tab Take 1 Tablet by mouth 4 times a day for 14 days. 56 Tablet 0    venlafaxine XR (EFFEXOR XR) 75 MG CAPSULE SR 24 HR Take 1 Capsule by mouth every day. 90 Capsule 3    levothyroxine (SYNTHROID) 75 MCG Tab Take 1 Tablet by mouth every morning on an empty stomach. 60 Tablet 0    irbesartan-hydrochlorothiazide (AVALIDE) 300-12.5 MG per tablet Take 1 Tablet by mouth every day. 100 Tablet 0    metoprolol SR (TOPROL XL) 25 MG TABLET SR 24 HR Take 1 Tablet by mouth every day. 100 Tablet 2    amLODIPine (NORVASC) 10 MG Tab Take 1 Tablet by mouth every day. 100 Tablet 2    aspirin (ASA) 81 MG Chew Tab chewable tablet Chew 1 Tablet every day.      atorvastatin (LIPITOR) 40 MG Tab Take 1 Tablet by mouth at bedtime. 100 Tablet 2     No current facility-administered medications for this visit.       Allergies   Allergen Reactions    Penicillins Rash and Swelling     \"tongue got thick\"       Physical Exam  Constitutional:       General: She is not in acute distress.     Appearance: She is not diaphoretic.   HENT:      Head: Normocephalic.   Eyes:      General: No scleral icterus.  Pulmonary:      Effort: Pulmonary effort is normal. No respiratory distress.   Abdominal:      General: There is no distension.   Skin:     General: Skin is " warm and dry.      Coloration: Skin is not pale.      Findings: No erythema or rash.   Neurological:      General: No focal deficit present.      Mental Status: She is alert and oriented to person, place, and time. She is not disoriented.      Cranial Nerves: No cranial nerve deficit or facial asymmetry.      Sensory: Sensation is intact.      Motor: No weakness or tremor.      Coordination: Coordination normal.      Gait: Gait is intact. Gait normal.   Psychiatric:         Mood and Affect: Mood and affect normal.         Behavior: Behavior normal.         Thought Content: Thought content normal.         Cognition and Memory: Memory normal.         Judgment: Judgment normal.        Impression:   1. Acute vertebral insufficiency compression fracture of T12, amenable to vertebral augmentation.  2. Acute low back pain.  3. Osteopenia.  4. Hypertension, controlled.  5. Hyperlipidemia.  6. History of stroke.    Plan:   Satish Armendariz MD has reviewed 's history and imaging studies, examined the patient, and discussed treatment options.  is a candidate for vertebral augmentation. The acute pain the patient describes is directly related to the fracture and conservative measures have been inadequate for pain relief. We discussed the method of the procedure at length as well as outcome expectations and explained that it can take several weeks to optimize the results after the procedure. We additionally discussed the risks, including bleeding and infection, reaction to the moderate sedation medications, and cement extravasation causing compression of a nerve or the spinal canal or pulmonary embolus and subsequent interventions. The patient may be at risk to develop future compression fractures. We discussed alternatives of the procedure including conservative medical management. The patient verbalizes understanding of the plan and elects to proceed. She has been scheduled for May 8. She will hold her aspirin  until after the procedure.      MARCUS Bowie with Satish Armendariz MD  Interventional Radiology  [facility]   [address] ([id number])  [place] [address]  [phone]      I, Nicole Armendariz, the interventional neuroradiologist attest that I personally reviewed the relevant imaging studies (MRI, CT and Angiograms) of this patient and also discussed the history, physical exam findings and correlated with the APNP's physical exam and agree with the above note. I also personally discussed the findings and plan of management with the patient.

## 2023-05-04 ENCOUNTER — APPOINTMENT (OUTPATIENT)
Dept: ADMISSIONS | Facility: MEDICAL CENTER | Age: 88
End: 2023-05-04
Attending: RADIOLOGY
Payer: MEDICARE

## 2023-05-04 ENCOUNTER — HOSPITAL ENCOUNTER (OUTPATIENT)
Dept: RADIOLOGY | Facility: MEDICAL CENTER | Age: 88
End: 2023-05-04
Payer: MEDICARE

## 2023-05-04 VITALS — WEIGHT: 122 LBS | HEIGHT: 62 IN | BODY MASS INDEX: 22.45 KG/M2

## 2023-05-04 DIAGNOSIS — S22.089A CLOSED FRACTURE OF TWELFTH THORACIC VERTEBRA, UNSPECIFIED FRACTURE MORPHOLOGY, INITIAL ENCOUNTER (HCC): ICD-10-CM

## 2023-05-04 ASSESSMENT — ENCOUNTER SYMPTOMS
CHILLS: 0
FEVER: 0
DIAPHORESIS: 0
SENSORY CHANGE: 0
WEAKNESS: 0
BACK PAIN: 1
GASTROINTESTINAL NEGATIVE: 1
SPEECH CHANGE: 0
WEIGHT LOSS: 0
FOCAL WEAKNESS: 0
RESPIRATORY NEGATIVE: 1
CONSTITUTIONAL NEGATIVE: 1
CARDIOVASCULAR NEGATIVE: 1

## 2023-05-04 ASSESSMENT — LIFESTYLE VARIABLES: SUBSTANCE_ABUSE: 0

## 2023-05-04 ASSESSMENT — FIBROSIS 4 INDEX: FIB4 SCORE: 2.63

## 2023-05-08 ENCOUNTER — APPOINTMENT (OUTPATIENT)
Dept: RADIOLOGY | Facility: MEDICAL CENTER | Age: 88
End: 2023-05-08
Attending: RADIOLOGY
Payer: MEDICARE

## 2023-05-08 ENCOUNTER — ANESTHESIA (OUTPATIENT)
Dept: RADIOLOGY | Facility: MEDICAL CENTER | Age: 88
End: 2023-05-08
Payer: MEDICARE

## 2023-05-08 ENCOUNTER — HOSPITAL ENCOUNTER (OUTPATIENT)
Facility: MEDICAL CENTER | Age: 88
End: 2023-05-08
Attending: RADIOLOGY | Admitting: RADIOLOGY
Payer: MEDICARE

## 2023-05-08 ENCOUNTER — ANESTHESIA EVENT (OUTPATIENT)
Dept: RADIOLOGY | Facility: MEDICAL CENTER | Age: 88
End: 2023-05-08
Payer: MEDICARE

## 2023-05-08 VITALS
WEIGHT: 121.25 LBS | RESPIRATION RATE: 18 BRPM | HEART RATE: 57 BPM | DIASTOLIC BLOOD PRESSURE: 76 MMHG | BODY MASS INDEX: 21.48 KG/M2 | SYSTOLIC BLOOD PRESSURE: 170 MMHG | TEMPERATURE: 97.6 F | OXYGEN SATURATION: 93 % | HEIGHT: 63 IN

## 2023-05-08 DIAGNOSIS — S22.080G T12 COMPRESSION FRACTURE, WITH DELAYED HEALING, SUBSEQUENT ENCOUNTER: ICD-10-CM

## 2023-05-08 DIAGNOSIS — S22.080G WEDGE COMPRESSION FRACTURE OF T11-T12 VERTEBRA, SUBSEQUENT ENCOUNTER FOR FRACTURE WITH DELAYED HEALING: ICD-10-CM

## 2023-05-08 LAB
ERYTHROCYTE [DISTWIDTH] IN BLOOD BY AUTOMATED COUNT: 43.1 FL (ref 35.9–50)
HCT VFR BLD AUTO: 45.6 % (ref 37–47)
HGB BLD-MCNC: 15.3 G/DL (ref 12–16)
INR PPP: 1.02 (ref 0.87–1.13)
MCH RBC QN AUTO: 31.7 PG (ref 27–33)
MCHC RBC AUTO-ENTMCNC: 33.6 G/DL (ref 33.6–35)
MCV RBC AUTO: 94.6 FL (ref 81.4–97.8)
PLATELET # BLD AUTO: 224 K/UL (ref 164–446)
PMV BLD AUTO: 11.1 FL (ref 9–12.9)
PROTHROMBIN TIME: 13.3 SEC (ref 12–14.6)
RBC # BLD AUTO: 4.82 M/UL (ref 4.2–5.4)
WBC # BLD AUTO: 5.4 K/UL (ref 4.8–10.8)

## 2023-05-08 PROCEDURE — 700105 HCHG RX REV CODE 258: Performed by: ANESTHESIOLOGY

## 2023-05-08 PROCEDURE — 700101 HCHG RX REV CODE 250: Performed by: ANESTHESIOLOGY

## 2023-05-08 PROCEDURE — 4410588 IR-KYPHOPLASTY,1 VERTEBRA,THOR

## 2023-05-08 PROCEDURE — 160035 HCHG PACU - 1ST 60 MINS PHASE I

## 2023-05-08 PROCEDURE — 85610 PROTHROMBIN TIME: CPT

## 2023-05-08 PROCEDURE — 85027 COMPLETE CBC AUTOMATED: CPT

## 2023-05-08 PROCEDURE — 160036 HCHG PACU - EA ADDL 30 MINS PHASE I

## 2023-05-08 PROCEDURE — 99100 ANES PT EXTEME AGE<1 YR&>70: CPT | Performed by: ANESTHESIOLOGY

## 2023-05-08 PROCEDURE — 01941 ANES NEUROMD/NTRVRT CRV/THRC: CPT | Performed by: ANESTHESIOLOGY

## 2023-05-08 PROCEDURE — 700111 HCHG RX REV CODE 636 W/ 250 OVERRIDE (IP): Performed by: ANESTHESIOLOGY

## 2023-05-08 PROCEDURE — 4410197 IR-KYPHOPLASTY,1 VERTEBRA,THOR

## 2023-05-08 PROCEDURE — 160046 HCHG PACU - 1ST 60 MINS PHASE II

## 2023-05-08 PROCEDURE — 160002 HCHG RECOVERY MINUTES (STAT)

## 2023-05-08 PROCEDURE — 700105 HCHG RX REV CODE 258: Performed by: RADIOLOGY

## 2023-05-08 RX ORDER — LIDOCAINE HYDROCHLORIDE 20 MG/ML
INJECTION, SOLUTION EPIDURAL; INFILTRATION; INTRACAUDAL; PERINEURAL PRN
Status: DISCONTINUED | OUTPATIENT
Start: 2023-05-08 | End: 2023-05-08 | Stop reason: SURG

## 2023-05-08 RX ORDER — OXYCODONE HCL 5 MG/5 ML
10 SOLUTION, ORAL ORAL
Status: DISCONTINUED | OUTPATIENT
Start: 2023-05-08 | End: 2023-05-08 | Stop reason: HOSPADM

## 2023-05-08 RX ORDER — HALOPERIDOL 5 MG/ML
1 INJECTION INTRAMUSCULAR
Status: DISCONTINUED | OUTPATIENT
Start: 2023-05-08 | End: 2023-05-08 | Stop reason: HOSPADM

## 2023-05-08 RX ORDER — ONDANSETRON 2 MG/ML
4 INJECTION INTRAMUSCULAR; INTRAVENOUS
Status: DISCONTINUED | OUTPATIENT
Start: 2023-05-08 | End: 2023-05-08 | Stop reason: HOSPADM

## 2023-05-08 RX ORDER — SODIUM CHLORIDE, SODIUM LACTATE, POTASSIUM CHLORIDE, CALCIUM CHLORIDE 600; 310; 30; 20 MG/100ML; MG/100ML; MG/100ML; MG/100ML
INJECTION, SOLUTION INTRAVENOUS CONTINUOUS
Status: DISCONTINUED | OUTPATIENT
Start: 2023-05-08 | End: 2023-05-08 | Stop reason: HOSPADM

## 2023-05-08 RX ORDER — MIDAZOLAM HYDROCHLORIDE 1 MG/ML
.5-2 INJECTION INTRAMUSCULAR; INTRAVENOUS PRN
Status: DISCONTINUED | OUTPATIENT
Start: 2023-05-08 | End: 2023-05-08 | Stop reason: HOSPADM

## 2023-05-08 RX ORDER — CEFAZOLIN SODIUM 1 G/3ML
INJECTION, POWDER, FOR SOLUTION INTRAMUSCULAR; INTRAVENOUS PRN
Status: DISCONTINUED | OUTPATIENT
Start: 2023-05-08 | End: 2023-05-08 | Stop reason: SURG

## 2023-05-08 RX ORDER — LIDOCAINE HYDROCHLORIDE 10 MG/ML
INJECTION, SOLUTION INFILTRATION; PERINEURAL
Status: DISCONTINUED
Start: 2023-05-08 | End: 2023-05-08 | Stop reason: HOSPADM

## 2023-05-08 RX ORDER — SODIUM CHLORIDE, SODIUM LACTATE, POTASSIUM CHLORIDE, CALCIUM CHLORIDE 600; 310; 30; 20 MG/100ML; MG/100ML; MG/100ML; MG/100ML
INJECTION, SOLUTION INTRAVENOUS
Status: DISCONTINUED | OUTPATIENT
Start: 2023-05-08 | End: 2023-05-08 | Stop reason: SURG

## 2023-05-08 RX ORDER — SODIUM CHLORIDE 9 MG/ML
500 INJECTION, SOLUTION INTRAVENOUS
Status: DISCONTINUED | OUTPATIENT
Start: 2023-05-08 | End: 2023-05-08 | Stop reason: HOSPADM

## 2023-05-08 RX ORDER — DIPHENHYDRAMINE HYDROCHLORIDE 50 MG/ML
12.5 INJECTION INTRAMUSCULAR; INTRAVENOUS
Status: DISCONTINUED | OUTPATIENT
Start: 2023-05-08 | End: 2023-05-08 | Stop reason: HOSPADM

## 2023-05-08 RX ORDER — OXYCODONE HCL 5 MG/5 ML
5 SOLUTION, ORAL ORAL
Status: DISCONTINUED | OUTPATIENT
Start: 2023-05-08 | End: 2023-05-08 | Stop reason: HOSPADM

## 2023-05-08 RX ORDER — ONDANSETRON 2 MG/ML
4 INJECTION INTRAMUSCULAR; INTRAVENOUS PRN
Status: DISCONTINUED | OUTPATIENT
Start: 2023-05-08 | End: 2023-05-08 | Stop reason: HOSPADM

## 2023-05-08 RX ORDER — MEPERIDINE HYDROCHLORIDE 25 MG/ML
6.25 INJECTION INTRAMUSCULAR; INTRAVENOUS; SUBCUTANEOUS
Status: DISCONTINUED | OUTPATIENT
Start: 2023-05-08 | End: 2023-05-08 | Stop reason: HOSPADM

## 2023-05-08 RX ADMIN — SODIUM CHLORIDE, POTASSIUM CHLORIDE, SODIUM LACTATE AND CALCIUM CHLORIDE: 600; 310; 30; 20 INJECTION, SOLUTION INTRAVENOUS at 10:32

## 2023-05-08 RX ADMIN — LIDOCAINE HYDROCHLORIDE 100 MG: 20 INJECTION, SOLUTION EPIDURAL; INFILTRATION; INTRACAUDAL; PERINEURAL at 10:33

## 2023-05-08 RX ADMIN — PROPOFOL 150 MG: 10 INJECTION, EMULSION INTRAVENOUS at 10:33

## 2023-05-08 RX ADMIN — Medication 100 MG: at 10:33

## 2023-05-08 RX ADMIN — CEFAZOLIN 2 G: 1 INJECTION, POWDER, FOR SOLUTION INTRAMUSCULAR; INTRAVENOUS at 10:33

## 2023-05-08 ASSESSMENT — PAIN DESCRIPTION - PAIN TYPE
TYPE: CHRONIC PAIN
TYPE: SURGICAL PAIN

## 2023-05-08 ASSESSMENT — FIBROSIS 4 INDEX: FIB4 SCORE: 2.63

## 2023-05-08 NOTE — ANESTHESIA POSTPROCEDURE EVALUATION
Patient: Milena Ang    Procedure Summary     Date: 05/08/23 Room / Location: [facility] INTERVENTIONAL  [facility]    Anesthesia Start: 1032 Anesthesia Stop: 1156    Procedure: IR-KYPHOPLASTY,1 VERTEBRA,THOR Diagnosis:       T12 compression fracture, with delayed healing, subsequent encounter      Wedge compression fracture of t11-T12 vertebra, subsequent encounter for fracture with delayed healing      (Vertebrae Fracture)      (T12 kyphoplasty)    Scheduled Providers: Nicole Armendariz M.D.; Ulices Hussein M.D. Responsible Provider: Ulices Hussein M.D.    Anesthesia Type: general ASA Status: 2          Final Anesthesia Type: general  Last vitals  BP   Blood Pressure : (!) 168/72    Temp   36.4 °C (97.5 °F)    Pulse   (!) 59   Resp   17    SpO2   96 %      Anesthesia Post Evaluation    Patient location during evaluation: PACU  Patient participation: complete - patient participated  Level of consciousness: awake and alert    Airway patency: patent  Anesthetic complications: no  Cardiovascular status: hemodynamically stable  Respiratory status: acceptable  Hydration status: euvolemic    PONV: none          There were no known notable events for this encounter.     Nurse Pain Score: 0 (NPRS)

## 2023-05-08 NOTE — OR NURSING
Preop complete. Iv placed and labs sent for processing. Bed in low position and call light in reach. Encouraged to call for any needs or assistance to bathroom. Consent signed. Belongings in locker.

## 2023-05-08 NOTE — ANESTHESIA PREPROCEDURE EVALUATION
Date/Time: 05/08/23 1000    Scheduled providers: Nicole Armendariz M.D.; Ulices Hussein M.D.    Procedure: IR-KYPHOPLASTY,1 VERTEBRA,THOR    Diagnosis:       T12 compression fracture, with delayed healing, subsequent encounter [S22.080G]      Wedge compression fracture of t11-T12 vertebra, subsequent encounter for fracture with delayed healing [S22.080G]    Indications:       Vertebrae Fracture      T12 kyphoplasty    Location: [facility] - [facility]IONAL [facility]          Relevant Problems   CARDIAC   (positive) Cardiac arrhythmia   (positive) Essential hypertension   (positive) Murmur   (positive) Nonrheumatic aortic valve insufficiency      ENDO   (positive) Postoperative hypothyroidism       Physical Exam    Airway   Mallampati: II  TM distance: >3 FB  Neck ROM: full       Cardiovascular - normal exam  Rhythm: regular  Rate: normal  (-) murmur     Dental - normal exam           Pulmonary - normal exam  Breath sounds clear to auscultation     Abdominal    Neurological - normal exam                 Anesthesia Plan    ASA 2       Plan - general       Airway plan will be ETT          Induction: intravenous    Postoperative Plan: Postoperative administration of opioids is intended.    Pertinent diagnostic labs and testing reviewed    Informed Consent:    Anesthetic plan and risks discussed with patient.    Use of blood products discussed with: patient whom consented to blood products.

## 2023-05-08 NOTE — ANESTHESIA PREPROCEDURE EVALUATION
Date/Time: 05/08/23 1000    Scheduled providers: Nicole Armendariz M.D.; Ulices Hussein M.D.    Procedure: IR-KYPHOPLASTY,1 VERTEBRA,THOR    Diagnosis:       T12 compression fracture, with delayed healing, subsequent encounter [S22.080G]      Wedge compression fracture of t11-T12 vertebra, subsequent encounter for fracture with delayed healing [S22.080G]    Indications:       Vertebrae Fracture      T12 kyphoplasty    Location: [facility] - INTERVENTIONAL - [facility]          Relevant Problems   CARDIAC   (positive) Cardiac arrhythmia   (positive) Essential hypertension   (positive) Murmur   (positive) Nonrheumatic aortic valve insufficiency      ENDO   (positive) Postoperative hypothyroidism       Physical Exam    Airway   Mallampati: II  TM distance: >3 FB  Neck ROM: full       Cardiovascular - normal exam  Rhythm: regular  Rate: normal  (-) murmur     Dental - normal exam           Pulmonary - normal exam  Breath sounds clear to auscultation     Abdominal    Neurological - normal exam                 Anesthesia Plan    ASA 2       Plan - general       Airway plan will be ETT          Induction: intravenous    Postoperative Plan: Postoperative administration of opioids is intended.    Pertinent diagnostic labs and testing reviewed    Informed Consent:    Anesthetic plan and risks discussed with patient.    Use of blood products discussed with: patient whom consented to blood products.

## 2023-05-08 NOTE — ANESTHESIA TIME REPORT
Anesthesia Start and Stop Event Times     Date Time Event    5/8/2023 1032 Anesthesia Start     1202 Anesthesia Stop        Responsible Staff  05/08/23    Name Role Begin End    Ulcies Hussein M.D. Anesth 1032 1202        Overtime Reason:  overtime    Comments:

## 2023-05-08 NOTE — DISCHARGE INSTRUCTIONS
HOME CARE INSTRUCTIONS    ACTIVITY: Rest and take it easy for the first 24 hours.  A responsible adult is recommended to remain with you during that time.  It is normal to feel sleepy.  We encourage you to not do anything that requires balance, judgment or coordination.    FOR 24 HOURS DO NOT:  Drive, operate machinery or run household appliances.  Drink beer or alcoholic beverages.  Make important decisions or sign legal documents.    SPECIAL INSTRUCTIONS:  Kyphoplasty, Care After  This sheet gives you information about how to care for yourself after your procedure. Your health care provider may also give you more specific instructions. If you have problems or questions, contact your health care provider.  What can I expect after the procedure?  After your procedure, it is common to have back pain.  Follow these instructions at home:  Medicines  Take over-the-counter and prescription medicines only as told by your health care provider.  Ask your health care provider if the medicine prescribed to you:  Requires you to avoid driving or using heavy machinery.  Can cause constipation. You may need to take steps to prevent or treat constipation, such as:  Drink enough fluid to keep your urine pale yellow.  Take over-the-counter or prescription medicines.  Eat foods that are high in fiber, such as beans, whole grains, and fresh fruits and vegetables.  Limit foods that are high in fat and processed sugars, such as fried or sweet foods.  Puncture site care  Follow instructions from your health care provider about how to take care of your puncture site. Make sure you:  Wash your hands with soap and water before and after you change your bandage (dressing). If soap and water are not available, use hand .  Change your dressing as told by your health care provider.  Leave skin glue or adhesive strips in place. These skin closures may need to be in place for 2 weeks or longer. If adhesive strip edges start to loosen  and curl up, you may trim the loose edges. Do not remove adhesive strips completely unless your health care provider tells you to do that.  Check your puncture site every day for signs of infection. Watch for:  Redness, swelling, or pain.  Fluid or blood.  Warmth.  Pus or a bad smell.  Keep your dressing dry until your health care provider says that it can be removed.  Managing pain, stiffness, and swelling  If directed, put ice on the painful area.  Put ice in a plastic bag.  Place a towel between your skin and the bag.  Leave the ice on for 20 minutes, 2-3 times a day.  Activity  Rest your back and avoid intense physical activity for as long as told by your health care provider.  Avoid bending, lifting, or twisting your back for as long as told by your health care provider.  Return to your normal activities as told by your health care provider. Ask your health care provider what activities are safe for you.  Do not lift anything that is heavier than 5 lb (2.2 kg). You may need to avoid heavy lifting for several weeks.  General instructions  Do not use any products that contain nicotine or tobacco, such as cigarettes, e-cigarettes, and chewing tobacco. These can delay bone healing. If you need help quitting, ask your health care provider.  Do not drive for 24 hours if you were given a sedative during your procedure.  Keep all follow-up visits as told by your health care provider. This is important.  Contact a health care provider if:  You have a fever or chills.  You have redness, swelling, or pain at the site of your puncture.  You have fluid, blood, or pus coming from the puncture site.  You have pain that gets worse or does not get better with medicine.  You develop numbness or weakness in any part of your body.  Get help right away if:  You have chest pain.  You have difficulty breathing.  You have weakness, numbness, or tingling in your legs.  You cannot control your bladder or bowel movements.  You suddenly  become weak or numb on one side of your body.  You become very confused.  You have trouble speaking or understanding, or both.  Summary  Follow instructions from your health care provider about how to take care of your puncture site.  Take over-the-counter and prescription medicines only as told by your health care provider.  Rest your back and avoid intense physical activity for as long as told by your health care provider.  Contact a health care provider if you have pain that gets worse or does not get better with medicine.  Keep all follow-up visits as told by your health care provider. This is important.  This information is not intended to replace advice given to you by your health care provider. Make sure you discuss any questions you have with your health care provider.    DIET: To avoid nausea, slowly advance diet as tolerated, avoiding spicy or greasy foods for the first day.  Add more substantial food to your diet according to your physician's instructions.  Babies can be fed formula or breast milk as soon as they are hungry.  INCREASE FLUIDS AND FIBER TO AVOID CONSTIPATION.    SURGICAL DRESSING/BATHING:   Keep dressing on for 24 hours.  Okay to removed dressing after 24 hours.  May shower once dressing is removed.  No bath/submerging in water for 7 days.    MEDICATIONS: Resume taking daily medication.  Take prescribed pain medication with food.  If no medication is prescribed, you may take non-aspirin pain medication if needed.  PAIN MEDICATION CAN BE VERY CONSTIPATING.  Take a stool softener or laxative such as senokot, pericolace, or milk of magnesia if needed.    No new prescriptions. No pain medication given in recovery.    A follow-up appointment should be arranged with your doctor in 1-2 weeks; call to schedule.    You should CALL YOUR PHYSICIAN if you develop:  Fever greater than 101 degrees F.  Pain not relieved by medication, or persistent nausea or vomiting.  Excessive bleeding (blood soaking  through dressing) or unexpected drainage from the wound.  Extreme redness or swelling around the incision site, drainage of pus or foul smelling drainage.  Inability to urinate or empty your bladder within 8 hours.  Problems with breathing or chest pain.    You should call 911 if you develop problems with breathing or chest pain.  If you are unable to contact your doctor or surgical center, you should go to the nearest emergency room or urgent care center.      Physician's telephone #: [phone] Dr. Armendariz    MILD FLU-LIKE SYMPTOMS ARE NORMAL.  YOU MAY EXPERIENCE GENERALIZED MUSCLE ACHES, THROAT IRRITATION, HEADACHE AND/OR SOME NAUSEA.    If any questions arise, call your doctor.  If your doctor is not available, please feel free to call the Surgical Center at [phone].  The Center is open Monday through Friday from 7AM to 7PM.      A registered nurse may call you a few days after your surgery to see how you are doing after your procedure.    You may also receive a survey in the mail within the next two weeks and we ask that you take a few moments to complete the survey and return it to us.  Our goal is to provide you with very good care and we value your comments.     Depression / Suicide Risk    As you are discharged from this [facility] facility, it is important to learn how to keep safe from harming yourself.    Recognize the warning signs:  Abrupt changes in personality, positive or negative- including increase in energy   Giving away possessions  Change in eating patterns- significant weight changes-  positive or negative  Change in sleeping patterns- unable to sleep or sleeping all the time   Unwillingness or inability to communicate  Depression  Unusual sadness, discouragement and loneliness  Talk of wanting to die  Neglect of personal appearance   Rebelliousness- reckless behavior  Withdrawal from people/activities they love  Confusion- inability to concentrate     If you or a loved one  observes any of these behaviors or has concerns about self-harm, here's what you can do:  Talk about it- your feelings and reasons for harming yourself  Remove any means that you might use to hurt yourself (examples: pills, rope, extension cords, firearm)  Get professional help from the community (Mental Health, Substance Abuse, psychological counseling)  Do not be alone:Call your Safe Contact- someone whom you trust who will be there for you.  Call your local CRISIS HOTLINE 061-0754 or 740-185-4601  Call your local Children's Mobile Crisis Response Team [place] (951) 626-4919 or www.[employer]uc[employer]iale  Call the toll free National Suicide Prevention Hotlines   National Suicide Prevention Lifeline 859-581-VQDX (1516)  National Hope Line Network 800-SUICIDE (006-8347)    I acknowledge receipt and understanding of these Home Care instructions.

## 2023-05-08 NOTE — ANESTHESIA PROCEDURE NOTES
Airway    Date/Time: 5/8/2023 10:33 AM  Performed by: Ulices Hussein M.D.  Authorized by: Ulices Hussein M.D.     Location:  OR  Urgency:  Elective  Indications for Airway Management:  Anesthesia      Spontaneous Ventilation: absent    Sedation Level:  Deep  Preoxygenated: Yes    Patient Position:  Sniffing  Final Airway Type:  Endotracheal airway  Final Endotracheal Airway:  ETT  Cuffed: Yes    Technique Used for Successful ETT Placement:  Direct laryngoscopy    Insertion Site:  Oral  Blade Type:  Baldemar  Laryngoscope Blade/Videolaryngoscope Blade Size:  3  ETT Size (mm):  7.0  Measured from:  Teeth  ETT to Teeth (cm):  22  Placement Verified by: auscultation and capnometry    Cormack-Lehane Classification:  Grade I - full view of glottis  Number of Attempts at Approach:  1

## 2023-05-08 NOTE — PROGRESS NOTES
IR Nursing Note    T12 Kyphoplasty by MD Armendariz assisted by RT Brandon, thoracic spine access site. This is a general anesthesia case by Dr Hussein.  Procedure site was marked by MD and verified using imaging guidance.  Patient was placed in a prone position. Vitals were taken every 5 minutes and remained stable during procedure (see doc flow sheet for results).  CO2 waveform capnography was monitored and remained stable throughout procedure.  A gauze and Tegaderm dressing was placed over surgical site.         Report given to RN Reinaldo; patient transported to [facility] via IR RN and anesthesia; care given to report RN.     [employer]  REF: [id number]  LOT: [id number]  EXP:03/30/2025

## 2023-05-08 NOTE — OR SURGEON
Immediate Post- Operative Note        Findings: T12 compression fracture,  Kyphoplasty performed.      Procedure(s): T12 kyphoplasty      Estimated Blood Loss: Less than 5 ml        Complications: None            5/8/2023     11:27 AM     Nicole Armendariz M.D.

## 2023-05-08 NOTE — OR NURSING
1329: Report received from TASHI Najera. Patient to Phase II 24 pending transport. Bed rest completed in PACU. Patient provided with water. Patient tolerating PO intake.    1338: Patient to Phase II Rm 24. [id number] puncture site dressing CDI. Vitals taken upon arrival. Plan of care discussed with patient.    1345: Patient and family updated regarding Plan of care. Patient belongings returned to patient at bedside.    1400: Pt discharged home. Discharge instructions given to pt prior to leaving unit including when to see PCP, and new medications if applicable. PIV removed. All questions answered. Verbalized understanding. All belongings with pt when leaving unit via wheelchair with RN.

## 2023-05-11 ENCOUNTER — APPOINTMENT (RX ONLY)
Dept: URBAN - METROPOLITAN AREA CLINIC 22 | Facility: CLINIC | Age: 88
Setting detail: DERMATOLOGY
End: 2023-05-11

## 2023-05-11 DIAGNOSIS — Z71.89 OTHER SPECIFIED COUNSELING: ICD-10-CM

## 2023-05-11 DIAGNOSIS — D18.0 HEMANGIOMA: ICD-10-CM

## 2023-05-11 DIAGNOSIS — L82.1 OTHER SEBORRHEIC KERATOSIS: ICD-10-CM

## 2023-05-11 DIAGNOSIS — L57.0 ACTINIC KERATOSIS: ICD-10-CM

## 2023-05-11 DIAGNOSIS — D22 MELANOCYTIC NEVI: ICD-10-CM

## 2023-05-11 DIAGNOSIS — L82.0 INFLAMED SEBORRHEIC KERATOSIS: ICD-10-CM

## 2023-05-11 DIAGNOSIS — L81.4 OTHER MELANIN HYPERPIGMENTATION: ICD-10-CM

## 2023-05-11 DIAGNOSIS — Z85.828 PERSONAL HISTORY OF OTHER MALIGNANT NEOPLASM OF SKIN: ICD-10-CM

## 2023-05-11 PROBLEM — D18.01 HEMANGIOMA OF SKIN AND SUBCUTANEOUS TISSUE: Status: ACTIVE | Noted: 2023-05-11

## 2023-05-11 PROBLEM — D22.5 MELANOCYTIC NEVI OF TRUNK: Status: ACTIVE | Noted: 2023-05-11

## 2023-05-11 PROBLEM — D22.62 MELANOCYTIC NEVI OF LEFT UPPER LIMB, INCLUDING SHOULDER: Status: ACTIVE | Noted: 2023-05-11

## 2023-05-11 PROBLEM — D22.61 MELANOCYTIC NEVI OF RIGHT UPPER LIMB, INCLUDING SHOULDER: Status: ACTIVE | Noted: 2023-05-11

## 2023-05-11 PROCEDURE — ? SUNSCREEN RECOMMENDATIONS

## 2023-05-11 PROCEDURE — 99213 OFFICE O/P EST LOW 20 MIN: CPT | Mod: 25

## 2023-05-11 PROCEDURE — ? COUNSELING

## 2023-05-11 PROCEDURE — ? LIQUID NITROGEN

## 2023-05-11 PROCEDURE — 17000 DESTRUCT PREMALG LESION: CPT | Mod: 59

## 2023-05-11 PROCEDURE — 17110 DESTRUCTION B9 LES UP TO 14: CPT

## 2023-05-11 PROCEDURE — 17003 DESTRUCT PREMALG LES 2-14: CPT | Mod: 59

## 2023-05-11 ASSESSMENT — LOCATION SIMPLE DESCRIPTION DERM
LOCATION SIMPLE: LEFT SCALP
LOCATION SIMPLE: LEFT FOREHEAD
LOCATION SIMPLE: CHEST
LOCATION SIMPLE: RIGHT UPPER BACK
LOCATION SIMPLE: LEFT CHEEK
LOCATION SIMPLE: ANTERIOR SCALP
LOCATION SIMPLE: LEFT EYEBROW
LOCATION SIMPLE: LEFT FOREARM
LOCATION SIMPLE: RIGHT ZYGOMA
LOCATION SIMPLE: RIGHT LOWER BACK
LOCATION SIMPLE: NOSE
LOCATION SIMPLE: RIGHT FOREARM
LOCATION SIMPLE: ABDOMEN

## 2023-05-11 ASSESSMENT — LOCATION DETAILED DESCRIPTION DERM
LOCATION DETAILED: EPIGASTRIC SKIN
LOCATION DETAILED: RIGHT SUPERIOR MEDIAL MIDBACK
LOCATION DETAILED: RIGHT SUPERIOR UPPER BACK
LOCATION DETAILED: RIGHT PROXIMAL DORSAL FOREARM
LOCATION DETAILED: LEFT SUPERIOR CENTRAL MALAR CHEEK
LOCATION DETAILED: RIGHT DISTAL DORSAL FOREARM
LOCATION DETAILED: LEFT FOREHEAD
LOCATION DETAILED: LEFT LATERAL EYEBROW
LOCATION DETAILED: NASAL DORSUM
LOCATION DETAILED: RIGHT MEDIAL UPPER BACK
LOCATION DETAILED: LEFT SUPERIOR FOREHEAD
LOCATION DETAILED: MID-FRONTAL SCALP
LOCATION DETAILED: LEFT DISTAL DORSAL FOREARM
LOCATION DETAILED: RIGHT MEDIAL ZYGOMA
LOCATION DETAILED: RIGHT MEDIAL SUPERIOR CHEST
LOCATION DETAILED: LEFT PROXIMAL DORSAL FOREARM
LOCATION DETAILED: LEFT MEDIAL FRONTAL SCALP

## 2023-05-11 ASSESSMENT — LOCATION ZONE DERM
LOCATION ZONE: SCALP
LOCATION ZONE: ARM
LOCATION ZONE: FACE
LOCATION ZONE: TRUNK
LOCATION ZONE: NOSE

## 2023-05-11 NOTE — PROCEDURE: LIQUID NITROGEN
Show Spray Paint Technique Variable?: Yes
Post-Care Instructions: I reviewed with the patient in detail post-care instructions. Patient is to wear sunprotection, and avoid picking at any of the treated lesions. Pt may apply Vaseline to crusted or scabbing areas.
Detail Level: Detailed
Spray Paint Text: The liquid nitrogen was applied to the skin utilizing a spray paint frosting technique.
Add 52 Modifier (Optional): no
Medical Necessity Information: It is in your best interest to select a reason for this procedure from the list below. All of these items fulfill various CMS LCD requirements except the new and changing color options.
Duration Of Freeze Thaw-Cycle (Seconds): 3
Consent: The patient's consent was obtained including but not limited to risks of crusting, scabbing, blistering, scarring, darker or lighter pigmentary change, recurrence, incomplete removal and infection.
Application Tool (Optional): Liquid Nitrogen Sprayer
Number Of Freeze-Thaw Cycles: 3 freeze-thaw cycles
Medical Necessity Clause: This procedure was medically necessary because the lesions that were treated were:
Number Of Freeze-Thaw Cycles: 2 freeze-thaw cycles

## 2023-05-22 RX ORDER — IRBESARTAN AND HYDROCHLOROTHIAZIDE 300; 12.5 MG/1; MG/1
1 TABLET, FILM COATED ORAL
Qty: 100 TABLET | Refills: 0 | Status: SHIPPED | OUTPATIENT
Start: 2023-05-22 | End: 2023-09-06

## 2023-05-23 ENCOUNTER — TELEMEDICINE (OUTPATIENT)
Dept: SLEEP MEDICINE | Facility: MEDICAL CENTER | Age: 88
End: 2023-05-23
Payer: MEDICARE

## 2023-05-23 VITALS — HEIGHT: 63 IN | WEIGHT: 122 LBS | BODY MASS INDEX: 21.62 KG/M2

## 2023-05-23 DIAGNOSIS — G47.33 OSA (OBSTRUCTIVE SLEEP APNEA): ICD-10-CM

## 2023-05-23 DIAGNOSIS — G47.00 INSOMNIA, UNSPECIFIED TYPE: ICD-10-CM

## 2023-05-23 DIAGNOSIS — I69.30 LATE EFFECT OF STROKE: ICD-10-CM

## 2023-05-23 DIAGNOSIS — G47.34 NOCTURNAL HYPOXIA: ICD-10-CM

## 2023-05-23 PROCEDURE — 99204 OFFICE O/P NEW MOD 45 MIN: CPT | Mod: 95 | Performed by: PREVENTIVE MEDICINE

## 2023-05-23 ASSESSMENT — FIBROSIS 4 INDEX: FIB4 SCORE: 2.17

## 2023-05-23 NOTE — PROGRESS NOTES
This evaluation was conducted via Zoom using secure and encrypted video conferencing technology.  The patient was in a private location in the state of [place].  The patient's identity was confirmed and verbal consent was obtained for this virtual visit.    CHIEF COMPLIANT: Poor sleep quality and s/p CVA in 2021, PCP ordered PSG.   HISTORY OF PRESENT ILLNESS:  Milena Ang is a 87 y.o. female kindly referred by ZOLTAN Funez and  is here for a sleep medicine consultation.     Sleep History:  The patient reports maintenance insomnia and some daytime fatigue and rare snoring.   The patient goes to bed at 7-11 pm and wakes up at 5-7am. It usually takes the patient approximately 30 mins to fall asleep. The patient does not  feel refreshed and does  nap during the day. The patient has never  used tobacco nor cannabis, however, does drink  alcoholic beverages per week and has 1 caffeinated beverages daily. 8-10 times frequent awakenings at night told by her fit bit.     Milena Ang is a retired .        EPWORTH SCORE:     1 /24, this is not   consistent with EDS,     TYPE: diagnostic PSG ordered by PCP.   DATE: 03/28/23  Diagnostic:AHI:13.85  Diagnostic  Oxygen [place]: 82% with 86.7mins at or under 88%     3/9/2021 3:39 PM  HISTORY/REASON FOR EXAM:  TIA, initial exam.  TECHNIQUE/EXAM DESCRIPTION:  MRI of the brain without contrast.  T1 sagittal, T2 fast spin-echo axial, T1 coronal, FLAIR coronal, diffusion-weighted and apparent diffusion coefficient (ADC map) axial images were obtained of the whole brain.     The study was performed on a WinProbe Signa 1.5 Natalia MRI scanner.  COMPARISON:  None.  FINDINGS: The axial diffusion weighted sequences demonstrates areas of acute infarcts in the right centrum semiovale and right posterior frontal gray matter. Nonspecific T2 hyperintensities are noted in the subcortical and periventricular white matter.   There is no intra-axial space-occupying  lesion.     The ventricles, cortical sulci and the basal cisterns are prominent consistent mild cerebral volume loss. There is no extra-axial fluid collection, hemorrhage or mass. The visualized flow voids of the cerebral vasculature are unremarkable.  There is no   large lesion identified in the expected course of the intracranial portions of the cranial nerves.     The skull bones are unremarkable. The paranasal sinuses are clear. The extracranial soft tissue including orbits appear grossly normal.  IMPRESSION:  1.  Tiny areas of acute infarcts in the right centrum semiovale and right posterior frontal gray matter.  2.  Mild cerebral volume loss.  3.  Moderate chronic microvascular ischemic disease.    Significant comorbidities and modifying factors: see below    PAST MEDICAL HISTORY:  Past Medical History:   Diagnosis Date    Cancer (HCC)     skin cancer right wrist    Cataract     11-4-2020 H/O IOL OU    Cervical high risk human papillomavirus (HPV) DNA test positive     Colon polyp     hyperplastic    Disorder of thyroid     Diverticulosis of colon     Dyslipidemia     Female bladder prolapse 11/04/2020    High cholesterol     Hypertension     Incontinence     pessary    Incontinence 11/04/2020    Pt. states does not have a pessary in place.    Indigestion     Kidney stone     Menopausal and postmenopausal disorder     OSTEOPOROSIS     Stroke (HCC) 2021    no residual    Urinary bladder disorder 11/04/2020    Bladder Prolapse      PROBLEM LIST:  Patient Active Problem List    Diagnosis Date Noted    Fall 04/25/2023    Primary insomnia 06/28/2022    Encounter to establish care 06/28/2022    Cerebral atrophy, mild (HCC) 06/28/2022    Uterine prolapse 01/06/2022    Postoperative hypothyroidism 12/01/2021    Dyslipidemia 11/15/2021    Incontinence 11/15/2021    Diverticulosis of colon 11/15/2021    Late effect of stroke 06/23/2021    Mixed hyperlipidemia 06/23/2021    Cardiac arrhythmia 06/23/2021    History of CVA  (cerebrovascular accident) 03/22/2021    Weight loss 03/22/2021    Left facial numbness 03/08/2021    Bradycardia 03/08/2021    Functional diarrhea 03/03/2021    Allergies 02/08/2021    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 10/11/2020    Fatigue 07/03/2020    Prediabetes 08/07/2019    Overactive bladder 08/06/2018    Vitamin D deficiency 11/18/2017    Nonrheumatic aortic valve insufficiency 05/02/2017    Osteopenia of spine 05/02/2017    Murmur 03/23/2017    Essential hypertension 01/14/2015     PAST SOCIAL HISTORY:  Past Surgical History:   Procedure Laterality Date    IN THYROID LOBECTOMY,UNILAT Right 8/4/2021    Procedure: LOBECTOMY, THYROID - TOTAL, UNILATERAL.;  Surgeon: Mercedes Byrne M.D.;  Location: SURGERY SAME DAY [facility];  Service: General    IN CYSTOURETHROSCOPY,URETER CATHETER Right 11/6/2020    Procedure: CYSTOSCOPY, WITH BILATERAL RETROGRADE PYELOGRAM- URETERO;  Surgeon: Ernesto Gamboa M.D.;  Location: SURGERY [facility];  Service: Urology    IN CYSTO/URETERO/PYELOSCOPY, DX Bilateral 11/6/2020    Procedure: URETEROSCOPY;  Surgeon: Ernesto Gamboa M.D.;  Location: SURGERY [facility];  Service: Urology    IN CYSTOSCOPY,INSERT URETERAL STENT Right 11/6/2020    Procedure: CYSTOSCOPY, WITH URETERAL STENT INSERTION;  Surgeon: Ernesto Gamboa M.D.;  Location: SURGERY [facility];  Service: Urology    BLADDER SLING FEMALE  7/12    Dr. Quinn    CATARACT EXTRACTION WITH IOL Bilateral     CHOLECYSTECTOMY       PAST FAMILY HISTORY:  Family History   Problem Relation Age of Onset    Cancer Father         pancreatic    Heart Disease Father         MI at age 57    Hypertension Father     Cancer Brother         melanoma    Heart Disease Brother     No Known Problems Mother     Cancer Sister         ovaren cancer    Cancer Brother         throat    Psychiatric Illness Daughter     No Known Problems Daughter      SOCIAL HISTORY:  Social History     Socioeconomic History    Marital status:       Spouse name: Rivera    Number of children: 2    Years of education: college    Highest education level: Bachelor's degree (e.g., BA, AB, BS)   Occupational History    Occupation: Retired Teacher     Employer: OTHER   Tobacco Use    Smoking status: Never    Smokeless tobacco: Never   Vaping Use    Vaping Use: Never used   Substance and Sexual Activity    Alcohol use: No    Drug use: No    Sexual activity: Not Currently   Other Topics Concern    Not on file   Social History Narrative    Not on file     Social Determinants of Health     Financial Resource Strain: Low Risk  (1/3/2022)    Overall Financial Resource Strain (CARDIA)     Difficulty of Paying Living Expenses: Not hard at all   Food Insecurity: No Food Insecurity (1/3/2022)    Hunger Vital Sign     Worried About Running Out of Food in the Last Year: Never true     Ran Out of Food in the Last Year: Never true   Transportation Needs: No Transportation Needs (1/3/2022)    PRAPARE - Transportation     Lack of Transportation (Medical): No     Lack of Transportation (Non-Medical): No   Physical Activity: Inactive (1/3/2022)    Exercise Vital Sign     Days of Exercise per Week: 0 days     Minutes of Exercise per Session: 0 min   Stress: No Stress Concern Present (1/3/2022)    [employer] [place] [employer] - Occupational Stress Questionnaire     Feeling of Stress : Only a little   Social Connections: Socially Integrated (1/3/2022)    Social Connection and Isolation Panel [NHANES]     Frequency of Communication with Friends and Family: More than three times a week     Frequency of Social Gatherings with Friends and Family: Three times a week     Attends [employer] Services: More than 4 times per year     Active Member of Clubs or Organizations: Yes     Attends Club or Organization Meetings: More than 4 times per year     Marital Status:    Intimate Partner Violence: Not on file   Housing Stability: Low Risk  (1/3/2022)    Housing Stability Vital  "Sign     Unable to Pay for Housing in the Last Year: No     Number of Places Lived in the Last Year: 1     Unstable Housing in the Last Year: No     ALLERGIES: Penicillins  MEDICATIONS:  Current Outpatient Medications   Medication Sig Dispense Refill    irbesartan-hydrochlorothiazide (AVALIDE) 300-12.5 MG per tablet TAKE 1 TABLET BY MOUTH EVERY  Tablet 0    venlafaxine XR (EFFEXOR XR) 75 MG CAPSULE SR 24 HR Take 1 Capsule by mouth every day. 90 Capsule 3    levothyroxine (SYNTHROID) 75 MCG Tab Take 1 Tablet by mouth every morning on an empty stomach. 60 Tablet 0    metoprolol SR (TOPROL XL) 25 MG TABLET SR 24 HR Take 1 Tablet by mouth every day. 100 Tablet 2    amLODIPine (NORVASC) 10 MG Tab Take 1 Tablet by mouth every day. (Patient taking differently: Take 10 mg by mouth every evening.) 100 Tablet 2    atorvastatin (LIPITOR) 40 MG Tab Take 1 Tablet by mouth at bedtime. 100 Tablet 2    aspirin (ASA) 81 MG Chew Tab chewable tablet Chew 1 Tablet every day. (Patient not taking: Reported on 5/23/2023)       No current facility-administered medications for this visit.    \"CURRENT RX\"    REVIEW OF SYSTEMS:  Constitutional: Denies weight loss, endorses chronic daytime fatigue --also see HPI    PHYSICAL EXAM/VITALS:  Ht 1.6 m (5' 3\")   Wt 55.3 kg (122 lb)   LMP 07/01/1986   BMI 21.61 kg/m²   Appearance: Well-nourished, well-developed,  looks stated age, no acute distress  Telemedicine visit.     MEDICAL DECISION MAKING:  The medical record was reviewed as it pertains to this referral. This includes records from primary care,consultants notes, referral request, hospital records, labs and imaging. Any available diagnostic and titration nocturnal polysomnograms, home sleep apnea tests, continuous nocturnal oximetry results, multiple sleep latency tests, and recent compliance reports were reviewed with the patient.    ASSESSMENT/PLAN:  Milena Ang is a 87 y.o. female who has mild SHIVA but with moderate " nocturnal hypoxia. IF she did not have moderate nocturnal hypoxia she would have been eligible for alternative treatment therapies. Due to this moderate nocturnal hypoxia, this patient should undergo an overnight PSG titration. Patient will need to be contacted by staff to schedule titration and inquire if a sleep aid is needed (unclear if past use of a sleep aid on first sleep test).       1. Late effect of stroke    2. SHIVA (obstructive sleep apnea)  - Polysomnography Titration    3. Nocturnal hypoxia  - Polysomnography Titration    4. Insomnia, unspecified type    The patient has signs and symptoms consistent with obstructive sleep apnea hypopnea syndrome. Will schedule a nocturnal polysomnogram or a home sleep apnea test (please see plan).  The risks of untreated sleep apnea were discussed with the patient at length. Patients with SHIVA are at increased risk of cardiovascular disease including coronary artery disease, systemic arterial hypertension, pulmonary arterial hypertension, cardiac arrhythmias, and stroke. SHIVA patients have an increased risk of motor vehicle accidents, type 2 diabetes, chronic kidney disease, and non-alcoholic liver disease. The patient was advised to avoid driving a motor vehicle when drowsy.  Have advised the patient to follow up with the appropriate healthcare practitioners for all other medical problems and issues.    RETURN TO CLINIC: Return for 3 weeks after SS - discuss results w/ any provider.    My total time spent caring for the patient on the day of the encounter was 40 minutes. This includes time spent on a thorough chart review including other physician notes, all sleep studies, as well as critical labs and pulmonary and cardiac studies.  Additionally, it includes a thorough discussion of good sleep hygiene and stimulus control, as well as  the need for consistency in terms of sleep preparation and practice.    Please note that this dictation was created using voice recognition  software.  I have made every reasonable attempt to correct obvious errors, I expect that there are errors of grammar and possibly content that I did not discover before finalizing this note.

## 2023-06-05 ENCOUNTER — TELEPHONE (OUTPATIENT)
Dept: HEALTH INFORMATION MANAGEMENT | Facility: OTHER | Age: 88
End: 2023-06-05
Payer: MEDICARE

## 2023-06-09 ENCOUNTER — DOCUMENTATION (OUTPATIENT)
Dept: HEALTH INFORMATION MANAGEMENT | Facility: OTHER | Age: 88
End: 2023-06-09
Payer: MEDICARE

## 2023-06-09 ENCOUNTER — PATIENT MESSAGE (OUTPATIENT)
Dept: HEALTH INFORMATION MANAGEMENT | Facility: OTHER | Age: 88
End: 2023-06-09

## 2023-07-19 DIAGNOSIS — I10 ESSENTIAL HYPERTENSION: Chronic | ICD-10-CM

## 2023-07-25 RX ORDER — METOPROLOL SUCCINATE 25 MG/1
25 TABLET, EXTENDED RELEASE ORAL DAILY
Qty: 100 TABLET | Refills: 2 | Status: ON HOLD | OUTPATIENT
Start: 2023-07-25 | End: 2023-12-07

## 2023-09-06 RX ORDER — IRBESARTAN AND HYDROCHLOROTHIAZIDE 300; 12.5 MG/1; MG/1
1 TABLET, FILM COATED ORAL
Qty: 100 TABLET | Refills: 0 | Status: SHIPPED | OUTPATIENT
Start: 2023-09-06

## 2023-11-13 ENCOUNTER — HOSPITAL ENCOUNTER (OUTPATIENT)
Dept: LAB | Facility: MEDICAL CENTER | Age: 88
End: 2023-11-13
Payer: MEDICARE

## 2023-11-13 DIAGNOSIS — I10 ESSENTIAL HYPERTENSION: Chronic | ICD-10-CM

## 2023-11-13 DIAGNOSIS — E78.2 MIXED HYPERLIPIDEMIA: Chronic | ICD-10-CM

## 2023-11-15 ENCOUNTER — APPOINTMENT (OUTPATIENT)
Dept: MEDICAL GROUP | Facility: PHYSICIAN GROUP | Age: 88
End: 2023-11-15
Payer: MEDICARE

## 2023-11-17 ENCOUNTER — HOSPITAL ENCOUNTER (OUTPATIENT)
Dept: LAB | Facility: MEDICAL CENTER | Age: 88
End: 2023-11-17
Payer: MEDICARE

## 2023-11-17 DIAGNOSIS — E78.2 MIXED HYPERLIPIDEMIA: Chronic | ICD-10-CM

## 2023-11-17 DIAGNOSIS — I10 ESSENTIAL HYPERTENSION: Chronic | ICD-10-CM

## 2023-11-17 LAB
ALBUMIN SERPL BCP-MCNC: 4.2 G/DL (ref 3.2–4.9)
ALBUMIN/GLOB SERPL: 1.4 G/DL
ALP SERPL-CCNC: 59 U/L (ref 30–99)
ALT SERPL-CCNC: 13 U/L (ref 2–50)
ANION GAP SERPL CALC-SCNC: 11 MMOL/L (ref 7–16)
AST SERPL-CCNC: 21 U/L (ref 12–45)
BILIRUB SERPL-MCNC: 0.7 MG/DL (ref 0.1–1.5)
BUN SERPL-MCNC: 27 MG/DL (ref 8–22)
CALCIUM ALBUM COR SERPL-MCNC: 9 MG/DL (ref 8.5–10.5)
CALCIUM SERPL-MCNC: 9.2 MG/DL (ref 8.5–10.5)
CHLORIDE SERPL-SCNC: 107 MMOL/L (ref 96–112)
CHOLEST SERPL-MCNC: 197 MG/DL (ref 100–199)
CO2 SERPL-SCNC: 24 MMOL/L (ref 20–33)
CREAT SERPL-MCNC: 0.85 MG/DL (ref 0.5–1.4)
FASTING STATUS PATIENT QL REPORTED: NORMAL
GFR SERPLBLD CREATININE-BSD FMLA CKD-EPI: 66 ML/MIN/1.73 M 2
GLOBULIN SER CALC-MCNC: 3 G/DL (ref 1.9–3.5)
GLUCOSE SERPL-MCNC: 112 MG/DL (ref 65–99)
HDLC SERPL-MCNC: 38 MG/DL
LDLC SERPL CALC-MCNC: 133 MG/DL
POTASSIUM SERPL-SCNC: 4 MMOL/L (ref 3.6–5.5)
PROT SERPL-MCNC: 7.2 G/DL (ref 6–8.2)
SODIUM SERPL-SCNC: 142 MMOL/L (ref 135–145)
TRIGL SERPL-MCNC: 128 MG/DL (ref 0–149)

## 2023-11-17 PROCEDURE — 80061 LIPID PANEL: CPT

## 2023-11-17 PROCEDURE — 36415 COLL VENOUS BLD VENIPUNCTURE: CPT

## 2023-11-17 PROCEDURE — 80053 COMPREHEN METABOLIC PANEL: CPT

## 2023-11-21 ENCOUNTER — OFFICE VISIT (OUTPATIENT)
Dept: MEDICAL GROUP | Facility: PHYSICIAN GROUP | Age: 88
End: 2023-11-21
Payer: MEDICARE

## 2023-11-21 VITALS
HEART RATE: 76 BPM | DIASTOLIC BLOOD PRESSURE: 70 MMHG | RESPIRATION RATE: 12 BRPM | OXYGEN SATURATION: 94 % | WEIGHT: 129 LBS | BODY MASS INDEX: 24.35 KG/M2 | TEMPERATURE: 97.4 F | SYSTOLIC BLOOD PRESSURE: 118 MMHG | HEIGHT: 61 IN

## 2023-11-21 DIAGNOSIS — E78.2 MIXED HYPERLIPIDEMIA: ICD-10-CM

## 2023-11-21 DIAGNOSIS — I10 ESSENTIAL HYPERTENSION: Chronic | ICD-10-CM

## 2023-11-21 DIAGNOSIS — E89.0 POSTOPERATIVE HYPOTHYROIDISM: ICD-10-CM

## 2023-11-21 DIAGNOSIS — Z78.0 POST-MENOPAUSAL: ICD-10-CM

## 2023-11-21 DIAGNOSIS — Z86.73 HISTORY OF STROKE: ICD-10-CM

## 2023-11-21 DIAGNOSIS — G47.34 NOCTURNAL HYPOXIA: ICD-10-CM

## 2023-11-21 DIAGNOSIS — J04.0 REFLUX LARYNGITIS: ICD-10-CM

## 2023-11-21 DIAGNOSIS — K21.9 REFLUX LARYNGITIS: ICD-10-CM

## 2023-11-21 PROCEDURE — 3078F DIAST BP <80 MM HG: CPT

## 2023-11-21 PROCEDURE — 3074F SYST BP LT 130 MM HG: CPT

## 2023-11-21 PROCEDURE — 99214 OFFICE O/P EST MOD 30 MIN: CPT

## 2023-11-21 RX ORDER — OMEPRAZOLE 20 MG/1
20 CAPSULE, DELAYED RELEASE ORAL DAILY
Qty: 30 CAPSULE | Refills: 2 | Status: SHIPPED | OUTPATIENT
Start: 2023-11-21

## 2023-11-21 RX ORDER — LEVOTHYROXINE SODIUM 0.07 MG/1
75 TABLET ORAL
Qty: 90 TABLET | Refills: 2 | Status: SHIPPED | OUTPATIENT
Start: 2023-11-21

## 2023-11-21 RX ORDER — ATORVASTATIN CALCIUM 40 MG/1
40 TABLET, FILM COATED ORAL
Qty: 100 TABLET | Refills: 2 | Status: SHIPPED | OUTPATIENT
Start: 2023-11-21

## 2023-11-21 RX ORDER — AMLODIPINE BESYLATE 10 MG/1
10 TABLET ORAL NIGHTLY
Qty: 100 TABLET | Refills: 2 | Status: SHIPPED | OUTPATIENT
Start: 2023-11-21

## 2023-11-21 ASSESSMENT — FIBROSIS 4 INDEX: FIB4 SCORE: 2.26

## 2023-11-21 NOTE — PROGRESS NOTES
"CC:   Chief Complaint   Patient presents with    Lab Follow-up        HISTORY OF PRESENT ILLNESS: Patient is a 88 y.o. female established patient who presents today to discuss the following problems below:     Patient presents for follow-up regarding her persistent fatigue.  She was seen by sleep medicine in May 2023 and advised to have a sleep titration study due to findings of mild SHIVA on home sleep study with oxygen desaturation in the evenings.  She was contacted in June to schedule this but at that time did not want to schedule as she was undergoing a kyphoplasty.     She reports her BP has been well controlled at home. She reports that she has persistent PND that has caused a persistent tickling in her throat. She has tried OTC allergy medications previously which did not help, this was as recently in April. She has not previously struggled with reflux. She has noticed that she has noticed a slight change in her stool, at baseline has a looser stool with no constipation. Has had some strings in her stools.     She reports that she has not been on her cholesterol medications for several months as she thought she did not to be on them anymore.     Review of Systems: Otherwise negative except for as stated above.      Exam: /70   Pulse 76   Temp 36.3 °C (97.4 °F) (Temporal)   Resp 12   Ht 1.549 m (5' 1\")   Wt 58.5 kg (129 lb)   SpO2 94%  Body mass index is 24.37 kg/m².    Physical Exam  Constitutional:       Appearance: Normal appearance.   Eyes:      Extraocular Movements: Extraocular movements intact.   Pulmonary:      Effort: Pulmonary effort is normal.   Musculoskeletal:      Cervical back: Normal range of motion.   Neurological:      General: No focal deficit present.      Mental Status: She is alert and oriented to person, place, and time.   Psychiatric:         Mood and Affect: Mood normal.         Behavior: Behavior normal.         Assessment/Plan:  88 y.o. female with the following -    1. " Essential hypertension  Chronic, stable. Continue medications as below.   - amLODIPine (NORVASC) 10 MG Tab; Take 1 Tablet by mouth every evening.  Dispense: 100 Tablet; Refill: 2    2. Postoperative hypothyroidism  Chronic, stable. Continue medications as below.   - levothyroxine (SYNTHROID) 75 MCG Tab; Take 1 Tablet by mouth every morning on an empty stomach.  Dispense: 90 Tablet; Refill: 2    3. Mixed hyperlipidemia  4. History of stroke  Chronic, not at goal.  Patient educated that she does need to be on a statin medication, particularly due to history of stroke.  Recheck lipids in 3 months  - atorvastatin (LIPITOR) 40 MG Tab; Take 1 Tablet by mouth at bedtime.  Dispense: 100 Tablet; Refill: 2    5. Post-menopausal  - DS-BONE DENSITY STUDY (DEXA); Future    6. Reflux laryngitis  New issue, suspect that patient's persistent itchy throat may possibly be reflux laryngitis as she did not have a good response to second-generation antihistamines.  Trial of Prilosec sent in today.  - omeprazole (PRILOSEC) 20 MG delayed-release capsule; Take 1 Capsule by mouth every day.  Dispense: 30 Capsule; Refill: 2    7. Nocturnal hypoxia  Chronic, not at goal.  Patient did follow-up with sleep medicine and was advised to have an overnight titration and oximetry study.  Unfortunately around this time, she had a fall resulting in a kyphoplasty and wanted to hold off on scheduling this visit, but has not been able to schedule a follow-up since that time.  Message sent over to scheduling department to get patient set up for a follow-up appointment as I do think that nocturnal hypoxia is likely contributing to her fatigue.       Follow-up: Return in about 3 months (around 2/21/2024).    Health Maintenance: Completed      Please note that this dictation was created using voice recognition software. I have made every reasonable attempt to correct obvious errors, but I expect that there are errors of grammar and possibly content that I did  not discover before finalizing the note.    Electronically signed by GURVINDER Cohen on November 21, 2023

## 2023-12-06 ENCOUNTER — HOSPITAL ENCOUNTER (OUTPATIENT)
Facility: MEDICAL CENTER | Age: 88
End: 2023-12-07
Attending: EMERGENCY MEDICINE | Admitting: INTERNAL MEDICINE
Payer: MEDICARE

## 2023-12-06 ENCOUNTER — APPOINTMENT (OUTPATIENT)
Dept: RADIOLOGY | Facility: MEDICAL CENTER | Age: 88
End: 2023-12-06
Attending: EMERGENCY MEDICINE
Payer: MEDICARE

## 2023-12-06 ENCOUNTER — APPOINTMENT (OUTPATIENT)
Dept: CARDIOLOGY | Facility: MEDICAL CENTER | Age: 88
End: 2023-12-06
Attending: INTERNAL MEDICINE
Payer: MEDICARE

## 2023-12-06 DIAGNOSIS — R55 SYNCOPE, UNSPECIFIED SYNCOPE TYPE: ICD-10-CM

## 2023-12-06 PROBLEM — Z71.89 ACP (ADVANCE CARE PLANNING): Status: ACTIVE | Noted: 2022-06-28

## 2023-12-06 PROBLEM — K21.9 GERD (GASTROESOPHAGEAL REFLUX DISEASE): Status: ACTIVE | Noted: 2023-12-06

## 2023-12-06 LAB
ALBUMIN SERPL BCP-MCNC: 3.8 G/DL (ref 3.2–4.9)
ALBUMIN/GLOB SERPL: 1.2 G/DL
ALP SERPL-CCNC: 62 U/L (ref 30–99)
ALT SERPL-CCNC: 16 U/L (ref 2–50)
ANION GAP SERPL CALC-SCNC: 11 MMOL/L (ref 7–16)
APPEARANCE UR: ABNORMAL
APTT PPP: 25.5 SEC (ref 24.7–36)
AST SERPL-CCNC: 15 U/L (ref 12–45)
BACTERIA #/AREA URNS HPF: ABNORMAL /HPF
BASOPHILS # BLD AUTO: 0.8 % (ref 0–1.8)
BASOPHILS # BLD: 0.05 K/UL (ref 0–0.12)
BILIRUB SERPL-MCNC: 1.5 MG/DL (ref 0.1–1.5)
BILIRUB UR QL STRIP.AUTO: NEGATIVE
BUN SERPL-MCNC: 22 MG/DL (ref 8–22)
CALCIUM ALBUM COR SERPL-MCNC: 9.2 MG/DL (ref 8.5–10.5)
CALCIUM SERPL-MCNC: 9 MG/DL (ref 8.5–10.5)
CAOX CRY #/AREA URNS HPF: ABNORMAL /HPF
CHLORIDE SERPL-SCNC: 107 MMOL/L (ref 96–112)
CO2 SERPL-SCNC: 27 MMOL/L (ref 20–33)
COLOR UR: YELLOW
CREAT SERPL-MCNC: 0.93 MG/DL (ref 0.5–1.4)
EKG IMPRESSION: NORMAL
EOSINOPHIL # BLD AUTO: 0.03 K/UL (ref 0–0.51)
EOSINOPHIL NFR BLD: 0.5 % (ref 0–6.9)
EPI CELLS #/AREA URNS HPF: ABNORMAL /HPF
ERYTHROCYTE [DISTWIDTH] IN BLOOD BY AUTOMATED COUNT: 41.4 FL (ref 35.9–50)
GFR SERPLBLD CREATININE-BSD FMLA CKD-EPI: 59 ML/MIN/1.73 M 2
GLOBULIN SER CALC-MCNC: 3.1 G/DL (ref 1.9–3.5)
GLUCOSE SERPL-MCNC: 137 MG/DL (ref 65–99)
GLUCOSE UR STRIP.AUTO-MCNC: NEGATIVE MG/DL
HCT VFR BLD AUTO: 45.6 % (ref 37–47)
HGB BLD-MCNC: 15.6 G/DL (ref 12–16)
HYALINE CASTS #/AREA URNS LPF: ABNORMAL /LPF
IMM GRANULOCYTES # BLD AUTO: 0.04 K/UL (ref 0–0.11)
IMM GRANULOCYTES NFR BLD AUTO: 0.6 % (ref 0–0.9)
INR PPP: 1.04 (ref 0.87–1.13)
KETONES UR STRIP.AUTO-MCNC: NEGATIVE MG/DL
LEUKOCYTE ESTERASE UR QL STRIP.AUTO: ABNORMAL
LYMPHOCYTES # BLD AUTO: 0.82 K/UL (ref 1–4.8)
LYMPHOCYTES NFR BLD: 12.8 % (ref 22–41)
MCH RBC QN AUTO: 31.5 PG (ref 27–33)
MCHC RBC AUTO-ENTMCNC: 34.2 G/DL (ref 32.2–35.5)
MCV RBC AUTO: 91.9 FL (ref 81.4–97.8)
MICRO URNS: ABNORMAL
MONOCYTES # BLD AUTO: 0.54 K/UL (ref 0–0.85)
MONOCYTES NFR BLD AUTO: 8.4 % (ref 0–13.4)
NEUTROPHILS # BLD AUTO: 4.95 K/UL (ref 1.82–7.42)
NEUTROPHILS NFR BLD: 76.9 % (ref 44–72)
NITRITE UR QL STRIP.AUTO: NEGATIVE
NRBC # BLD AUTO: 0 K/UL
NRBC BLD-RTO: 0 /100 WBC (ref 0–0.2)
PH UR STRIP.AUTO: 5.5 [PH] (ref 5–8)
PLATELET # BLD AUTO: 181 K/UL (ref 164–446)
PMV BLD AUTO: 12.3 FL (ref 9–12.9)
POTASSIUM SERPL-SCNC: 3.4 MMOL/L (ref 3.6–5.5)
PROT SERPL-MCNC: 6.9 G/DL (ref 6–8.2)
PROT UR QL STRIP: NEGATIVE MG/DL
PROTHROMBIN TIME: 13.7 SEC (ref 12–14.6)
RBC # BLD AUTO: 4.96 M/UL (ref 4.2–5.4)
RBC # URNS HPF: ABNORMAL /HPF
RBC UR QL AUTO: ABNORMAL
SODIUM SERPL-SCNC: 145 MMOL/L (ref 135–145)
SP GR UR STRIP.AUTO: 1.02
TROPONIN T SERPL-MCNC: 22 NG/L (ref 6–19)
UROBILINOGEN UR STRIP.AUTO-MCNC: 0.2 MG/DL
WBC # BLD AUTO: 6.4 K/UL (ref 4.8–10.8)
WBC #/AREA URNS HPF: ABNORMAL /HPF

## 2023-12-06 PROCEDURE — 93005 ELECTROCARDIOGRAM TRACING: CPT | Performed by: EMERGENCY MEDICINE

## 2023-12-06 PROCEDURE — 700111 HCHG RX REV CODE 636 W/ 250 OVERRIDE (IP): Mod: JZ | Performed by: INTERNAL MEDICINE

## 2023-12-06 PROCEDURE — 99223 1ST HOSP IP/OBS HIGH 75: CPT | Mod: AI | Performed by: INTERNAL MEDICINE

## 2023-12-06 PROCEDURE — 99285 EMERGENCY DEPT VISIT HI MDM: CPT

## 2023-12-06 PROCEDURE — 700102 HCHG RX REV CODE 250 W/ 637 OVERRIDE(OP): Performed by: INTERNAL MEDICINE

## 2023-12-06 PROCEDURE — 99497 ADVNCD CARE PLAN 30 MIN: CPT | Mod: 25 | Performed by: INTERNAL MEDICINE

## 2023-12-06 PROCEDURE — 70450 CT HEAD/BRAIN W/O DYE: CPT

## 2023-12-06 PROCEDURE — 71045 X-RAY EXAM CHEST 1 VIEW: CPT

## 2023-12-06 PROCEDURE — 96372 THER/PROPH/DIAG INJ SC/IM: CPT

## 2023-12-06 PROCEDURE — G0378 HOSPITAL OBSERVATION PER HR: HCPCS

## 2023-12-06 PROCEDURE — A9270 NON-COVERED ITEM OR SERVICE: HCPCS | Performed by: INTERNAL MEDICINE

## 2023-12-06 PROCEDURE — 36415 COLL VENOUS BLD VENIPUNCTURE: CPT

## 2023-12-06 PROCEDURE — 81001 URINALYSIS AUTO W/SCOPE: CPT

## 2023-12-06 PROCEDURE — 85610 PROTHROMBIN TIME: CPT

## 2023-12-06 PROCEDURE — 85730 THROMBOPLASTIN TIME PARTIAL: CPT

## 2023-12-06 PROCEDURE — 80053 COMPREHEN METABOLIC PANEL: CPT

## 2023-12-06 PROCEDURE — 93306 TTE W/DOPPLER COMPLETE: CPT

## 2023-12-06 PROCEDURE — 84484 ASSAY OF TROPONIN QUANT: CPT

## 2023-12-06 PROCEDURE — 85025 COMPLETE CBC W/AUTO DIFF WBC: CPT

## 2023-12-06 RX ORDER — ASPIRIN 81 MG/1
81 TABLET ORAL DAILY
COMMUNITY

## 2023-12-06 RX ORDER — OMEPRAZOLE 20 MG/1
20 CAPSULE, DELAYED RELEASE ORAL DAILY
Status: DISCONTINUED | OUTPATIENT
Start: 2023-12-06 | End: 2023-12-07 | Stop reason: HOSPADM

## 2023-12-06 RX ORDER — CHOLECALCIFEROL (VITAMIN D3) 50 MCG
2000 TABLET ORAL DAILY
COMMUNITY

## 2023-12-06 RX ORDER — AMLODIPINE BESYLATE 10 MG/1
10 TABLET ORAL EVERY EVENING
Status: DISCONTINUED | OUTPATIENT
Start: 2023-12-06 | End: 2023-12-07 | Stop reason: HOSPADM

## 2023-12-06 RX ORDER — BISACODYL 10 MG
10 SUPPOSITORY, RECTAL RECTAL
Status: DISCONTINUED | OUTPATIENT
Start: 2023-12-06 | End: 2023-12-07 | Stop reason: HOSPADM

## 2023-12-06 RX ORDER — ASPIRIN 81 MG/1
81 TABLET ORAL DAILY
Status: DISCONTINUED | OUTPATIENT
Start: 2023-12-07 | End: 2023-12-07 | Stop reason: HOSPADM

## 2023-12-06 RX ORDER — ATORVASTATIN CALCIUM 40 MG/1
40 TABLET, FILM COATED ORAL EVERY EVENING
Status: DISCONTINUED | OUTPATIENT
Start: 2023-12-06 | End: 2023-12-07 | Stop reason: HOSPADM

## 2023-12-06 RX ORDER — ONDANSETRON 2 MG/ML
4 INJECTION INTRAMUSCULAR; INTRAVENOUS EVERY 4 HOURS PRN
Status: DISCONTINUED | OUTPATIENT
Start: 2023-12-06 | End: 2023-12-06

## 2023-12-06 RX ORDER — ONDANSETRON 4 MG/1
4 TABLET, ORALLY DISINTEGRATING ORAL EVERY 4 HOURS PRN
Status: DISCONTINUED | OUTPATIENT
Start: 2023-12-06 | End: 2023-12-06

## 2023-12-06 RX ORDER — LEVOTHYROXINE SODIUM 0.07 MG/1
75 TABLET ORAL
Status: DISCONTINUED | OUTPATIENT
Start: 2023-12-07 | End: 2023-12-07 | Stop reason: HOSPADM

## 2023-12-06 RX ORDER — ACETAMINOPHEN 325 MG/1
650 TABLET ORAL EVERY 6 HOURS PRN
Status: DISCONTINUED | OUTPATIENT
Start: 2023-12-06 | End: 2023-12-07 | Stop reason: HOSPADM

## 2023-12-06 RX ORDER — AMOXICILLIN 250 MG
2 CAPSULE ORAL 2 TIMES DAILY
Status: DISCONTINUED | OUTPATIENT
Start: 2023-12-07 | End: 2023-12-07 | Stop reason: HOSPADM

## 2023-12-06 RX ORDER — POLYETHYLENE GLYCOL 3350 17 G/17G
1 POWDER, FOR SOLUTION ORAL
Status: DISCONTINUED | OUTPATIENT
Start: 2023-12-06 | End: 2023-12-07 | Stop reason: HOSPADM

## 2023-12-06 RX ORDER — ENOXAPARIN SODIUM 100 MG/ML
40 INJECTION SUBCUTANEOUS DAILY
Status: DISCONTINUED | OUTPATIENT
Start: 2023-12-06 | End: 2023-12-07 | Stop reason: HOSPADM

## 2023-12-06 RX ORDER — HYDRALAZINE HYDROCHLORIDE 20 MG/ML
10 INJECTION INTRAMUSCULAR; INTRAVENOUS EVERY 4 HOURS PRN
Status: DISCONTINUED | OUTPATIENT
Start: 2023-12-06 | End: 2023-12-07 | Stop reason: HOSPADM

## 2023-12-06 RX ADMIN — ATORVASTATIN CALCIUM 40 MG: 40 TABLET, FILM COATED ORAL at 17:12

## 2023-12-06 RX ADMIN — OMEPRAZOLE 20 MG: 20 CAPSULE, DELAYED RELEASE ORAL at 17:12

## 2023-12-06 RX ADMIN — AMLODIPINE BESYLATE 10 MG: 10 TABLET ORAL at 17:12

## 2023-12-06 RX ADMIN — ENOXAPARIN SODIUM 40 MG: 100 INJECTION SUBCUTANEOUS at 17:12

## 2023-12-06 ASSESSMENT — LIFESTYLE VARIABLES
HOW MANY TIMES IN THE PAST YEAR HAVE YOU HAD 5 OR MORE DRINKS IN A DAY: 0
ALCOHOL_USE: NO
AVERAGE NUMBER OF DAYS PER WEEK YOU HAVE A DRINK CONTAINING ALCOHOL: 0
DOES PATIENT WANT TO STOP DRINKING: NO
HAVE PEOPLE ANNOYED YOU BY CRITICIZING YOUR DRINKING: NO
TOTAL SCORE: 0
HAVE YOU EVER FELT YOU SHOULD CUT DOWN ON YOUR DRINKING: NO
TOTAL SCORE: 0
EVER HAD A DRINK FIRST THING IN THE MORNING TO STEADY YOUR NERVES TO GET RID OF A HANGOVER: NO
EVER FELT BAD OR GUILTY ABOUT YOUR DRINKING: NO
TOTAL SCORE: 0
ON A TYPICAL DAY WHEN YOU DRINK ALCOHOL HOW MANY DRINKS DO YOU HAVE: 0
CONSUMPTION TOTAL: NEGATIVE

## 2023-12-06 ASSESSMENT — ENCOUNTER SYMPTOMS
SENSORY CHANGE: 1
LOSS OF CONSCIOUSNESS: 1

## 2023-12-06 ASSESSMENT — PATIENT HEALTH QUESTIONNAIRE - PHQ9
1. LITTLE INTEREST OR PLEASURE IN DOING THINGS: NOT AT ALL
SUM OF ALL RESPONSES TO PHQ9 QUESTIONS 1 AND 2: 0
2. FEELING DOWN, DEPRESSED, IRRITABLE, OR HOPELESS: NOT AT ALL

## 2023-12-06 ASSESSMENT — PAIN DESCRIPTION - PAIN TYPE
TYPE: ACUTE PAIN
TYPE: ACUTE PAIN

## 2023-12-06 ASSESSMENT — COPD QUESTIONNAIRES
DURING THE PAST 4 WEEKS HOW MUCH DID YOU FEEL SHORT OF BREATH: NONE/LITTLE OF THE TIME
DO YOU EVER COUGH UP ANY MUCUS OR PHLEGM?: NO/ONLY WITH OCCASIONAL COLDS OR INFECTIONS
COPD SCREENING SCORE: 2
HAVE YOU SMOKED AT LEAST 100 CIGARETTES IN YOUR ENTIRE LIFE: NO/DON'T KNOW

## 2023-12-06 ASSESSMENT — FIBROSIS 4 INDEX
FIB4 SCORE: 1.8
FIB4 SCORE: 1.8
FIB4 SCORE: 2.26

## 2023-12-06 NOTE — ED NOTES
"Patient ambulatory to the restroom and back w/ stand by assist.  Slow but steady gait.  Urine spec to lab.  Patient denies dizziness with ambulation, reports feeling \"tired\".   "

## 2023-12-06 NOTE — ED PROVIDER NOTES
ED Provider Note    CHIEF COMPLAINT  Chief Complaint   Patient presents with    Possible Stroke     Pt bib remsa after she states she felt dizzy and had a syncopal episode while she was sitting at the table, pt .denies hitting her head or any injury, she states she has numbness to her left cheek, no other complaints at this time       EXTERNAL RECORDS REVIEWED  Outpatient Notes saw her PCP on November 21 for hypertension, history of a stroke    HPI/ROS  LIMITATION TO HISTORY   Select: : None  OUTSIDE HISTORIAN(S):  EMS presentation    Milena Ang is a 88 y.o. female who presents to the ED after a syncopal episode.  The patient was in the kitchen, leaned down to put some bagels in the oven, stood up, started feeling lightheaded, she sat down in a chair, apparently passed out.  Patient's  found the patient staring off into the distance, eyes open, unresponsive.  Patient did not have any seizure-like activity.  Patient woke up with her  around her.  The patient states she has some numbness on the left jaw but no other numbness, tingling, weakness.  Patient denies any chest pains or trouble breathing.  She did state that she felt sweaty.  No nausea.    PAST MEDICAL HISTORY   has a past medical history of Cancer (HCC), Cataract, Cervical high risk human papillomavirus (HPV) DNA test positive, Colon polyp, Disorder of thyroid, Diverticulosis of colon, Dyslipidemia, Female bladder prolapse (11/04/2020), High cholesterol, Hypertension, Incontinence, Incontinence (11/04/2020), Indigestion, Kidney stone, Menopausal and postmenopausal disorder, OSTEOPOROSIS, Stroke (HCC) (2021), and Urinary bladder disorder (11/04/2020).    SURGICAL HISTORY   has a past surgical history that includes bladder sling female (7/12); cataract extraction with iol (Bilateral); cholecystectomy; cystourethroscopy,ureter catheter (Right, 11/6/2020); cysto/uretero/pyeloscopy, dx (Bilateral, 11/6/2020); cystoscopy,insert ureteral  "stent (Right, 11/6/2020); and thyroid lobectomy,unilat (Right, 8/4/2021).    FAMILY HISTORY  Family History   Problem Relation Age of Onset    Cancer Father         pancreatic    Heart Disease Father         MI at age 57    Hypertension Father     Cancer Brother         melanoma    Heart Disease Brother     No Known Problems Mother     Cancer Sister         ovaren cancer    Cancer Brother         throat    Psychiatric Illness Daughter     No Known Problems Daughter        SOCIAL HISTORY  Social History     Tobacco Use    Smoking status: Never    Smokeless tobacco: Never   Vaping Use    Vaping Use: Never used   Substance and Sexual Activity    Alcohol use: No    Drug use: No    Sexual activity: Not Currently       CURRENT MEDICATIONS  Home Medications       Reviewed by Meredith Cast (Pharmacy Tech) on 12/06/23 at 1337  Med List Status: Complete     Medication Last Dose Status   amLODIPine (NORVASC) 10 MG Tab 12/5/2023 Active   aspirin 81 MG EC tablet unk Active   atorvastatin (LIPITOR) 40 MG Tab 12/5/2023 Active   Cholecalciferol (VITAMIN D) 2000 UNIT Tab 12/5/2023 Active   irbesartan-hydrochlorothiazide (AVALIDE) 300-12.5 MG per tablet 12/6/2023 Active   levothyroxine (SYNTHROID) 75 MCG Tab 12/6/2023 Active   metoprolol SR (TOPROL XL) 25 MG TABLET SR 24 HR 12/5/2023 Active   omeprazole (PRILOSEC) 20 MG delayed-release capsule 12/5/2023 Active                    ALLERGIES  Allergies   Allergen Reactions    Penicillins Rash and Swelling     \"tongue got thick\"       PHYSICAL EXAM  VITAL SIGNS: /60   Pulse (!) 56   Temp 36.3 °C (97.3 °F) (Temporal)   Resp 16   Ht 1.575 m (5' 2\")   Wt 55.3 kg (122 lb)   LMP 07/01/1986   SpO2 98%   BMI 22.31 kg/m²    Well-developed and nourished 88-year-old female who appears in mild distress  Atraumatic, normocephalic, oropharynx is clear  Neck is supple  Chest clear to auscultation  Regular rhythm  Neuro awake alert speech is clear, muscle strength is 5-5 throughout, " patient does have some subjective decrease sensation on the left side of the jaw    DIAGNOSTIC STUDIES / PROCEDURES  Results for orders placed or performed during the hospital encounter of 12/06/23   CBC WITH DIFFERENTIAL   Result Value Ref Range    WBC 6.4 4.8 - 10.8 K/uL    RBC 4.96 4.20 - 5.40 M/uL    Hemoglobin 15.6 12.0 - 16.0 g/dL    Hematocrit 45.6 37.0 - 47.0 %    MCV 91.9 81.4 - 97.8 fL    MCH 31.5 27.0 - 33.0 pg    MCHC 34.2 32.2 - 35.5 g/dL    RDW 41.4 35.9 - 50.0 fL    Platelet Count 181 164 - 446 K/uL    MPV 12.3 9.0 - 12.9 fL    Neutrophils-Polys 76.90 (H) 44.00 - 72.00 %    Lymphocytes 12.80 (L) 22.00 - 41.00 %    Monocytes 8.40 0.00 - 13.40 %    Eosinophils 0.50 0.00 - 6.90 %    Basophils 0.80 0.00 - 1.80 %    Immature Granulocytes 0.60 0.00 - 0.90 %    Nucleated RBC 0.00 0.00 - 0.20 /100 WBC    Neutrophils (Absolute) 4.95 1.82 - 7.42 K/uL    Lymphs (Absolute) 0.82 (L) 1.00 - 4.80 K/uL    Monos (Absolute) 0.54 0.00 - 0.85 K/uL    Eos (Absolute) 0.03 0.00 - 0.51 K/uL    Baso (Absolute) 0.05 0.00 - 0.12 K/uL    Immature Granulocytes (abs) 0.04 0.00 - 0.11 K/uL    NRBC (Absolute) 0.00 K/uL   COMP METABOLIC PANEL   Result Value Ref Range    Sodium 145 135 - 145 mmol/L    Potassium 3.4 (L) 3.6 - 5.5 mmol/L    Chloride 107 96 - 112 mmol/L    Co2 27 20 - 33 mmol/L    Anion Gap 11.0 7.0 - 16.0    Glucose 137 (H) 65 - 99 mg/dL    Bun 22 8 - 22 mg/dL    Creatinine 0.93 0.50 - 1.40 mg/dL    Calcium 9.0 8.5 - 10.5 mg/dL    Correct Calcium 9.2 8.5 - 10.5 mg/dL    AST(SGOT) 15 12 - 45 U/L    ALT(SGPT) 16 2 - 50 U/L    Alkaline Phosphatase 62 30 - 99 U/L    Total Bilirubin 1.5 0.1 - 1.5 mg/dL    Albumin 3.8 3.2 - 4.9 g/dL    Total Protein 6.9 6.0 - 8.2 g/dL    Globulin 3.1 1.9 - 3.5 g/dL    A-G Ratio 1.2 g/dL   TROPONIN   Result Value Ref Range    Troponin T 22 (H) 6 - 19 ng/L   PRTOTHROMBIN TIME (INR)   Result Value Ref Range    PT 13.7 12.0 - 14.6 sec    INR 1.04 0.87 - 1.13   APTT   Result Value Ref Range     APTT 25.5 24.7 - 36.0 sec   URINALYSIS    Specimen: Urine   Result Value Ref Range    Color Yellow     Character Turbid (A)     Specific Gravity 1.020 <1.035    Ph 5.5 5.0 - 8.0    Glucose Negative Negative mg/dL    Ketones Negative Negative mg/dL    Protein Negative Negative mg/dL    Bilirubin Negative Negative    Urobilinogen, Urine 0.2 Negative    Nitrite Negative Negative    Leukocyte Esterase Small (A) Negative    Occult Blood Trace (A) Negative    Micro Urine Req Microscopic    ESTIMATED GFR   Result Value Ref Range    GFR (CKD-EPI) 59 (A) >60 mL/min/1.73 m 2   URINE MICROSCOPIC (W/UA)   Result Value Ref Range    WBC 20-50 (A) /hpf    RBC 0-2 /hpf    Bacteria Many (A) None /hpf    Epithelial Cells Many (A) /hpf    Ca Oxalate Crystal Many /hpf    Hyaline Cast 0-2 /lpf   EKG   Result Value Ref Range    Report       [facility] Emergency Dept.    Test Date:  2023  Pt Name:    JOHNATHAN GUY                  Department: ER  MRN:        [id number]                      Room:        13  Gender:     Female                       Technician: [id number]  :        [id number]                   Requested By:ROLANDO VIVAR  Order #:    [id number]                    Reading MD: ROLANDO VIVAR MD    Measurements  Intervals                                Axis  Rate:       55                           P:          61  NM:         220                          QRS:        1  QRSD:       94                           T:          261  QT:         615  QTc:        589    Interpretive Statements  Sinus bradycardia  Prolonged NM interval  Anterior infarct, old  Borderline repolarization abnormality  Prolonged QT interval  Compared to ECG 2021 15:24:27  First degree AV block now present  Myocardial infarct finding now present  Prolonged QT interval now present  Electronically Signed On  12:48:52 PST by ROLANDO VIVAR MD          RADIOLOGY    Radiologist interpretation:   DX-CHEST-PORTABLE  (1 VIEW)   Final Result      No acute cardiac or pulmonary abnormalities are identified.      CT-HEAD W/O   Final Result         Age-related volume loss and chronic microvascular ischemic changes.      No acute process.               COURSE & MEDICAL DECISION MAKING    ED Observation Status?  No    INITIAL ASSESSMENT, COURSE AND PLAN  Care Narrative: Patient coming in after syncopal episode.  Concerning for cardiogenic.  We will check a CT scan of the head, I do not see any evidence of a stroke workup, will check some basic laboratory test.  Laboratory test unremarkable, CT scan was negative.  Discussed case with the hospitalist for hospitalization.    1:30 PM - I discussed the patient's case and the above findings with Dr. Vann (hospitalist) who will consult on the patient for hospitalization.      DISPOSITION AND DISCUSSIONS  I have discussed management of the patient with the following physicians and SHABANA's:  Dr. Vann (hospitalist)     Discussion of management with other [facility] or appropriate source(s): None     The patient will return for new or worsening symptoms and is stable at the time of discharge.    The patient is referred to a primary physician for blood pressure management, diabetic screening, and for all other preventative health concerns.    DISPOSITION:  Patient will be discharged home in stable condition.    FINAL DIAGNOSIS  1. Syncope, unspecified syncope type           Electronically signed by: Dmitry Kirk M.D., 12/6/2023 11:26 AM

## 2023-12-06 NOTE — ED TRIAGE NOTES
.  Chief Complaint   Patient presents with    Possible Stroke     Pt little xie after she states she felt dizzy and had a syncopal episode while she was sitting at the table, pt .denies hitting her head or any injury, she states she has numbness to her left cheek, no other complaints at this time

## 2023-12-07 ENCOUNTER — APPOINTMENT (OUTPATIENT)
Dept: RADIOLOGY | Facility: MEDICAL CENTER | Age: 88
End: 2023-12-07
Attending: INTERNAL MEDICINE
Payer: MEDICARE

## 2023-12-07 VITALS
HEIGHT: 62 IN | BODY MASS INDEX: 23.25 KG/M2 | SYSTOLIC BLOOD PRESSURE: 131 MMHG | RESPIRATION RATE: 16 BRPM | DIASTOLIC BLOOD PRESSURE: 60 MMHG | WEIGHT: 126.32 LBS | TEMPERATURE: 98.2 F | HEART RATE: 65 BPM | OXYGEN SATURATION: 93 %

## 2023-12-07 PROBLEM — R20.0 NUMBNESS: Status: RESOLVED | Noted: 2021-03-08 | Resolved: 2023-12-07

## 2023-12-07 PROBLEM — R55 SYNCOPE AND COLLAPSE: Status: RESOLVED | Noted: 2023-12-06 | Resolved: 2023-12-07

## 2023-12-07 LAB
ANION GAP SERPL CALC-SCNC: 11 MMOL/L (ref 7–16)
BUN SERPL-MCNC: 20 MG/DL (ref 8–22)
CALCIUM SERPL-MCNC: 8.6 MG/DL (ref 8.5–10.5)
CHLORIDE SERPL-SCNC: 107 MMOL/L (ref 96–112)
CO2 SERPL-SCNC: 24 MMOL/L (ref 20–33)
CREAT SERPL-MCNC: 0.76 MG/DL (ref 0.5–1.4)
ERYTHROCYTE [DISTWIDTH] IN BLOOD BY AUTOMATED COUNT: 40.5 FL (ref 35.9–50)
GFR SERPLBLD CREATININE-BSD FMLA CKD-EPI: 76 ML/MIN/1.73 M 2
GLUCOSE SERPL-MCNC: 126 MG/DL (ref 65–99)
HCT VFR BLD AUTO: 41.4 % (ref 37–47)
HGB BLD-MCNC: 14.2 G/DL (ref 12–16)
LV EJECT FRACT  99904: 60
MCH RBC QN AUTO: 31.1 PG (ref 27–33)
MCHC RBC AUTO-ENTMCNC: 34.3 G/DL (ref 32.2–35.5)
MCV RBC AUTO: 90.8 FL (ref 81.4–97.8)
PLATELET # BLD AUTO: 177 K/UL (ref 164–446)
PMV BLD AUTO: 12.5 FL (ref 9–12.9)
POTASSIUM SERPL-SCNC: 3.1 MMOL/L (ref 3.6–5.5)
RBC # BLD AUTO: 4.56 M/UL (ref 4.2–5.4)
SODIUM SERPL-SCNC: 142 MMOL/L (ref 135–145)
WBC # BLD AUTO: 7.2 K/UL (ref 4.8–10.8)

## 2023-12-07 PROCEDURE — 70551 MRI BRAIN STEM W/O DYE: CPT

## 2023-12-07 PROCEDURE — 80048 BASIC METABOLIC PNL TOTAL CA: CPT

## 2023-12-07 PROCEDURE — 93306 TTE W/DOPPLER COMPLETE: CPT | Mod: 26 | Performed by: INTERNAL MEDICINE

## 2023-12-07 PROCEDURE — 85027 COMPLETE CBC AUTOMATED: CPT

## 2023-12-07 PROCEDURE — 99239 HOSP IP/OBS DSCHRG MGMT >30: CPT | Performed by: INTERNAL MEDICINE

## 2023-12-07 PROCEDURE — 700102 HCHG RX REV CODE 250 W/ 637 OVERRIDE(OP): Performed by: INTERNAL MEDICINE

## 2023-12-07 PROCEDURE — A9270 NON-COVERED ITEM OR SERVICE: HCPCS

## 2023-12-07 PROCEDURE — 700102 HCHG RX REV CODE 250 W/ 637 OVERRIDE(OP)

## 2023-12-07 PROCEDURE — G0378 HOSPITAL OBSERVATION PER HR: HCPCS

## 2023-12-07 PROCEDURE — A9270 NON-COVERED ITEM OR SERVICE: HCPCS | Performed by: INTERNAL MEDICINE

## 2023-12-07 RX ORDER — POTASSIUM CHLORIDE 20 MEQ/1
40 TABLET, EXTENDED RELEASE ORAL ONCE
Status: COMPLETED | OUTPATIENT
Start: 2023-12-07 | End: 2023-12-07

## 2023-12-07 RX ORDER — UREA 10 %
1 LOTION (ML) TOPICAL NIGHTLY
Status: DISCONTINUED | OUTPATIENT
Start: 2023-12-07 | End: 2023-12-07 | Stop reason: HOSPADM

## 2023-12-07 RX ADMIN — OMEPRAZOLE 20 MG: 20 CAPSULE, DELAYED RELEASE ORAL at 05:23

## 2023-12-07 RX ADMIN — LEVOTHYROXINE SODIUM 75 MCG: 0.07 TABLET ORAL at 05:23

## 2023-12-07 RX ADMIN — Medication 1 MG: at 00:35

## 2023-12-07 RX ADMIN — DOCUSATE SODIUM 50 MG AND SENNOSIDES 8.6 MG 2 TABLET: 8.6; 5 TABLET, FILM COATED ORAL at 05:23

## 2023-12-07 RX ADMIN — POTASSIUM CHLORIDE 40 MEQ: 1500 TABLET, EXTENDED RELEASE ORAL at 09:35

## 2023-12-07 RX ADMIN — ASPIRIN 81 MG: 81 TABLET, COATED ORAL at 05:23

## 2023-12-07 ASSESSMENT — PAIN DESCRIPTION - PAIN TYPE: TYPE: ACUTE PAIN

## 2023-12-07 NOTE — ASSESSMENT & PLAN NOTE
Left facial numbness that is more pronounced than her baseline from a previous stroke  I will order MRI brain for further evaluation  Neurochecks every 4 hours

## 2023-12-07 NOTE — PROGRESS NOTES
Monitor Summary:  Rhythm: SR/SB 1st degree  Rate: 54 - 62  Ectopy: rare PVC  0.26/ 0.07/ 0.47

## 2023-12-07 NOTE — PROGRESS NOTES
Report received from ER RN. Assume care. AAOx4.  Assessment completed. VSS. Denies pain, fully ambulatory, able to wiggle toes and dorsi/plantar flex feet, good CMS and pulses to little LE, denies numbness and tingling.  Pt was update for the care for the day. White board updated, All question answered. Pt has call light within reach,  bed is in the lowest position. Pt has no other needs at this time.

## 2023-12-07 NOTE — ASSESSMENT & PLAN NOTE
I discussed advance care planning for at least 16 minutes with the patient, including diagnosis, prognosis, plan of care, risks and benefits of any therapies that could be offered, as well as alternatives including palliation and hospice, as appropriate. The patient has opted for full medical treatment however she would like to be a DNR/DNI.

## 2023-12-07 NOTE — DISCHARGE SUMMARY
Discharge Summary    CHIEF COMPLAINT ON ADMISSION  Chief Complaint   Patient presents with    Possible Stroke     Pt bib remsa after she states she felt dizzy and had a syncopal episode while she was sitting at the table, pt .denies hitting her head or any injury, she states she has numbness to her left cheek, no other complaints at this time       Reason for Admission  Possible Stroke     Admission Date  12/6/2023    CODE STATUS  DNAR/DNI    HPI & HOSPITAL COURSE  88 y.o. female with a past medical history of stroke, hypertension, GERD who presented 12/6/2023 with syncopal episode.  Patient states she was bending down to grab something in the kitchen when she suddenly felt like she was going to pass out therefore she got herself to a chair and then was noted by her  to be staring out and was briefly unresponsive.  She denied any chest pain or shortness of breath.  She does report some chronic left facial numbness from her previous stroke which she felt was more pronounced.    In the ER she was noted to be hypertensive with a blood pressure 166/71  CT head without contrast was negative for acute intracranial process  Chest x-ray interpreted by me reveals no acute cardiopulmonary process  EKG interpreted by me reveals sinus bradycardia with prolonged NJ interval. No acute ischemic changes    She was placed in the clinical decision unit with continuous cardiac monitoring which revealed sinus bradycardia.  Her metoprolol was held.  She is also noted to be hypokalemic and her potassium was replaced.  She underwent a 2D echo that revealed normal left ventricular systolic function and wall motion.  Mild to moderate aortic insufficiency.  She underwent an MRI brain that revealed no acute abnormality.  Her symptoms had improved and and her vitals remained stable.  At this point she will be discharged home with close outpatient follow-up with her PCP.  She was instructed to discontinue her metoprolol which may be  "contributing to her symptoms due to resting bradycardia.    Therefore, she is discharged in good and stable condition to home with close outpatient follow-up.    The patient recovered much more quickly than anticipated on admission.    Discharge Date  12/7/23    FOLLOW UP ITEMS POST DISCHARGE  PCP    DISCHARGE DIAGNOSES  Principal Problem (Resolved):    Syncope and collapse (POA: Yes)  Active Problems:    Essential hypertension (Chronic) (POA: Yes)    ACP (advance care planning) (POA: Yes)    GERD (gastroesophageal reflux disease) (POA: Yes)  Resolved Problems:    Numbness (POA: Yes)      FOLLOW UP  Future Appointments   Date Time Provider Department Center   12/21/2023 11:05 AM [facility]     No follow-up provider specified.    MEDICATIONS ON DISCHARGE     Medication List        CONTINUE taking these medications        Instructions   amLODIPine 10 MG Tabs  Commonly known as: Norvasc   Take 1 Tablet by mouth every evening.  Dose: 10 mg     aspirin 81 MG EC tablet   Take 81 mg by mouth every day.  Dose: 81 mg     atorvastatin 40 MG Tabs  Commonly known as: Lipitor   Take 1 Tablet by mouth at bedtime.  Dose: 40 mg     irbesartan-hydrochlorothiazide 300-12.5 MG per tablet  Commonly known as: Avalide   TAKE 1 TABLET BY MOUTH EVERY DAY  Dose: 1 Tablet     levothyroxine 75 MCG Tabs  Commonly known as: Synthroid   Take 1 Tablet by mouth every morning on an empty stomach.  Dose: 75 mcg     omeprazole 20 MG delayed-release capsule  Commonly known as: PriLOSEC   Take 1 Capsule by mouth every day.  Dose: 20 mg     vitamin D 2000 UNIT Tabs   Take 2,000 Units by mouth every day.  Dose: 2,000 Units            STOP taking these medications      metoprolol SR 25 MG Tb24  Commonly known as: Toprol XL              Allergies  Allergies   Allergen Reactions    Penicillins Rash and Swelling     \"tongue got thick\"       DIET  Orders Placed This Encounter   Procedures    Diet Order Diet: Regular     Standing Status:   Standing "     Number of Occurrences:   1     Order Specific Question:   Diet:     Answer:   Regular [1]       ACTIVITY  As tolerated.  Weight bearing as tolerated    CONSULTATIONS  none    PROCEDURES  none    LABORATORY  Lab Results   Component Value Date    SODIUM 142 12/07/2023    POTASSIUM 3.1 (L) 12/07/2023    CHLORIDE 107 12/07/2023    CO2 24 12/07/2023    GLUCOSE 126 (H) 12/07/2023    BUN 20 12/07/2023    CREATININE 0.76 12/07/2023    CREATININE 0.88 04/06/2010    GLOMRATE >59 04/06/2010        Lab Results   Component Value Date    WBC 7.2 12/07/2023    HEMOGLOBIN 14.2 12/07/2023    HEMATOCRIT 41.4 12/07/2023    PLATELETCT 177 12/07/2023        Total time of the discharge process exceeds 32 minutes.

## 2023-12-07 NOTE — PROGRESS NOTES
4 Eyes Skin Assessment Completed by TASHI Farris and TASHI Monique.    Head WDL  Ears WDL  Nose WDL  Mouth WDL  Neck WDL  Breast/Chest WDL  Shoulder Blades WDL  Spine WDL  (R) Arm/Elbow/Hand WDL  (L) Arm/Elbow/Hand WDL  Abdomen WDL  Groin WDL  Scrotum/Coccyx/Buttocks WDL  (R) Leg WDL  (L) Leg WDL  (R) Heel/Foot/Toe Redness and Blanching  (L) Heel/Foot/Toe Redness and Blanching          Devices In Places Tele Box and Pulse Ox      Interventions In Place Pillows    Possible Skin Injury No    Pictures Uploaded Into Epic N/A  Wound Consult Placed N/A  RN Wound Prevention Protocol Ordered No

## 2023-12-07 NOTE — DISCHARGE PLANNING
"HTH/SCP TCN chart review completed. The most current review of medical record, knowledge of pt's PLOF and social support, LACE+ score of 64, no 6 clicks scores documented.    Pt seen at bedside. Introduced TCN program. Provided education regarding post acute levels of care. Discussed HTH/SCP plan benefits (Meds to Beds, medical uber and [facility] transitional care). Pt verbalizes understanding.     Patient reports no concerns about discharge home, that she has been ambulating in kidd with no concerns about her balance.  This is confirmed via chart as patient ambulating with \"slow and steady gait\" per nursing notes.  Patient also reports she does not use AD, and that she lives at [place], with spouse, which is ILF.  Patient denies assistance with f/u with PCP, reports she can easily get appointment with PCP.      Choice proactively obtained for none and faxed to [facility]. TCN will continue to follow and collaborate with discharge planning team as additional post acute needs arise. Thank you.     Completed today:  Choice obtained: none (see above)  SCP with [facility] PCP. Patient declined assistance from TCN for PCP f/u.     "

## 2023-12-07 NOTE — CARE PLAN
The patient is Stable - Low risk of patient condition declining or worsening    Shift Goals  Clinical Goals: echo, MRI  Patient Goals: rst    Progress made toward(s) clinical / shift goals:    Problem: Knowledge Deficit - Standard  Goal: Patient and family/care givers will demonstrate understanding of plan of care, disease process/condition, diagnostic tests and medications  Outcome: Progressing     Problem: Pain - Standard  Goal: Alleviation of pain or a reduction in pain to the patient’s comfort goal  Outcome: Progressing       Patient is not progressing towards the following goals:

## 2023-12-07 NOTE — H&P
Hospital Medicine History & Physical Note    Date of Service  12/6/2023    Primary Care Physician  TYRA Funez.    Consultants    None    Code Status  DNAR/DNI    Chief Complaint  Chief Complaint   Patient presents with    Possible Stroke     Pt bib remsa after she states she felt dizzy and had a syncopal episode while she was sitting at the table, pt .denies hitting her head or any injury, she states she has numbness to her left cheek, no other complaints at this time       History of Presenting Illness  Milena Ang is a 88 y.o. female with a past medical history of stroke, hypertension, GERD who presented 12/6/2023 with syncopal episode.  Patient states she was bending down to grab something in the kitchen when she suddenly felt like she was going to pass out therefore she got herself to a chair and then was noted by her  to be staring out and was briefly unresponsive.  Patient reported diaphoresis at the time but denied any chest pain or shortness of breath.  She noted some left facial numbness which has persisted but she denies any headache or focal weakness.  She denies any seizure-like activity.  She denies any fevers or chills.  In the ER she was noted to be hypertensive with a blood pressure 166/71    CT head without contrast was negative for acute intracranial process  Chest x-ray interpreted by me reveals no acute cardiopulmonary process  EKG interpreted by me reveals sinus bradycardia with prolonged MN interval.  No acute ischemic changes    I discussed the plan of care with patient, bedside RN, and ERP .    Review of Systems  Review of Systems   Neurological:  Positive for sensory change and loss of consciousness.       Past Medical History   has a past medical history of Cancer (HCC), Cataract, Cervical high risk human papillomavirus (HPV) DNA test positive, Colon polyp, Disorder of thyroid, Diverticulosis of colon, Dyslipidemia, Female bladder prolapse (11/04/2020), High  "cholesterol, Hypertension, Incontinence, Incontinence (11/04/2020), Indigestion, Kidney stone, Menopausal and postmenopausal disorder, OSTEOPOROSIS, Stroke (HCC) (2021), and Urinary bladder disorder (11/04/2020).    Surgical History   has a past surgical history that includes bladder sling female (7/12); cataract extraction with iol (Bilateral); cholecystectomy; pr cystourethroscopy,ureter catheter (Right, 11/6/2020); pr cysto/uretero/pyeloscopy, dx (Bilateral, 11/6/2020); pr cystoscopy,insert ureteral stent (Right, 11/6/2020); and pr thyroid lobectomy,unilat (Right, 8/4/2021).     Family History  family history includes Cancer in her brother, brother, father, and sister; Heart Disease in her brother and father; Hypertension in her father; No Known Problems in her daughter and mother; Psychiatric Illness in her daughter.   Family history reviewed with patient. There is no family history that is pertinent to the chief complaint.     Social History   reports that she has never smoked. She has never used smokeless tobacco. She reports that she does not drink alcohol and does not use drugs.    Allergies  Allergies   Allergen Reactions    Penicillins Rash and Swelling     \"tongue got thick\"       Medications  Prior to Admission Medications   Prescriptions Last Dose Informant Patient Reported? Taking?   Cholecalciferol (VITAMIN D) 2000 UNIT Tab 12/5/2023 at unk  Yes Yes   Sig: Take 2,000 Units by mouth every day.   amLODIPine (NORVASC) 10 MG Tab 12/5/2023 at hs  No No   Sig: Take 1 Tablet by mouth every evening.   aspirin 81 MG EC tablet unk at unk  Yes Yes   Sig: Take 81 mg by mouth every day.   atorvastatin (LIPITOR) 40 MG Tab 12/5/2023 at hs  No No   Sig: Take 1 Tablet by mouth at bedtime.   irbesartan-hydrochlorothiazide (AVALIDE) 300-12.5 MG per tablet 12/6/2023 at am  No No   Sig: TAKE 1 TABLET BY MOUTH EVERY DAY   levothyroxine (SYNTHROID) 75 MCG Tab 12/6/2023 at am  No No   Sig: Take 1 Tablet by mouth every " morning on an empty stomach.   metoprolol SR (TOPROL XL) 25 MG TABLET SR 24 HR 12/5/2023 at unk  No No   Sig: Take 1 Tablet by mouth every day.   omeprazole (PRILOSEC) 20 MG delayed-release capsule 12/5/2023 at 1200  No No   Sig: Take 1 Capsule by mouth every day.      Facility-Administered Medications: None       Physical Exam  Temp:  [36.3 °C (97.3 °F)] 36.3 °C (97.3 °F)  Pulse:  [56-63] 63  Resp:  [16-20] 20  BP: (126-166)/(60-71) 166/71  SpO2:  [91 %-98 %] 93 %  Blood Pressure : (!) 166/71   Temperature: 36.3 °C (97.3 °F)   Pulse: 63   Respiration: 20   Pulse Oximetry: 93 %       Physical Exam  Vitals and nursing note reviewed.   Constitutional:       General: She is not in acute distress.  HENT:      Head: Normocephalic.      Mouth/Throat:      Mouth: Mucous membranes are moist.   Eyes:      Pupils: Pupils are equal, round, and reactive to light.   Cardiovascular:      Rate and Rhythm: Normal rate and regular rhythm.      Pulses: Normal pulses.      Heart sounds: Normal heart sounds.   Pulmonary:      Effort: Pulmonary effort is normal.      Breath sounds: Normal breath sounds.   Abdominal:      Palpations: Abdomen is soft.      Tenderness: There is no abdominal tenderness.   Musculoskeletal:         General: No swelling.      Cervical back: Neck supple.   Skin:     General: Skin is warm.      Coloration: Skin is not jaundiced.   Neurological:      General: No focal deficit present.      Mental Status: She is alert and oriented to person, place, and time.      Comments: Speech intact  Strength and sensation intact bilaterally   Psychiatric:         Mood and Affect: Mood normal.         Behavior: Behavior normal.         Laboratory:  Recent Labs     12/06/23  1135   WBC 6.4   RBC 4.96   HEMOGLOBIN 15.6   HEMATOCRIT 45.6   MCV 91.9   MCH 31.5   MCHC 34.2   RDW 41.4   PLATELETCT 181   MPV 12.3     Recent Labs     12/06/23  1135   SODIUM 145   POTASSIUM 3.4*   CHLORIDE 107   CO2 27   GLUCOSE 137*   BUN 22  "  CREATININE 0.93   CALCIUM 9.0     Recent Labs     12/06/23  1135   ALTSGPT 16   ASTSGOT 15   ALKPHOSPHAT 62   TBILIRUBIN 1.5   GLUCOSE 137*     Recent Labs     12/06/23  1135   APTT 25.5   INR 1.04     No results for input(s): \"NTPROBNP\" in the last 72 hours.      Recent Labs     12/06/23  1135   TROPONINT 22*       Imaging:  DX-CHEST-PORTABLE (1 VIEW)   Final Result      No acute cardiac or pulmonary abnormalities are identified.      CT-HEAD W/O   Final Result         Age-related volume loss and chronic microvascular ischemic changes.      No acute process.      MR-BRAIN-W/O    (Results Pending)   EC-ECHOCARDIOGRAM COMPLETE W/O CONT    (Results Pending)           Assessment/Plan:  Justification for Admission Status  I anticipate this patient is appropriate for observation status at this time because      Patient will need a Telemetry bed on MEDICAL service .  The need is secondary to .    * Syncope and collapse- (present on admission)  Assessment & Plan  Rule out cardiogenic causes  Continuous cardiac monitoring on telemetry  Check 2D echo  Check orthostatics      GERD (gastroesophageal reflux disease)  Assessment & Plan  omeprazole    ACP (advance care planning)- (present on admission)  Assessment & Plan  I discussed advance care planning for at least 16 minutes with the patient, including diagnosis, prognosis, plan of care, risks and benefits of any therapies that could be offered, as well as alternatives including palliation and hospice, as appropriate. The patient has opted for full medical treatment however she would like to be a DNR/DNI.       Numbness- (present on admission)  Assessment & Plan  Left facial numbness that is more pronounced than her baseline from a previous stroke  I will order MRI brain for further evaluation  Neurochecks every 4 hours    Essential hypertension- (present on admission)  Assessment & Plan  Uncontrolled  Continue amlodipine and irbesartan  IV hydralazine as needed        VTE " prophylaxis: enoxaparin ppx

## 2023-12-07 NOTE — PROGRESS NOTES
Report received from drew AKINS at 1900. Patient sitting up in bed, AOX4, denies pain. Reports numbness in L facial area. All other neuro intact. POC dicussed with patient. Call light within  reach.

## 2023-12-08 ENCOUNTER — PATIENT OUTREACH (OUTPATIENT)
Dept: MEDICAL GROUP | Facility: PHYSICIAN GROUP | Age: 88
End: 2023-12-08
Payer: MEDICARE

## 2023-12-11 NOTE — PROGRESS NOTES
12/11: 2nd Patient outreach for TCM.  LVM with contact information. 1st attempt made by Hellen Frazier RN, on 12/8

## 2023-12-21 ENCOUNTER — APPOINTMENT (OUTPATIENT)
Dept: RADIOLOGY | Facility: MEDICAL CENTER | Age: 88
End: 2023-12-21
Payer: MEDICARE

## 2024-01-05 ENCOUNTER — HOSPITAL ENCOUNTER (OUTPATIENT)
Dept: RADIOLOGY | Facility: MEDICAL CENTER | Age: 89
End: 2024-01-05
Payer: MEDICARE

## 2024-01-05 DIAGNOSIS — Z78.0 POST-MENOPAUSAL: ICD-10-CM

## 2024-01-05 PROCEDURE — 77080 DXA BONE DENSITY AXIAL: CPT

## 2024-01-10 ENCOUNTER — TELEMEDICINE (OUTPATIENT)
Dept: MEDICAL GROUP | Facility: PHYSICIAN GROUP | Age: 89
End: 2024-01-10
Payer: MEDICARE

## 2024-01-10 VITALS — WEIGHT: 126 LBS | HEIGHT: 62 IN | BODY MASS INDEX: 23.19 KG/M2

## 2024-01-10 DIAGNOSIS — I10 ESSENTIAL HYPERTENSION: Chronic | ICD-10-CM

## 2024-01-10 DIAGNOSIS — G31.9 CEREBRAL ATROPHY, MILD (HCC): ICD-10-CM

## 2024-01-10 DIAGNOSIS — G47.34 NOCTURNAL HYPOXIA: ICD-10-CM

## 2024-01-10 DIAGNOSIS — E89.0 POSTOPERATIVE HYPOTHYROIDISM: Chronic | ICD-10-CM

## 2024-01-10 DIAGNOSIS — M81.0 AGE-RELATED OSTEOPOROSIS WITHOUT CURRENT PATHOLOGICAL FRACTURE: ICD-10-CM

## 2024-01-10 PROCEDURE — 99214 OFFICE O/P EST MOD 30 MIN: CPT | Mod: 95

## 2024-01-10 ASSESSMENT — PATIENT HEALTH QUESTIONNAIRE - PHQ9: CLINICAL INTERPRETATION OF PHQ2 SCORE: 0

## 2024-01-10 ASSESSMENT — FIBROSIS 4 INDEX: FIB4 SCORE: 1.84

## 2024-01-10 NOTE — PROGRESS NOTES
Virtual Visit: Established Patient   This visit was conducted via Zoom using secure and encrypted videoconferencing technology.   The patient was in their home in the state of [place].    The patient's identity was confirmed and verbal consent was obtained for this virtual visit.    Subjective:   CC:   Chief Complaint   Patient presents with    Follow-Up     Bone scan    Hospital Follow-up     Pt blacked out and went to er.   Er taken off of amlodipine      Milena Ang is a 88 y.o. female presenting for evaluation and management of:    Essential hypertension  Patient presents for follow-up after she was hospitalized in early January for a syncopal episode.  Her metoprolol 25 mg extended release was discontinued due to bradycardia which was suspected to have caused the potential syncopal episode. Unfortunately she discontinued the amlodipine and is still taking the metoprolol. She is still fatigued. She did not follow up with sleep medicine. Her watch at night time shows that she desaturates in while she sleeps so she has stopped checking on this.     Osteopenia of spine  Had DEXA scan that shows osteoporosis. Was last treated with fosamax over 10 years ago but cannot recall how long she was on the medication.     HCC Gap Form    Diagnosis to address: G31.9 - Cerebral atrophy, mild (HCC)  Assessment and plan: Chronic, stable. Continue with current defined treatment plan: Monitor for cognitive decline. Follow-up at least annually.  Last edited 01/10/24 13:55 PST by TYRA Funez.           ROS   Otherwise negative.     Current medicines (including changes today)  Current Outpatient Medications   Medication Sig Dispense Refill    Potassium (POTASSIMIN PO) Take  by mouth.      aspirin 81 MG EC tablet Take 81 mg by mouth every day.      Cholecalciferol (VITAMIN D) 2000 UNIT Tab Take 2,000 Units by mouth every day.      levothyroxine (SYNTHROID) 75 MCG Tab Take 1 Tablet by mouth every morning on an  "empty stomach. 90 Tablet 2    atorvastatin (LIPITOR) 40 MG Tab Take 1 Tablet by mouth at bedtime. 100 Tablet 2    omeprazole (PRILOSEC) 20 MG delayed-release capsule Take 1 Capsule by mouth every day. 30 Capsule 2    irbesartan-hydrochlorothiazide (AVALIDE) 300-12.5 MG per tablet TAKE 1 TABLET BY MOUTH EVERY  Tablet 0    amLODIPine (NORVASC) 10 MG Tab Take 1 Tablet by mouth every evening. 100 Tablet 2     No current facility-administered medications for this visit.       Patient Active Problem List    Diagnosis Date Noted    GERD (gastroesophageal reflux disease) 12/06/2023    Nocturnal hypoxia 11/21/2023    Fall 04/25/2023    Primary insomnia 06/28/2022    ACP (advance care planning) 06/28/2022    Cerebral atrophy, mild (HCC) 06/28/2022    Uterine prolapse 01/06/2022    Postoperative hypothyroidism 12/01/2021    Dyslipidemia 11/15/2021    Incontinence 11/15/2021    Diverticulosis of colon 11/15/2021    Late effect of stroke 06/23/2021    Mixed hyperlipidemia 06/23/2021    Cardiac arrhythmia 06/23/2021    History of CVA (cerebrovascular accident) 03/22/2021    Weight loss 03/22/2021    Bradycardia 03/08/2021    Functional diarrhea 03/03/2021    Allergies 02/08/2021    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 10/11/2020    Fatigue 07/03/2020    Prediabetes 08/07/2019    Overactive bladder 08/06/2018    Vitamin D deficiency 11/18/2017    Nonrheumatic aortic valve insufficiency 05/02/2017    Osteopenia of spine 05/02/2017    Murmur 03/23/2017    Essential hypertension 01/14/2015        Objective:   Ht 1.575 m (5' 2\")   Wt 57.2 kg (126 lb)   LMP 07/01/1986   BMI 23.05 kg/m²     Physical Exam:  Constitutional: Alert, no distress, well-groomed.  Skin: No rashes in visible areas.  Eye: Round. Conjunctiva clear, lids normal. No icterus.   ENMT: Lips pink without lesions, good dentition, moist mucous membranes. Phonation normal.  Neck: No masses, no thyromegaly. Moves freely without " pain.  Respiratory: Unlabored respiratory effort, no cough or audible wheeze  Psych: Alert and oriented x3, normal affect and mood.     Assessment and Plan:   The following treatment plan was discussed:     1. Essential hypertension  Chronic, stable.  Patient will discontinue the metoprolol and restarted on her amlodipine.  Has not been checking blood pressure at home.  No syncopal or dizzy events since discharge.  Follow-up in 4 weeks for an office blood pressure check  - Comp Metabolic Panel; Future    2. Nocturnal hypoxia  Chronic, not at goal.  Referral placed back to sleep medicine.  In the interim, obtain overnight home sleep study with overnight oximetry, this is likely the root cause of her profound fatigue  - Referral to Pulmonary and Sleep Medicine  - Overnight Home Sleep Study; Future  - Overnight Oximetry; Future    3. Postoperative hypothyroidism  Chronic, stable.  Patient wishes to have her thyroid labs checked, last TSH in April 2023 was normal.  Continues on levothyroxine 75 mcg every morning.  - TSH+FREE T4    4. Age-related osteoporosis without current pathological fracture  Chronic, worsening.  Patient reports that she did try Fosamax for she believes about a year, but then was advised to discontinue this medication.  I do not have these records so I am unsure why the medication was discontinued.  Recent bone density scan does show progression to osteoporosis.  Referred to endocrinology for management  - Referral to Endocrinology  - VITAMIN D,25 HYDROXY (DEFICIENCY); Future    5. Cerebral atrophy, mild (HCC)  Chronic, stable.  Identified on CT of the head in March 2021.  Monitor for cognitive change.            Follow-up: Return in about 4 weeks (around 2/7/2024) for lab follow up.

## 2024-02-08 ENCOUNTER — TELEPHONE (OUTPATIENT)
Dept: HEALTH INFORMATION MANAGEMENT | Facility: OTHER | Age: 89
End: 2024-02-08
Payer: MEDICARE

## 2024-02-15 ENCOUNTER — APPOINTMENT (OUTPATIENT)
Dept: LAB | Facility: MEDICAL CENTER | Age: 89
End: 2024-02-15
Payer: MEDICARE

## 2024-02-26 ENCOUNTER — APPOINTMENT (OUTPATIENT)
Dept: LAB | Facility: MEDICAL CENTER | Age: 89
End: 2024-02-26
Payer: MEDICARE

## 2024-04-11 ENCOUNTER — APPOINTMENT (OUTPATIENT)
Dept: MEDICAL GROUP | Facility: PHYSICIAN GROUP | Age: 89
End: 2024-04-11
Payer: MEDICARE

## 2024-04-11 VITALS
OXYGEN SATURATION: 93 % | HEART RATE: 68 BPM | TEMPERATURE: 97.8 F | SYSTOLIC BLOOD PRESSURE: 118 MMHG | HEIGHT: 62 IN | RESPIRATION RATE: 16 BRPM | WEIGHT: 128 LBS | DIASTOLIC BLOOD PRESSURE: 66 MMHG | BODY MASS INDEX: 23.55 KG/M2

## 2024-04-11 DIAGNOSIS — R53.82 CHRONIC FATIGUE: Chronic | ICD-10-CM

## 2024-04-11 DIAGNOSIS — I69.30 LATE EFFECT OF STROKE: ICD-10-CM

## 2024-04-11 DIAGNOSIS — M81.0 AGE-RELATED OSTEOPOROSIS WITHOUT CURRENT PATHOLOGICAL FRACTURE: ICD-10-CM

## 2024-04-11 DIAGNOSIS — I10 ESSENTIAL HYPERTENSION: Chronic | ICD-10-CM

## 2024-04-11 DIAGNOSIS — K59.1 FUNCTIONAL DIARRHEA: ICD-10-CM

## 2024-04-11 DIAGNOSIS — L29.9 ITCHING: ICD-10-CM

## 2024-04-11 PROBLEM — R63.4 WEIGHT LOSS: Status: RESOLVED | Noted: 2021-03-22 | Resolved: 2024-04-11
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
## 2024-04-11 PROCEDURE — 99214 OFFICE O/P EST MOD 30 MIN: CPT | Performed by: FAMILY MEDICINE

## 2024-04-11 PROCEDURE — 3078F DIAST BP <80 MM HG: CPT | Performed by: FAMILY MEDICINE

## 2024-04-11 PROCEDURE — 3074F SYST BP LT 130 MM HG: CPT | Performed by: FAMILY MEDICINE

## 2024-04-11 RX ORDER — ONDANSETRON 4 MG/1
TABLET, ORALLY DISINTEGRATING ORAL
Qty: 30 TABLET | Refills: 0 | Status: SHIPPED | OUTPATIENT
Start: 2024-04-11

## 2024-04-11 RX ORDER — ONDANSETRON 4 MG/1
TABLET, ORALLY DISINTEGRATING ORAL
COMMUNITY
Start: 2024-02-24 | End: 2024-04-11 | Stop reason: SDUPTHER

## 2024-04-11 ASSESSMENT — FIBROSIS 4 INDEX: FIB4 SCORE: 1.84

## 2024-04-11 NOTE — ASSESSMENT & PLAN NOTE
This is a chronic problem.  Patient states periodically she will just have feelings of fatigue and not want to do much.  She cannot attribute anything in particular.  Her PCP had tried to order a sleep apnea test but apparently she was sick at the time so did not get a valid reading.  She does not want to go back and have it done at this time.  She states she will sleep several hours and then wake up and do some things around the house and to go back and sleep again.  She takes melatonin for this.

## 2024-04-11 NOTE — ASSESSMENT & PLAN NOTE
This is a chronic problem.  She has had troubles with diarrhea on and off for quite some time.  She was at [facility] emergency room back in February with nausea, vomiting and diarrhea.  They hydrated her and gave her Zofran.  She states with the Zofran her diarrhea will go away for couple of days and she would like a refill for that.

## 2024-04-11 NOTE — PROGRESS NOTES
Subjective:     CC: Here to discuss several issues.    HPI:   Milena presents today with the following medical concerns:    Age-related osteoporosis without current pathological fracture  This is a new issue.  Patient states her PCP had recommended that she endocrinology about whether treatment is needed for the new finding of osteoporosis in her left hip.  She is asking whether she really needs to do that.  She apparently had been on Fosamax for 5 years and was stopped around 2011 or 2012.  Her bone density remained stable until this last exam.  She does not really want to take a medication at this time.  And she is going to delay on seeing endocrinology.  She is aware that decreasing bone density could increase her risk of refracture if she was to have a fall.    Essential hypertension  This is a chronic problem.  Patient states with the last change in her blood pressure medication it has been doing much better and she thinks it is under very good control at the present time.    Fatigue  This is a chronic problem.  Patient states periodically she will just have feelings of fatigue and not want to do much.  She cannot attribute anything in particular.  Her PCP had tried to order a sleep apnea test but apparently she was sick at the time so did not get a valid reading.  She does not want to go back and have it done at this time.  She states she will sleep several hours and then wake up and do some things around the house and to go back and sleep again.  She takes melatonin for this.    Functional diarrhea  This is a chronic problem.  She has had troubles with diarrhea on and off for quite some time.  She was at [facility] emergency room back in February with nausea, vomiting and diarrhea.  They hydrated her and gave her Zofran.  She states with the Zofran her diarrhea will go away for couple of days and she would like a refill for that.    Late effect of stroke  This is a chronic problem.  She has persistent  numbness down the left arm and around the left side of the head from her stroke.  She is asking if that is permanent and I told her most likely it will be.    Itching  This is a chronic problem.  She states she occasionally gets itching to her scalp and when that happens she tends to have a day of fatigue.  She did change her shampoo and it has lessened the occurrences but it still happens.    Past Medical History:   Diagnosis Date    Cancer (HCC)     skin cancer right wrist    Cataract     11-4-2020 H/O IOL OU    Cervical high risk human papillomavirus (HPV) DNA test positive     Colon polyp     hyperplastic    Disorder of thyroid     Diverticulosis of colon     Dyslipidemia     Female bladder prolapse 11/04/2020    High cholesterol     Hypertension     Incontinence     pessary    Incontinence 11/04/2020    Pt. states does not have a pessary in place.    Indigestion     Kidney stone     Menopausal and postmenopausal disorder     OSTEOPOROSIS     Stroke (HCC) 2021    no residual    Urinary bladder disorder 11/04/2020    Bladder Prolapse       Social History     Tobacco Use    Smoking status: Never    Smokeless tobacco: Never   Vaping Use    Vaping Use: Never used   Substance Use Topics    Alcohol use: No    Drug use: No       Current Outpatient Medications Ordered in Epic   Medication Sig Dispense Refill    Omega 3-6-9 Fatty Acids (OMEGA-3-6-9 PO) Take  by mouth.      ondansetron (ZOFRAN ODT) 4 MG TABLET DISPERSIBLE DISSOLVE 1 TABLET ON THE TONGUE EVERY 8 HOURS FOR 7 DAYS AS NEEDED FOR NAUSEA OR VOMITING 30 Tablet 0    Potassium (POTASSIMIN PO) Take  by mouth.      aspirin 81 MG EC tablet Take 81 mg by mouth every day.      Cholecalciferol (VITAMIN D) 2000 UNIT Tab Take 2,000 Units by mouth every day.      amLODIPine (NORVASC) 10 MG Tab Take 1 Tablet by mouth every evening. 100 Tablet 2    levothyroxine (SYNTHROID) 75 MCG Tab Take 1 Tablet by mouth every morning on an empty stomach. 90 Tablet 2    atorvastatin  "(LIPITOR) 40 MG Tab Take 1 Tablet by mouth at bedtime. 100 Tablet 2    omeprazole (PRILOSEC) 20 MG delayed-release capsule Take 1 Capsule by mouth every day. 30 Capsule 2    irbesartan-hydrochlorothiazide (AVALIDE) 300-12.5 MG per tablet TAKE 1 TABLET BY MOUTH EVERY  Tablet 0     No current Epic-ordered facility-administered medications on file.       Allergies:  Penicillins    Health Maintenance: Completed    ROS:  Gen: no fevers/chills, no changes in weight  Eyes: no changes in vision  ENT: no sore throat, no hearing loss, no bloody nose  Pulm: no sob, no cough  CV: no chest pain, no palpitations  GI: no nausea/vomiting, no diarrhea  : no dysuria  MSk: no myalgias  Skin: no rash  Neuro: no headaches,   Heme/Lymph: no easy bruising      Objective:       Exam:  /66 (BP Location: Right arm, Patient Position: Sitting, BP Cuff Size: Adult)   Pulse 68   Temp 36.6 °C (97.8 °F) (Temporal)   Resp 16   Ht 1.575 m (5' 2\")   Wt 58.1 kg (128 lb)   LMP 07/01/1986   SpO2 93%   BMI 23.41 kg/m²  Body mass index is 23.41 kg/m².    Gen: Alert and oriented, No apparent distress.  Neck: Neck is supple without lymphadenopathy.  Lungs: Normal effort,   Ext: No clubbing, cyanosis, edema.  Psych: Patient is alert and cooperative.  No unusual thought Jean Pierre expressed.  Insight and judgment is good.    Labs: ER records from April 24 reviewed and labs reviewed with patient.  They appear to show evidence of dehydration consistent with her clinical picture.    Assessment & Plan:     88 y.o. female with the following -     1. Age-related osteoporosis without current pathological fracture  This is a new issue.  She does not want to see the endocrinologist at this time.  I did discuss with her options to treat osteoporosis to help prevent fractures but she is not interested at this time.    2. Essential hypertension  This is a chronic stable condition.  Continue current medications.    3. Chronic fatigue  This is a chronic " problem.  I told her I do not really know what is causing her periodic episodes of fatigue but it could be her disrupted sleep pattern.  Continue to follow.    4. Functional diarrhea  This is a chronic problem.  Since it is improved using Zofran once or twice a week we will go ahead and renew that for her.  Even though I told her it is really for nausea and vomiting.    5. Late effect of stroke  This is a chronic stable condition.  Discussed with patient.    6. Itching  This is a chronic problem.  I told her to be sure she looks into her shampoo and even laundry detergent is possible causes of her scalp itching.  She might even want to try head and shoulders shampoo to see if that helps.  Otherwise I do not have any suggestions on what to do.    35 minutes spent with the patient.  Return in about 6 months (around 10/11/2024) for Short with PCP.    Please note that this dictation was created using voice recognition software. I have made every reasonable attempt to correct obvious errors, but I expect that there are errors of grammar and possibly content that I did not discover before finalizing the note.

## 2024-04-11 NOTE — ASSESSMENT & PLAN NOTE
This is a new issue.  Patient states her PCP had recommended that she endocrinology about whether treatment is needed for the new finding of osteoporosis in her left hip.  She is asking whether she really needs to do that.  She apparently had been on Fosamax for 5 years and was stopped around 2011 or 2012.  Her bone density remained stable until this last exam.  She does not really want to take a medication at this time.  And she is going to delay on seeing endocrinology.  She is aware that decreasing bone density could increase her risk of refracture if she was to have a fall.

## 2024-04-11 NOTE — ASSESSMENT & PLAN NOTE
This is a chronic problem.  Patient states with the last change in her blood pressure medication it has been doing much better and she thinks it is under very good control at the present time.

## 2024-04-11 NOTE — ASSESSMENT & PLAN NOTE
This is a chronic problem.  She has persistent numbness down the left arm and around the left side of the head from her stroke.  She is asking if that is permanent and I told her most likely it will be.

## 2024-04-11 NOTE — ASSESSMENT & PLAN NOTE
This is a chronic problem.  She states she occasionally gets itching to her scalp and when that happens she tends to have a day of fatigue.  She did change her shampoo and it has lessened the occurrences but it still happens.

## 2024-04-26 ENCOUNTER — TELEPHONE (OUTPATIENT)
Dept: HEALTH INFORMATION MANAGEMENT | Facility: OTHER | Age: 89
End: 2024-04-26
Payer: MEDICARE

## 2024-05-21 ENCOUNTER — HOSPITAL ENCOUNTER (OUTPATIENT)
Dept: LAB | Facility: MEDICAL CENTER | Age: 89
End: 2024-05-21
Payer: MEDICARE

## 2024-05-21 ENCOUNTER — OFFICE VISIT (OUTPATIENT)
Dept: MEDICAL GROUP | Facility: PHYSICIAN GROUP | Age: 89
End: 2024-05-21
Payer: MEDICARE

## 2024-05-21 VITALS
SYSTOLIC BLOOD PRESSURE: 144 MMHG | HEART RATE: 61 BPM | OXYGEN SATURATION: 96 % | DIASTOLIC BLOOD PRESSURE: 74 MMHG | RESPIRATION RATE: 16 BRPM | BODY MASS INDEX: 24.03 KG/M2 | TEMPERATURE: 97.9 F | WEIGHT: 130.6 LBS | HEIGHT: 62 IN

## 2024-05-21 DIAGNOSIS — R73.09 ELEVATED GLUCOSE: ICD-10-CM

## 2024-05-21 DIAGNOSIS — J04.0 REFLUX LARYNGITIS: ICD-10-CM

## 2024-05-21 DIAGNOSIS — K21.9 REFLUX LARYNGITIS: ICD-10-CM

## 2024-05-21 DIAGNOSIS — Z00.00 ENCOUNTER FOR MEDICARE ANNUAL WELLNESS EXAM: ICD-10-CM

## 2024-05-21 DIAGNOSIS — Z86.73 HISTORY OF CVA (CEREBROVASCULAR ACCIDENT): ICD-10-CM

## 2024-05-21 DIAGNOSIS — I10 ESSENTIAL HYPERTENSION: Chronic | ICD-10-CM

## 2024-05-21 DIAGNOSIS — Z86.73 HISTORY OF STROKE: ICD-10-CM

## 2024-05-21 DIAGNOSIS — M81.0 AGE-RELATED OSTEOPOROSIS WITHOUT CURRENT PATHOLOGICAL FRACTURE: ICD-10-CM

## 2024-05-21 DIAGNOSIS — E78.2 MIXED HYPERLIPIDEMIA: ICD-10-CM

## 2024-05-21 DIAGNOSIS — K59.1 FUNCTIONAL DIARRHEA: ICD-10-CM

## 2024-05-21 DIAGNOSIS — E89.0 POSTOPERATIVE HYPOTHYROIDISM: Chronic | ICD-10-CM

## 2024-05-21 LAB
ALBUMIN SERPL BCP-MCNC: 4.1 G/DL (ref 3.2–4.9)
ALBUMIN/GLOB SERPL: 1.3 G/DL
ALP SERPL-CCNC: 64 U/L (ref 30–99)
ALT SERPL-CCNC: 17 U/L (ref 2–50)
ANION GAP SERPL CALC-SCNC: 13 MMOL/L (ref 7–16)
AST SERPL-CCNC: 19 U/L (ref 12–45)
BILIRUB SERPL-MCNC: 0.8 MG/DL (ref 0.1–1.5)
BUN SERPL-MCNC: 22 MG/DL (ref 8–22)
CALCIUM ALBUM COR SERPL-MCNC: 9 MG/DL (ref 8.5–10.5)
CALCIUM SERPL-MCNC: 9.1 MG/DL (ref 8.5–10.5)
CHLORIDE SERPL-SCNC: 107 MMOL/L (ref 96–112)
CO2 SERPL-SCNC: 25 MMOL/L (ref 20–33)
CREAT SERPL-MCNC: 0.79 MG/DL (ref 0.5–1.4)
EST. AVERAGE GLUCOSE BLD GHB EST-MCNC: 123 MG/DL
GFR SERPLBLD CREATININE-BSD FMLA CKD-EPI: 72 ML/MIN/1.73 M 2
GLOBULIN SER CALC-MCNC: 3.1 G/DL (ref 1.9–3.5)
GLUCOSE SERPL-MCNC: 124 MG/DL (ref 65–99)
HBA1C MFR BLD: 5.9 % (ref 4–5.6)
POTASSIUM SERPL-SCNC: 3.7 MMOL/L (ref 3.6–5.5)
PROT SERPL-MCNC: 7.2 G/DL (ref 6–8.2)
SODIUM SERPL-SCNC: 145 MMOL/L (ref 135–145)

## 2024-05-21 PROCEDURE — 3077F SYST BP >= 140 MM HG: CPT

## 2024-05-21 PROCEDURE — G0439 PPPS, SUBSEQ VISIT: HCPCS

## 2024-05-21 PROCEDURE — 3078F DIAST BP <80 MM HG: CPT

## 2024-05-21 RX ORDER — OMEPRAZOLE 20 MG/1
20 CAPSULE, DELAYED RELEASE ORAL DAILY
Qty: 100 CAPSULE | Refills: 2 | Status: SHIPPED | OUTPATIENT
Start: 2024-05-21

## 2024-05-21 RX ORDER — ATORVASTATIN CALCIUM 40 MG/1
40 TABLET, FILM COATED ORAL
Qty: 100 TABLET | Refills: 2 | Status: SHIPPED | OUTPATIENT
Start: 2024-05-21

## 2024-05-21 RX ORDER — LEVOTHYROXINE SODIUM 0.07 MG/1
75 TABLET ORAL
Qty: 100 TABLET | Refills: 2 | Status: SHIPPED | OUTPATIENT
Start: 2024-05-21 | End: 2024-05-21 | Stop reason: SDUPTHER

## 2024-05-21 RX ORDER — ASPIRIN 81 MG/1
81 TABLET ORAL DAILY
Qty: 100 TABLET | Refills: 2 | Status: SHIPPED | OUTPATIENT
Start: 2024-05-21

## 2024-05-21 RX ORDER — LEVOTHYROXINE SODIUM 0.07 MG/1
75 TABLET ORAL
Qty: 30 TABLET | Refills: 2 | Status: SHIPPED | OUTPATIENT
Start: 2024-05-21

## 2024-05-21 RX ORDER — AMLODIPINE BESYLATE 10 MG/1
10 TABLET ORAL NIGHTLY
Qty: 100 TABLET | Refills: 2 | Status: SHIPPED | OUTPATIENT
Start: 2024-05-21

## 2024-05-21 ASSESSMENT — FIBROSIS 4 INDEX: FIB4 SCORE: 1.84

## 2024-05-21 ASSESSMENT — ENCOUNTER SYMPTOMS: GENERAL WELL-BEING: GOOD

## 2024-05-21 ASSESSMENT — ACTIVITIES OF DAILY LIVING (ADL): BATHING_REQUIRES_ASSISTANCE: 0

## 2024-05-21 ASSESSMENT — PATIENT HEALTH QUESTIONNAIRE - PHQ9: CLINICAL INTERPRETATION OF PHQ2 SCORE: 0

## 2024-05-21 NOTE — ASSESSMENT & PLAN NOTE
Patient reports that she has seen GI, still having some episodes of mucus in her stool but this has been a couple of years. She reports the diarrhea improves with use of zofran once or twice weekly.

## 2024-05-21 NOTE — PROGRESS NOTES
Chief Complaint   Patient presents with    Annual Wellness Visit       HPI:  Milena Ang is a 88 y.o. here for Medicare Annual Wellness Visit     Patient Active Problem List    Diagnosis Date Noted    Age-related osteoporosis without current pathological fracture 04/11/2024    Itching 04/11/2024    GERD (gastroesophageal reflux disease) 12/06/2023    Nocturnal hypoxia 11/21/2023    Primary insomnia 06/28/2022    ACP (advance care planning) 06/28/2022    Cerebral atrophy, mild (HCC) 06/28/2022    Uterine prolapse 01/06/2022    Postoperative hypothyroidism 12/01/2021    Dyslipidemia 11/15/2021    Incontinence 11/15/2021    Diverticulosis of colon 11/15/2021    Late effect of stroke 06/23/2021    Mixed hyperlipidemia 06/23/2021    Cardiac arrhythmia 06/23/2021    History of CVA (cerebrovascular accident) 03/22/2021    Bradycardia 03/08/2021    Functional diarrhea 03/03/2021    Allergies 02/08/2021    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 10/11/2020    Fatigue 07/03/2020    Prediabetes 08/07/2019    Overactive bladder 08/06/2018    Vitamin D deficiency 11/18/2017    Nonrheumatic aortic valve insufficiency 05/02/2017    Osteopenia of spine 05/02/2017    Murmur 03/23/2017    Essential hypertension 01/14/2015       Current Outpatient Medications   Medication Sig Dispense Refill    amLODIPine (NORVASC) 10 MG Tab Take 1 Tablet by mouth every evening. 100 Tablet 2    atorvastatin (LIPITOR) 40 MG Tab Take 1 Tablet by mouth at bedtime. 100 Tablet 2    omeprazole (PRILOSEC) 20 MG delayed-release capsule Take 1 Capsule by mouth every day. 100 Capsule 2    levothyroxine (SYNTHROID) 75 MCG Tab Take 1 Tablet by mouth every morning on an empty stomach. 30 Tablet 2    aspirin 81 MG EC tablet Take 1 Tablet by mouth every day. 100 Tablet 2    Omega 3-6-9 Fatty Acids (OMEGA-3-6-9 PO) Take  by mouth.      ondansetron (ZOFRAN ODT) 4 MG TABLET DISPERSIBLE DISSOLVE 1 TABLET ON THE TONGUE EVERY 8 HOURS FOR 7 DAYS  AS NEEDED FOR NAUSEA OR VOMITING 30 Tablet 0    Potassium (POTASSIMIN PO) Take  by mouth.      Cholecalciferol (VITAMIN D) 2000 UNIT Tab Take 2,000 Units by mouth every day.      irbesartan-hydrochlorothiazide (AVALIDE) 300-12.5 MG per tablet TAKE 1 TABLET BY MOUTH EVERY  Tablet 0     No current facility-administered medications for this visit.          Current supplements as per medication list.     Allergies: Penicillins    Current social contact/activities: Living at [place], happy, socializes and wants to play bingo.  Puzzles, newspapers, reading     She  reports that she has never smoked. She has never used smokeless tobacco. She reports that she does not drink alcohol and does not use drugs.  Counseling given: Not Answered      ROS:    Gait: Uses no assistive device  Ostomy: No  Other tubes: No  Amputations: No  Chronic oxygen use: No  Last eye exam: 2024  Wears hearing aids: No   : Reports urinary leakage during the last 6 months that has not interfered at all with their daily activities or sleep.    Screening:    Depression Screening  Little interest or pleasure in doing things?  0 - not at all  Feeling down, depressed , or hopeless? 0 - not at all  Patient Health Questionnaire Score: 0     If depressive symptoms identified deferred to follow up visit unless specifically addressed in assessment and plan.    Interpretation of PHQ-9 Total Score   Score Severity   1-4 No Depression   5-9 Mild Depression   10-14 Moderate Depression   15-19 Moderately Severe Depression   20-27 Severe Depression    Screening for Cognitive Impairment  Do you or any of your friends or family members have any concern about your memory? No  Three Minute Recall (Leader, Season, Table) 2/3    Ryne clock face with all 12 numbers and set the hands to show 10 minutes after 11.  Yes    Cognitive concerns identified deferred for follow up unless specifically addressed in assessment and plan.    Fall Risk Assessment  Has the patient  had two or more falls in the last year or any fall with injury in the last year?  No    Safety Assessment  Do you always wear your seatbelt?  Yes  Any changes to home needed to function safely? No  Difficulty hearing.  No  Patient counseled about all safety risks that were identified.    Functional Assessment ADLs  Are there any barriers preventing you from cooking for yourself or meeting nutritional needs?  No.    Are there any barriers preventing you from driving safely or obtaining transportation?  No.    Are there any barriers preventing you from using a telephone or calling for help?  No    Are there any barriers preventing you from shopping?  No.    Are there any barriers preventing you from taking care of your own finances?  No    Are there any barriers preventing you from managing your medications?  No    Are there any barriers preventing you from showering, bathing or dressing yourself? No    Are there any barriers preventing you from doing housework or laundry? No  Are there any barriers preventing you from using the toilet?No  Are you currently engaging in any exercise or physical activity?  Yes. Tried to walk 500 steps a day     Self-Assessment of Health  What is your perception of your health? Good  Do you sleep more than six hours a night? Yes  In the past 7 days, how much did pain keep you from doing your normal work? None  Do you spend quality time with family or friends (virtually or in person)? Yes  Do you usually eat a heart healthy diet that constists of a variety of fruits, vegetables, whole grains and fiber? Yes  Do you eat foods high in fat and/or Fast Food more than three times per week? No    Advance Care Planning  Do you have an Advance Directive, Living Will, Durable Power of , or POLST? Yes  Advance Directive Living Will Durable Power of  POLST is not on file - instructed patient to bring in a copy to scan into their chart      Health Maintenance Summary             Postponed - COVID-19 Vaccine (3 - 2023-24 season) Postponed until 4/11/2025 03/06/2021  Imm Admin: PFIZER PURPLE CAP SARS-COV-2 VACCINATION (12+)    02/17/2021  Imm Admin: PFIZER PURPLE CAP SARS-COV-2 VACCINATION (12+)              Influenza Vaccine (Season Ended) Next due on 9/1/2024      10/10/2012  Imm Admin: INFLUENZA TIV (IM)    10/10/2012  Imm Admin: Influenza Seasonal Injectable - Historical Data    10/04/2012  Imm Admin: Influenza Seasonal Injectable - Historical Data              Annual Wellness Visit (Yearly) Next due on 5/21/2025 05/21/2024  Visit Dx: Encounter for Medicare annual wellness exam    08/07/2019  Subsequent Annual Wellness Visit - Includes PPPS ()    08/19/2015  Visit Dx: Encounter for Medicare annual wellness exam    03/12/2014  Done    10/25/2012  Done    Only the first 5 history entries have been loaded, but more history exists.              IMM DTaP/Tdap/Td Vaccine (2 - Td or Tdap) Next due on 9/20/2026 09/20/2016  Imm Admin: Tdap Vaccine    10/23/1990  Imm Admin: TD Vaccine              Bone Density Scan (Every 5 Years) Next due on 1/5/2029 01/05/2024  DS-BONE DENSITY STUDY (DEXA)    04/20/2017  DS-BONE DENSITY STUDY (DEXA)    04/21/2014  Done    11/21/2011  DS-BONE DENSITY STUDY (DEXA)    10/10/2006  DS-BONE DENSITY STUDY (DEXA)    Only the first 5 history entries have been loaded, but more history exists.              Pneumococcal Vaccine: 65+ Years (Series Information) Completed      09/20/2016  Imm Admin: Pneumococcal Conjugate Vaccine (Prevnar/PCV-13)    10/29/2012  Imm Admin: Pneumococcal polysaccharide vaccine (PPSV-23)    10/25/2012  Imm Admin: Pneumococcal Vaccine (UF) - HISTORICAL DATA    08/17/2006  Imm Admin: Pneumococcal Vaccine (UF) - HISTORICAL DATA              Zoster (Shingles) Vaccines (Series Information) Completed      11/30/2021  Imm Admin: Zoster Vaccine Recombinant (RZV) (SHINGRIX)    01/05/2021  Imm Admin: Zoster Vaccine Recombinant (RZV)  (SHINGRIX)    10/30/2012  Imm Admin: Zoster Vaccine Live (ZVL) (Zostavax) - HISTORICAL DATA    10/25/2012  Imm Admin: Zoster Vaccine Live (ZVL) (Zostavax) - HISTORICAL DATA              Hepatitis A Vaccine (Hep A) (Series Information) Aged Out      No completion history exists for this topic.              Hepatitis B Vaccine (Hep B) (Series Information) Aged Out      No completion history exists for this topic.              HPV Vaccines (Series Information) Aged Out      No completion history exists for this topic.              Meningococcal Immunization (Series Information) Aged Out      No completion history exists for this topic.              Discontinued - Mammogram  Discontinued        Frequency changed to Never automatically (Topic No Longer Applies)    09/29/2016  MA-SCREENING MAMMO BILAT W/CAD    04/21/2014  [id number]    11/28/2012  MA-SCREEING MAMMOGRAM W/ CAD    11/21/2011  MA-SCREEING MAMMOGRAM W/ CAD    Only the first 5 history entries have been loaded, but more history exists.              Discontinued - Cervical Cancer Screening  Discontinued        Frequency changed to Never automatically (Topic No Longer Applies)    09/26/2011  PAP IG, RFX HPV ALL PTH              Discontinued - Colorectal Cancer Screening  Discontinued        Frequency changed to Never automatically (Topic No Longer Applies)    03/28/2013  OCCULT BLOOD FECES IMMUNOASSAY    10/15/2008  Colonoscopy (Done)              Discontinued - Polio Vaccine (Inactivated Polio)  Discontinued      07/20/1993  Imm Admin: OPV TRIVALENT - HISTORICAL DATA (GIVEN PRIOR TO MAY 2016)    10/23/1990  Imm Admin: OPV TRIVALENT - HISTORICAL DATA (GIVEN PRIOR TO MAY 2016)                    Patient Care Team:  ZOLTAN Funez as PCP - General (Nurse Practitioner Family)  Dario Sy O.D. as Consulting Physician (Optometry)        Social History     Tobacco Use    Smoking status: Never    Smokeless tobacco: Never   Vaping Use     "Vaping status: Never Used   Substance Use Topics    Alcohol use: No    Drug use: No     Family History   Problem Relation Age of Onset    Cancer Father         pancreatic    Heart Disease Father         MI at age 57    Hypertension Father     Cancer Brother         melanoma    Heart Disease Brother     No Known Problems Mother     Cancer Sister         ovaren cancer    Cancer Brother         throat    Psychiatric Illness Daughter     No Known Problems Daughter      She  has a past medical history of Cancer (HCC), Cataract, Cervical high risk human papillomavirus (HPV) DNA test positive, Colon polyp, Disorder of thyroid, Diverticulosis of colon, Dyslipidemia, Female bladder prolapse (11/04/2020), High cholesterol, Hypertension, Incontinence, Incontinence (11/04/2020), Indigestion, Kidney stone, Menopausal and postmenopausal disorder, OSTEOPOROSIS, Stroke (HCC) (2021), and Urinary bladder disorder (11/04/2020).   Past Surgical History:   Procedure Laterality Date    NJ THYROID LOBECTOMY,UNILAT Right 8/4/2021    Procedure: LOBECTOMY, THYROID - TOTAL, UNILATERAL.;  Surgeon: Mercedes Byrne M.D.;  Location: SURGERY SAME DAY [facility];  Service: General    NJ CYSTOURETHROSCOPY,URETER CATHETER Right 11/6/2020    Procedure: CYSTOSCOPY, WITH BILATERAL RETROGRADE PYELOGRAM- URETERO;  Surgeon: Ernesto Gamboa M.D.;  Location: SURGERY [facility];  Service: Urology    NJ CYSTO/URETERO/PYELOSCOPY, DX Bilateral 11/6/2020    Procedure: URETEROSCOPY;  Surgeon: Ernesto Gamboa M.D.;  Location: SURGERY [facility];  Service: Urology    NJ CYSTOSCOPY,INSERT URETERAL STENT Right 11/6/2020    Procedure: CYSTOSCOPY, WITH URETERAL STENT INSERTION;  Surgeon: Ernesto Gamboa M.D.;  Location: SURGERY [facility];  Service: Urology    BLADDER SLING FEMALE  7/12    Dr. Quinn    CATARACT EXTRACTION WITH IOL Bilateral     CHOLECYSTECTOMY         Exam:   BP (!) 144/74   Pulse 61   Temp 36.6 °C (97.9 °F) (Temporal)   Resp 16   Ht 1.575 m (5' 2\")  "  Wt 59.2 kg (130 lb 9.6 oz)   SpO2 96%  Body mass index is 23.89 kg/m².    Hearing excellent.    Dentition good  Alert, oriented in no acute distress.  Eye contact is good, speech goal directed, affect calm    Assessment and Plan. The following treatment and monitoring plan is recommended:      Start transitioning medications over to mail delivery   1. Encounter for Medicare annual wellness exam    2. Essential hypertension  - amLODIPine (NORVASC) 10 MG Tab; Take 1 Tablet by mouth every evening.  Dispense: 100 Tablet; Refill: 2    3. Postoperative hypothyroidism  - levothyroxine (SYNTHROID) 75 MCG Tab; Take 1 Tablet by mouth every morning on an empty stomach.  Dispense: 30 Tablet; Refill: 2    4. Functional diarrhea    5. Mixed hyperlipidemia  - atorvastatin (LIPITOR) 40 MG Tab; Take 1 Tablet by mouth at bedtime.  Dispense: 100 Tablet; Refill: 2    6. History of stroke  - atorvastatin (LIPITOR) 40 MG Tab; Take 1 Tablet by mouth at bedtime.  Dispense: 100 Tablet; Refill: 2    7. Reflux laryngitis  - omeprazole (PRILOSEC) 20 MG delayed-release capsule; Take 1 Capsule by mouth every day.  Dispense: 100 Capsule; Refill: 2    8. History of CVA (cerebrovascular accident)  - aspirin 81 MG EC tablet; Take 1 Tablet by mouth every day.  Dispense: 100 Tablet; Refill: 2    9. Elevated glucose  - HEMOGLOBIN A1C; Future      Services suggested: No services needed at this time  Health Care Screening: Age-appropriate preventive services recommended by USPTF and ACIP covered by Medicare were discussed today. Services ordered if indicated and agreed upon by the patient.  Referrals offered: Community-based lifestyle interventions to reduce health risks and promote self-management and wellness, fall prevention, nutrition, physical activity, tobacco-use cessation, weight loss, and mental health services as per orders if indicated.    Discussion today about general wellness and lifestyle habits:    Prevent falls and reduce trip  hazards; Cautioned about securing or removing rugs.  Have a working fire alarm and carbon monoxide detector;   Engage in regular physical activity and social activities     Follow-up: Return in about 3 months (around 8/21/2024) for lab follow up.

## 2024-05-21 NOTE — ASSESSMENT & PLAN NOTE
Chronic and ongoing. Managed with amlodipine 5mg QHS, irbesartan-HCTZ 300/12.5mg daily. Fill dates not consistent. Needs BMP rechecked for electrolytes.

## 2024-05-21 NOTE — ASSESSMENT & PLAN NOTE
This is a chronic, stable medical condition.   Labs previously ordered but not completed.   Levothyroxine 75mcg daily.

## 2024-05-22 LAB
25(OH)D3 SERPL-MCNC: 26 NG/ML (ref 30–100)
T4 FREE SERPL-MCNC: 1.29 NG/DL (ref 0.93–1.7)
TSH SERPL DL<=0.005 MIU/L-ACNC: 1.6 UIU/ML (ref 0.38–5.33)

## 2024-05-29 DIAGNOSIS — E55.9 VITAMIN D DEFICIENCY: ICD-10-CM

## 2024-05-29 DIAGNOSIS — M85.88 OSTEOPENIA OF SPINE: ICD-10-CM

## 2024-05-29 RX ORDER — ERGOCALCIFEROL 1.25 MG/1
50000 CAPSULE ORAL
Qty: 12 CAPSULE | Refills: 0 | Status: SHIPPED | OUTPATIENT
Start: 2024-05-29

## 2024-05-29 NOTE — TELEPHONE ENCOUNTER
Received request via: Pharmacy    Was the patient seen in the last year in this department? Yes    Does the patient have an active prescription (recently filled or refills available) for medication(s) requested? No    [facility]    Does the patient have correction Plus and need 100 day supply (blood pressure, diabetes and cholesterol meds only)? Yes, quantity updated to 100 days

## 2024-06-05 RX ORDER — IRBESARTAN AND HYDROCHLOROTHIAZIDE 300; 12.5 MG/1; MG/1
1 TABLET, FILM COATED ORAL
Qty: 100 TABLET | Refills: 3 | Status: SHIPPED | OUTPATIENT
Start: 2024-06-05

## 2024-06-11 ENCOUNTER — OFFICE VISIT (OUTPATIENT)
Dept: MEDICAL GROUP | Facility: PHYSICIAN GROUP | Age: 89
End: 2024-06-11
Payer: MEDICARE

## 2024-06-11 VITALS
WEIGHT: 129.5 LBS | HEART RATE: 66 BPM | DIASTOLIC BLOOD PRESSURE: 50 MMHG | RESPIRATION RATE: 18 BRPM | HEIGHT: 62 IN | OXYGEN SATURATION: 95 % | BODY MASS INDEX: 23.83 KG/M2 | SYSTOLIC BLOOD PRESSURE: 100 MMHG | TEMPERATURE: 98.5 F

## 2024-06-11 DIAGNOSIS — F32.A DEPRESSION, UNSPECIFIED DEPRESSION TYPE: ICD-10-CM

## 2024-06-11 DIAGNOSIS — F51.01 PRIMARY INSOMNIA: Chronic | ICD-10-CM

## 2024-06-11 DIAGNOSIS — R53.83 OTHER FATIGUE: Chronic | ICD-10-CM

## 2024-06-11 PROCEDURE — 3074F SYST BP LT 130 MM HG: CPT

## 2024-06-11 PROCEDURE — 99214 OFFICE O/P EST MOD 30 MIN: CPT

## 2024-06-11 PROCEDURE — 3078F DIAST BP <80 MM HG: CPT

## 2024-06-11 RX ORDER — ESCITALOPRAM OXALATE 10 MG/1
10 TABLET ORAL DAILY
Qty: 30 TABLET | Refills: 1 | Status: SHIPPED | OUTPATIENT
Start: 2024-06-11

## 2024-06-11 ASSESSMENT — FIBROSIS 4 INDEX: FIB4 SCORE: 2.27

## 2024-06-11 NOTE — PROGRESS NOTES
Verbal consent was acquired by the patient to use [employer] ambient listening note generation during this visit Yes     Subjective:     Chief Complaint   Patient presents with    Diarrhea    Extremity Weakness     History of Present Illness  The patient is an 88-year-old established female patient of MARCUS Cohen, who uses she/her pronouns.    The patient experienced a stroke three years ago and has been under evaluation for the same, including bowel and bladder tests, all of which yielded normal results. She has been under the care of Frederic VELAZQUEZ and has been experiencing fatigue, which has led to significant fatigue for several days over the past week. Despite her good days, she experiences alternating good and bad days, leading her to question whether her fatigue is a manifestation of her other health issues.     Approximately two years ago, she consulted with a cardiologist, Dr. Jose Francisco Toribio, who prescribed venlafaxine 75 mg. However, after taking a single dose one night, she experienced a three-day rest period without hunger or thirst, leading her to discontinue its use. She underwent a sleep study, which revealed insufficient sleep, prompting her physician to order another study.     Despite this, her sleep quality has improved, and she maintains a consistent sleep schedule, retiring at 8:00 PM and waking up at 8:00 AM.  Patient does monitor her sleep with a smart watch.  She was prescribed Unisom for sleep medicine and took the medication for 5 nights and recorded her sleep, after which she discontinued its use without any noticeable changes in her sleep patterns. The medication  in 2024, and she no longer has either medication available.     Her neurologist discontinued her ibuprofen due to kidney issues, and she discussed using Tylenol PM, which she was advised against. She is currently on thyroid medication, but her thyroid levels are currently normal, and she is considering  "reducing her dosage.  Patient reports that on some days she will feel ill and does not want to take her thyroid medication on an empty stomach.     Zofran has been effective in managing her diarrhea, with no reported nausea.    Allergies: Penicillins    Health Maintenance: Deferred at this time   Objective:     /50 (BP Location: Left arm, Patient Position: Sitting, BP Cuff Size: Adult)   Pulse 66   Temp 36.9 °C (98.5 °F) (Temporal)   Resp 18   Ht 1.575 m (5' 2\")   Wt 58.7 kg (129 lb 8 oz)   LMP 07/01/1986   SpO2 95%   BMI 23.69 kg/m²  Body mass index is 23.69 kg/m².     Physical Exam  Vitals reviewed.   Constitutional:       General: She is not in acute distress.     Appearance: Normal appearance. She is not ill-appearing.   Cardiovascular:      Rate and Rhythm: Normal rate and regular rhythm.   Pulmonary:      Effort: Pulmonary effort is normal. No respiratory distress.   Skin:     Coloration: Skin is not jaundiced or pale.   Neurological:      General: No focal deficit present.      Mental Status: She is alert and oriented to person, place, and time.   Psychiatric:         Mood and Affect: Mood normal.         Behavior: Behavior normal.         Thought Content: Thought content normal.         Judgment: Judgment normal.        Results for orders placed or performed during the hospital encounter of 05/21/24   HEMOGLOBIN A1C   Result Value Ref Range    Glycohemoglobin 5.9 (H) 4.0 - 5.6 %    Est Avg Glucose 123 mg/dL   VITAMIN D,25 HYDROXY (DEFICIENCY)   Result Value Ref Range    25-Hydroxy   Vitamin D 25 26 (L) 30 - 100 ng/mL   Comp Metabolic Panel   Result Value Ref Range    Sodium 145 135 - 145 mmol/L    Potassium 3.7 3.6 - 5.5 mmol/L    Chloride 107 96 - 112 mmol/L    Co2 25 20 - 33 mmol/L    Anion Gap 13.0 7.0 - 16.0    Glucose 124 (H) 65 - 99 mg/dL    Bun 22 8 - 22 mg/dL    Creatinine 0.79 0.50 - 1.40 mg/dL    Calcium 9.1 8.5 - 10.5 mg/dL    Correct Calcium 9.0 8.5 - 10.5 mg/dL    AST(SGOT) 19 12 - " 45 U/L    ALT(SGPT) 17 2 - 50 U/L    Alkaline Phosphatase 64 30 - 99 U/L    Total Bilirubin 0.8 0.1 - 1.5 mg/dL    Albumin 4.1 3.2 - 4.9 g/dL    Total Protein 7.2 6.0 - 8.2 g/dL    Globulin 3.1 1.9 - 3.5 g/dL    A-G Ratio 1.3 g/dL   TSH   Result Value Ref Range    TSH 1.600 0.380 - 5.330 uIU/mL   FREE THYROXINE   Result Value Ref Range    Free T-4 1.29 0.93 - 1.70 ng/dL   ESTIMATED GFR   Result Value Ref Range    GFR (CKD-EPI) 72 >60 mL/min/1.73 m 2      Assessment and Plan:     The following treatment plan was discussed through shared decision making with the patient:    1. Primary insomnia  escitalopram (LEXAPRO) 10 MG Tab      2. Other fatigue  escitalopram (LEXAPRO) 10 MG Tab      3. Depression, unspecified depression type  escitalopram (LEXAPRO) 10 MG Tab        Assessment & Plan  1. Depression.  A prescription for Lexapro, with a 30-tablet supply with a refill, has been issued for a duration of 4 weeks. The patient has been thoroughly informed about the potential risks, benefits, and side effects of this medication.    Follow-up  The patient is scheduled for a follow-up appointment with Frederic Correa in 4 weeks.         Please note that this note was created using dictation with voice recognition software. I have made every reasonable attempt to correct obvious errors, but I expect that there are errors of grammar and possibly content that I did not discover before finalizing the note.    MARCUS Hernandez  [facility]  [facility]

## 2024-07-08 ENCOUNTER — OFFICE VISIT (OUTPATIENT)
Dept: MEDICAL GROUP | Facility: PHYSICIAN GROUP | Age: 89
End: 2024-07-08
Payer: MEDICARE

## 2024-07-08 VITALS
BODY MASS INDEX: 23.37 KG/M2 | RESPIRATION RATE: 16 BRPM | TEMPERATURE: 97.8 F | HEIGHT: 62 IN | OXYGEN SATURATION: 97 % | HEART RATE: 60 BPM | WEIGHT: 127 LBS | SYSTOLIC BLOOD PRESSURE: 120 MMHG | DIASTOLIC BLOOD PRESSURE: 60 MMHG

## 2024-07-08 DIAGNOSIS — R53.82 CHRONIC FATIGUE: ICD-10-CM

## 2024-07-08 DIAGNOSIS — G47.30 SLEEP APNEA, UNSPECIFIED TYPE: ICD-10-CM

## 2024-07-08 DIAGNOSIS — R41.3 MEMORY CHANGE: ICD-10-CM

## 2024-07-08 PROCEDURE — 3078F DIAST BP <80 MM HG: CPT | Performed by: PHYSICIAN ASSISTANT

## 2024-07-08 PROCEDURE — 99214 OFFICE O/P EST MOD 30 MIN: CPT | Performed by: PHYSICIAN ASSISTANT

## 2024-07-08 PROCEDURE — 3074F SYST BP LT 130 MM HG: CPT | Performed by: PHYSICIAN ASSISTANT

## 2024-07-08 ASSESSMENT — FIBROSIS 4 INDEX: FIB4 SCORE: 2.29

## 2024-07-18 ENCOUNTER — APPOINTMENT (OUTPATIENT)
Dept: MEDICAL GROUP | Facility: PHYSICIAN GROUP | Age: 89
End: 2024-07-18
Payer: MEDICARE

## 2024-08-16 DIAGNOSIS — M85.88 OSTEOPENIA OF SPINE: ICD-10-CM

## 2024-08-16 DIAGNOSIS — E89.0 POSTOPERATIVE HYPOTHYROIDISM: Chronic | ICD-10-CM

## 2024-08-16 DIAGNOSIS — E55.9 VITAMIN D DEFICIENCY: ICD-10-CM

## 2024-08-16 NOTE — TELEPHONE ENCOUNTER
Received request via: Pharmacy    Was the patient seen in the last year in this department? Yes    Does the patient have an active prescription (recently filled or refills available) for medication(s) requested? No    [facility]    Does the patient have [facility] Plus and need 100-day supply? (This applies to ALL medications) Yes, quantity updated to 100 days

## 2024-08-19 RX ORDER — LEVOTHYROXINE SODIUM 75 UG/1
75 TABLET ORAL
Qty: 30 TABLET | Refills: 2 | Status: SHIPPED | OUTPATIENT
Start: 2024-08-19

## 2024-08-19 RX ORDER — ERGOCALCIFEROL 1.25 MG/1
50000 CAPSULE, LIQUID FILLED ORAL
Qty: 12 CAPSULE | Refills: 0 | Status: SHIPPED | OUTPATIENT
Start: 2024-08-19

## 2024-08-21 ENCOUNTER — HOSPITAL ENCOUNTER (OUTPATIENT)
Dept: LAB | Facility: MEDICAL CENTER | Age: 89
End: 2024-08-21
Payer: MEDICARE

## 2024-08-21 ENCOUNTER — APPOINTMENT (OUTPATIENT)
Dept: MEDICAL GROUP | Facility: PHYSICIAN GROUP | Age: 89
End: 2024-08-21
Payer: MEDICARE

## 2024-08-21 VITALS
SYSTOLIC BLOOD PRESSURE: 94 MMHG | RESPIRATION RATE: 17 BRPM | DIASTOLIC BLOOD PRESSURE: 48 MMHG | HEIGHT: 62 IN | HEART RATE: 57 BPM | OXYGEN SATURATION: 93 % | TEMPERATURE: 97.5 F | BODY MASS INDEX: 24 KG/M2 | WEIGHT: 130.4 LBS

## 2024-08-21 DIAGNOSIS — K59.1 FUNCTIONAL DIARRHEA: ICD-10-CM

## 2024-08-21 DIAGNOSIS — R19.5 MUCUS IN STOOL: ICD-10-CM

## 2024-08-21 DIAGNOSIS — R19.5 PENCILLING OF STOOLS: ICD-10-CM

## 2024-08-21 DIAGNOSIS — Z90.49 S/P CHOLECYSTECTOMY: ICD-10-CM

## 2024-08-21 DIAGNOSIS — R10.30 LOWER ABDOMINAL PAIN: ICD-10-CM

## 2024-08-21 DIAGNOSIS — G47.34 NOCTURNAL HYPOXIA: ICD-10-CM

## 2024-08-21 LAB
ALBUMIN SERPL BCP-MCNC: 3.8 G/DL (ref 3.2–4.9)
ALBUMIN/GLOB SERPL: 1.2 G/DL
ALP SERPL-CCNC: 60 U/L (ref 30–99)
ALT SERPL-CCNC: 12 U/L (ref 2–50)
ANION GAP SERPL CALC-SCNC: 12 MMOL/L (ref 7–16)
AST SERPL-CCNC: 14 U/L (ref 12–45)
BILIRUB SERPL-MCNC: 0.5 MG/DL (ref 0.1–1.5)
BUN SERPL-MCNC: 23 MG/DL (ref 8–22)
CALCIUM ALBUM COR SERPL-MCNC: 9.5 MG/DL (ref 8.5–10.5)
CALCIUM SERPL-MCNC: 9.3 MG/DL (ref 8.5–10.5)
CHLORIDE SERPL-SCNC: 111 MMOL/L (ref 96–112)
CO2 SERPL-SCNC: 26 MMOL/L (ref 20–33)
CREAT SERPL-MCNC: 0.88 MG/DL (ref 0.5–1.4)
GFR SERPLBLD CREATININE-BSD FMLA CKD-EPI: 63 ML/MIN/1.73 M 2
GLOBULIN SER CALC-MCNC: 3.1 G/DL (ref 1.9–3.5)
GLUCOSE SERPL-MCNC: 126 MG/DL (ref 65–99)
POTASSIUM SERPL-SCNC: 3.6 MMOL/L (ref 3.6–5.5)
PROT SERPL-MCNC: 6.9 G/DL (ref 6–8.2)
SODIUM SERPL-SCNC: 149 MMOL/L (ref 135–145)

## 2024-08-21 PROCEDURE — 80053 COMPREHEN METABOLIC PANEL: CPT

## 2024-08-21 PROCEDURE — 3074F SYST BP LT 130 MM HG: CPT

## 2024-08-21 PROCEDURE — 99214 OFFICE O/P EST MOD 30 MIN: CPT

## 2024-08-21 PROCEDURE — 36415 COLL VENOUS BLD VENIPUNCTURE: CPT

## 2024-08-21 PROCEDURE — 3078F DIAST BP <80 MM HG: CPT

## 2024-08-21 RX ORDER — CHOLESTYRAMINE 4 G/9G
1 POWDER, FOR SUSPENSION ORAL DAILY
Qty: 60 EACH | Refills: 2 | Status: SHIPPED | OUTPATIENT
Start: 2024-08-21

## 2024-08-21 ASSESSMENT — FIBROSIS 4 INDEX: FIB4 SCORE: 2.29

## 2024-08-22 NOTE — PROGRESS NOTES
Verbal consent was acquired by the patient to use [employer] ambient listening note generation during this visit     Subjective:     HPI:   History of Present Illness  The patient is an 89-year-old female presenting for follow-up.    She reports that she has still not received the order for overnight oximetry, which is needed to continue the care initiated by pulmonology for her nighttime low oxygen levels and mild sleep apnea. She has been using a Fitbit watch to monitor her sleep, which indicates that she sleeps for 4 to 5 hours before needing to use the bathroom. She believes this represents an improvement in her sleep duration. However, she is unsure of her oxygen levels during sleep as her watch does not provide this information.    Over the past 10 days, she has experienced diarrhea on all but two days. She has been taking medication for this, which seems to help when she remembers to take it. However, she has had four episodes of diarrhea today, despite not taking the medication. She last visited the digestive center about 4 years ago, following a stroke. She was hospitalized in February 2024 due to these symptoms. She has been keeping a food diary and has noticed that certain foods, such as Chinese food and milk products, do not seem to trigger her symptoms. She underwent gallbladder removal approximately 20 years ago and has noticed an increase in her diarrhea since her stroke. She has never taken cholestyramine or Questran and has not tried a low FODMAP diet. She stopped taking her medication due to lower abdominal pain and swelling, which she has been experiencing for the past month. She reports no vaginal discharge or bleeding and states that the pain is only on the right side. She also reports no blood or mucus in her stool but has noticed pencil-thin stools. She does not have any issues with constipation.    Her blood pressure has been around 120 to 130 at home, which is a decrease from the 140  systolic reading she had 4 years ago. She is currently taking two medications at night for her blood pressure.    FAMILY HISTORY  Her younger brother was diagnosed with colon cancer a couple of years ago.        Assessment & Plan:     Problem List Items Addressed This Visit       Functional diarrhea    Relevant Medications    cholestyramine (QUESTRAN) 4 g packet    Other Relevant Orders    Referral to Gastroenterology    Comp Metabolic Panel (Completed)    Nocturnal hypoxia    Relevant Orders    Referral to Pulmonary and Sleep Medicine     Other Visit Diagnoses       S/P cholecystectomy        Lower abdominal pain        Relevant Orders    Referral to Gastroenterology    CT-ABDOMEN-PELVIS WITH & W/O    Mucus in stool        Relevant Orders    Referral to Gastroenterology    CT-ABDOMEN-PELVIS WITH & W/O    Comp Metabolic Panel (Completed)    Pencilling of stools        Relevant Orders    Referral to Gastroenterology    CT-ABDOMEN-PELVIS WITH & W/O    Comp Metabolic Panel (Completed)              1. Nocturnal hypoxia  Chronic and ongoing   Referral placed back to sleep medicine for further titration studies.   - Referral to Pulmonary and Sleep Medicine    2. Functional diarrhea  3. S/P cholecystectomy  Chronic and ongoing   Hx cholecysectomy, trial of questran given today and recommended to try low fodmap diet.   - Referral to Gastroenterology  - cholestyramine (QUESTRAN) 4 g packet; Take 4 g by mouth every day.  Dispense: 60 Each; Refill: 2  - Comp Metabolic Panel; Future    4. Lower abdominal pain  5. Mucus in stool  6. Pencilling of stools  Undiagnosed concern with uncertain prognosis. New development in the last several weeks, red flag symptoms. Stat imaging ordered and referral to GI placed.   - CT-ABDOMEN-PELVIS WITH & W/O; Future  - Comp Metabolic Panel; Future        Health Maintenance: Orders placed as applicable to patient     Objective:     Exam:  Objective:  Vitals:    08/21/24 1418   BP: 94/48   Pulse:  (!) 57   Resp: 17   Temp: 36.4 °C (97.5 °F)   SpO2: 93%     Physical Exam  Physical Exam  Abdominal:      Tenderness: There is abdominal tenderness.      Comments: RLQ, negative Tarzana          Constitutional:       Appearance: Normal appearance.   Eyes:      Extraocular Movements: Extraocular movements intact.   Pulmonary:      Effort: Pulmonary effort is normal.   Neurological:      General: No focal deficit present.      Mental Status: She is alert and oriented to person, place, and time.   Psychiatric:         Mood and Affect: Mood normal.         Behavior: Behavior normal.       Return in about 4 weeks (around 9/18/2024).    Please note that this dictation was created using voice recognition software. I have made every reasonable attempt to correct obvious errors, but I expect that there are errors of grammar and possibly content that I did not discover before finalizing the note.

## 2024-08-23 ENCOUNTER — HOSPITAL ENCOUNTER (OUTPATIENT)
Dept: RADIOLOGY | Facility: MEDICAL CENTER | Age: 89
End: 2024-08-23
Payer: MEDICARE

## 2024-08-23 DIAGNOSIS — R10.30 LOWER ABDOMINAL PAIN: ICD-10-CM

## 2024-08-23 DIAGNOSIS — R19.5 PENCILLING OF STOOLS: ICD-10-CM

## 2024-08-23 DIAGNOSIS — R19.5 MUCUS IN STOOL: ICD-10-CM

## 2024-08-23 PROCEDURE — 74177 CT ABD & PELVIS W/CONTRAST: CPT

## 2024-08-23 PROCEDURE — 700117 HCHG RX CONTRAST REV CODE 255

## 2024-08-23 RX ADMIN — IOHEXOL 100 ML: 350 INJECTION, SOLUTION INTRAVENOUS at 18:09

## 2024-08-26 ENCOUNTER — PATIENT MESSAGE (OUTPATIENT)
Dept: HEALTH INFORMATION MANAGEMENT | Facility: OTHER | Age: 89
End: 2024-08-26

## 2024-08-27 NOTE — PROGRESS NOTES
Problem: Discharge Planning  Goal: Discharge to home or other facility with appropriate resources  Outcome: Progressing     Problem: Safety - Adult  Goal: Free from fall injury  Outcome: Progressing     Problem: Pain  Goal: Verbalizes/displays adequate comfort level or baseline comfort level  Outcome: Progressing     Problem: Skin/Tissue Integrity  Goal: Absence of new skin breakdown  Description: 1.  Monitor for areas of redness and/or skin breakdown  2.  Assess vascular access sites hourly  3.  Every 4-6 hours minimum:  Change oxygen saturation probe site  4.  Every 4-6 hours:  If on nasal continuous positive airway pressure, respiratory therapy assess nares and determine need for appliance change or resting period.  Outcome: Progressing      CC:   Chief Complaint   Patient presents with   • Follow-Up     thyroid    • Referral Needed     neurologist           HISTORY OF PRESENT ILLNESS: Patient is a 86 y.o. female established patient who presents today to follow up on the following conditions:       Health Maintenance: Completed    Hospital discharge follow-up  Since her last visit patient was seen in [place] ER on November 15, 2021.  Patient presented with left-sided facial and neck numbness.  She has a history of a CVA in April 2021.  Patient states that she was feeling sick every other day.  CT of the head showed mild diffuse cerebral atrophy, mild to moderate chronic small vessel ischemic change of periventricular white matter bilaterally, no acute intracranial abnormality.  Chest x-ray was normal.  Labs are relatively unremarkable.    Postoperative hypothyroidism  Patient had her right side of thyroid out by Dr. Mercedes Byrne on 08/04/21 . She is having hot flashes in the last month since she was increased from 75 mcg to 100 mcg. Her last TSH was a bit low 0.99 on 11/15.       Patient Active Problem List    Diagnosis Date Noted   • Postoperative hypothyroidism 12/01/2021   • Dyslipidemia 11/15/2021   • Incontinence 11/15/2021   • Diverticulosis of colon 11/15/2021   • Late effect of stroke 06/23/2021   • Mixed hyperlipidemia 06/23/2021   • Cardiac arrhythmia 06/23/2021   • History of CVA (cerebrovascular accident) 03/22/2021   • Weight loss 03/22/2021   • Left facial numbness 03/08/2021   • Bradycardia 03/08/2021   • Hospital discharge follow-up 03/03/2021   • Functional diarrhea 03/03/2021   • Allergies 02/08/2021   • 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 10/11/2020   • Fatigue 07/03/2020   • Prediabetes 08/07/2019   • Overactive bladder 08/06/2018   • Vitamin D deficiency 11/18/2017   • Nonrheumatic aortic valve insufficiency 05/02/2017   • Osteopenia of spine 05/02/2017   • Murmur 03/23/2017   • Essential hypertension  01/14/2015      Allergies:Penicillins    Current Outpatient Medications   Medication Sig Dispense Refill   • levothyroxine (SYNTHROID) 88 MCG Tab Take 1 Tablet by mouth every morning on an empty stomach. 30 Tablet 2   • cholestyramine (QUESTRAN) 4 GM/DOSE powder Once a day     • ciprofloxacin (CIPRO) 500 MG Tab Take 500 mg by mouth 2 times a day.     • predniSONE (DELTASONE) 20 MG Tab TAKE 1 TABLET BY MOUTH TWICE DAILY FOR 3 DAYS THEN TAKE 1 TABLET BY MOUTH EVERY MORNING THEREAFTER     • venlafaxine XR (EFFEXOR XR) 75 MG CAPSULE SR 24 HR Take 75 mg by mouth every day.     • metoprolol tartrate (LOPRESSOR) 25 MG Tab Take 25 mg by mouth.     • irbesartan-hydrochlorothiazide (AVALIDE) 300-12.5 MG per tablet Take 1 tablet by mouth every day.     • amLODIPine (NORVASC) 5 MG Tab Take 1 tablet by mouth every day. 90 tablet 0   • Ferrous Sulfate (IRON PO) Take 1 Tab by mouth every day.     • oxybutynin SR (DITROPAN-XL) 10 MG CR tablet Take 1 Tab by mouth every day. 90 Tab 3   • VITAMIN D PO Take 1 capsule by mouth every day. 5000 units       No current facility-administered medications for this visit.       Social History     Tobacco Use   • Smoking status: Never Smoker   • Smokeless tobacco: Never Used   Vaping Use   • Vaping Use: Never used   Substance Use Topics   • Alcohol use: No   • Drug use: No     Social History     Social History Narrative   • Not on file       Family History   Problem Relation Age of Onset   • Cancer Father         pancreatic   • Heart Disease Father         MI at age 57   • Hypertension Father    • Cancer Brother         melanoma   • Heart Disease Brother    • No Known Problems Mother    • Cancer Sister         ovaren cancer   • Cancer Brother         throat   • Psychiatric Illness Daughter    • No Known Problems Daughter        Review of Systems:    Constitutional: No Fevers, Chills  Eyes: No vision changes  ENT: No hearing changes  Resp: No Shortness of breath  CV: No Chest pain  GI: No  "Nausea/Vomiting  MSK: No weakness  Skin: No rashes  Neuro: No Headaches  Psych: No Suicidal ideations    All remaining systems reviewed and found to be negative, except as stated above.    Exam:    /60   Pulse 72   Temp 36.8 °C (98.2 °F) (Temporal)   Resp 16   Ht 1.6 m (5' 3\")   Wt 54 kg (119 lb)   SpO2 94%  Body mass index is 21.08 kg/m².    General:  Well nourished, well developed female in NAD  HENT: Atraumatic, normocephalic  EYES: Extraocular movements intact  NECK: Supple, FROM  CHEST: No deformities, Equal chest expansion  RESP: Unlabored, no stridor or audible wheeze  HEART: Regular Rate and rhythm.   Extremities: No Clubbing, Cyanosis, or Edema  Skin: Warm/dry, without rashes  Neuro: A/O x 4, due to COVID-19- did not have patient remove face mask to test cranial nerves.  Motor/sensory grossly intact  Psych: Normal behavior, normal affect      Lab review:  Labs are reviewed and discussed with a patient  Lab Results   Component Value Date/Time    CHOLSTRLTOT 151 10/04/2021 10:47 AM    LDL 68 10/04/2021 10:47 AM    HDL 52 10/04/2021 10:47 AM    TRIGLYCERIDE 153 (H) 10/04/2021 10:47 AM       Lab Results   Component Value Date/Time    SODIUM 144 10/04/2021 10:47 AM    POTASSIUM 3.4 (L) 10/04/2021 10:47 AM    CHLORIDE 105 10/04/2021 10:47 AM    CO2 25 10/04/2021 10:47 AM    GLUCOSE 117 (H) 10/04/2021 10:47 AM    BUN 20 10/04/2021 10:47 AM    CREATININE 0.81 10/04/2021 10:47 AM    CREATININE 0.88 04/06/2010 12:00 AM    BUNCREATRAT 22 04/06/2010 12:00 AM    GLOMRATE >59 04/06/2010 12:00 AM     Lab Results   Component Value Date/Time    ALKPHOSPHAT 56 10/04/2021 10:47 AM    ASTSGOT 14 10/04/2021 10:47 AM    ALTSGPT 20 10/04/2021 10:47 AM    TBILIRUBIN 0.8 10/04/2021 10:47 AM      Lab Results   Component Value Date/Time    HBA1C 5.8 (H) 10/04/2021 10:47 AM    HBA1C 5.7 (A) 12/28/2020 02:25 PM    HBA1C 5.9 (A) 06/18/2020 10:55 AM     No results found for: TSH  Lab Results   Component Value Date/Time    " FREET4 1.05 06/09/2021 11:57 AM    FREET4 1.16 03/09/2021 02:29 AM       Lab Results   Component Value Date/Time    WBC 7.2 10/04/2021 10:47 AM    RBC 4.94 10/04/2021 10:47 AM    HEMOGLOBIN 15.7 10/04/2021 10:47 AM    HEMATOCRIT 47.8 (H) 10/04/2021 10:47 AM    MCV 96.8 10/04/2021 10:47 AM    MCH 31.8 10/04/2021 10:47 AM    MCHC 32.8 (L) 10/04/2021 10:47 AM    MPV 13.0 (H) 10/04/2021 10:47 AM    NEUTSPOLYS 61.90 10/04/2021 10:47 AM    LYMPHOCYTES 26.80 10/04/2021 10:47 AM    MONOCYTES 8.70 10/04/2021 10:47 AM    EOSINOPHILS 0.60 10/04/2021 10:47 AM    BASOPHILS 1.00 10/04/2021 10:47 AM          Assessment/Plan:  1. Hospital discharge follow-up  Patient was in [place] ER recently for continued left-sided facial numbness, this has been chronic since her stroke back in April 2021.  Patient would like referral back to neurology.  CT of the head was negative for any acute findings.  2. Postoperative hypothyroidism  - TSH; Future  - T3 FREE; Future  - FREE THYROXINE; Future  Patient had her right thyroid gland removed, following up with Dr. Mercedes Byrne.  Her last TSH in [place] ER was a bit low at 0.99, she is having hot flashes.  Recommend we try reducing her levothyroxine 88 mcg and repeat her lab in 4 weeks.  3. History of CVA (cerebrovascular accident)  - Referral to Neurology  Will refer patient back to neurology, she has seen them in the past.  She wants to discuss her continued left facial numbness.  This is not new, no new symptoms since her stroke in April 2021.  And was seen in [place] ER for the symptoms as well and had a negative CT for acute issues.    I do think it would be advantageous for the patient to also get a Mini-Mental status exam moving forward.  Patient denies any issues with driving, day-to-day activities, no issues with getting lost.  She states that her short-term memory is getting a bit worse.  Recommend this be performed by PCP at future visit or neurology if possible.    Patient  informed me today that she was discontinued off her full aspirin by one of her providers, but she is unsure who did this or why.  She believes it was Dr. Toribio, but she cannot recall why she was.  Discontinue this.  She was not having any abdominal discomfort or dark tarry stools while she was on the aspirin, do recommend she start a baby aspirin at the very least, requesting consult note from Dr. Toribio stat and am referring her back to neurology for consult.  4. Left facial numbness  - Referral to Neurology  Chronic, status post CVA, she would like to follow-up with neurology.  5. Hypokalemia  - POTASSIUM SERUM (K); Future  Was slightly low when she was in the ED, will repeat now to ensure normalization.  Other orders  - levothyroxine (SYNTHROID) 88 MCG Tab; Take 1 Tablet by mouth every morning on an empty stomach.  Dispense: 30 Tablet; Refill: 2       Follow-up: Return in about 4 weeks (around 12/29/2021) for Follow up on labs with new PCP. .    Please note that this dictation was created using voice recognition software. I have made every reasonable attempt to correct obvious errors, but I expect that there are errors of grammar and possibly content that I did not discover before finalizing the note.

## 2024-09-09 DIAGNOSIS — K57.92 DIVERTICULITIS: ICD-10-CM

## 2024-09-09 RX ORDER — METRONIDAZOLE 500 MG/1
500 TABLET ORAL 2 TIMES DAILY
Qty: 14 TABLET | Refills: 0 | Status: SHIPPED | OUTPATIENT
Start: 2024-09-09 | End: 2024-09-16

## 2024-09-09 RX ORDER — SULFAMETHOXAZOLE/TRIMETHOPRIM 800-160 MG
1 TABLET ORAL 2 TIMES DAILY
Qty: 10 TABLET | Refills: 0 | Status: SHIPPED | OUTPATIENT
Start: 2024-09-09 | End: 2024-09-14

## 2024-10-10 ENCOUNTER — APPOINTMENT (RX ONLY)
Dept: URBAN - METROPOLITAN AREA CLINIC 22 | Facility: CLINIC | Age: 89
Setting detail: DERMATOLOGY
End: 2024-10-10

## 2024-10-10 DIAGNOSIS — Z85.828 PERSONAL HISTORY OF OTHER MALIGNANT NEOPLASM OF SKIN: ICD-10-CM

## 2024-10-10 DIAGNOSIS — D18.0 HEMANGIOMA: ICD-10-CM

## 2024-10-10 DIAGNOSIS — D69.2 OTHER NONTHROMBOCYTOPENIC PURPURA: ICD-10-CM

## 2024-10-10 DIAGNOSIS — Z71.89 OTHER SPECIFIED COUNSELING: ICD-10-CM

## 2024-10-10 DIAGNOSIS — L57.0 ACTINIC KERATOSIS: ICD-10-CM

## 2024-10-10 PROBLEM — D18.01 HEMANGIOMA OF SKIN AND SUBCUTANEOUS TISSUE: Status: ACTIVE | Noted: 2024-10-10

## 2024-10-10 PROCEDURE — ? LIQUID NITROGEN

## 2024-10-10 PROCEDURE — 17000 DESTRUCT PREMALG LESION: CPT

## 2024-10-10 PROCEDURE — ? SUNSCREEN RECOMMENDATIONS

## 2024-10-10 PROCEDURE — 17003 DESTRUCT PREMALG LES 2-14: CPT

## 2024-10-10 PROCEDURE — 99213 OFFICE O/P EST LOW 20 MIN: CPT | Mod: 25

## 2024-10-10 PROCEDURE — ? COUNSELING

## 2024-10-10 ASSESSMENT — LOCATION DETAILED DESCRIPTION DERM
LOCATION DETAILED: LEFT UPPER CUTANEOUS LIP
LOCATION DETAILED: LEFT SUPERIOR MEDIAL UPPER BACK
LOCATION DETAILED: LEFT SUPERIOR LATERAL MIDBACK
LOCATION DETAILED: RIGHT MEDIAL SUPERIOR CHEST
LOCATION DETAILED: LEFT SUPERIOR CENTRAL BUCCAL CHEEK
LOCATION DETAILED: LEFT PROXIMAL DORSAL FOREARM
LOCATION DETAILED: RIGHT PROXIMAL DORSAL FOREARM
LOCATION DETAILED: LEFT SUPERIOR FOREHEAD
LOCATION DETAILED: LEFT CENTRAL EYEBROW
LOCATION DETAILED: RIGHT DISTAL DORSAL FOREARM

## 2024-10-10 ASSESSMENT — LOCATION SIMPLE DESCRIPTION DERM
LOCATION SIMPLE: LEFT CHEEK
LOCATION SIMPLE: LEFT EYEBROW
LOCATION SIMPLE: LEFT FOREARM
LOCATION SIMPLE: LEFT UPPER BACK
LOCATION SIMPLE: LEFT LOWER BACK
LOCATION SIMPLE: CHEST
LOCATION SIMPLE: LEFT FOREHEAD
LOCATION SIMPLE: RIGHT FOREARM
LOCATION SIMPLE: LEFT LIP

## 2024-10-10 ASSESSMENT — LOCATION ZONE DERM
LOCATION ZONE: LIP
LOCATION ZONE: TRUNK
LOCATION ZONE: ARM
LOCATION ZONE: FACE

## 2024-10-10 ASSESSMENT — SEVERITY ASSESSMENT: SEVERITY: MILD

## 2024-10-10 NOTE — HPI: SKIN LESION
Is This A New Presentation, Or A Follow-Up?: Skin Lesion
What Type Of Note Output Would You Prefer (Optional)?: Standard Output
How Severe Is Your Skin Lesion?: moderate
Has Your Skin Lesion Been Treated?: been treated
Which Family Member (Optional)?: Brother
How Severe Is Your Skin Lesion?: mild
Has Your Skin Lesion Been Treated?: not been treated

## 2024-10-10 NOTE — PROCEDURE: LIQUID NITROGEN
Post-Care Instructions: I reviewed with the patient in detail post-care instructions. Patient is to wear sunprotection, and avoid picking at any of the treated lesions. Pt may apply Vaseline to crusted or scabbing areas.
Number Of Freeze-Thaw Cycles: 2 freeze-thaw cycles
Show Applicator Variable?: Yes
Render Post-Care Instructions In Note?: no
Detail Level: Detailed
Duration Of Freeze Thaw-Cycle (Seconds): 3
Consent: The patient's consent was obtained including but not limited to risks of crusting, scabbing, blistering, scarring, darker or lighter pigmentary change, recurrence, incomplete removal and infection.

## 2024-10-16 ENCOUNTER — OFFICE VISIT (OUTPATIENT)
Dept: MEDICAL GROUP | Facility: PHYSICIAN GROUP | Age: 89
End: 2024-10-16
Payer: MEDICARE

## 2024-10-16 VITALS
WEIGHT: 133.3 LBS | DIASTOLIC BLOOD PRESSURE: 62 MMHG | RESPIRATION RATE: 16 BRPM | HEIGHT: 62 IN | HEART RATE: 82 BPM | TEMPERATURE: 98.2 F | OXYGEN SATURATION: 95 % | BODY MASS INDEX: 24.53 KG/M2 | SYSTOLIC BLOOD PRESSURE: 118 MMHG

## 2024-10-16 DIAGNOSIS — R60.0 PERIPHERAL EDEMA: ICD-10-CM

## 2024-10-16 DIAGNOSIS — G47.34 NOCTURNAL HYPOXIA: ICD-10-CM

## 2024-10-16 DIAGNOSIS — R53.82 CHRONIC FATIGUE: ICD-10-CM

## 2024-10-16 DIAGNOSIS — K59.1 FUNCTIONAL DIARRHEA: ICD-10-CM

## 2024-10-16 PROBLEM — R53.83 FATIGUE: Chronic | Status: RESOLVED | Noted: 2020-07-03 | Resolved: 2024-10-16

## 2024-10-16 PROCEDURE — 99213 OFFICE O/P EST LOW 20 MIN: CPT | Performed by: FAMILY MEDICINE

## 2024-10-16 PROCEDURE — 3078F DIAST BP <80 MM HG: CPT | Performed by: FAMILY MEDICINE

## 2024-10-16 PROCEDURE — 3074F SYST BP LT 130 MM HG: CPT | Performed by: FAMILY MEDICINE

## 2024-10-16 RX ORDER — ONDANSETRON 4 MG/1
TABLET, ORALLY DISINTEGRATING ORAL
Qty: 30 TABLET | Refills: 0 | Status: SHIPPED | OUTPATIENT
Start: 2024-10-16

## 2024-10-16 ASSESSMENT — FIBROSIS 4 INDEX: FIB4 SCORE: 2.01

## 2024-11-27 ENCOUNTER — APPOINTMENT (OUTPATIENT)
Dept: SLEEP MEDICINE | Facility: MEDICAL CENTER | Age: 89
End: 2024-11-27
Payer: MEDICARE

## 2024-11-27 DIAGNOSIS — E55.9 VITAMIN D DEFICIENCY: ICD-10-CM

## 2024-11-27 DIAGNOSIS — M85.88 OSTEOPENIA OF SPINE: ICD-10-CM

## 2024-11-27 NOTE — TELEPHONE ENCOUNTER
Received request via: Pharmacy    Was the patient seen in the last year in this department? Yes    Does the patient have an active prescription (recently filled or refills available) for medication(s) requested? No    Pharm[facility] Name: [facility]    Does the patient have group home Plus and need 100-day supply? (This applies to ALL medications) Yes, quantity updated to 100 days

## 2024-12-03 RX ORDER — ERGOCALCIFEROL 1.25 MG/1
50000 CAPSULE, LIQUID FILLED ORAL
Qty: 12 CAPSULE | Refills: 0 | Status: SHIPPED | OUTPATIENT
Start: 2024-12-03

## 2024-12-17 ENCOUNTER — PATIENT MESSAGE (OUTPATIENT)
Dept: SLEEP MEDICINE | Facility: MEDICAL CENTER | Age: 89
End: 2024-12-17
Payer: MEDICARE

## 2024-12-19 ENCOUNTER — APPOINTMENT (OUTPATIENT)
Dept: SLEEP MEDICINE | Facility: MEDICAL CENTER | Age: 89
End: 2024-12-19
Payer: MEDICARE

## 2024-12-19 DIAGNOSIS — G47.34 NOCTURNAL HYPOXIA: ICD-10-CM

## 2024-12-19 PROCEDURE — 95800 SLP STDY UNATTENDED: CPT | Performed by: STUDENT IN AN ORGANIZED HEALTH CARE EDUCATION/TRAINING PROGRAM

## 2024-12-31 NOTE — PROCEDURES
DIAGNOSTIC HOME SLEEP TEST (HST) REPORT WatchPAT      PATIENT ID:  NAME:  Milena Ang  MRN:               [id number]  YOB: 1935  DATE OF STUDY: 12/21/2024      Impression:     This study shows evidence of:      1. Mild obstructive sleep apnea with 4% PAT apnea hypopnea index(pAHI) of 8.4 per hour.  PAT respiratory disturbance index (pRDI) was 23.6 per hour. These findings are based on 7 channels recording of PAT signal with sleep staging, heart rate, pulse oximetry, actigraphy, body position, snoring and respiratory movement.     2. Oxygenation O2 Sat. mean O2 sat was 89%,  tammy was 85%,  and maximum O2 at 96 %. O2 sat was at or  below 88% for 67 min of evaluation time. Oxygen Desaturation (>=4%) Index was 7.5/hr. AVG HR was 62 BPM.      TECHNICAL DESCRIPTION: Patient underwent home sleep apnea testing with peripheral arterial tone signal (WatchPAT™). This is a Type IV portable monitor and device per Medicare. Monitoring was done with 7 channels recording of PAT signal with sleep staging, heart rate, pulse oximetry, actigraphy, body position, snoring and respiratory movement. Prior to using the device, the patient received verbal and written instructions for its application and was provided with the help desk phone number for additional telephonic instruction with 24-hour availability of qualified personnel to answer questions.    Respiratory events:        General sleep summary: . Total recording time is 10 hours and 18 minutes and total Sleep time is 9 hours and 23 minutes. The patient spent 242 minutes in the supine position and 322 minutes in the nonsupine position.      Recommendations:    1. CPAP titration study vs Auto CPAP trial.  If starting auto CPAP would recommend minimum pressure 4 cmH2O maximum pressure 12 cmH2O.  Once stable on CPAP therapy would recommend overnight oximetry study to make sure oxygen saturations have normalized.  2. In general patients with sleep apnea are  advised to avoid alcohol and sedatives and to not operate a motor vehicle while drowsy. In some cases alternative treatment options may prove effective in resolving sleep apnea in these options include upper airway surgery, the use of a dental orthotic or weight loss and positional therapy. Clinical correlation is required.

## 2025-01-07 DIAGNOSIS — E89.0 POSTOPERATIVE HYPOTHYROIDISM: Chronic | ICD-10-CM

## 2025-01-09 ENCOUNTER — TELEMEDICINE (OUTPATIENT)
Dept: SLEEP MEDICINE | Facility: MEDICAL CENTER | Age: 89
End: 2025-01-09
Attending: STUDENT IN AN ORGANIZED HEALTH CARE EDUCATION/TRAINING PROGRAM
Payer: MEDICARE

## 2025-01-09 VITALS — BODY MASS INDEX: 23.92 KG/M2 | HEIGHT: 62 IN | WEIGHT: 130 LBS

## 2025-01-09 DIAGNOSIS — F51.04 PSYCHOPHYSIOLOGICAL INSOMNIA: ICD-10-CM

## 2025-01-09 DIAGNOSIS — G47.33 OSA (OBSTRUCTIVE SLEEP APNEA): ICD-10-CM

## 2025-01-09 PROCEDURE — 99214 OFFICE O/P EST MOD 30 MIN: CPT | Mod: 95 | Performed by: STUDENT IN AN ORGANIZED HEALTH CARE EDUCATION/TRAINING PROGRAM

## 2025-01-09 ASSESSMENT — FIBROSIS 4 INDEX: FIB4 SCORE: 2.01

## 2025-01-09 NOTE — PROGRESS NOTES
Sleep Clinic Telemedicine Follow Up Note    This visit was conducted via Zoom using secure and encrypted videoconferencing technology. The patient was in a private location in the state [facility]. The patient's identity was confirmed and verbal consent was obtained for this virtual visit. Given the importance of social distancing and other strategies recommended to reduce the risk of COVID-19 transmission, I am providing medical care to this patient via audio/video visit in place of an in person visit at the request of the patient. Verbal consent to telehealth, risks, benefits, and consequences were discussed. Patient retains the right to withdraw at any time. All existing confidentiality protections apply. The patient has access to all transmitted medical information. No dissemination of any patient images or information to other entities without further written consent.     Place of Service: Home    The patient was seen by Ema Rivera M.D.    Reason for follow up:SHIVA follow up    Patient confirmed that they are currently in the state of [place] during this visit.    Interval history: 89 y.o. female retired  with a pmh of hypertension, hyperlipidemia, hypothyroidism, CVA, GERD who presents for follow up.    Last seen in sleep clinic by Dr. Bain on 5/23/2023 via telehealth. At that  visit, Dr. Bain ordered PSG based on patient's symptoms including snoring, sleep maintenance insomnia, daytime fatigue, nonrestorative sleep.    HST performed 12/19/2024 demonstrating mild to moderate SHIVA with severe nocturnal hypoxemia (4% AHI 8.4, RDI 23.6, tammy SpO2 85%, 67 minutes below 88%)     Sleep and wake time:  Bedtime: 10-11 pm  Wake time: 5-7 am  Sleep-onset latency: 30 min to several hours.   Awakenings from sleep: several, 2x nocturia  Difficulty falling back asleep: Sometimes  No naps  Bedroom partner: no  Sleep meds: no    Sleep history:    03/28/23 PSG - 4% AHI:13.85  Diagnostic  Oxygen  Tammy: 82% with 86.7mins at or under 88%     12/19/2024 HST -4% AHI 8.4/h, RDI 23.6.  Mean SpO2 89%, tammy SpO2 85%, 67 minutes of evaluation time less than 88%.    Current Outpatient Medications   Medication Sig Dispense Refill    vitamin D2, Ergocalciferol, (DRISDOL) 1.25 MG (41976 UT) Cap capsule TAKE 1 CAPSULE BY MOUTH EVERY 7 DAYS 12 Capsule 0    ondansetron (ZOFRAN ODT) 4 MG TABLET DISPERSIBLE DISSOLVE 1 TABLET ON THE TONGUE EVERY 8 HOURS FOR 7 DAYS AS NEEDED FOR NAUSEA OR VOMITING 30 Tablet 0    cholestyramine (QUESTRAN) 4 g packet Take 4 g by mouth every day. 60 Each 2    levothyroxine (SYNTHROID) 75 MCG Tab TAKE 1 TABLET BY MOUTH EVERY MORNING ON AN EMPTY STOMACH 30 Tablet 2    irbesartan-hydrochlorothiazide (AVALIDE) 300-12.5 MG per tablet Take 1 Tablet by mouth every day. 100 Tablet 3    amLODIPine (NORVASC) 10 MG Tab Take 1 Tablet by mouth every evening. 100 Tablet 2    atorvastatin (LIPITOR) 40 MG Tab Take 1 Tablet by mouth at bedtime. 100 Tablet 2    omeprazole (PRILOSEC) 20 MG delayed-release capsule Take 1 Capsule by mouth every day. 100 Capsule 2    aspirin 81 MG EC tablet Take 1 Tablet by mouth every day. 100 Tablet 2    Cholecalciferol (VITAMIN D) 2000 UNIT Tab Take 2,000 Units by mouth every day.       No current facility-administered medications for this visit.       Past Medical History:   Diagnosis Date    Cancer (HCC)     skin cancer right wrist    Cataract     11-4-2020 H/O IOL OU    Cervical high risk human papillomavirus (HPV) DNA test positive     Colon polyp     hyperplastic    Disorder of thyroid     Diverticulosis of colon     Dyslipidemia     Female bladder prolapse 11/04/2020    High cholesterol     Hypertension     Incontinence     pessary    Incontinence 11/04/2020    Pt. states does not have a pessary in place.    Indigestion     Kidney stone     Menopausal and postmenopausal disorder     OSTEOPOROSIS     Stroke (HCC) 2021    no residual    Urinary bladder disorder 11/04/2020     Bladder Prolapse       Review of Systems  Negative unless otherwise stated in HPI    Physical Exam:  General appearance: well appearing, no acute distress  HENT: deferred  Neurologic: A&amp;Ox4  Psychiatric: normal mood. Speech non tangential. Pleasant affect      Assessment:  89 y.o. 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    Plan:  Patient amenable to CPAP/BiPAP titration (ordered) given patient's evidence of severe nocturnal hypoxemia as patient may require more advanced PAP modality+/- supplemental O2  CBT-I resources given for chronic sleep onset insomnia.  Encouraged to anchor consistent wake up time    Ema Rivera MD    RTC in 6 to 8 weeks to discuss titration PSG

## 2025-01-09 NOTE — PATIENT INSTRUCTIONS
"It was a pleasure meeting you today. Here are a list of resources for self-guided CTBI.    CBT-I Books  Bartolo Mind: Turn Off Your Noisy Thoughts and Get a Good Night's Sleep - Adina Gallegos,  and Charity Parada Phd  Accessible, enjoyable, and grounded in evidence-based cognitive behavioral therapy (CBT), Bartolo Mind directly addresses the effects of rumination?or having an overactive brain?on your ability to sleep well. Written by two psychologists who specialize in sleep disorders, the book contains helpful exercises and insights into how you can better manage your thoughts at bedtime, and finally get some sleep.    Insomnia Solved: A Self-Directed Cognitive Behavioral Therapy for Insomnia (CBTI) Program - Munir Armstrong MD  Cognitive behavioral therapy for insomnia (CBTI) is often structured as a 6-week treatment program that can help people who have difficulty falling asleep, staying asleep, or find that sleep is unrefreshing. CBTI is scientifically proven, highly effective, and does not rely on medications. CBTI has life-long benefits and most participants report improved sleep satisfaction. Insomnia Solved is based on the core features of this treatment.    Quiet Your Mind and Get to Sleep: Solutions to Insomnia for Those with Depression, Anxiety or Chronic Pain - Charity Parada, PhD  This workbook uses cognitive behavior therapy, which has been shown to work as well as sleep medications and produce longer-lasting effects. Research shows that it also works well for those whose insomnia is experienced in the context of anxiety, depression, and chronic pain. The complete program in this book goes to the root of your insomnia and offers the same techniques used by experienced sleep specialists.    \"Say Bartolo to Insomnia\" by Mike Riggs     \"No More Sleepless Nights\" by Ramón Choi    CBT-I Online Resources  Path to Better Sleep (free) - https://www.veterantraining.va.gov/insomnia/index.asp   This " "free online Cognitive Behavioral Therapy for Insomnia course, which was developed for veterans, takes you through a sleep \"check-in\" and the various components of CBT-I, including modifying unhelpful behaviors and unhelpful thoughts around sleep.    Insomnia Solved - Munir Armstrong MD ($89) - http://www.[employer]/fix-my-insomnia/   Insomnia Solved is a self-guided CBTI program created by Munir Armstrong M.D., a board-certified medical doctor, that is priced inexpensively at $89. The complete program includes full access to exclusive multimedia content, including the 154-page eBook and online modules and audiofiles.    Apps  Insomnia  (free)   Offers a self-guided 5-week training plan designed to change sleep habits.  https://[employer].va.gov/annemarie/insomnia-   "

## 2025-01-09 NOTE — TELEPHONE ENCOUNTER
Received request via: Pharmacy    Was the patient seen in the last year in this department? Yes    Does the patient have an active prescription (recently filled or refills available) for medication(s) requested? No    [facility]    Does the patient have nursing home Plus and need 100-day supply? (This applies to ALL medications) Yes, quantity updated to 100 days

## 2025-01-13 RX ORDER — LEVOTHYROXINE SODIUM 75 UG/1
75 TABLET ORAL
Qty: 100 TABLET | Refills: 3 | Status: SHIPPED | OUTPATIENT
Start: 2025-01-13

## 2025-02-10 ENCOUNTER — APPOINTMENT (OUTPATIENT)
Dept: SLEEP MEDICINE | Facility: MEDICAL CENTER | Age: 89
End: 2025-02-10
Attending: STUDENT IN AN ORGANIZED HEALTH CARE EDUCATION/TRAINING PROGRAM
Payer: MEDICARE

## 2025-03-01 DIAGNOSIS — I10 ESSENTIAL HYPERTENSION: Chronic | ICD-10-CM

## 2025-03-03 NOTE — TELEPHONE ENCOUNTER
Received request via: Pharmacy    Was the patient seen in the last year in this department? Yes    Does the patient have an active prescription (recently filled or refills available) for medication(s) requested? No    Pharm[facility] Name: [facility]    Does the patient have alf Plus and need 100-day supply? (This applies to ALL medications) Yes, quantity updated to 100 days

## 2025-03-04 RX ORDER — AMLODIPINE BESYLATE 10 MG/1
10 TABLET ORAL NIGHTLY
Qty: 100 TABLET | Refills: 2 | Status: SHIPPED | OUTPATIENT
Start: 2025-03-04

## 2025-03-23 ENCOUNTER — APPOINTMENT (OUTPATIENT)
Dept: SLEEP MEDICINE | Facility: MEDICAL CENTER | Age: 89
End: 2025-03-23
Attending: STUDENT IN AN ORGANIZED HEALTH CARE EDUCATION/TRAINING PROGRAM
Payer: MEDICARE

## 2025-04-01 ENCOUNTER — TELEPHONE (OUTPATIENT)
Dept: FAMILY PLANNING/WOMEN'S HEALTH CLINIC | Facility: PHYSICIAN GROUP | Age: 89
End: 2025-04-01
Payer: MEDICARE

## 2025-04-02 NOTE — TELEPHONE ENCOUNTER
HISTORY OF PRESENT ILLNESS  Alicia Collins is a 49 y.o. female. HPI  Last here vv 4/16/21. Pt is here for routine care    BP today 137/85  She is no longer checking BP at home, she prev was but states it was fine so she stopped  BP at other offices: 128/74 122/74     Wt today is 129 lbs, down 3 lbs since lov  Her weight is within normal ranges     Reviewed labs     She saw Dr. Moises Velásquez for L hand  She had an injection earlier this month 8/22  Last visit 8/1/22  Reviewed note:  Assessment:     1. Arthritis of carpometacarpal (CMC) joint of left thumb     Plan:   I discussed with the patient the nature of their condition and treatment options. They desires to proceed with an injection of the left basal joint due to significant symptoms. Risks, benefits and alternatives are discussed and they consent to proceed. This does include the significant risk of a steroid flare reaction. I performed this today under sterile conditions and they tolerated it well. Sent over to therapy today for a left [place]  splint for basal joint protection. Wearing instructions discussed. They will f/u on an as needed basis.       Pt follows with Dr Yamile Cervantes (derm)  Last visit was 9/19  She had cyst removed on feet 10/20 with derm but they won't heal      Recall Pt saw Dr. Dannie Salcido (rheum) in the past  Pt completed labs and was told that she had no lupus   However, pt had positive lab tests for lupus  Her double stranded DNA was positive for the lupus    Had a positive hepatitis C screen we then checked for hepatitis C viral load which was negative normal     Pt saw Dr Moises Velásquez (ortho) for trigger thumb   Last there 10/20   Completed PT      Pt follows with Dr Ho Gonzalez (ortho) for knee pain   Last visit was 12/5/19   She states her pain is much improved      Continues on vit D 5000 U     Has hypothyroidism  She is taking Synthroid 50 mcg since last office visit    Pt takes zyrtec at night       Recall fmhx breast cancer Spoke to Milena scheduled her in [facility] with Jemma Edwards on 5/1/25 at 3pm   PREVENTIVE:  Colonoscopy: 1/25/19, Dr Twyla Mg, repeat 5 years  Pap: DANITA Anderson Ma, 8/22  Mammogram: [facility], 8/22  DEXA: not yet needed  Tdap: will get today 8/29/22  Pneumovax: not yet needed  Genette Guise: not yet needed  Shingrix: not yet needed  Flu shot: 10/21  Eye exam: Dr. Carmen Andino, 8/22  Hep C: 12/21 positive but viral load negative  Lipids: 12/21   A1C: 8/18 4.6 12/21 5.0  Covid: [employer], no booster, has not had COVID in last year      Patient Active Problem List    Diagnosis Date Noted    Fibrocystic breast disease 07/29/2016    FH: diabetes mellitus 05/12/2015    Benign breast lumps 12/19/2012    Back skin lesion 12/19/2012    H/O bilateral breast implants 12/19/2012     Current Outpatient Medications   Medication Sig Dispense Refill    levothyroxine (SYNTHROID) 50 mcg tablet TAKE 1 TABLET BY MOUTH EVERY DAY BEFORE BREAKFAST 30 Tablet 0    vitamin e (E GEMS) 100 unit capsule Take  by mouth daily. zonia primrose/linoleic/g-lenic (PRIMROSE OIL PO) Take  by mouth. blue-green algae (SPIRULINA) by Does Not Apply route. fluticasone propionate (FLONASE) 50 mcg/actuation nasal spray 2 Sprays by Both Nostrils route daily. 1 Bottle 0    Cetirizine (ZYRTEC) 10 mg cap Take  by mouth. cholecalciferol, vitamin D3, (VITAMIN D3 PO) Take  by mouth. FLAXSEED OIL PO Take 600 mg by mouth daily.        Past Surgical History:   Procedure Laterality Date    HC PACK LASER CONE  1992    HX BREAST AUGMENTATION  1998    Saline implants    HX BREAST BIOPSY  x3 -L    2 cysts removed and 1 \"tumor\" removed in the past    HX CYST REMOVAL      HX GYN      removal of 1 ovary and cyst    HX KNEE ARTHROSCOPY  2002    left    HX OTHER SURGICAL      laser surgery for dysplasia    HX OTHER SURGICAL  2010    rectal     HX OTHER SURGICAL      cyst removal from ovaries      Lab Results   Component Value Date/Time    WBC 4.4 12/03/2021 04:46 PM    HGB 13.6 12/03/2021 04:46 PM    Hemoglobin (POC) 14.5 06/14/2016 09:26 AM    HCT 39.0 12/03/2021 04:46 PM    PLATELET 095 75/99/8204 04:46 PM    MCV 87 12/03/2021 04:46 PM     Lab Results   Component Value Date/Time    Cholesterol, total 197 12/03/2021 04:46 PM    HDL Cholesterol 70 12/03/2021 04:46 PM    LDL, calculated 110 (H) 12/03/2021 04:46 PM    LDL, calculated 116 (H) 02/06/2020 08:52 AM    Triglyceride 94 12/03/2021 04:46 PM     Lab Results   Component Value Date/Time    GFR est non- 12/03/2021 04:46 PM    GFR est  12/03/2021 04:46 PM    Creatinine 0.59 12/03/2021 04:46 PM    BUN 10 12/03/2021 04:46 PM    Sodium 141 12/03/2021 04:46 PM    Potassium 4.1 12/03/2021 04:46 PM    Chloride 105 12/03/2021 04:46 PM    CO2 24 12/03/2021 04:46 PM        Review of Systems   Respiratory:  Negative for shortness of breath. Cardiovascular:  Negative for chest pain. Physical Exam  Constitutional:       General: She is not in acute distress. Appearance: She is not ill-appearing, toxic-appearing or diaphoretic. HENT:      Head: Normocephalic and atraumatic. Right Ear: External ear normal.      Left Ear: External ear normal.   Eyes:      General:         Right eye: No discharge. Left eye: No discharge. Conjunctiva/sclera: Conjunctivae normal.      Pupils: Pupils are equal, round, and reactive to light. Cardiovascular:      Rate and Rhythm: Normal rate and regular rhythm. Pulses: Normal pulses. Heart sounds: No murmur heard. No friction rub. No gallop. Pulmonary:      Effort: No respiratory distress. Breath sounds: Normal breath sounds. No wheezing or rales. Chest:      Chest wall: No tenderness. Musculoskeletal:         General: Normal range of motion. Cervical back: Normal.      Right lower leg: No edema. Left lower leg: No edema. Skin:     General: Skin is warm and dry. Neurological:      Mental Status: She is oriented to person, place, and time. Mental status is at baseline.       Coordination: Coordination normal.      Gait: Gait normal.   Psychiatric:         Mood and Affect: Mood normal.         Behavior: Behavior normal.       ASSESSMENT and PLAN    ICD-10-CM ICD-9-CM    1. Physical exam  Z00.00 V70.9 LIPID PANEL      METABOLIC PANEL, COMPREHENSIVE   Normal weight blood pressure adequate    Colonoscopy up-to-date    Mammogram pelvic exam up-to-date Tdap today    Declines COVID booster    Eye exam is up-to-date    Flu shot can be obtained in the fall    Labs ordered   TSH 3RD GENERATION      CBC W/O DIFF      T4, FREE      LIPID PANEL      METABOLIC PANEL, COMPREHENSIVE      TSH 3RD GENERATION      CBC W/O DIFF      T4, FREE      2. Acquired hypothyroidism  E03.9 244.9    Now on Synthroid 50 mcg daily check thyroid levels and adjust medication as needed     Depression screen reviewed and negative. Scribed by Rose Liang, as dictated by Dr. Mariama Hadley. Current diagnosis and concerns discussed with pt at length. Pt understands risks and benefits or current treatment plan and medications, and accepts the treatment and medication with any possible risks. Pt asks appropriate questions, which were answered. Pt was instructed to call with any concerns or problems. I have reviewed the note documented by the scribe. The services provided are my own. The documentation is accurate.

## 2025-05-30 ENCOUNTER — APPOINTMENT (OUTPATIENT)
Dept: SLEEP MEDICINE | Facility: MEDICAL CENTER | Age: 89
End: 2025-05-30
Payer: MEDICARE

## 2025-05-30 ENCOUNTER — TELEPHONE (OUTPATIENT)
Dept: SLEEP MEDICINE | Facility: MEDICAL CENTER | Age: 89
End: 2025-05-30
Payer: MEDICARE

## 2025-05-30 NOTE — TELEPHONE ENCOUNTER
Patient came in late for her appt and needing to schedule a sleep study.      The patient requested to speak with the ma or the provider regarding a sleep study stating that the last one she had    She wasn't able to sleep during the test and wanted to let the provider know that she has trouble sleeping during the test.      If possible please call the patient to help with test concurs.

## 2025-05-30 NOTE — TELEPHONE ENCOUNTER
GARRYM for the pt informing her that we received her message and it was forward to So VELAZQUEZ to review. I did ask the pt if she would like a sleep aid for the night of her sleep study. I advise the pt to give us a call back on this matter

## 2025-06-05 NOTE — TELEPHONE ENCOUNTER
Caller: Milena Ang     Topic/issue: patient called req update on sleep aid. Unable to transfer to  ma line as on is not listed on our guide at this time. Patient req a call back.     Callback Number: [phone]     Thank you  Ayana TRIPATHI

## 2025-06-05 NOTE — TELEPHONE ENCOUNTER
Per So VELAZQUEZ If the patient calls back can you let her know: unfortunately given her age and other health conditions, I don't recommend a sleep aid such as ambien the night of the sleep study. There are too many risks. Since she has been to the sleep center in the past, hopefully she will not have as much trouble sleeping there this time around and my recommendation would be to try doing the study without a medication. If she is still concerned, she could try low dose over the counter melatonin (1-3 mg) at home first and potentially bring the night of the study if it is effective and without side effects at home.     LVM for the pt relaying So recommendation on the sleep aid. I advise pt to give me a call back or send mychart message with any concerns or questions.

## 2025-06-13 ENCOUNTER — OFFICE VISIT (OUTPATIENT)
Dept: MEDICAL GROUP | Facility: PHYSICIAN GROUP | Age: 89
End: 2025-06-13
Payer: MEDICARE

## 2025-06-13 VITALS
RESPIRATION RATE: 16 BRPM | OXYGEN SATURATION: 96 % | SYSTOLIC BLOOD PRESSURE: 114 MMHG | BODY MASS INDEX: 24.29 KG/M2 | HEIGHT: 62 IN | DIASTOLIC BLOOD PRESSURE: 66 MMHG | TEMPERATURE: 98.1 F | WEIGHT: 132 LBS | HEART RATE: 74 BPM

## 2025-06-13 DIAGNOSIS — R53.82 CHRONIC FATIGUE: ICD-10-CM

## 2025-06-13 DIAGNOSIS — G47.33 OSA (OBSTRUCTIVE SLEEP APNEA): ICD-10-CM

## 2025-06-13 DIAGNOSIS — J39.2 THROAT IRRITATION: ICD-10-CM

## 2025-06-13 DIAGNOSIS — K59.1 FUNCTIONAL DIARRHEA: ICD-10-CM

## 2025-06-13 DIAGNOSIS — Z86.73 HISTORY OF CVA (CEREBROVASCULAR ACCIDENT): Primary | ICD-10-CM

## 2025-06-13 PROCEDURE — 3074F SYST BP LT 130 MM HG: CPT | Performed by: FAMILY MEDICINE

## 2025-06-13 PROCEDURE — 3078F DIAST BP <80 MM HG: CPT | Performed by: FAMILY MEDICINE

## 2025-06-13 PROCEDURE — 99214 OFFICE O/P EST MOD 30 MIN: CPT | Performed by: FAMILY MEDICINE

## 2025-06-13 RX ORDER — LOPERAMIDE HYDROCHLORIDE 2 MG/1
2 CAPSULE ORAL 4 TIMES DAILY PRN
Qty: 30 CAPSULE | Refills: 0 | Status: SHIPPED | OUTPATIENT
Start: 2025-06-13

## 2025-06-13 ASSESSMENT — PATIENT HEALTH QUESTIONNAIRE - PHQ9: CLINICAL INTERPRETATION OF PHQ2 SCORE: 0

## 2025-06-13 ASSESSMENT — FIBROSIS 4 INDEX: FIB4 SCORE: 2.01

## 2025-06-13 NOTE — PROGRESS NOTES
Subjective:     CC: Here for a number of issues.    HPI:   Milena presents today with the following medical concerns:    Chronic fatigue  This is a chronic problem.  Patient has had fatigue for probably more than 6 years.  It was occurring before she had some very small strokes back in 2021.  She has been seen in sleep clinic and they have diagnosed her with sleep apnea.  It looks like they are wanting her back for CPAP testing and titration.  But she did not quite understand that.  She is wanted to make sure it is not due to anything else.  She has not had any labs here with her PCP since the last year.  She did go to what sounds like a [facility] ER recently but we do not have those records.    Functional diarrhea  This is a chronic problem.  She states when she was at the [facility] ER they did give her prescription for Lomotil.  This was in January.  She has made 20 pills last until now.  She is asking if she can get a refill as she uses them very sparingly.    History of CVA (cerebrovascular accident)  This is a chronic problem.  Patient has stopped her aspirin and atorvastatin.  I told her they were to help prevent strokes in the future.  In 2021 she had some very small ischemic stroke to the right side.  A repeat MRI in 2023 showed a possible small microhemorrhage on the left.  I did discuss all this with her.    SHIVA (obstructive sleep apnea)  This is a new issue.  After discussing what I am reading in the chart about the wanting to do the CPAP titration test patient better understands will be going to that and July.  Hopefully that will make a difference in her energy level.    Throat irritation  This is a chronic problem.  Patient states that she often will feel like there is something in the back of her throat like some mucus.  She will have some days it is not there and other days it is very bad.  She tried sleeping with her head elevated and trying Benadryl but that did not help.  She  "also tried omeprazole that did not help either.  She like to see an ear nose and throat doctor.    Past Medical History[1]    Social History[2]    Current Medications and Prescriptions Ordered in Epic[3]    Allergies:  Penicillins    Health Maintenance: Completed    ROS:  Gen: no fevers/chills, no changes in weight  Eyes: no changes in vision  ENT:  no hearing loss, no bloody nose  Pulm: no sob, no cough  CV: no chest pain, no palpitations  GI: no nausea/vomiting, no diarrhea  : no dysuria  MSk: no myalgias  Skin: no rash  Neuro: no headaches, no numbness/tingling  Heme/Lymph: no easy bruising      Objective:       Exam:  /66 (BP Location: Right arm, Patient Position: Sitting, BP Cuff Size: Small adult)   Pulse 74   Temp 36.7 °C (98.1 °F) (Temporal)   Resp 16   Ht 1.575 m (5' 2\")   Wt 59.9 kg (132 lb)   LMP 07/01/1986   SpO2 96%   BMI 24.14 kg/m²  Body mass index is 24.14 kg/m².    Gen: Alert and oriented, No apparent distress.  Throat: I cannot see anything obvious in the back of her throat but her tongue obscures the view of it.  Neck: Neck is supple without lymphadenopathy.  Lungs: Normal effort,   Ext: No clubbing, cyanosis, edema.        Assessment & Plan:     89 y.o. female with the following -     1. Functional diarrhea  This is a chronic problem.  I went ahead and refilled her Imodium since she is using it sparingly.    2. Throat irritation  This is a chronic problem.  I talked to her the postnasal drainage could be causing irritation to her throat which makes mucus.  But will refer to ENT to have this evaluated further.  - Referral to ENT    3. History of CVA (cerebrovascular accident) (Primary)  This is a chronic problem.  Encouraged her to restart the aspirin and statin.    4. SHIVA (obstructive sleep apnea)  This is a chronic problem.  She will go for her sleep testing next month.    5. Chronic fatigue  This chronic problem.  Hopefully will improve when she is on a CPAP machine.  We do also " talked about her aortic valvular disease and that that sometimes can cause problems.  She should follow-up with her PCP for an annual exam and labs.    38 minutes spent with the patient discussing all her issues and concerns.  Return in about 3 months (around 9/13/2025) for annual exam with her PCP.    Please note that this dictation was created using voice recognition software. I have made every reasonable attempt to correct obvious errors, but I expect that there are errors of grammar and possibly content that I did not discover before finalizing the note.             [1]   Past Medical History:  Diagnosis Date    Cancer (HCC)     skin cancer right wrist    Cataract     11-4-2020 H/O IOL OU    Cervical high risk human papillomavirus (HPV) DNA test positive     Colon polyp     hyperplastic    Disorder of thyroid     Diverticulosis of colon     Dyslipidemia     Female bladder prolapse 11/04/2020    High cholesterol     Hypertension     Incontinence     pessary    Incontinence 11/04/2020    Pt. states does not have a pessary in place.    Indigestion     Kidney stone     Menopausal and postmenopausal disorder     OSTEOPOROSIS     Stroke (HCC) 2021    no residual    Urinary bladder disorder 11/04/2020    Bladder Prolapse   [2]   Social History  Tobacco Use    Smoking status: Never    Smokeless tobacco: Never   Vaping Use    Vaping status: Never Used   Substance Use Topics    Alcohol use: No    Drug use: No   [3]   Current Outpatient Medications Ordered in Epic   Medication Sig Dispense Refill    loperamide (IMODIUM) 2 MG Cap Take 1 Capsule by mouth 4 times a day as needed for Diarrhea. 30 Capsule 0    amLODIPine (NORVASC) 10 MG Tab TAKE 1 TABLET BY MOUTH EVERY EVENING 100 Tablet 2    levothyroxine (SYNTHROID) 75 MCG Tab TAKE 1 TABLET BY MOUTH EVERY MORNING ON AN EMPTY STOMACH 100 Tablet 3    irbesartan-hydrochlorothiazide (AVALIDE) 300-12.5 MG per tablet Take 1 Tablet by mouth every day. 100 Tablet 3    Cholecalciferol  (VITAMIN D) 2000 UNIT Tab Take 2,000 Units by mouth every day.      atorvastatin (LIPITOR) 40 MG Tab Take 1 Tablet by mouth at bedtime. (Patient not taking: Reported on 6/13/2025) 100 Tablet 2    aspirin 81 MG EC tablet Take 1 Tablet by mouth every day. (Patient not taking: Reported on 6/13/2025) 100 Tablet 2     No current Epic-ordered facility-administered medications on file.

## 2025-06-13 NOTE — LETTER
[facility]  ZOLTAN Funez  [address]  [place] [address]  Fax: [phone]   Authorization for Release/Disclosure of   Protected Health Information   Name: JOHNATHAN GUY : 1935 SSN: [id number]   Address: [address] [place] [address] [address]  [place] [address] Phone:    [phone] (home)    I authorize the entity listed below to release/disclose the PHI below to:   [facility] and Babatunde Delgadillo III, M.D.   Provider or Entity Name:  [facility]   Address   [facility], [address]  [address] [place] [address] Phone: [phone]      Fax: [phone]   Reason for request: continuity of care   Information to be released:    [  ] LAST COLONOSCOPY,  including any PATH REPORT and follow-up  [  ] LAST FIT/COLOGUARD RESULT [  ] LAST DEXA  [  ] LAST MAMMOGRAM  [  ] LAST PAP  [  ] LAST LABS [  ] RETINA EXAM REPORT  [  ] IMMUNIZATION RECORDS  [ XXX ] Release all info      [  ] Check here and initial the line next to each item to release ALL health information INCLUDING  _____ Care and treatment for drug and / or alcohol abuse  _____ HIV testing, infection status, or AIDS  _____ Genetic Testing    DATES OF SERVICE OR TIME PERIOD TO BE DISCLOSED: _____________  I understand and acknowledge that:  * This Authorization may be revoked at any time by you in writing, except if your health information has already been used or disclosed.  * Your health information that will be used or disclosed as a result of you signing this authorization could be re-disclosed by the recipient. If this occurs, your re-disclosed health information may no longer be protected by State or Federal laws.  * You may refuse to sign this Authorization. Your refusal will not affect your ability to obtain treatment.  * This Authorization becomes effective upon signing and will  on (date) __________.      If no date is indicated, this Authorization will   one (1) year from the signature date.    Name: Milena Luizawilmar Ang  Signature: Date:   6/13/2025     PLEASE FAX REQUESTED RECORDS BACK TO: [phone]

## 2025-06-13 NOTE — ASSESSMENT & PLAN NOTE
This is a chronic problem.  Patient states that she often will feel like there is something in the back of her throat like some mucus.  She will have some days it is not there and other days it is very bad.  She tried sleeping with her head elevated and trying Benadryl but that did not help.  She also tried omeprazole that did not help either.  She like to see an ear nose and throat doctor.

## 2025-06-13 NOTE — ASSESSMENT & PLAN NOTE
This is a chronic problem.  Patient has stopped her aspirin and atorvastatin.  I told her they were to help prevent strokes in the future.  In 2021 she had some very small ischemic stroke to the right side.  A repeat MRI in 2023 showed a possible small microhemorrhage on the left.  I did discuss all this with her.

## 2025-06-13 NOTE — ASSESSMENT & PLAN NOTE
This is a chronic problem.  She states when she was at the [facility] ER they did give her prescription for Lomotil.  This was in January.  She has made 20 pills last until now.  She is asking if she can get a refill as she uses them very sparingly.

## 2025-06-13 NOTE — ASSESSMENT & PLAN NOTE
This is a new issue.  After discussing what I am reading in the chart about the wanting to do the CPAP titration test patient better understands will be going to that and July.  Hopefully that will make a difference in her energy level.

## 2025-06-13 NOTE — ASSESSMENT & PLAN NOTE
This is a chronic problem.  Patient has had fatigue for probably more than 6 years.  It was occurring before she had some very small strokes back in 2021.  She has been seen in sleep clinic and they have diagnosed her with sleep apnea.  It looks like they are wanting her back for CPAP testing and titration.  But she did not quite understand that.  She is wanted to make sure it is not due to anything else.  She has not had any labs here with her PCP since the last year.  She did go to what sounds like a [facility] freestanding ER recently but we do not have those records.

## 2025-06-20 NOTE — Clinical Note
REFERRAL APPROVAL NOTICE         Sent on June 20, 2025                   Milenaciarra Ang  [address]  [place] [address]                   Dear MsDave Sav,    After a careful review of the medical information and benefit coverage, [facility] has processed your referral. See below for additional details.    If applicable, you must be actively enrolled with your insurance for coverage of the authorized service. If you have any questions regarding your coverage, please contact your insurance directly.    REFERRAL INFORMATION   Referral #:  [id number]  Referred-To Provider    Referred-By Provider:  Otolaryngology    MARY Arenas III, JEREMY D      [address]  [place] [id number] [phone] [address] [facility] [address]  [phone]    Referral Start Date:  06/13/2025  Referral End Date:   06/13/2026             SCHEDULING  If you do not already have an appointment, please call [phone] to make an appointment.     MORE INFORMATION  If you do not already have a [employer] account, sign up at: [email or web address]  You can access your medical information, make appointments, see lab results, billing information, and more.  If you have questions regarding this referral, please contact  the [facility] Referrals department at:             [phone]. Monday - Friday 8:00AM - 5:00PM.     Sincerely,    [facility]

## 2025-07-24 ENCOUNTER — OFFICE VISIT (OUTPATIENT)
Dept: MEDICAL GROUP | Facility: PHYSICIAN GROUP | Age: 89
End: 2025-07-24
Payer: MEDICARE

## 2025-07-24 VITALS
BODY MASS INDEX: 24.77 KG/M2 | HEIGHT: 62 IN | WEIGHT: 134.6 LBS | DIASTOLIC BLOOD PRESSURE: 62 MMHG | RESPIRATION RATE: 16 BRPM | SYSTOLIC BLOOD PRESSURE: 112 MMHG | TEMPERATURE: 98.2 F | OXYGEN SATURATION: 95 % | HEART RATE: 64 BPM

## 2025-07-24 DIAGNOSIS — M79.89 SWELLING OF EXTREMITY OF UNKNOWN ETIOLOGY: Primary | ICD-10-CM

## 2025-07-24 PROCEDURE — 99213 OFFICE O/P EST LOW 20 MIN: CPT | Performed by: FAMILY MEDICINE

## 2025-07-24 PROCEDURE — 3074F SYST BP LT 130 MM HG: CPT | Performed by: FAMILY MEDICINE

## 2025-07-24 PROCEDURE — 3078F DIAST BP <80 MM HG: CPT | Performed by: FAMILY MEDICINE

## 2025-07-24 ASSESSMENT — FIBROSIS 4 INDEX: FIB4 SCORE: 2.03

## 2025-07-24 NOTE — ASSESSMENT & PLAN NOTE
This is a new problem.  Patient states July 5 she had swelling when she woke up to her left calf but not of the ankle or foot.  It went away after a day or 2.  Then on July 17 she had swelling to the left ankle and a little bit into the foot as well.  And less swelling to the right side.  It also went away very quickly.    Today she is having a little tingling in both ankles around the Achilles area.  She does try to walk about a mile a day.  And she uses different shoes for that.  She has not had any recent changes in medications.  She is not always aware of how they prepare the meals at her care facility but will check on that.

## 2025-07-24 NOTE — PROGRESS NOTES
"Subjective:     CC: Here complaining of periodic swelling to her lower extremities.    HPI:   Milena presents today with the following medical concern:    Swelling of extremity of unknown etiology  This is a new problem.  Patient states July 5 she had swelling when she woke up to her left calf but not of the ankle or foot.  It went away after a day or 2.  Then on July 17 she had swelling to the left ankle and a little bit into the foot as well.  And less swelling to the right side.  It also went away very quickly.    Today she is having a little tingling in both ankles around the Achilles area.  She does try to walk about a mile a day.  And she uses different shoes for that.  She has not had any recent changes in medications.  She is not always aware of how they prepare the meals at her care facility but will check on that.    Past Medical History[1]    Social History[2]    Current Medications and Prescriptions Ordered in Epic[3]    Allergies:  Penicillins    Health Maintenance: Completed    ROS:  Gen: no fevers/chills, no changes in weight  Eyes: no changes in vision  ENT: no sore throat, no hearing loss, no bloody nose  Pulm: no sob, no cough  CV: no chest pain, no palpitations  GI: no nausea/vomiting, no diarrhea  : no dysuria  MSk: no myalgias  Skin: no rash  Neuro: no headaches, no numbness/tingling  Heme/Lymph: no easy bruising      Objective:       Exam:  /62 (BP Location: Right arm, Patient Position: Sitting, BP Cuff Size: Adult)   Pulse 64   Temp 36.8 °C (98.2 °F) (Temporal)   Resp 16   Ht 1.575 m (5' 2\")   Wt 61.1 kg (134 lb 9.6 oz)   LMP 07/01/1986   SpO2 95%   BMI 24.62 kg/m²  Body mass index is 24.62 kg/m².    Gen: Alert and oriented, No apparent distress.  Lungs: Normal effort,   Ext: No clubbing, cyanosis, edema.  There is no swelling or redness to either lower extremity.  She has normal range of motion of the ankles without discomfort.  Gait is normal.        Assessment & Plan:     89 " y.o. female with the following -     1. Swelling of extremity of unknown etiology (Primary)  This is a new issue.  I suspect that it could be due to too much salt in her diet at times versus flare of arthritis causing her symptoms.  We discussed what significant causes of swelling could be and she would need to be seen for those.    She has not seen her PCP for an annual exam or labs in over a year so we will make an appointment for her to come in and get that done.  To ensure that there is nothing on her lab work that could be contributing to this.      Return if symptoms worsen or fail to improve.    Please note that this dictation was created using voice recognition software. I have made every reasonable attempt to correct obvious errors, but I expect that there are errors of grammar and possibly content that I did not discover before finalizing the note.             [1]   Past Medical History:  Diagnosis Date    Cancer (HCC)     skin cancer right wrist    Cataract     11-4-2020 H/O IOL OU    Cervical high risk human papillomavirus (HPV) DNA test positive     Colon polyp     hyperplastic    Disorder of thyroid     Diverticulosis of colon     Dyslipidemia     Female bladder prolapse 11/04/2020    High cholesterol     Hypertension     Incontinence     pessary    Incontinence 11/04/2020    Pt. states does not have a pessary in place.    Indigestion     Kidney stone     Menopausal and postmenopausal disorder     OSTEOPOROSIS     Stroke (HCC) 2021    no residual    Urinary bladder disorder 11/04/2020    Bladder Prolapse   [2]   Social History  Tobacco Use    Smoking status: Never    Smokeless tobacco: Never   Vaping Use    Vaping status: Never Used   Substance Use Topics    Alcohol use: No    Drug use: No   [3]   Current Outpatient Medications Ordered in Epic   Medication Sig Dispense Refill    loperamide (IMODIUM) 2 MG Cap Take 1 Capsule by mouth 4 times a day as needed for Diarrhea. 30 Capsule 0    amLODIPine  (NORVASC) 10 MG Tab TAKE 1 TABLET BY MOUTH EVERY EVENING 100 Tablet 2    levothyroxine (SYNTHROID) 75 MCG Tab TAKE 1 TABLET BY MOUTH EVERY MORNING ON AN EMPTY STOMACH 100 Tablet 3    irbesartan-hydrochlorothiazide (AVALIDE) 300-12.5 MG per tablet Take 1 Tablet by mouth every day. 100 Tablet 3     No current Epic-ordered facility-administered medications on file.

## 2025-07-29 ENCOUNTER — APPOINTMENT (OUTPATIENT)
Dept: SLEEP MEDICINE | Facility: MEDICAL CENTER | Age: 89
End: 2025-07-29
Attending: STUDENT IN AN ORGANIZED HEALTH CARE EDUCATION/TRAINING PROGRAM
Payer: MEDICARE

## 2025-08-04 ENCOUNTER — APPOINTMENT (OUTPATIENT)
Dept: SLEEP MEDICINE | Facility: MEDICAL CENTER | Age: 89
End: 2025-08-04
Payer: MEDICARE

## 2025-08-26 ENCOUNTER — APPOINTMENT (OUTPATIENT)
Dept: RADIOLOGY | Facility: MEDICAL CENTER | Age: 89
End: 2025-08-26
Attending: EMERGENCY MEDICINE
Payer: MEDICARE

## 2025-08-26 ENCOUNTER — HOSPITAL ENCOUNTER (EMERGENCY)
Facility: MEDICAL CENTER | Age: 89
End: 2025-08-26
Attending: EMERGENCY MEDICINE
Payer: MEDICARE

## 2025-08-26 VITALS
HEART RATE: 60 BPM | DIASTOLIC BLOOD PRESSURE: 63 MMHG | OXYGEN SATURATION: 92 % | WEIGHT: 130 LBS | SYSTOLIC BLOOD PRESSURE: 142 MMHG | RESPIRATION RATE: 18 BRPM | BODY MASS INDEX: 23.04 KG/M2 | TEMPERATURE: 97.4 F | HEIGHT: 63 IN

## 2025-08-26 DIAGNOSIS — N39.0 URINARY TRACT INFECTION WITHOUT HEMATURIA, SITE UNSPECIFIED: ICD-10-CM

## 2025-08-26 DIAGNOSIS — R53.83 FATIGUE, UNSPECIFIED TYPE: Primary | ICD-10-CM

## 2025-08-26 DIAGNOSIS — R79.89 ELEVATED TROPONIN: ICD-10-CM

## 2025-08-26 LAB
ALBUMIN SERPL BCP-MCNC: 4 G/DL (ref 3.2–4.9)
ALBUMIN/GLOB SERPL: 1.1 G/DL
ALP SERPL-CCNC: 65 U/L (ref 30–99)
ALT SERPL-CCNC: 19 U/L (ref 2–50)
ANION GAP SERPL CALC-SCNC: 13 MMOL/L (ref 7–16)
APPEARANCE UR: CLEAR
AST SERPL-CCNC: 20 U/L (ref 12–45)
BACTERIA #/AREA URNS HPF: ABNORMAL /HPF
BASOPHILS # BLD AUTO: 0.9 % (ref 0–1.8)
BASOPHILS # BLD: 0.04 K/UL (ref 0–0.12)
BILIRUB SERPL-MCNC: 0.9 MG/DL (ref 0.1–1.5)
BILIRUB UR QL STRIP.AUTO: NEGATIVE
BUN SERPL-MCNC: 20 MG/DL (ref 8–22)
CALCIUM ALBUM COR SERPL-MCNC: 9.1 MG/DL (ref 8.5–10.5)
CALCIUM SERPL-MCNC: 9.1 MG/DL (ref 8.5–10.5)
CASTS URNS QL MICRO: ABNORMAL /LPF (ref 0–2)
CHLORIDE SERPL-SCNC: 108 MMOL/L (ref 96–112)
CO2 SERPL-SCNC: 23 MMOL/L (ref 20–33)
COLOR UR: YELLOW
CREAT SERPL-MCNC: 0.91 MG/DL (ref 0.5–1.4)
D DIMER PPP IA.FEU-MCNC: 1.18 UG/ML (FEU) (ref 0–0.5)
EKG IMPRESSION: NORMAL
EOSINOPHIL # BLD AUTO: 0.05 K/UL (ref 0–0.51)
EOSINOPHIL NFR BLD: 1.1 % (ref 0–6.9)
EPITHELIAL CELLS 1715: ABNORMAL /HPF (ref 0–5)
ERYTHROCYTE [DISTWIDTH] IN BLOOD BY AUTOMATED COUNT: 40.5 FL (ref 35.9–50)
GFR SERPLBLD CREATININE-BSD FMLA CKD-EPI: 60 ML/MIN/1.73 M 2
GLOBULIN SER CALC-MCNC: 3.5 G/DL (ref 1.9–3.5)
GLUCOSE SERPL-MCNC: 122 MG/DL (ref 65–99)
GLUCOSE UR STRIP.AUTO-MCNC: NEGATIVE MG/DL
HCT VFR BLD AUTO: 44.9 % (ref 37–47)
HGB BLD-MCNC: 15.2 G/DL (ref 12–16)
IMM GRANULOCYTES # BLD AUTO: 0.02 K/UL (ref 0–0.11)
IMM GRANULOCYTES NFR BLD AUTO: 0.5 % (ref 0–0.9)
KETONES UR STRIP.AUTO-MCNC: NEGATIVE MG/DL
LEUKOCYTE ESTERASE UR QL STRIP.AUTO: NEGATIVE
LYMPHOCYTES # BLD AUTO: 1.18 K/UL (ref 1–4.8)
LYMPHOCYTES NFR BLD: 26.9 % (ref 22–41)
MCH RBC QN AUTO: 30.7 PG (ref 27–33)
MCHC RBC AUTO-ENTMCNC: 33.9 G/DL (ref 32.2–35.5)
MCV RBC AUTO: 90.7 FL (ref 81.4–97.8)
MICRO URNS: ABNORMAL
MONOCYTES # BLD AUTO: 0.44 K/UL (ref 0–0.85)
MONOCYTES NFR BLD AUTO: 10 % (ref 0–13.4)
NEUTROPHILS # BLD AUTO: 2.65 K/UL (ref 1.82–7.42)
NEUTROPHILS NFR BLD: 60.6 % (ref 44–72)
NITRITE UR QL STRIP.AUTO: POSITIVE
NRBC # BLD AUTO: 0 K/UL
NRBC BLD-RTO: 0 /100 WBC (ref 0–0.2)
NT-PROBNP SERPL IA-MCNC: 226 PG/ML (ref 0–125)
PH UR STRIP.AUTO: 5.5 [PH] (ref 5–8)
PLATELET # BLD AUTO: 207 K/UL (ref 164–446)
PMV BLD AUTO: 11.5 FL (ref 9–12.9)
POTASSIUM SERPL-SCNC: 3.9 MMOL/L (ref 3.6–5.5)
PROT SERPL-MCNC: 7.5 G/DL (ref 6–8.2)
PROT UR QL STRIP: NEGATIVE MG/DL
RBC # BLD AUTO: 4.95 M/UL (ref 4.2–5.4)
RBC # URNS HPF: ABNORMAL /HPF (ref 0–2)
RBC UR QL AUTO: NEGATIVE
SODIUM SERPL-SCNC: 144 MMOL/L (ref 135–145)
SP GR UR STRIP.AUTO: 1.03
T4 FREE SERPL-MCNC: 1.87 NG/DL (ref 0.93–1.7)
TROPONIN T SERPL-MCNC: 21 NG/L (ref 6–19)
TROPONIN T SERPL-MCNC: 26 NG/L (ref 6–19)
TSH SERPL DL<=0.005 MIU/L-ACNC: 0.19 UIU/ML (ref 0.38–5.33)
UROBILINOGEN UR STRIP.AUTO-MCNC: 0.2 EU/DL
WBC # BLD AUTO: 4.4 K/UL (ref 4.8–10.8)
WBC #/AREA URNS HPF: ABNORMAL /HPF

## 2025-08-26 PROCEDURE — 93005 ELECTROCARDIOGRAM TRACING: CPT | Mod: TC

## 2025-08-26 PROCEDURE — 71045 X-RAY EXAM CHEST 1 VIEW: CPT

## 2025-08-26 PROCEDURE — 99285 EMERGENCY DEPT VISIT HI MDM: CPT

## 2025-08-26 PROCEDURE — 700105 HCHG RX REV CODE 258: Performed by: EMERGENCY MEDICINE

## 2025-08-26 PROCEDURE — 81001 URINALYSIS AUTO W/SCOPE: CPT

## 2025-08-26 PROCEDURE — 83880 ASSAY OF NATRIURETIC PEPTIDE: CPT

## 2025-08-26 PROCEDURE — 80053 COMPREHEN METABOLIC PANEL: CPT

## 2025-08-26 PROCEDURE — 93005 ELECTROCARDIOGRAM TRACING: CPT | Mod: TC | Performed by: EMERGENCY MEDICINE

## 2025-08-26 PROCEDURE — 36415 COLL VENOUS BLD VENIPUNCTURE: CPT

## 2025-08-26 PROCEDURE — 87086 URINE CULTURE/COLONY COUNT: CPT

## 2025-08-26 PROCEDURE — 84484 ASSAY OF TROPONIN QUANT: CPT

## 2025-08-26 PROCEDURE — 700102 HCHG RX REV CODE 250 W/ 637 OVERRIDE(OP): Performed by: EMERGENCY MEDICINE

## 2025-08-26 PROCEDURE — 71275 CT ANGIOGRAPHY CHEST: CPT

## 2025-08-26 PROCEDURE — 84439 ASSAY OF FREE THYROXINE: CPT

## 2025-08-26 PROCEDURE — 85025 COMPLETE CBC W/AUTO DIFF WBC: CPT

## 2025-08-26 PROCEDURE — 85379 FIBRIN DEGRADATION QUANT: CPT

## 2025-08-26 PROCEDURE — 96360 HYDRATION IV INFUSION INIT: CPT | Mod: XU

## 2025-08-26 PROCEDURE — 700117 HCHG RX CONTRAST REV CODE 255: Performed by: EMERGENCY MEDICINE

## 2025-08-26 PROCEDURE — A9270 NON-COVERED ITEM OR SERVICE: HCPCS | Performed by: EMERGENCY MEDICINE

## 2025-08-26 PROCEDURE — 84443 ASSAY THYROID STIM HORMONE: CPT

## 2025-08-26 RX ORDER — CEPHALEXIN 500 MG/1
500 CAPSULE ORAL 2 TIMES DAILY
Qty: 10 CAPSULE | Refills: 0 | Status: ACTIVE | OUTPATIENT
Start: 2025-08-26 | End: 2025-08-31

## 2025-08-26 RX ORDER — CEPHALEXIN 500 MG/1
500 CAPSULE ORAL ONCE
Status: COMPLETED | OUTPATIENT
Start: 2025-08-26 | End: 2025-08-26

## 2025-08-26 RX ORDER — SODIUM CHLORIDE, SODIUM LACTATE, POTASSIUM CHLORIDE, CALCIUM CHLORIDE 600; 310; 30; 20 MG/100ML; MG/100ML; MG/100ML; MG/100ML
1000 INJECTION, SOLUTION INTRAVENOUS ONCE
Status: COMPLETED | OUTPATIENT
Start: 2025-08-26 | End: 2025-08-26

## 2025-08-26 RX ADMIN — SODIUM CHLORIDE, POTASSIUM CHLORIDE, SODIUM LACTATE AND CALCIUM CHLORIDE 1000 ML: 600; 310; 30; 20 INJECTION, SOLUTION INTRAVENOUS at 13:06

## 2025-08-26 RX ADMIN — IOHEXOL 57 ML: 350 INJECTION, SOLUTION INTRAVENOUS at 14:30

## 2025-08-26 RX ADMIN — CEPHALEXIN 500 MG: 500 CAPSULE ORAL at 16:36

## 2025-08-26 ASSESSMENT — FIBROSIS 4 INDEX: FIB4 SCORE: 2.03

## 2025-08-28 ENCOUNTER — APPOINTMENT (OUTPATIENT)
Dept: SLEEP MEDICINE | Facility: MEDICAL CENTER | Age: 89
End: 2025-08-28
Attending: STUDENT IN AN ORGANIZED HEALTH CARE EDUCATION/TRAINING PROGRAM
Payer: MEDICARE

## 2025-08-28 LAB
BACTERIA UR CULT: NORMAL
SIGNIFICANT IND 70042: NORMAL
SITE SITE: NORMAL
SOURCE SOURCE: NORMAL

## (undated) DEVICE — PACK MINOR BASIN - (2EA/CA)

## (undated) DEVICE — ELECTRODE DUAL RETURN W/ CORD - (50/PK)

## (undated) DEVICE — GOWN WARMING STANDARD FLEX - (30/CA)

## (undated) DEVICE — MASK ANESTHESIA ADULT  - (100/CA)

## (undated) DEVICE — SUTURE 3-0 VICRYL PLUS RB-1 - 8 X 18 INCH (12/BX)

## (undated) DEVICE — GLOVE BIOGEL SZ 8 SURGICAL PF LTX - (50PR/BX 4BX/CA)

## (undated) DEVICE — SLEEVE, VASO, THIGH, MED

## (undated) DEVICE — SET EXTENSION WITH 2 PORTS (48EA/CA) ***PART #2C8610 IS A SUBSTITUTE*****

## (undated) DEVICE — TUBING CLEARLINK DUO-VENT - C-FLO (48EA/CA)

## (undated) DEVICE — CONNECTOR HOSE NEPTUNE FOR CYSTO ROOM

## (undated) DEVICE — SYRINGE DISP. 12 CC LL - (100/BX)

## (undated) DEVICE — SYRINGE TOOMEY (50EA/CA)

## (undated) DEVICE — PACK SINGLE BASIN - (6/CA)

## (undated) DEVICE — CHLORAPREP 26 ML APPLICATOR - ORANGE TINT(25/CA)

## (undated) DEVICE — HEAD HOLDER JUNIOR/ADULT

## (undated) DEVICE — SODIUM CHL IRRIGATION 0.9% 1000ML (12EA/CA)

## (undated) DEVICE — PACK CYSTOSCOPY III - (8/CA)

## (undated) DEVICE — SET IRRIGATION CYSTOSCOPY Y-TYPE L81 IN (20EA/CA)

## (undated) DEVICE — WATER IRRIGATION STERILE 1000ML (12EA/CA)

## (undated) DEVICE — CLIP SM INTNL HRZN TI ESCP LGT - (24EA/PK 25PK/BX)

## (undated) DEVICE — BOVIE NEEDLE TIP 3CM COLORADO

## (undated) DEVICE — SUTURE 3-0 VICRYL PLUS SH - 8X 18 INCH (12/BX)

## (undated) DEVICE — ZIPWIRE .038 STRAIGHT BENTSON TIP (5EA/BX)

## (undated) DEVICE — LACTATED RINGERS INJ 1000 ML - (14EA/CA 60CA/PF)

## (undated) DEVICE — SUTURE 5-0 MONOCRYL PLUS PC-3 - (12/BX)

## (undated) DEVICE — CANISTER SUCTION RIGID RED 1500CC (40EA/CA)

## (undated) DEVICE — CONTAINER SPECIMEN BAG OR - STERILE 4 OZ W/LID (100EA/CA)

## (undated) DEVICE — DRAPE LG. APERTURE 33 X 51" - (10EA/BX)"

## (undated) DEVICE — PROTECTOR ULNA NERVE - (36PR/CA)

## (undated) DEVICE — FIBRILLAR SURGICEL 4X4 - 10/CA

## (undated) DEVICE — CLIP MED INTNL HRZN TI ESCP - (25/BX)

## (undated) DEVICE — GOWN SURGEONS LARGE - (32/CA)

## (undated) DEVICE — GLOVE BIOGEL PI INDICATOR SZ 6.5 SURGICAL PF LF - (50/BX 4BX/CA)

## (undated) DEVICE — COVER FOOT UNIVERSAL DISP. - (25EA/CA)

## (undated) DEVICE — SHEAR HS FOCUS 9CM CVD - (6/BX)

## (undated) DEVICE — KIT ANESTHESIA W/CIRCUIT & 3/LT BAG W/FILTER (20EA/CA)

## (undated) DEVICE — GLOVE BIOGEL INDICATOR SZ 6.5 SURGICAL PF LTX - (50PR/BX 4BX/CA)

## (undated) DEVICE — TUBE CONNECTING SUCTION - CLEAR PLASTIC STERILE 72 IN (50EA/CA)

## (undated) DEVICE — SUCTION INSTRUMENT YANKAUER BULBOUS TIP W/O VENT (50EA/CA)

## (undated) DEVICE — SET LEADWIRE 5 LEAD BEDSIDE DISPOSABLE ECG (1SET OF 5/EA)

## (undated) DEVICE — SODIUM CHL. IRRIGATION 0.9% 3000ML (4EA/CA 65CA/PF)

## (undated) DEVICE — SHEET THYROID - (10EA/CA)

## (undated) DEVICE — ELECTRODE 850 FOAM ADHESIVE - HYDROGEL RADIOTRNSPRNT (50/PK)

## (undated) DEVICE — PROBE PRASS STAND STIMULATING (5EA/PK)

## (undated) DEVICE — GLOVE BIOGEL SZ 7.5 SURGICAL PF LTX - (50PR/BX 4BX/CA)

## (undated) DEVICE — CANISTER SUCTION 3000ML MECHANICAL FILTER AUTO SHUTOFF MEDI-VAC NONSTERILE LF DISP  (40EA/CA)

## (undated) DEVICE — GOWN SURGICAL X-LARGE ULTRA - FILM-REINFORCED (20/CA)

## (undated) DEVICE — BAG URODRAIN WITH TUBING - (20/CA)

## (undated) DEVICE — SPONGE GAUZESTER 4 X 4 4PLY - (128PK/CA)

## (undated) DEVICE — DRESSING TRANSPARENT FILM TEGADERM 4 X 4.75" (50EA/BX)"

## (undated) DEVICE — SUTURE GENERAL

## (undated) DEVICE — EVACUATOR BLADDER ELLIK - (10/BX)

## (undated) DEVICE — CATHETER IV 20 GA X 1-1/4 ---SURG.& SDS ONLY--- (50EA/BX)

## (undated) DEVICE — KIT  I.V. START (100EA/CA)

## (undated) DEVICE — TUBE CONNECT SUCTION CLEAR 120 X 1/4" (50EA/CA)"

## (undated) DEVICE — SPONGE PEANUT - (5/PK 50PK/CA)

## (undated) DEVICE — SUTURE 3-0 VICRYL PLUS SH - 27 INCH (36/BX)

## (undated) DEVICE — SENSOR SPO2 NEO LNCS ADHESIVE (20/BX) SEE USER NOTES

## (undated) DEVICE — WATER IRRIG. STER 3000 ML - (4/CA)

## (undated) DEVICE — TUBE E-T HI-LO CUFF 7.0MM (10EA/PK)

## (undated) DEVICE — GOWN SURGEONS X-LARGE - DISP. (30/CA)

## (undated) DEVICE — NEPTUNE 4 PORT MANIFOLD - (20/PK)

## (undated) DEVICE — TOWEL STOP TIMEOUT SAFETY FLAG (40EA/CA)

## (undated) DEVICE — JELLY SURGILUBE STERILE TUBE 4.25 OZ (1/EA)

## (undated) DEVICE — DRAPE MAGNETIC (INSTRA-MAG) - (30/CA)